# Patient Record
Sex: FEMALE | Race: OTHER | NOT HISPANIC OR LATINO | URBAN - METROPOLITAN AREA
[De-identification: names, ages, dates, MRNs, and addresses within clinical notes are randomized per-mention and may not be internally consistent; named-entity substitution may affect disease eponyms.]

---

## 2020-09-14 ENCOUNTER — EMERGENCY (EMERGENCY)
Facility: HOSPITAL | Age: 45
LOS: 1 days | Discharge: HOME | End: 2020-09-14
Attending: HOSPITALIST | Admitting: HOSPITALIST
Payer: COMMERCIAL

## 2020-09-14 VITALS
RESPIRATION RATE: 16 BRPM | TEMPERATURE: 98 F | SYSTOLIC BLOOD PRESSURE: 140 MMHG | HEART RATE: 83 BPM | DIASTOLIC BLOOD PRESSURE: 85 MMHG | OXYGEN SATURATION: 99 %

## 2020-09-14 VITALS
TEMPERATURE: 99 F | HEART RATE: 86 BPM | RESPIRATION RATE: 16 BRPM | SYSTOLIC BLOOD PRESSURE: 150 MMHG | OXYGEN SATURATION: 99 % | DIASTOLIC BLOOD PRESSURE: 87 MMHG

## 2020-09-14 LAB
APPEARANCE UR: CLEAR — SIGNIFICANT CHANGE UP
BACTERIA # UR AUTO: SIGNIFICANT CHANGE UP
BILIRUB UR-MCNC: NEGATIVE — SIGNIFICANT CHANGE UP
BLOOD UR QL VISUAL: SIGNIFICANT CHANGE UP
COLOR SPEC: YELLOW — SIGNIFICANT CHANGE UP
GLUCOSE UR-MCNC: NEGATIVE — SIGNIFICANT CHANGE UP
HYALINE CASTS # UR AUTO: NEGATIVE — SIGNIFICANT CHANGE UP
KETONES UR-MCNC: NEGATIVE — SIGNIFICANT CHANGE UP
LEUKOCYTE ESTERASE UR-ACNC: NEGATIVE — SIGNIFICANT CHANGE UP
MUCOUS THREADS # UR AUTO: SIGNIFICANT CHANGE UP
NITRITE UR-MCNC: NEGATIVE — SIGNIFICANT CHANGE UP
PH UR: 7.5 — SIGNIFICANT CHANGE UP (ref 5–8)
PROT UR-MCNC: 20 — SIGNIFICANT CHANGE UP
RBC CASTS # UR COMP ASSIST: HIGH (ref 0–?)
SP GR SPEC: 1.03 — SIGNIFICANT CHANGE UP (ref 1–1.04)
SQUAMOUS # UR AUTO: SIGNIFICANT CHANGE UP
UROBILINOGEN FLD QL: NORMAL — SIGNIFICANT CHANGE UP
WBC UR QL: SIGNIFICANT CHANGE UP (ref 0–?)

## 2020-09-14 PROCEDURE — 99284 EMERGENCY DEPT VISIT MOD MDM: CPT

## 2020-09-14 PROCEDURE — 76830 TRANSVAGINAL US NON-OB: CPT | Mod: 26

## 2020-09-14 RX ORDER — IBUPROFEN 200 MG
600 TABLET ORAL ONCE
Refills: 0 | Status: COMPLETED | OUTPATIENT
Start: 2020-09-14 | End: 2020-09-14

## 2020-09-14 RX ADMIN — Medication 600 MILLIGRAM(S): at 21:10

## 2020-09-14 NOTE — ED ADULT TRIAGE NOTE - CHIEF COMPLAINT QUOTE
pt c/o dysuria and lower abdominal pain for approx 4 days. Pt unsure if she has had fevers but feels like she has chills. Denies PMH. Appears comfortable.

## 2020-09-14 NOTE — ED PROVIDER NOTE - NSFOLLOWUPINSTRUCTIONS_ED_ALL_ED_FT
please follow up with a primary care doctor, referral list provided as requested  return for any new or worsening pain  Take tylenol as needed for pain, 650Mg every 6-8 hours.  You can also take ibuprofen as needed for pain, 600mg every 6-8 hours, take with food.

## 2020-09-14 NOTE — ED PROVIDER NOTE - NS_ ATTENDINGSCRIBEDETAILS _ED_A_ED_FT
Ashley Lebron MD: The history, relevant review of systems, past medical and surgical history, medical decision making, and physical examination was documented by the scribe in my presence and I attest to the accuracy of the documentation.

## 2020-09-14 NOTE — ED ADULT NURSE NOTE - OBJECTIVE STATEMENT
patient aaox3. ambulatory w/o assist. came in with lower abdominal pain 8/10 radiating to the back, dysuria, foul smelling urine. denies hematuria, vaginal discharge, bleeding, chance of pregnancy. also reports chills, no fever denies nausea, vomiting. came from [place] a month ago. urine tests sent earlier. Will continue to monitor

## 2020-09-14 NOTE — ED PROVIDER NOTE - PATIENT PORTAL LINK FT
You can access the FollowMyHealth Patient Portal offered by [facility] by registering at the following website: [email or web address]. By joining [employer]’s FollowMyHealth portal, you will also be able to view your health information using other applications (apps) compatible with our system.

## 2020-09-17 RX ORDER — MOXIFLOXACIN HYDROCHLORIDE TABLETS, 400 MG 400 MG/1
1 TABLET, FILM COATED ORAL
Qty: 20 | Refills: 0
Start: 2020-09-17 | End: 2020-09-26

## 2020-09-22 ENCOUNTER — EMERGENCY (EMERGENCY)
Facility: HOSPITAL | Age: 45
LOS: 1 days | Discharge: HOME | End: 2020-09-22
Attending: STUDENT IN AN ORGANIZED HEALTH CARE EDUCATION/TRAINING PROGRAM | Admitting: STUDENT IN AN ORGANIZED HEALTH CARE EDUCATION/TRAINING PROGRAM
Payer: COMMERCIAL

## 2020-09-22 VITALS
SYSTOLIC BLOOD PRESSURE: 120 MMHG | OXYGEN SATURATION: 100 % | TEMPERATURE: 98 F | RESPIRATION RATE: 20 BRPM | DIASTOLIC BLOOD PRESSURE: 48 MMHG | HEART RATE: 76 BPM

## 2020-09-22 VITALS
HEART RATE: 60 BPM | TEMPERATURE: 98 F | RESPIRATION RATE: 19 BRPM | SYSTOLIC BLOOD PRESSURE: 133 MMHG | DIASTOLIC BLOOD PRESSURE: 64 MMHG | OXYGEN SATURATION: 99 %

## 2020-09-22 LAB
ALBUMIN SERPL ELPH-MCNC: 4.5 G/DL — SIGNIFICANT CHANGE UP (ref 3.3–5)
ALP SERPL-CCNC: 80 U/L — SIGNIFICANT CHANGE UP (ref 40–120)
ALT FLD-CCNC: 19 U/L — SIGNIFICANT CHANGE UP (ref 4–33)
ANION GAP SERPL CALC-SCNC: 12 MMO/L — SIGNIFICANT CHANGE UP (ref 7–14)
APPEARANCE UR: CLEAR — SIGNIFICANT CHANGE UP
AST SERPL-CCNC: 31 U/L — SIGNIFICANT CHANGE UP (ref 4–32)
BASE EXCESS BLDV CALC-SCNC: 2.3 MMOL/L — SIGNIFICANT CHANGE UP
BASOPHILS # BLD AUTO: 0.04 K/UL — SIGNIFICANT CHANGE UP (ref 0–0.2)
BASOPHILS NFR BLD AUTO: 0.7 % — SIGNIFICANT CHANGE UP (ref 0–2)
BILIRUB SERPL-MCNC: 0.2 MG/DL — SIGNIFICANT CHANGE UP (ref 0.2–1.2)
BILIRUB UR-MCNC: NEGATIVE — SIGNIFICANT CHANGE UP
BLOOD GAS VENOUS - CREATININE: 0.81 MG/DL — SIGNIFICANT CHANGE UP (ref 0.5–1.3)
BLOOD GAS VENOUS - FIO2: 21 — SIGNIFICANT CHANGE UP
BLOOD UR QL VISUAL: NEGATIVE — SIGNIFICANT CHANGE UP
BUN SERPL-MCNC: 9 MG/DL — SIGNIFICANT CHANGE UP (ref 7–23)
CALCIUM SERPL-MCNC: 10.2 MG/DL — SIGNIFICANT CHANGE UP (ref 8.4–10.5)
CHLORIDE BLDV-SCNC: 109 MMOL/L — HIGH (ref 96–108)
CHLORIDE SERPL-SCNC: 103 MMOL/L — SIGNIFICANT CHANGE UP (ref 98–107)
CO2 SERPL-SCNC: 24 MMOL/L — SIGNIFICANT CHANGE UP (ref 22–31)
COLOR SPEC: SIGNIFICANT CHANGE UP
CREAT SERPL-MCNC: 0.79 MG/DL — SIGNIFICANT CHANGE UP (ref 0.5–1.3)
EOSINOPHIL # BLD AUTO: 0.18 K/UL — SIGNIFICANT CHANGE UP (ref 0–0.5)
EOSINOPHIL NFR BLD AUTO: 3 % — SIGNIFICANT CHANGE UP (ref 0–6)
GAS PNL BLDV: 133 MMOL/L — LOW (ref 136–146)
GLUCOSE BLDV-MCNC: 90 MG/DL — SIGNIFICANT CHANGE UP (ref 70–99)
GLUCOSE SERPL-MCNC: 93 MG/DL — SIGNIFICANT CHANGE UP (ref 70–99)
GLUCOSE UR-MCNC: NEGATIVE — SIGNIFICANT CHANGE UP
HCO3 BLDV-SCNC: 25 MMOL/L — SIGNIFICANT CHANGE UP (ref 20–27)
HCT VFR BLD CALC: 39.1 % — SIGNIFICANT CHANGE UP (ref 34.5–45)
HCT VFR BLDV CALC: 39.7 % — SIGNIFICANT CHANGE UP (ref 34.5–45)
HGB BLD-MCNC: 12.2 G/DL — SIGNIFICANT CHANGE UP (ref 11.5–15.5)
HGB BLDV-MCNC: 12.9 G/DL — SIGNIFICANT CHANGE UP (ref 11.5–15.5)
IMM GRANULOCYTES NFR BLD AUTO: 0.3 % — SIGNIFICANT CHANGE UP (ref 0–1.5)
KETONES UR-MCNC: NEGATIVE — SIGNIFICANT CHANGE UP
LACTATE BLDV-MCNC: 1.4 MMOL/L — SIGNIFICANT CHANGE UP (ref 0.5–2)
LEUKOCYTE ESTERASE UR-ACNC: NEGATIVE — SIGNIFICANT CHANGE UP
LYMPHOCYTES # BLD AUTO: 1.72 K/UL — SIGNIFICANT CHANGE UP (ref 1–3.3)
LYMPHOCYTES # BLD AUTO: 28.5 % — SIGNIFICANT CHANGE UP (ref 13–44)
MCHC RBC-ENTMCNC: 28.9 PG — SIGNIFICANT CHANGE UP (ref 27–34)
MCHC RBC-ENTMCNC: 31.2 % — LOW (ref 32–36)
MCV RBC AUTO: 92.7 FL — SIGNIFICANT CHANGE UP (ref 80–100)
MONOCYTES # BLD AUTO: 0.29 K/UL — SIGNIFICANT CHANGE UP (ref 0–0.9)
MONOCYTES NFR BLD AUTO: 4.8 % — SIGNIFICANT CHANGE UP (ref 2–14)
NEUTROPHILS # BLD AUTO: 3.79 K/UL — SIGNIFICANT CHANGE UP (ref 1.8–7.4)
NEUTROPHILS NFR BLD AUTO: 62.7 % — SIGNIFICANT CHANGE UP (ref 43–77)
NITRITE UR-MCNC: NEGATIVE — SIGNIFICANT CHANGE UP
NRBC # FLD: 0 K/UL — SIGNIFICANT CHANGE UP (ref 0–0)
PCO2 BLDV: 49 MMHG — SIGNIFICANT CHANGE UP (ref 41–51)
PH BLDV: 7.36 PH — SIGNIFICANT CHANGE UP (ref 7.32–7.43)
PH UR: 7.5 — SIGNIFICANT CHANGE UP (ref 5–8)
PLATELET # BLD AUTO: 254 K/UL — SIGNIFICANT CHANGE UP (ref 150–400)
PMV BLD: 11.7 FL — SIGNIFICANT CHANGE UP (ref 7–13)
PO2 BLDV: 32 MMHG — LOW (ref 35–40)
POTASSIUM BLDV-SCNC: 4 MMOL/L — SIGNIFICANT CHANGE UP (ref 3.4–4.5)
POTASSIUM SERPL-MCNC: 4.3 MMOL/L — SIGNIFICANT CHANGE UP (ref 3.5–5.3)
POTASSIUM SERPL-SCNC: 4.3 MMOL/L — SIGNIFICANT CHANGE UP (ref 3.5–5.3)
PROT SERPL-MCNC: 6.8 G/DL — SIGNIFICANT CHANGE UP (ref 6–8.3)
PROT UR-MCNC: 10 — SIGNIFICANT CHANGE UP
RBC # BLD: 4.22 M/UL — SIGNIFICANT CHANGE UP (ref 3.8–5.2)
RBC # FLD: 12.8 % — SIGNIFICANT CHANGE UP (ref 10.3–14.5)
SAO2 % BLDV: 53.7 % — LOW (ref 60–85)
SODIUM SERPL-SCNC: 139 MMOL/L — SIGNIFICANT CHANGE UP (ref 135–145)
SP GR SPEC: 1.01 — SIGNIFICANT CHANGE UP (ref 1–1.04)
UROBILINOGEN FLD QL: NORMAL — SIGNIFICANT CHANGE UP
WBC # BLD: 6.04 K/UL — SIGNIFICANT CHANGE UP (ref 3.8–10.5)
WBC # FLD AUTO: 6.04 K/UL — SIGNIFICANT CHANGE UP (ref 3.8–10.5)

## 2020-09-22 PROCEDURE — 74177 CT ABD & PELVIS W/CONTRAST: CPT | Mod: 26

## 2020-09-22 PROCEDURE — 99284 EMERGENCY DEPT VISIT MOD MDM: CPT

## 2020-09-22 RX ORDER — KETOROLAC TROMETHAMINE 30 MG/ML
15 SYRINGE (ML) INJECTION ONCE
Refills: 0 | Status: DISCONTINUED | OUTPATIENT
Start: 2020-09-22 | End: 2020-09-22

## 2020-09-22 RX ORDER — SODIUM CHLORIDE 9 MG/ML
1000 INJECTION INTRAMUSCULAR; INTRAVENOUS; SUBCUTANEOUS ONCE
Refills: 0 | Status: COMPLETED | OUTPATIENT
Start: 2020-09-22 | End: 2020-09-22

## 2020-09-22 RX ADMIN — Medication 15 MILLIGRAM(S): at 18:29

## 2020-09-22 RX ADMIN — Medication 15 MILLIGRAM(S): at 16:00

## 2020-09-22 RX ADMIN — SODIUM CHLORIDE 1000 MILLILITER(S): 9 INJECTION INTRAMUSCULAR; INTRAVENOUS; SUBCUTANEOUS at 16:00

## 2020-09-22 RX ADMIN — SODIUM CHLORIDE 1000 MILLILITER(S): 9 INJECTION INTRAMUSCULAR; INTRAVENOUS; SUBCUTANEOUS at 17:00

## 2020-09-22 NOTE — ED PROVIDER NOTE - PROGRESS NOTE DETAILS
ITA Aldana: Pt reassessed, states back pain has improved although still feels some discomfort. CT abdomen consistent with mild wall thickening of the sigmoid which may be partially due to underdistention vs colitis. Hx clinically not consistent w/ colitis. Sx likely related to UTI. Discussed labs and imaging with patient, agrees to follow up with her primary care doctor for further management. Pt instructed to abstain from sexual intercourse until completion of her abx.

## 2020-09-22 NOTE — ED PROVIDER NOTE - CARE PLAN
Principal Discharge DX:	Abdominal pain  Secondary Diagnosis:	Back pain  Secondary Diagnosis:	UTI (urinary tract infection)

## 2020-09-22 NOTE — ED PROVIDER NOTE - CLINICAL SUMMARY MEDICAL DECISION MAKING FREE TEXT BOX
46 y/o F no PMHx here c/o lower abdominal and lower back pain, worsening x 1 week. Abdomen tender in RLQ. Sx likely related to UTI but will do ct abdomen to r/o appendicitis.

## 2020-09-22 NOTE — ED PROVIDER NOTE - ATTENDING CONTRIBUTION TO CARE
Alex RYAN: I agree with the above provided history and exam and addend/modify it as follows.    46 y/o F no PMHx here c/o lower abdominal and lower back pain, worsening x 1 week. Was seen in the ED 1 week ago for urinary sx, diagnosed with a UTI and treated with Cipro BID x 10 days. Patient reports diffuse pain w/ chills, and nausea. No fever, vomit, cough, sob, dizziness.     On exam patient diffusely tender over abdomen, particularly bilateral lower abd. Otherwise no CVA TTP, no erythema, rash, fever,     I Hector Johnson MD performed a history and physical exam of the patient and discussed their management with the resident and /or advanced care provider. I reviewed the resident and /or ACP's note and agree with the documented findings and plan of care. My medical decision making and observations are found above. Alex RYAN: I agree with the above provided history and exam and addend/modify it as follows.    46 y/o F no PMHx here c/o lower abdominal and lower back pain, worsening x 1 week. Was seen in the ED 1 week ago for urinary sx, diagnosed with a UTI and treated with Cipro BID x 10 days. Patient reports diffuse pain w/ chills, and nausea. No fever, vomit, cough, sob, dizziness.     On exam patient diffusely tender over abdomen, particularly bilateral lower abd. Otherwise no CVA TTP, no erythema, rash, tachycardia, etc.    Possible sx 2/2 UTI, ?pyelo, but also will check CTAP for appendicitis/diverticulitis, will reassess, consider dispo after    I Hector Johnson MD performed a history and physical exam of the patient and discussed their management with the resident and /or advanced care provider. I reviewed the resident and /or ACP's note and agree with the documented findings and plan of care. My medical decision making and observations are found above.

## 2020-09-22 NOTE — ED ADULT NURSE NOTE - OBJECTIVE STATEMENT
Patient was treated for a UTI, Called back, given medication for a kidney infection. Pt was to take antibiotic for 10 days. Pt is on her third day of medication and is c/o RT flank pain. 20 g line placed in LT AC, labs sent.

## 2020-09-22 NOTE — ED PROVIDER NOTE - OBJECTIVE STATEMENT
44 y/o F no PMHx here c/o lower abdominal and lower back pain, worsening x 1 week. Was seen in the ED 1 week ago for urinary sx, diagnosed with a UTI and treated with Cipro BID x 10 days. States she has been taking the abx for 3 days with worsening back and abdominal pain. States she feels run down. +chills, no fevers. No vaginal discharge. Sexually active w/ 1 partner. Last pelvic exam was 6 months ago, normal STD screen at the time.

## 2020-09-22 NOTE — ED PROVIDER NOTE - PATIENT PORTAL LINK FT
You can access the FollowMyHealth Patient Portal offered by [facility] by registering at the following website: [email or web address]. By joining [employer]’s FollowMyHealth portal, you will also be able to view your health information using other applications (apps) compatible with our system.

## 2020-09-22 NOTE — ED ADULT TRIAGE NOTE - CHIEF COMPLAINT QUOTE
Patient was treated for a UTI, Called back, given medication for a kidney infection. Pt was to take antibiotic for 10 days. Pt is on her third day of medication and is c/o pain still.

## 2020-09-22 NOTE — ED PROVIDER NOTE - NSFOLLOWUPINSTRUCTIONS_ED_ALL_ED_FT
See your primary care doctor within 24-48 hours, bring copies of all reports with you. Finish the full course of Cipro as prescribed to you. Take Motrin 600mg every 8hrs with food for pain. Drink plenty of fluids. Abstain from sexual intercourse until completion of your antibiotic. Return to the ER for worsening symptoms or any other concerns.

## 2020-09-23 LAB
CULTURE RESULTS: NO GROWTH — SIGNIFICANT CHANGE UP
SPECIMEN SOURCE: SIGNIFICANT CHANGE UP

## 2020-11-29 ENCOUNTER — INPATIENT (INPATIENT)
Facility: HOSPITAL | Age: 45
LOS: 3 days | Discharge: LEFT BEFORE DISCHARGE | End: 2020-12-03
Attending: INTERNAL MEDICINE | Admitting: INTERNAL MEDICINE
Payer: COMMERCIAL

## 2020-11-29 VITALS
SYSTOLIC BLOOD PRESSURE: 134 MMHG | TEMPERATURE: 98 F | DIASTOLIC BLOOD PRESSURE: 81 MMHG | RESPIRATION RATE: 20 BRPM | OXYGEN SATURATION: 100 % | HEART RATE: 102 BPM

## 2020-11-29 DIAGNOSIS — Z90.710 ACQUIRED ABSENCE OF BOTH CERVIX AND UTERUS: Chronic | ICD-10-CM

## 2020-11-29 LAB
ALBUMIN SERPL ELPH-MCNC: 4.1 G/DL — SIGNIFICANT CHANGE UP (ref 3.3–5)
ALP SERPL-CCNC: 86 U/L — SIGNIFICANT CHANGE UP (ref 40–120)
ALT FLD-CCNC: 17 U/L — SIGNIFICANT CHANGE UP (ref 4–33)
ANION GAP SERPL CALC-SCNC: 8 MMO/L — SIGNIFICANT CHANGE UP (ref 7–14)
APPEARANCE UR: SIGNIFICANT CHANGE UP
AST SERPL-CCNC: 31 U/L — SIGNIFICANT CHANGE UP (ref 4–32)
BACTERIA # UR AUTO: SIGNIFICANT CHANGE UP
BASOPHILS # BLD AUTO: 0.04 K/UL — SIGNIFICANT CHANGE UP (ref 0–0.2)
BASOPHILS NFR BLD AUTO: 0.6 % — SIGNIFICANT CHANGE UP (ref 0–2)
BILIRUB SERPL-MCNC: 0.3 MG/DL — SIGNIFICANT CHANGE UP (ref 0.2–1.2)
BILIRUB UR-MCNC: NEGATIVE — SIGNIFICANT CHANGE UP
BLOOD UR QL VISUAL: NEGATIVE — SIGNIFICANT CHANGE UP
BUN SERPL-MCNC: 9 MG/DL — SIGNIFICANT CHANGE UP (ref 7–23)
CALCIUM SERPL-MCNC: 9.2 MG/DL — SIGNIFICANT CHANGE UP (ref 8.4–10.5)
CHLORIDE SERPL-SCNC: 106 MMOL/L — SIGNIFICANT CHANGE UP (ref 98–107)
CO2 SERPL-SCNC: 25 MMOL/L — SIGNIFICANT CHANGE UP (ref 22–31)
COLOR SPEC: YELLOW — SIGNIFICANT CHANGE UP
CREAT SERPL-MCNC: 0.71 MG/DL — SIGNIFICANT CHANGE UP (ref 0.5–1.3)
EOSINOPHIL # BLD AUTO: 0.17 K/UL — SIGNIFICANT CHANGE UP (ref 0–0.5)
EOSINOPHIL NFR BLD AUTO: 2.5 % — SIGNIFICANT CHANGE UP (ref 0–6)
GLUCOSE SERPL-MCNC: 77 MG/DL — SIGNIFICANT CHANGE UP (ref 70–99)
GLUCOSE UR-MCNC: NEGATIVE — SIGNIFICANT CHANGE UP
HCT VFR BLD CALC: 35.2 % — SIGNIFICANT CHANGE UP (ref 34.5–45)
HGB BLD-MCNC: 11.4 G/DL — LOW (ref 11.5–15.5)
HYALINE CASTS # UR AUTO: NEGATIVE — SIGNIFICANT CHANGE UP
IMM GRANULOCYTES NFR BLD AUTO: 0.3 % — SIGNIFICANT CHANGE UP (ref 0–1.5)
KETONES UR-MCNC: NEGATIVE — SIGNIFICANT CHANGE UP
LEUKOCYTE ESTERASE UR-ACNC: NEGATIVE — SIGNIFICANT CHANGE UP
LYMPHOCYTES # BLD AUTO: 1.25 K/UL — SIGNIFICANT CHANGE UP (ref 1–3.3)
LYMPHOCYTES # BLD AUTO: 18.6 % — SIGNIFICANT CHANGE UP (ref 13–44)
MCHC RBC-ENTMCNC: 28.9 PG — SIGNIFICANT CHANGE UP (ref 27–34)
MCHC RBC-ENTMCNC: 32.4 % — SIGNIFICANT CHANGE UP (ref 32–36)
MCV RBC AUTO: 89.3 FL — SIGNIFICANT CHANGE UP (ref 80–100)
MONOCYTES # BLD AUTO: 0.54 K/UL — SIGNIFICANT CHANGE UP (ref 0–0.9)
MONOCYTES NFR BLD AUTO: 8 % — SIGNIFICANT CHANGE UP (ref 2–14)
NEUTROPHILS # BLD AUTO: 4.7 K/UL — SIGNIFICANT CHANGE UP (ref 1.8–7.4)
NEUTROPHILS NFR BLD AUTO: 70 % — SIGNIFICANT CHANGE UP (ref 43–77)
NITRITE UR-MCNC: NEGATIVE — SIGNIFICANT CHANGE UP
NRBC # FLD: 0 K/UL — SIGNIFICANT CHANGE UP (ref 0–0)
PH UR: 8.5 — HIGH (ref 5–8)
PLATELET # BLD AUTO: 230 K/UL — SIGNIFICANT CHANGE UP (ref 150–400)
PMV BLD: 10.7 FL — SIGNIFICANT CHANGE UP (ref 7–13)
POTASSIUM SERPL-MCNC: 4 MMOL/L — SIGNIFICANT CHANGE UP (ref 3.5–5.3)
POTASSIUM SERPL-SCNC: 4 MMOL/L — SIGNIFICANT CHANGE UP (ref 3.5–5.3)
PROT SERPL-MCNC: 6.7 G/DL — SIGNIFICANT CHANGE UP (ref 6–8.3)
PROT UR-MCNC: 30 — SIGNIFICANT CHANGE UP
RBC # BLD: 3.94 M/UL — SIGNIFICANT CHANGE UP (ref 3.8–5.2)
RBC # FLD: 12.7 % — SIGNIFICANT CHANGE UP (ref 10.3–14.5)
RBC CASTS # UR COMP ASSIST: >50 — HIGH (ref 0–?)
SODIUM SERPL-SCNC: 139 MMOL/L — SIGNIFICANT CHANGE UP (ref 135–145)
SP GR SPEC: 1.02 — SIGNIFICANT CHANGE UP (ref 1–1.04)
SQUAMOUS # UR AUTO: SIGNIFICANT CHANGE UP
UROBILINOGEN FLD QL: NORMAL — SIGNIFICANT CHANGE UP
WBC # BLD: 6.72 K/UL — SIGNIFICANT CHANGE UP (ref 3.8–10.5)
WBC # FLD AUTO: 6.72 K/UL — SIGNIFICANT CHANGE UP (ref 3.8–10.5)
WBC UR QL: SIGNIFICANT CHANGE UP (ref 0–?)

## 2020-11-29 PROCEDURE — 76830 TRANSVAGINAL US NON-OB: CPT | Mod: 26

## 2020-11-29 PROCEDURE — 74177 CT ABD & PELVIS W/CONTRAST: CPT | Mod: 26

## 2020-11-29 RX ORDER — MORPHINE SULFATE 50 MG/1
4 CAPSULE, EXTENDED RELEASE ORAL ONCE
Refills: 0 | Status: DISCONTINUED | OUTPATIENT
Start: 2020-11-29 | End: 2020-11-29

## 2020-11-29 RX ORDER — KETOROLAC TROMETHAMINE 30 MG/ML
30 SYRINGE (ML) INJECTION EVERY 8 HOURS
Refills: 0 | Status: DISCONTINUED | OUTPATIENT
Start: 2020-11-29 | End: 2020-12-02

## 2020-11-29 RX ORDER — KETOROLAC TROMETHAMINE 30 MG/ML
30 SYRINGE (ML) INJECTION ONCE
Refills: 0 | Status: DISCONTINUED | OUTPATIENT
Start: 2020-11-29 | End: 2020-11-29

## 2020-11-29 RX ORDER — MORPHINE SULFATE 50 MG/1
4 CAPSULE, EXTENDED RELEASE ORAL EVERY 6 HOURS
Refills: 0 | Status: DISCONTINUED | OUTPATIENT
Start: 2020-11-29 | End: 2020-12-03

## 2020-11-29 RX ORDER — SODIUM CHLORIDE 9 MG/ML
1000 INJECTION INTRAMUSCULAR; INTRAVENOUS; SUBCUTANEOUS ONCE
Refills: 0 | Status: COMPLETED | OUTPATIENT
Start: 2020-11-29 | End: 2020-11-29

## 2020-11-29 RX ORDER — SODIUM CHLORIDE 9 MG/ML
1000 INJECTION, SOLUTION INTRAVENOUS
Refills: 0 | Status: DISCONTINUED | OUTPATIENT
Start: 2020-11-29 | End: 2020-12-02

## 2020-11-29 RX ADMIN — Medication 1 TABLET(S): at 22:47

## 2020-11-29 RX ADMIN — SODIUM CHLORIDE 1000 MILLILITER(S): 9 INJECTION INTRAMUSCULAR; INTRAVENOUS; SUBCUTANEOUS at 17:35

## 2020-11-29 RX ADMIN — Medication 30 MILLIGRAM(S): at 21:30

## 2020-11-29 RX ADMIN — Medication 30 MILLIGRAM(S): at 20:57

## 2020-11-29 RX ADMIN — SODIUM CHLORIDE 75 MILLILITER(S): 9 INJECTION, SOLUTION INTRAVENOUS at 22:47

## 2020-11-29 RX ADMIN — MORPHINE SULFATE 4 MILLIGRAM(S): 50 CAPSULE, EXTENDED RELEASE ORAL at 22:47

## 2020-11-29 RX ADMIN — MORPHINE SULFATE 4 MILLIGRAM(S): 50 CAPSULE, EXTENDED RELEASE ORAL at 17:41

## 2020-11-29 NOTE — ED CDU PROVIDER INITIAL DAY NOTE - ATTENDING CONTRIBUTION TO CARE
ED Attending (Dr Bowen): I have personally performed a face to face bedside history and physical examination of this patient.  I have discussed the case with the PA and  I have personally authored the following components: HPI, ROS, Physical Exam and MDM in addition to reviewing and revising the rest of the Provider Note. Pt p/w LLQ pain, found to have acute sigmoid diverticulitis and possible hemorrhagic ovarian cyst. Still with pain and tenderness to LLQ. Labs unremarkable. Pt requiring multiple doses of IV analgesia. Plan to observe overnight in CDU to continue pain control and antibiotics. Reassess in AM.

## 2020-11-29 NOTE — ED CDU PROVIDER INITIAL DAY NOTE - MEDICAL DECISION MAKING DETAILS
acute sigmoid diverticulitis. Labs unremarkable. Pt requiring multiple doses of IV analgesia. Plan to observe overnight in CDU to continue pain control and antibiotics. If pain controlled overnight pt can likely be d/c in morning. Pt p/w LLQ pain, found to have acute sigmoid diverticulitis and possible hemorrhagic ovarian cyst. Still with pain and tenderness to LLQ. Labs unremarkable. Pt requiring multiple doses of IV analgesia. Plan to observe overnight in CDU to continue pain control and antibiotics. Reassess in AM.

## 2020-11-29 NOTE — ED PROVIDER NOTE - CLINICAL SUMMARY MEDICAL DECISION MAKING FREE TEXT BOX
46 y/o h/o endometrial cancer s/p hysterectomy without BSO presents with severe pelvic pain. Mostly tender to left adnexa with vaginal discharge. Concern for GC chlamydia vs PAD. Pain control, transvaginal US, urine culture and UA. R/O pregnancy and reassess. 46 y/o h/o endometrial cancer s/p hysterectomy without BSO presents with severe pelvic pain. Mostly tender to left adnexa with vaginal discharge. Concern for GC chlamydia vs PID. Pain control, transvaginal US, urine culture and UA. R/O pregnancy and reassess.

## 2020-11-29 NOTE — ED ADULT NURSE REASSESSMENT NOTE - COMFORT CARE
plan of care explained
wait time explained/plan of care explained/ambulatory in area, placed on stretcher for comfort

## 2020-11-29 NOTE — ED ADULT NURSE REASSESSMENT NOTE - SYMPTOMS
complains of increasing lower abd pain 9/10 currently,  denies nausea and vomiting
c.o. continued pain in LLQ, unrelieved by tordol
states pain is better after morphine, decreased to 4/10, resting quietly

## 2020-11-29 NOTE — ED PROVIDER NOTE - PHYSICAL EXAMINATION
pt appears uncomfortable and is clutching lower abdomen and using a heating pad   Pelvic exam chaperone RN   tender at left adnexa and right adnexa, external genital normal, TTP left adnexa and right adnexa more left, mild watery discharge, no swabs available in ED for a GC pt appears uncomfortable and is clutching lower abdomen and using a heating pad   Pelvic exam chaperone RN Mara  tender at left adnexa and right adnexa, external genital normal, TTP left adnexa and right adnexa more left, mild watery discharge, no swabs available in ED for a GC

## 2020-11-29 NOTE — ED PROVIDER NOTE - OBJECTIVE STATEMENT
46 y/o F with PMHx of endometrial cancer s/p hysterectomy several years ago presents to the ED with severe lower abdominal pian with watery discharge for the last 3 days. Denies n/v/d, fever, chills, vaginal bleeding. Reports pain is stabbing, constant and severe. Tried Ibuprofen without relief. Pt recently  and sexually active with one male partner. Denies cough, SOB, upper abdominal pain. Pain is much worse with palpitations. No alleviating factors. 44 y/o F with PMHx of endometrial cancer s/p hysterectomy (no BSO) several years ago presents to the ED with severe lower abdominal pian with watery discharge for the last 3 days. Denies n/v/d, fever, chills, vaginal bleeding. Reports pain is stabbing, constant and severe. Tried Ibuprofen without relief. Pt recently  and sexually active with one male partner. Denies cough, SOB, upper abdominal pain. Pain is much worse with palpitations. No alleviating factors.

## 2020-11-29 NOTE — ED ADULT NURSE REASSESSMENT NOTE - ANCILLARY STATUS
radiology results pending
covid swab done/lab results pending/cardiovascular tests pending
lab results pending

## 2020-11-29 NOTE — ED PROVIDER NOTE - PROGRESS NOTE DETAILS
Reviewed imaging c pt incl known ovarian cyst and divertic.  Pt states NSAID not helping, though felt somewhat better c Morphine. Agreeable to CDU. Accepted for abx, pain control, AM reassessment.

## 2020-11-29 NOTE — ED ADULT NURSE REASSESSMENT NOTE - REASSESS COMMUNICATION
medicated as ordered/ED physician notified
moved to intake area for continued evaluation, report to area RNs
ED physician notified/medicated as ordered

## 2020-11-29 NOTE — ED ADULT NURSE NOTE - OBJECTIVE STATEMENT
pt received to intake, a&ox 3, ambulatory, pmh of hysertectomy, ovarian cysts p/w lower abdominal/pelvic pain since friday. Pt breathing even and unlabored on room air. Denies fever, chills, cough, SOB, chest pain, palpitations, dizziness, N/V/D, constipation, numbness, tingling. Right 20g IV placed. Labs collected and sent. pending eval.

## 2020-11-29 NOTE — ED CDU PROVIDER INITIAL DAY NOTE - PROGRESS NOTE DETAILS
Pt signed out to me by ITA [place], 45yF w/pmhx endometrial cancer s/p hysterectomy p/w lower abd pain. On CT pt found to have acute sigmoid diverticulitis, pt sent to CDU for pain control and PO Augmentin. TVUS with left sided ovarian cyst Spoke with surgery, will consult on pt given pt reports continued severe pain requiring multiple doses of pain medication

## 2020-11-29 NOTE — ED CDU PROVIDER INITIAL DAY NOTE - OBJECTIVE STATEMENT
44 y/o F with PMHx of endometrial cancer s/p hysterectomy (no BSO) several years ago presents to the ED with severe LLQ pain x 3 days Denies n/v/d, fever, chills, vaginal bleeding. Reports pain is stabbing, constant and severe. Tried Ibuprofen without relief. Pt recently  and sexually active with one male partner. Denies cough, SOB, upper abdominal pain. Pain is much worse with palpitations. No alleviating factors.  CTAP reveals acute sigmoid diverticulitis. Labs unremarkable. Pt requiring multiple doses of IV analgesia. Plan to observe overnight in CDU to continue pain control and antibiotics. If pain controlled overnight pt can likely be d/c in morning. 46 y/o F with PMHx of endometrial cancer s/p hysterectomy (no BSO) several years ago presents to the ED with severe LLQ pain x 3 days Denies n/v/d, fever, chills, vaginal bleeding. Reports pain is stabbing, constant and severe. Tried Ibuprofen without relief. Pt recently  and sexually active with one male partner. Denies cough, SOB, upper abdominal pain. Pain is much worse with palpitations. No alleviating factors. CTAP reveals acute sigmoid diverticulitis. Labs unremarkable. Pt requiring multiple doses of IV analgesia. Plan to observe overnight in CDU to continue pain control and antibiotics. If pain controlled overnight pt can likely be d/c in morning if improving.

## 2020-11-29 NOTE — ED ADULT TRIAGE NOTE - CHIEF COMPLAINT QUOTE
pt c/o lower abdominal pain since friday. endorses dizziness. no bleeding, n/v, fevers or chills. + discharge. hx cysts

## 2020-11-30 DIAGNOSIS — R10.30 LOWER ABDOMINAL PAIN, UNSPECIFIED: ICD-10-CM

## 2020-11-30 DIAGNOSIS — Z29.9 ENCOUNTER FOR PROPHYLACTIC MEASURES, UNSPECIFIED: ICD-10-CM

## 2020-11-30 DIAGNOSIS — Z02.9 ENCOUNTER FOR ADMINISTRATIVE EXAMINATIONS, UNSPECIFIED: ICD-10-CM

## 2020-11-30 DIAGNOSIS — N83.202 UNSPECIFIED OVARIAN CYST, LEFT SIDE: ICD-10-CM

## 2020-11-30 DIAGNOSIS — R07.9 CHEST PAIN, UNSPECIFIED: ICD-10-CM

## 2020-11-30 DIAGNOSIS — K57.92 DIVERTICULITIS OF INTESTINE, PART UNSPECIFIED, WITHOUT PERFORATION OR ABSCESS WITHOUT BLEEDING: ICD-10-CM

## 2020-11-30 LAB — SARS-COV-2 RNA SPEC QL NAA+PROBE: SIGNIFICANT CHANGE UP

## 2020-11-30 PROCEDURE — 99254 IP/OBS CNSLTJ NEW/EST MOD 60: CPT

## 2020-11-30 PROCEDURE — 99220: CPT

## 2020-11-30 PROCEDURE — 93010 ELECTROCARDIOGRAM REPORT: CPT

## 2020-11-30 RX ORDER — MORPHINE SULFATE 50 MG/1
4 CAPSULE, EXTENDED RELEASE ORAL ONCE
Refills: 0 | Status: DISCONTINUED | OUTPATIENT
Start: 2020-11-30 | End: 2020-11-30

## 2020-11-30 RX ORDER — AMPICILLIN SODIUM AND SULBACTAM SODIUM 250; 125 MG/ML; MG/ML
3 INJECTION, POWDER, FOR SUSPENSION INTRAMUSCULAR; INTRAVENOUS EVERY 6 HOURS
Refills: 0 | Status: DISCONTINUED | OUTPATIENT
Start: 2020-12-01 | End: 2020-12-03

## 2020-11-30 RX ORDER — ENOXAPARIN SODIUM 100 MG/ML
40 INJECTION SUBCUTANEOUS DAILY
Refills: 0 | Status: DISCONTINUED | OUTPATIENT
Start: 2020-11-30 | End: 2020-12-03

## 2020-11-30 RX ORDER — SENNA PLUS 8.6 MG/1
2 TABLET ORAL AT BEDTIME
Refills: 0 | Status: DISCONTINUED | OUTPATIENT
Start: 2020-11-30 | End: 2020-12-01

## 2020-11-30 RX ORDER — AMPICILLIN SODIUM AND SULBACTAM SODIUM 250; 125 MG/ML; MG/ML
3 INJECTION, POWDER, FOR SUSPENSION INTRAMUSCULAR; INTRAVENOUS ONCE
Refills: 0 | Status: COMPLETED | OUTPATIENT
Start: 2020-11-30 | End: 2020-11-30

## 2020-11-30 RX ORDER — AMPICILLIN SODIUM AND SULBACTAM SODIUM 250; 125 MG/ML; MG/ML
INJECTION, POWDER, FOR SUSPENSION INTRAMUSCULAR; INTRAVENOUS
Refills: 0 | Status: DISCONTINUED | OUTPATIENT
Start: 2020-11-30 | End: 2020-12-03

## 2020-11-30 RX ORDER — POLYETHYLENE GLYCOL 3350 17 G/17G
17 POWDER, FOR SOLUTION ORAL DAILY
Refills: 0 | Status: DISCONTINUED | OUTPATIENT
Start: 2020-11-30 | End: 2020-12-01

## 2020-11-30 RX ADMIN — Medication 30 MILLIGRAM(S): at 10:00

## 2020-11-30 RX ADMIN — Medication 30 MILLIGRAM(S): at 19:22

## 2020-11-30 RX ADMIN — ENOXAPARIN SODIUM 40 MILLIGRAM(S): 100 INJECTION SUBCUTANEOUS at 21:45

## 2020-11-30 RX ADMIN — Medication 30 MILLIGRAM(S): at 01:04

## 2020-11-30 RX ADMIN — MORPHINE SULFATE 4 MILLIGRAM(S): 50 CAPSULE, EXTENDED RELEASE ORAL at 07:07

## 2020-11-30 RX ADMIN — MORPHINE SULFATE 4 MILLIGRAM(S): 50 CAPSULE, EXTENDED RELEASE ORAL at 03:07

## 2020-11-30 RX ADMIN — Medication 30 MILLIGRAM(S): at 09:45

## 2020-11-30 RX ADMIN — Medication 1 TABLET(S): at 10:48

## 2020-11-30 RX ADMIN — AMPICILLIN SODIUM AND SULBACTAM SODIUM 200 GRAM(S): 250; 125 INJECTION, POWDER, FOR SUSPENSION INTRAMUSCULAR; INTRAVENOUS at 16:38

## 2020-11-30 RX ADMIN — MORPHINE SULFATE 4 MILLIGRAM(S): 50 CAPSULE, EXTENDED RELEASE ORAL at 02:52

## 2020-11-30 RX ADMIN — Medication 30 MILLIGRAM(S): at 09:00

## 2020-11-30 RX ADMIN — SENNA PLUS 2 TABLET(S): 8.6 TABLET ORAL at 21:44

## 2020-11-30 NOTE — CONSULT NOTE ADULT - SUBJECTIVE AND OBJECTIVE BOX
HPI:  Patricia is a very pleasant 45 year old lady with history of CHELSEA for uterine cancer with adjuvant XRT, ovarian cyst, abdominoplasty and breast reduction, presenting to ED c/o abdominal pain. Pt states she first had severe b/l LQ abdominal pain which woke her from sleep around 2AM on . Pain persisted and over the course of the day became localized to the LLQ. Pt thought the pain would pass and did not come to ED at the time, presented today as pain has not improved and has been constant since onset. She has never had this type of pain before. Pain is also associated with nausea but no vomiting. Pt has also been constipated, last BM Saturday and feels bloated at this time. States she typically is not constipated and has 1-2 BM's per day without straining. Denies any other complaints including recent illness/sick contacts, fevers/chills, HA/lightheadedness, chest pain/shortness of breath, vomiting, diarrhea.     PMHx: Endometrial cancer    No pertinent past medical history      PSHx: H/O: hysterectomy    No significant past surgical history      Medications (inpatient): amoxicillin  875 milliGRAM(s)/clavulanate 1 Tablet(s) Oral two times a day  lactated ringers. 1000 milliLiter(s) IV Continuous <Continuous>    Medications (PRN):ketorolac   Injectable 30 milliGRAM(s) IV Push every 8 hours PRN  morphine  - Injectable 4 milliGRAM(s) IV Push every 6 hours PRN    Allergies: codeine (Short breath)  (Intolerances: )  Social Hx:   Family Hx: No pertinent family history in first degree relatives        T(C): 36.6  HR: 76 (72 - 102)  BP: 115/58 (115/58 - 143/78)  RR: 18 (18 - 20)  SpO2: 100% (100% - 100%)  Tmax: T(C): , Max: 37.4 (11-29-20 @ 17:28)      Physical Exam:  General: Well-developed, in no acute distress   Neurologic: Awake, alert, GCS 15, No focal Deficits   Respiratory: Normal respiratory effort  CVS: RRR, perfusing adequately  Abdomen: Abdomen softly distended, moderate LLQ TTP, no rebound or guarding   Ext: Grossly symmetric, Moving all extremities  Skin: Warm, dry, intact, no erythema     Labs:                        11.4   6.72  )-----------( 230      ( 2020 17:20 )             35.2           139  |  106  |  9   ----------------------------<  77  4.0   |  25  |  0.71    Ca    9.2      2020 17:20    TPro  6.7  /  Alb  4.1  /  TBili  0.3  /  DBili  x   /  AST  31  /  ALT  17  /  AlkPhos  86      Urinalysis Basic - ( 2020 17:24 )    Color: YELLOW / Appearance: Lt TURBID / S.025 / pH: 8.5  Gluc: NEGATIVE / Ketone: NEGATIVE  / Bili: NEGATIVE / Urobili: NORMAL   Blood: NEGATIVE / Protein: 30 / Nitrite: NEGATIVE   Leuk Esterase: NEGATIVE / RBC: >50 / WBC 0-2   Sq Epi: MODERATE / Non Sq Epi: x / Bacteria: FEW            Imaging and other studies:  < from: CT Abdomen and Pelvis w/ IV Cont (20 @ 20:20) >    EXAM:  CT ABDOMEN AND PELVIS IC        PROCEDURE DATE:  2020         INTERPRETATION:  CLINICAL INFORMATION: Left lower quadrant/adnexal pain.    COMPARISON: Pelvic ultrasound from same day. CT abdomen and pelvis 2020.    PROCEDURE:  CT of the Abdomen and Pelvis was performed with intravenous contrast.  Intravenous contrast: 90 ml Omnipaque 350. 10 ml discarded.  Oral contrast: None.  Sagittal and coronal reformats were performed.    FINDINGS:  LOWER CHEST: Within normal limits.    LIVER: Within normal limits.  BILE DUCTS: Normal caliber.  GALLBLADDER: Within normal limits.  SPLEEN: Within normal limits.  PANCREAS: Within normal limits.  ADRENALS: Within normal limits.  KIDNEYS: Symmetrically enhancing without hydronephrosis.    BLADDER: Inadequately distended.  REPRODUCTIVE ORGANS: Hysterectomy. Redemonstrated complex 4.0 x 3.3 cm left adnexal cyst as better delineated on pelvic ultrasound from same day.    BOWEL: Scattered colonic diverticulosis. There is short segmental wall thickening of the sigmoid colonic loops with trace surrounding stranding at the level of the diverticuli, concerning for acute diverticulitis. Consider nonemergent colonoscopy evaluation once inflammation subsides to exclude underlying malignancy.No bowel obstruction. Appendix is normal.  PERITONEUM: No ascites.  VESSELS: Within normal limits.  RETROPERITONEUM/LYMPH NODES: No lymphadenopathy.  ABDOMINAL WALL: Within normal limits.  BONES: Degenerative changes of the spine.    IMPRESSION:    Findings concerning for acute sigmoid diverticulitis. Consider nonemergent colonoscopy evaluation once inflammation subsides to exclude underlying malignancy.No bowel obstruction.    Appendix is normal.    Hysterectomy. Redemonstrated complex 4 cm left adnexal cyst, as better delineated on pelvic ultrasound from same day. Short-term pelvic ultrasound imaging follow-up in 6 weeks is advised to demonstrate resolution.              ELGIN CAGLE M.D., RADIOLOGY RESIDENT  This document has been electronically signed.  AMY VIVAS MD; Attending Radiologist  This document has been electronically signed. 2020 10:22PM    < end of copied text >

## 2020-11-30 NOTE — CONSULT NOTE ADULT - ASSESSMENT
45yF w/pmhx endometrial cancer s/p hysterectomy p/w lower abd pain.    1. Abd pain, Acute diverticulitis   - ct abd with acute sigmoid diverticulitis. + Hysterectomy. Redemonstrated complex 4 cm left adnexal cyst  -on Po abx  -pending surgery eval   -med f/u      45yF w/pmhx endometrial cancer s/p hysterectomy p/w lower abd pain.    1. Abd pain, Acute diverticulitis   - ct abd with acute sigmoid diverticulitis. + Hysterectomy. Redemonstrated complex 4 cm left adnexal cyst  -on Po abx  -pending surgery eval   -med f/u   -gi eval    dvt ppx

## 2020-11-30 NOTE — H&P ADULT - NSHPREVIEWOFSYSTEMS_GEN_ALL_CORE
CONSTITUTIONAL: No fever, no chills  EYES: No eye pain, no visual disturbance  Mouth: no pain in mouth, no cuts  RESPIRATORY: No cough, No sob  CARDIOVASCULAR: No CP, no palpitations  GASTROINTESTINAL: +nausea, +abdominal pain, no v/d  GENITOURINARY: No dysuria, no hematuria  NEUROLOGICAL: No headaches, no weakness  SKIN: No itching, no rashes  MUSCULOSKELETAL: No joint pain, no joint swelling  PSYCHIATRY: no insomnia, no irritability

## 2020-11-30 NOTE — CONSULT NOTE ADULT - ATTENDING COMMENTS
Agree with above NP note.  cv stable  abdominal pain, atypical sx secondary to acute gi process  med/gi/sx f/u  abx   ct abd imaging
Patient seen and examined  Presents with lower abdominal pain  Never had episodes of diverticulitis in the past    Awake, alert and oriented  Breathing comfortably on room air  Moving extremities  Abd is soft, not distended, mild tenderness in the lower abdomen  No rebound, no guarding    Labs and imaging reviewed    Sigmoid diverticulitis  - no emergent surgical intervention required at this time  - continue antibiotics per ID  - continue supportive care per primary  team
Crispin Brooks  Attending Physician   Division of Infectious Disease  Pager #[phone]  After 5pm/weekend or no response, call #[phone]

## 2020-11-30 NOTE — H&P ADULT - PROBLEM SELECTOR PLAN 3
DVT ppx: lovenox subq daily  Diet: regular  Pain control: toradol 30 IVP q8h prn mod pain, morphine 4mg IVPq6h prn severe pain  Constipation: miralax, senna negative...

## 2020-11-30 NOTE — ED CDU PROVIDER SUBSEQUENT DAY NOTE - ATTENDING CONTRIBUTION TO CARE
agree with PA note    "45yF w/pmhx endometrial cancer s/p hysterectomy p/w lower abd pain. On CT pt found to have acute sigmoid diverticulitis, pt sent to CDU for pain control and PO Augmentin. TVUS with left sided ovarian cyst. Pt continues to have 8/10 pain, ordered for Toradol and morphine PRN. Spoke with surgery will consult on pt the CDU."    overnight pt still having pain; states comes and goes; tolerable but still present.    PE: well appearing; VSS: CTAB/L; s1 s2 no m/r/g abd: LLQ tenderness no rebound no guarding ext: no edema    Imp: given continued pain from diverticulitis; will have surgery consult; likely will need admission for pain control

## 2020-11-30 NOTE — H&P ADULT - HISTORY OF PRESENT ILLNESS
45yF w/pmhx endometrial cancer s/p hysterectomy p/w lower abd pain. On CT pt found to have acute sigmoid diverticulitis and is started PO Augmentin. TVUS with left sided ovarian cyst. Pt continues to have lower abd pain on exam. She denies any chest pain, sob, palps, or orthopnea. Denies any hx of CHF. CAD or valvular disease 44 yo female w/pmhx endometrial cancer s/p hysterectomy p/w lower abd pain of 2 days duration. Her lower abdominal pain was bilateral and woke her up from sleep. The pain was continuous throughout the day, localizing to the LLQ and she decided to come to the ED on 11/29. On CT pt found to have acute sigmoid diverticulitis and is started PO Augmentin. TVUS with left sided ovarian cyst. Pt continues to have lower abd pain on exam. Pt had nausea however she denies any vomiting, chest pain, sob, palps, sick contacts, fevers, chills, or orthopnea. Denies any hx of CHF, CAD or valvular disease. 46 yo female w/pmhx endometrial cancer s/p hysterectomy p/w lower abd pain of 2 days duration. Her lower abdominal pain was bilateral and woke her up from sleep around 1am Saturday 11/28 night. The pain was 10/10, sharp and continuous throughout the night and into the day. Pt tried Tylenol, ibuprofen and heating pads without any benefit. Her pain did not subside and she decided to come to the ED on 11/29. On CTAP pt found to have acute sigmoid diverticulitis and she was observed in the CDU and started PO Augmentin. TVUS also noted a left sided ovarian cyst. Pt continued to have lower abd pain on exam while in the CDU and was thus admitted for further management. Of note pt also reports constipation since 3 days ago. She had nausea however denied vomiting, chest pain, sob, palps, sick contacts, fevers, chills, dysuria, diarrhea or orthopnea. Denies any hx of CHF, CAD or valvular disease. Currently her pain is slightly improved, rated 8/10 with pain meds.

## 2020-11-30 NOTE — CONSULT NOTE ADULT - SUBJECTIVE AND OBJECTIVE BOX
CARDIOLOGY CONSULT - Dr. Chavez         HPI:   45yF w/pmhx endometrial cancer s/p hysterectomy p/w lower abd pain. On CT pt found to have acute sigmoid diverticulitis and is started PO Augmentin. TVUS with left sided ovarian cyst. Pt continues to have lower abd pain on exam. She denies any chest pain, sob, palps, or orthopnea. Denies any hx of CHF. CAD or valvular disease  pending surgery consult per CDU noted  ROS otherwise negative       PAST MEDICAL & SURGICAL HISTORY:  Endometrial cancer    H/O: hysterectomy            PREVIOUS DIAGNOSTIC TESTING:    [ ] Echocardiogram:  [ ]  Catheterization:  [ ] Stress Test:  	    MEDICATIONS:  Home Medications:      MEDICATIONS  (STANDING):  amoxicillin  875 milliGRAM(s)/clavulanate 1 Tablet(s) Oral two times a day  lactated ringers. 1000 milliLiter(s) (75 mL/Hr) IV Continuous <Continuous>      FAMILY HISTORY:  No pertinent family history in first degree relatives        SOCIAL HISTORY:    [ x] Non-smoker  [ ] Smoker  [ ] Alcohol    Allergies    codeine (Short breath)    Intolerances    	    REVIEW OF SYSTEMS:  CONSTITUTIONAL: No fever, weight loss, or fatigue  EYES: No eye pain, visual disturbances, or discharge  ENMT:  No difficulty hearing, tinnitus, vertigo; No sinus or throat pain  NECK: No pain or stiffness  RESPIRATORY: No cough, wheezing, chills or hemoptysis; No Shortness of Breath  CARDIOVASCULAR: No chest pain, palpitations, passing out, dizziness, or leg swelling  GASTROINTESTINAL: see hpi   GENITOURINARY: No dysuria, frequency, hematuria, or incontinence  NEUROLOGICAL: No headaches, memory loss, loss of strength, numbness, or tremors  SKIN: No itching, burning, rashes, or lesions   	    [ x] All others negative	  [ ] Unable to obtain    PHYSICAL EXAM:  T(C): 36.6 (11-30-20 @ 06:23), Max: 37.4 (11-29-20 @ 17:28)  HR: 75 (11-30-20 @ 06:23) (72 - 102)  BP: 143/78 (11-30-20 @ 06:23) (127/72 - 143/78)  RR: 18 (11-30-20 @ 06:23) (18 - 20)  SpO2: 100% (11-30-20 @ 06:23) (100% - 100%)  Wt(kg): --  I&O's Summary      Appearance: Normal	  Psychiatry: A & O x 3, Mood & affect appropriate  HEENT:   Normal oral mucosa, PERRL, EOMI	  Lymphatic: No lymphadenopathy  Cardiovascular: Normal S1 S2,RRR, No JVD, No murmurs  Respiratory: Lungs clear to auscultation	  Gastrointestinal:  tender, + BS	  Skin: No rashes, No ecchymoses, No cyanosis	  Neurologic: Non-focal  Extremities: Normal range of motion, No clubbing, cyanosis or edema  Vascular: Peripheral pulses palpable 2+ bilaterally    TELEMETRY: 	    ECG:  	  RADIOLOGY:    < from: US Transvaginal (11.29.20 @ 19:07) >    IMPRESSION:  A 2.9 cm complex left ovarian cyst consistent with hemorrhagic cyst.    There is no evidence of ovarian torsion.        < from: CT Abdomen and Pelvis w/ IV Cont (11.29.20 @ 20:20) >    IMPRESSION:    Findings concerning for acute sigmoid diverticulitis. Consider nonemergent colonoscopy evaluation once inflammation subsides to exclude underlying malignancy.No bowel obstruction.    Appendix is normal.    Hysterectomy. Redemonstrated complex 4 cm left adnexal cyst, as better delineated on pelvic ultrasound from same day. Short-term pelvic ultrasound imaging follow-up in 6 weeks is advised to demonstrate resolution.    < end of copied text >    OTHER: 	  	  LABS:	 	    CARDIAC MARKERS:                                  11.4   6.72  )-----------( 230      ( 29 Nov 2020 17:20 )             35.2     11-29    139  |  106  |  9   ----------------------------<  77  4.0   |  25  |  0.71    Ca    9.2      29 Nov 2020 17:20    TPro  6.7  /  Alb  4.1  /  TBili  0.3  /  DBili  x   /  AST  31  /  ALT  17  /  AlkPhos  86  11-29      proBNP:   Lipid Profile:   HgA1c:   TSH:

## 2020-11-30 NOTE — ED CDU PROVIDER SUBSEQUENT DAY NOTE - HISTORY
Pt signed out to me by ITA [place], 45yF w/pmhx endometrial cancer s/p hysterectomy p/w lower abd pain. On CT pt found to have acute sigmoid diverticulitis, pt sent to CDU for pain control and PO Augmentin. TVUS with left sided ovarian cyst. Pt continues to have 8/10 pain, ordered for Toradol and morphine PRN. Spoke with surgery will consult on pt the CDU

## 2020-11-30 NOTE — ED CDU PROVIDER SUBSEQUENT DAY NOTE - PROGRESS NOTE DETAILS
Pt continues to have pain but is refusing pain medication, reports she feels drowsy. Will admit to medicine, surgery following. Pt was seen by cardiology who consulted GI. Spoke with Dr.Davindar Cade who accepts pt to his service.

## 2020-11-30 NOTE — H&P ADULT - NSHPPHYSICALEXAM_GEN_ALL_CORE
Vital Signs Last 24 Hrs  T(C): 36 (30 Nov 2020 15:05), Max: 37.4 (29 Nov 2020 17:28)  T(F): 96.8 (30 Nov 2020 15:05), Max: 99.3 (29 Nov 2020 17:28)  HR: 77 (30 Nov 2020 15:05) (72 - 77)  BP: 114/75 (30 Nov 2020 15:05) (114/75 - 143/78)  BP(mean): --  RR: 17 (30 Nov 2020 15:05) (17 - 19)  SpO2: 99% (30 Nov 2020 15:05) (99% - 100%) Vital Signs Last 24 Hrs  T(C): 36 (30 Nov 2020 15:05), Max: 37.4 (29 Nov 2020 17:28)  T(F): 96.8 (30 Nov 2020 15:05), Max: 99.3 (29 Nov 2020 17:28)  HR: 77 (30 Nov 2020 15:05) (72 - 77)  BP: 114/75 (30 Nov 2020 15:05) (114/75 - 143/78)  BP(mean): --  RR: 17 (30 Nov 2020 15:05) (17 - 19)  SpO2: 99% (30 Nov 2020 15:05) (99% - 100%)    PHYSICAL EXAM:  GENERAL: NAD, lying in bed comfortably. Birthmark center of forehead.  HEENT:  NCAT, supple neck  CHEST/LUNG: Clear to auscultation bilaterally; No rales, rhonchi, wheezing, or rubs. Unlabored respirations  HEART: Regular rate and rhythm; S1, S2 present. No murmurs, rubs, or gallops  ABDOMEN: Bowel sounds present; Soft, lower abdomen scar from liposuction. +distended. Mild TTP lower abdomen.   EXTREMITIES:  No pedal edema  NERVOUS SYSTEM:  Alert & Oriented X3, speech clear. No deficits   MSK: FROM all 4 extremities, full and equal strength

## 2020-11-30 NOTE — CONSULT NOTE ADULT - SUBJECTIVE AND OBJECTIVE BOX
HITESH DEAN 45y Female  MRN-[id number]    Patient is a 45y old  Female who presents with a chief complaint of abdominal pain (2020 14:45)      HPI:  45yF w/pmhx endometrial cancer s/p hysterectomy p/w lower abd pain. On CT pt found to have acute sigmoid diverticulitis and is started PO Augmentin. TVUS with left sided ovarian cyst. Pt continues to have lower abd pain on exam. She denies any chest pain, sob, palps, or orthopnea. Denies any hx of CHF. CAD or valvular disease (2020 13:45)    No fever. NO abx use prior.    PAST MEDICAL & SURGICAL HISTORY:  Endometrial cancer    H/O: hysterectomy        Allergies    codeine (Short breath)    Intolerances        ANTIMICROBIALS:  ampicillin/sulbactam  IVPB        MEDICATIONS  (STANDING):  ampicillin/sulbactam  IVPB      lactated ringers. 1000 milliLiter(s) (75 mL/Hr) IV Continuous <Continuous>      Social History  Smoking: no  Etoh: no  Drug use: no      FAMILY HISTORY:  No pertinent family history in first degree relatives  No h/o diverticulitis       Vital Signs Last 24 Hrs  T(C): 36 (2020 15:05), Max: 37.4 (2020 17:28)  T(F): 96.8 (2020 15:05), Max: 99.3 (2020 17:28)  HR: 77 (2020 15:05) (72 - 102)  BP: 114/75 (2020 15:05) (114/75 - 143/78)  BP(mean): --  RR: 17 (2020 15:05) (17 - 20)  SpO2: 99% (2020 15:05) (99% - 100%)    CBC Full  -  ( 2020 17:20 )  WBC Count : 6.72 K/uL  RBC Count : 3.94 M/uL  Hemoglobin : 11.4 g/dL  Hematocrit : 35.2 %  Platelet Count - Automated : 230 K/uL  Mean Cell Volume : 89.3 fL  Mean Cell Hemoglobin : 28.9 pg  Mean Cell Hemoglobin Concentration : 32.4 %  Auto Neutrophil # : 4.70 K/uL  Auto Lymphocyte # : 1.25 K/uL  Auto Monocyte # : 0.54 K/uL  Auto Eosinophil # : 0.17 K/uL  Auto Basophil # : 0.04 K/uL  Auto Neutrophil % : 70.0 %  Auto Lymphocyte % : 18.6 %  Auto Monocyte % : 8.0 %  Auto Eosinophil % : 2.5 %  Auto Basophil % : 0.6 %        139  |  106  |  9   ----------------------------<  77  4.0   |  25  |  0.71    Ca    9.2      2020 17:20    TPro  6.7  /  Alb  4.1  /  TBili  0.3  /  DBili  x   /  AST  31  /  ALT  17  /  AlkPhos  86      LIVER FUNCTIONS - ( 2020 17:20 )  Alb: 4.1 g/dL / Pro: 6.7 g/dL / ALK PHOS: 86 u/L / ALT: 17 u/L / AST: 31 u/L / GGT: x           Urinalysis Basic - ( 2020 17:24 )    Color: YELLOW / Appearance: Lt TURBID / S.025 / pH: 8.5  Gluc: NEGATIVE / Ketone: NEGATIVE  / Bili: NEGATIVE / Urobili: NORMAL   Blood: NEGATIVE / Protein: 30 / Nitrite: NEGATIVE   Leuk Esterase: NEGATIVE / RBC: >50 / WBC 0-2   Sq Epi: MODERATE / Non Sq Epi: x / Bacteria: FEW        MICROBIOLOGY:    COVID-19 PCR: NotDetec: Testing is performed using polymerase chain reaction (PCR) or   transcription mediated amplification (TMA). This COVID-19 (SARS-CoV-2)   nucleic acid amplification test was validated by [employer] and is   in use under the FDA Emergency Use Authorization (EUA) for clinical labs   CLIA-certified to perform high complexity testing. Test results should be   correlated with clinical presentation, patient history, and epidemiology.         RADIOLOGY  < from: CT Abdomen and Pelvis w/ IV Cont (20 @ 20:20) >  Findings concerning for acute sigmoid diverticulitis. Consider nonemergent colonoscopy evaluation once inflammation subsides to exclude underlying malignancy.No bowel obstruction.    Appendix is normal.    Hysterectomy. Redemonstrated complex 4 cm left adnexal cyst, as better delineated on pelvic ultrasound from same day. Short-term pelvic ultrasound imaging follow-up in 6 weeks is advised to demonstrate resolution.    < end of copied text >

## 2020-11-30 NOTE — ED CDU PROVIDER SUBSEQUENT DAY NOTE - MEDICAL DECISION MAKING DETAILS
45yF w/pmhx endometrial cancer s/p hysterectomy p/w lower abd pain, found to have acute sigmoid diverticulitis. pt sent to CDU for pain control and PO Augmentin. Given persistent pain following multiple doses of IV analgesia will consult surgery this morning.

## 2020-11-30 NOTE — ED CDU PROVIDER DISPOSITION NOTE - CLINICAL COURSE
45yF w/pmhx endometrial cancer s/p hysterectomy p/w lower abd pain. On CT pt found to have acute sigmoid diverticulitis, pt sent to CDU for pain control and PO Augmentin. TVUS with left sided ovarian cyst. Pt continues to have 8/10 pain, ordered for Toradol and morphine PRN. Spoke with surgery will consult on pt the CDU.  pt to be admitted to medicine for pain control, with GI and surgery following

## 2020-11-30 NOTE — CONSULT NOTE ADULT - ASSESSMENT
ASSESSMENT:  Patricia is a very pleasant 45 year old lady with 1st episode of acute uncomplicated diverticulitis    PLAN:  - No acute surgical intervention at this time  - Would recommend Abx  - Pain control  - Seen and examined by surgery attending    Dev Nash, PGY 3  Surgery [id number]

## 2020-11-30 NOTE — H&P ADULT - PROBLEM SELECTOR PLAN 1
CTAP w/ acute sigmoid diverticulitis   - GI consulted, appreciate recs  - ID consulted, switched from PO Augmentin to Unasyn (11/30-)  - Per surgery, no acute surgical interventions at this time. Continue abx.

## 2020-11-30 NOTE — CONSULT NOTE ADULT - SUBJECTIVE AND OBJECTIVE BOX
Patient is a 45y Female     Patient is a 45y old  Female who presents with a chief complaint of abdominal pain (30 Nov 2020 15:39)      HPI:  44 yo female w/pmhx endometrial cancer s/p hysterectomy p/w lower abd pain of 2 days duration. Her lower abdominal pain was bilateral and woke her up from sleep around 1am Saturday 11/28 night. The pain was 10/10, sharp and continuous throughout the night and into the day. Pt tried Tylenol, ibuprofen and heating pads without any benefit. Her pain did not subside and she decided to come to the ED on 11/29. On CTAP pt found to have acute sigmoid diverticulitis and she was observed in the CDU and started PO Augmentin. TVUS also noted a left sided ovarian cyst. Pt continued to have lower abd pain on exam while in the CDU and was thus admitted for further management. Of note pt also reports constipation since 3 days ago. She had nausea however denied vomiting, chest pain, sob, palps, sick contacts, fevers, chills, dysuria, diarrhea or orthopnea. Denies any hx of CHF, CAD or valvular disease. Currently her pain is slightly improved, rated 8/10 with pain meds.  (30 Nov 2020 13:45)      PAST MEDICAL & SURGICAL HISTORY:  Endometrial cancer    H/O: hysterectomy        MEDICATIONS  (STANDING):  ampicillin/sulbactam  IVPB      lactated ringers. 1000 milliLiter(s) (75 mL/Hr) IV Continuous <Continuous>  senna 2 Tablet(s) Oral at bedtime      Allergies    codeine (Short breath)    Intolerances        SOCIAL HISTORY:  Denies ETOh,Smoking,     FAMILY HISTORY:  No pertinent family history in first degree relatives        REVIEW OF SYSTEMS:    CONSTITUTIONAL: No weakness, fevers or chills  EYES/ENT: No visual changes;  No vertigo or throat pain   NECK: No pain or stiffness  RESPIRATORY: No cough, wheezing, hemoptysis; No shortness of breath  CARDIOVASCULAR: No chest pain or palpitations  GASTROINTESTINAL: No abdominal or epigastric pain. No nausea, vomiting, or hematemesis; No diarrhea or constipation. No melena or hematochezia.  GENITOURINARY: No dysuria, frequency or hematuria  NEUROLOGICAL: No numbness or weakness  SKIN: No itching, burning, rashes, or lesions   All other review of systems is negative unless indicated above.    VITAL:  T(C): , Max: 36.8 (11-29-20 @ 20:51)  T(F): , Max: 98.2 (11-29-20 @ 20:51)  HR: 77 (11-30-20 @ 15:05)  BP: 114/75 (11-30-20 @ 15:05)  BP(mean): --  RR: 17 (11-30-20 @ 15:05)  SpO2: 99% (11-30-20 @ 15:05)  Wt(kg): --    I and O's:    Height (cm): 152.4 (11-30 @ 16:11)  Weight (kg): 68.6 (11-30 @ 16:11)  BMI (kg/m2): 29.5 (11-30 @ 16:11)  BSA (m2): 1.66 (11-30 @ 16:11)    PHYSICAL EXAM:    Constitutional: NAD  HEENT: PERRLA,   Neck: No JVD  Respiratory: CTA B/L  Cardiovascular: S1 and S2  Gastrointestinal: BS+, soft, NT/ND  Extremities: No peripheral edema  Neurological: A/O x 3, no focal deficits  Psychiatric: Normal mood, normal affect  : No Roberts  Skin: No rashes  Access: Not applicable  Back: No CVA tenderness    LABS:                        11.4   6.72  )-----------( 230      ( 29 Nov 2020 17:20 )             35.2     11-29    139  |  106  |  9   ----------------------------<  77  4.0   |  25  |  0.71    Ca    9.2      29 Nov 2020 17:20    TPro  6.7  /  Alb  4.1  /  TBili  0.3  /  DBili  x   /  AST  31  /  ALT  17  /  AlkPhos  86  11-29          RADIOLOGY & ADDITIONAL STUDIES:

## 2020-11-30 NOTE — H&P ADULT - PROBLEM SELECTOR PLAN 2
TVUS consistent with 2.9 cm complex left ovarian cyst consistent with hemorrhagic cyst. No signs of ovarian torsion  - monitor

## 2020-11-30 NOTE — H&P ADULT - NSHPLABSRESULTS_GEN_ALL_CORE
LABS:                        11.4   6.72  )-----------( 230      ( 2020 17:20 )             35.2         139  |  106  |  9   ----------------------------<  77  4.0   |  25  |  0.71    Ca    9.2      2020 17:20    TPro  6.7  /  Alb  4.1  /  TBili  0.3  /  DBili  x   /  AST  31  /  ALT  17  /  AlkPhos  86        Urinalysis Basic - ( 2020 17:24 )    Color: YELLOW / Appearance: Lt TURBID / S.025 / pH: 8.5  Gluc: NEGATIVE / Ketone: NEGATIVE  / Bili: NEGATIVE / Urobili: NORMAL   Blood: NEGATIVE / Protein: 30 / Nitrite: NEGATIVE   Leuk Esterase: NEGATIVE / RBC: >50 / WBC 0-2   Sq Epi: MODERATE / Non Sq Epi: x / Bacteria: FEW      CAPILLARY BLOOD GLUCOSE      RADIOLOGY/ADDITIONAL TESTS:    < from: CT Abdomen and Pelvis w/ IV Cont (20 @ 20:20) >    FINDINGS:  LOWER CHEST: Within normal limits.    LIVER: Within normal limits.  BILE DUCTS: Normal caliber.  GALLBLADDER: Within normal limits.  SPLEEN: Within normal limits.  PANCREAS: Within normal limits.  ADRENALS: Within normal limits.  KIDNEYS: Symmetrically enhancing without hydronephrosis.    BLADDER: Inadequately distended.  REPRODUCTIVE ORGANS: Hysterectomy. Redemonstrated complex 4.0 x 3.3 cm left adnexal cyst as better delineated on pelvic ultrasound from same day.    BOWEL: Scattered colonic diverticulosis. There is short segmental wall thickening of the sigmoid colonic loops with trace surrounding stranding at the level of the diverticuli, concerning for acute diverticulitis. Consider nonemergent colonoscopy evaluation once inflammation subsides to exclude underlying malignancy.No bowel obstruction. Appendix is normal.  PERITONEUM: No ascites.  VESSELS: Within normal limits.  RETROPERITONEUM/LYMPH NODES: No lymphadenopathy.  ABDOMINAL WALL: Within normal limits.  BONES: Degenerative changes of the spine.    IMPRESSION:    Findings concerning for acute sigmoid diverticulitis. Consider nonemergent colonoscopy evaluation once inflammation subsides to exclude underlying malignancy.No bowel obstruction.    Appendix is normal.    Hysterectomy. Redemonstrated complex 4 cm left adnexal cyst, as better delineated on pelvic ultrasound from same day. Short-term pelvic ultrasound imaging follow-up in 6 weeks is advised to demonstrate resolution.      < end of copied text >        EKG: LABS:                        11.4   6.72  )-----------( 230      ( 2020 17:20 )             35.2         139  |  106  |  9   ----------------------------<  77  4.0   |  25  |  0.71    Ca    9.2      2020 17:20    TPro  6.7  /  Alb  4.1  /  TBili  0.3  /  DBili  x   /  AST  31  /  ALT  17  /  AlkPhos  86        Urinalysis Basic - ( 2020 17:24 )    Color: YELLOW / Appearance: Lt TURBID / S.025 / pH: 8.5  Gluc: NEGATIVE / Ketone: NEGATIVE  / Bili: NEGATIVE / Urobili: NORMAL   Blood: NEGATIVE / Protein: 30 / Nitrite: NEGATIVE   Leuk Esterase: NEGATIVE / RBC: >50 / WBC 0-2   Sq Epi: MODERATE / Non Sq Epi: x / Bacteria: FEW      CAPILLARY BLOOD GLUCOSE      RADIOLOGY/ADDITIONAL TESTS:    < from: CT Abdomen and Pelvis w/ IV Cont (20 @ 20:20) >    FINDINGS:  LOWER CHEST: Within normal limits.    LIVER: Within normal limits.  BILE DUCTS: Normal caliber.  GALLBLADDER: Within normal limits.  SPLEEN: Within normal limits.  PANCREAS: Within normal limits.  ADRENALS: Within normal limits.  KIDNEYS: Symmetrically enhancing without hydronephrosis.    BLADDER: Inadequately distended.  REPRODUCTIVE ORGANS: Hysterectomy. Redemonstrated complex 4.0 x 3.3 cm left adnexal cyst as better delineated on pelvic ultrasound from same day.    BOWEL: Scattered colonic diverticulosis. There is short segmental wall thickening of the sigmoid colonic loops with trace surrounding stranding at the level of the diverticuli, concerning for acute diverticulitis. Consider nonemergent colonoscopy evaluation once inflammation subsides to exclude underlying malignancy.No bowel obstruction. Appendix is normal.  PERITONEUM: No ascites.  VESSELS: Within normal limits.  RETROPERITONEUM/LYMPH NODES: No lymphadenopathy.  ABDOMINAL WALL: Within normal limits.  BONES: Degenerative changes of the spine.    IMPRESSION:    Findings concerning for acute sigmoid diverticulitis. Consider nonemergent colonoscopy evaluation once inflammation subsides to exclude underlying malignancy.No bowel obstruction.    Appendix is normal.    Hysterectomy. Redemonstrated complex 4 cm left adnexal cyst, as better delineated on pelvic ultrasound from same day. Short-term pelvic ultrasound imaging follow-up in 6 weeks is advised to demonstrate resolution.      < end of copied text >    < from: US Transvaginal (20 @ 19:07) >      FINDINGS:    Uterus: Status post hysterectomy. Vaginal cuff is unremarkable.    Right ovary: Not visualized  Left ovary: 3.3 x 3.3 x 3.1 cm cm. Left ovarian cyst measuring 2.7 x 1.6x 2.9 cm, contains lacy internal echoes and fluid fluid level. There is no internal vascularity. Findings are consistent with hemorrhagic cyst.. Normal arterial waveforms within the ovary.. Findings are new as compared with the prior studies.    Fluid: Trace simple free fluid.    IMPRESSION:  A 2.9 cm complex left ovarian cyst consistent with hemorrhagic cyst.    There is no evidence of ovarian torsion.      < end of copied text >          EKG:

## 2020-11-30 NOTE — H&P ADULT - NSHPSOCIALHISTORY_GEN_ALL_CORE
No tobacco, ETOH use, drug use. Recently moved from [place] to [place], recently  2 months ago. Lives with . Able to perform all ADLS independently.

## 2020-12-01 ENCOUNTER — TRANSCRIPTION ENCOUNTER (OUTPATIENT)
Age: 45
End: 2020-12-01

## 2020-12-01 LAB
-  AMIKACIN: SIGNIFICANT CHANGE UP
-  AMOXICILLIN/CLAVULANIC ACID: SIGNIFICANT CHANGE UP
-  AMPICILLIN/SULBACTAM: SIGNIFICANT CHANGE UP
-  AMPICILLIN: SIGNIFICANT CHANGE UP
-  AZTREONAM: SIGNIFICANT CHANGE UP
-  CEFAZOLIN: SIGNIFICANT CHANGE UP
-  CEFEPIME: SIGNIFICANT CHANGE UP
-  CEFOXITIN: SIGNIFICANT CHANGE UP
-  CEFTRIAXONE: SIGNIFICANT CHANGE UP
-  CIPROFLOXACIN: SIGNIFICANT CHANGE UP
-  ERTAPENEM: SIGNIFICANT CHANGE UP
-  GENTAMICIN: SIGNIFICANT CHANGE UP
-  IMIPENEM: SIGNIFICANT CHANGE UP
-  LEVOFLOXACIN: SIGNIFICANT CHANGE UP
-  MEROPENEM: SIGNIFICANT CHANGE UP
-  NITROFURANTOIN: SIGNIFICANT CHANGE UP
-  PIPERACILLIN/TAZOBACTAM: SIGNIFICANT CHANGE UP
-  TIGECYCLINE: SIGNIFICANT CHANGE UP
-  TOBRAMYCIN: SIGNIFICANT CHANGE UP
-  TRIMETHOPRIM/SULFAMETHOXAZOLE: SIGNIFICANT CHANGE UP
ALBUMIN SERPL ELPH-MCNC: 3.8 G/DL — SIGNIFICANT CHANGE UP (ref 3.3–5)
ALP SERPL-CCNC: 85 U/L — SIGNIFICANT CHANGE UP (ref 40–120)
ALT FLD-CCNC: 20 U/L — SIGNIFICANT CHANGE UP (ref 4–33)
ANION GAP SERPL CALC-SCNC: 8 MMO/L — SIGNIFICANT CHANGE UP (ref 7–14)
AST SERPL-CCNC: 44 U/L — HIGH (ref 4–32)
BILIRUB SERPL-MCNC: < 0.2 MG/DL — LOW (ref 0.2–1.2)
BUN SERPL-MCNC: 17 MG/DL — SIGNIFICANT CHANGE UP (ref 7–23)
CALCIUM SERPL-MCNC: 9.3 MG/DL — SIGNIFICANT CHANGE UP (ref 8.4–10.5)
CHLORIDE SERPL-SCNC: 104 MMOL/L — SIGNIFICANT CHANGE UP (ref 98–107)
CO2 SERPL-SCNC: 25 MMOL/L — SIGNIFICANT CHANGE UP (ref 22–31)
CREAT SERPL-MCNC: 0.76 MG/DL — SIGNIFICANT CHANGE UP (ref 0.5–1.3)
CULTURE RESULTS: SIGNIFICANT CHANGE UP
GLUCOSE SERPL-MCNC: 116 MG/DL — HIGH (ref 70–99)
HCT VFR BLD CALC: 34 % — LOW (ref 34.5–45)
HGB BLD-MCNC: 10.7 G/DL — LOW (ref 11.5–15.5)
MAGNESIUM SERPL-MCNC: 1.9 MG/DL — SIGNIFICANT CHANGE UP (ref 1.6–2.6)
MCHC RBC-ENTMCNC: 28.4 PG — SIGNIFICANT CHANGE UP (ref 27–34)
MCHC RBC-ENTMCNC: 31.5 % — LOW (ref 32–36)
MCV RBC AUTO: 90.2 FL — SIGNIFICANT CHANGE UP (ref 80–100)
METHOD TYPE: SIGNIFICANT CHANGE UP
NRBC # FLD: 0 K/UL — SIGNIFICANT CHANGE UP (ref 0–0)
ORGANISM # SPEC MICROSCOPIC CNT: SIGNIFICANT CHANGE UP
ORGANISM # SPEC MICROSCOPIC CNT: SIGNIFICANT CHANGE UP
PHOSPHATE SERPL-MCNC: 3.5 MG/DL — SIGNIFICANT CHANGE UP (ref 2.5–4.5)
PLATELET # BLD AUTO: 214 K/UL — SIGNIFICANT CHANGE UP (ref 150–400)
PMV BLD: 11.2 FL — SIGNIFICANT CHANGE UP (ref 7–13)
POTASSIUM SERPL-MCNC: 3.9 MMOL/L — SIGNIFICANT CHANGE UP (ref 3.5–5.3)
POTASSIUM SERPL-SCNC: 3.9 MMOL/L — SIGNIFICANT CHANGE UP (ref 3.5–5.3)
PROT SERPL-MCNC: 6.4 G/DL — SIGNIFICANT CHANGE UP (ref 6–8.3)
RBC # BLD: 3.77 M/UL — LOW (ref 3.8–5.2)
RBC # FLD: 12.8 % — SIGNIFICANT CHANGE UP (ref 10.3–14.5)
SODIUM SERPL-SCNC: 137 MMOL/L — SIGNIFICANT CHANGE UP (ref 135–145)
SPECIMEN SOURCE: SIGNIFICANT CHANGE UP
WBC # BLD: 5.37 K/UL — SIGNIFICANT CHANGE UP (ref 3.8–10.5)
WBC # FLD AUTO: 5.37 K/UL — SIGNIFICANT CHANGE UP (ref 3.8–10.5)

## 2020-12-01 PROCEDURE — 99232 SBSQ HOSP IP/OBS MODERATE 35: CPT

## 2020-12-01 PROCEDURE — 99217: CPT

## 2020-12-01 RX ORDER — MORPHINE SULFATE 50 MG/1
4 CAPSULE, EXTENDED RELEASE ORAL ONCE
Refills: 0 | Status: DISCONTINUED | OUTPATIENT
Start: 2020-12-01 | End: 2020-12-01

## 2020-12-01 RX ADMIN — MORPHINE SULFATE 4 MILLIGRAM(S): 50 CAPSULE, EXTENDED RELEASE ORAL at 12:47

## 2020-12-01 RX ADMIN — ENOXAPARIN SODIUM 40 MILLIGRAM(S): 100 INJECTION SUBCUTANEOUS at 12:07

## 2020-12-01 RX ADMIN — Medication 20 MILLIGRAM(S): at 15:51

## 2020-12-01 RX ADMIN — AMPICILLIN SODIUM AND SULBACTAM SODIUM 200 GRAM(S): 250; 125 INJECTION, POWDER, FOR SUSPENSION INTRAMUSCULAR; INTRAVENOUS at 00:19

## 2020-12-01 RX ADMIN — AMPICILLIN SODIUM AND SULBACTAM SODIUM 200 GRAM(S): 250; 125 INJECTION, POWDER, FOR SUSPENSION INTRAMUSCULAR; INTRAVENOUS at 23:44

## 2020-12-01 RX ADMIN — MORPHINE SULFATE 4 MILLIGRAM(S): 50 CAPSULE, EXTENDED RELEASE ORAL at 15:50

## 2020-12-01 RX ADMIN — AMPICILLIN SODIUM AND SULBACTAM SODIUM 200 GRAM(S): 250; 125 INJECTION, POWDER, FOR SUSPENSION INTRAMUSCULAR; INTRAVENOUS at 05:59

## 2020-12-01 RX ADMIN — MORPHINE SULFATE 4 MILLIGRAM(S): 50 CAPSULE, EXTENDED RELEASE ORAL at 00:26

## 2020-12-01 RX ADMIN — Medication 30 MILLIGRAM(S): at 20:50

## 2020-12-01 RX ADMIN — AMPICILLIN SODIUM AND SULBACTAM SODIUM 200 GRAM(S): 250; 125 INJECTION, POWDER, FOR SUSPENSION INTRAMUSCULAR; INTRAVENOUS at 17:21

## 2020-12-01 RX ADMIN — MORPHINE SULFATE 4 MILLIGRAM(S): 50 CAPSULE, EXTENDED RELEASE ORAL at 00:49

## 2020-12-01 RX ADMIN — MORPHINE SULFATE 4 MILLIGRAM(S): 50 CAPSULE, EXTENDED RELEASE ORAL at 23:55

## 2020-12-01 RX ADMIN — MORPHINE SULFATE 4 MILLIGRAM(S): 50 CAPSULE, EXTENDED RELEASE ORAL at 12:05

## 2020-12-01 RX ADMIN — Medication 30 MILLIGRAM(S): at 20:32

## 2020-12-01 RX ADMIN — AMPICILLIN SODIUM AND SULBACTAM SODIUM 200 GRAM(S): 250; 125 INJECTION, POWDER, FOR SUSPENSION INTRAMUSCULAR; INTRAVENOUS at 12:07

## 2020-12-01 NOTE — PROGRESS NOTE ADULT - SUBJECTIVE AND OBJECTIVE BOX
PROGRESS NOTE:   Authored by Dr. Zamzam Matthews MD, Pager [phone] [facility], [id number] [facility]     Patient is a 45y old  Female who presents with a chief complaint of abdominal pain (01 Dec 2020 06:07)      SUBJECTIVE / OVERNIGHT EVENTS: No events overnight.    ADDITIONAL REVIEW OF SYSTEMS: Denies fevers, chills, n/v.    MEDICATIONS  (STANDING):  ampicillin/sulbactam  IVPB      ampicillin/sulbactam  IVPB 3 Gram(s) IV Intermittent every 6 hours  enoxaparin Injectable 40 milliGRAM(s) SubCutaneous daily  lactated ringers. 1000 milliLiter(s) (75 mL/Hr) IV Continuous <Continuous>  senna 2 Tablet(s) Oral at bedtime    MEDICATIONS  (PRN):  ketorolac   Injectable 30 milliGRAM(s) IV Push every 8 hours PRN Moderate Pain (4 - 6)  morphine  - Injectable 4 milliGRAM(s) IV Push every 6 hours PRN Severe Pain (7 - 10)  polyethylene glycol 3350 17 Gram(s) Oral daily PRN Constipation      CAPILLARY BLOOD GLUCOSE        I&O's Summary      PHYSICAL EXAM:  Vital Signs Last 24 Hrs  T(C): 36.4 (01 Dec 2020 06:19), Max: 37.2 (01 Dec 2020 00:25)  T(F): 97.6 (01 Dec 2020 06:19), Max: 98.9 (01 Dec 2020 00:25)  HR: 75 (01 Dec 2020 06:19) (74 - 84)  BP: 127/77 (01 Dec 2020 06:19) (114/75 - 129/87)  BP(mean): --  RR: 18 (01 Dec 2020 06:19) (17 - 18)  SpO2: 98% (01 Dec 2020 06:19) (98% - 100%)    CONSTITUTIONAL: NAD, lying in bed comfortably  RESPIRATORY: Normal respiratory effort; CTABL  CARDIOVASCULAR: Regular rate and rhythm, normal S1 and S2, no murmur/rub/gallop  ABDOMEN: Soft, nondistended, nontender to palpation, normoactive bowel sounds, no rebound/guarding  MUSCLOSKELETAL: no joint swelling or tenderness to palpation, FROM all extremities  NEURO: AAOx3 to person, place, and time, full and equal strength all extremities   EXTREMITIES: no pedal edema    LABS:                        11.4   6.72  )-----------( 230      ( 2020 17:20 )             35.2     11    139  |  106  |  9   ----------------------------<  77  4.0   |  25  |  0.71    Ca    9.2      2020 17:20    TPro  6.7  /  Alb  4.1  /  TBili  0.3  /  DBili  x   /  AST  31  /  ALT  17  /  AlkPhos  86            Urinalysis Basic - ( 2020 17:24 )    Color: YELLOW / Appearance: Lt TURBID / S.025 / pH: 8.5  Gluc: NEGATIVE / Ketone: NEGATIVE  / Bili: NEGATIVE / Urobili: NORMAL   Blood: NEGATIVE / Protein: 30 / Nitrite: NEGATIVE   Leuk Esterase: NEGATIVE / RBC: >50 / WBC 0-2   Sq Epi: MODERATE / Non Sq Epi: x / Bacteria: FEW        Culture - Urine (collected 2020 22:54)  Source: .Urine Clean Catch (Midstream)  Preliminary Report (2020 18:15):    >100,000 CFU/ml Escherichia coli        RADIOLOGY & ADDITIONAL TESTS:     PROGRESS NOTE:   Authored by Dr. Zamzam Matthews MD, Pager [phone] [facility], [id number] [facility]     Patient is a 45y old  Female who presents with a chief complaint of abdominal pain (01 Dec 2020 06:07)      SUBJECTIVE / OVERNIGHT EVENTS: No events overnight. Pt seen this AM, still with abdominal pain, only improved with pain meds.     ADDITIONAL REVIEW OF SYSTEMS: Denies fevers, chills, n/v.    MEDICATIONS  (STANDING):  ampicillin/sulbactam  IVPB      ampicillin/sulbactam  IVPB 3 Gram(s) IV Intermittent every 6 hours  enoxaparin Injectable 40 milliGRAM(s) SubCutaneous daily  lactated ringers. 1000 milliLiter(s) (75 mL/Hr) IV Continuous <Continuous>  senna 2 Tablet(s) Oral at bedtime    MEDICATIONS  (PRN):  ketorolac   Injectable 30 milliGRAM(s) IV Push every 8 hours PRN Moderate Pain (4 - 6)  morphine  - Injectable 4 milliGRAM(s) IV Push every 6 hours PRN Severe Pain (7 - 10)  polyethylene glycol 3350 17 Gram(s) Oral daily PRN Constipation      CAPILLARY BLOOD GLUCOSE        I&O's Summary      PHYSICAL EXAM:  Vital Signs Last 24 Hrs  T(C): 36.4 (01 Dec 2020 06:19), Max: 37.2 (01 Dec 2020 00:25)  T(F): 97.6 (01 Dec 2020 06:19), Max: 98.9 (01 Dec 2020 00:25)  HR: 75 (01 Dec 2020 06:19) (74 - 84)  BP: 127/77 (01 Dec 2020 06:19) (114/75 - 129/87)  BP(mean): --  RR: 18 (01 Dec 2020 06:19) (17 - 18)  SpO2: 98% (01 Dec 2020 06:19) (98% - 100%)    CONSTITUTIONAL: NAD, lying in bed comfortably  RESPIRATORY: Normal respiratory effort; CTABL  CARDIOVASCULAR: Regular rate and rhythm, normal S1 and S2, no murmur/rub/gallop  ABDOMEN: Soft, nondistended, TTP lower and mid abdomen , normoactive bowel sounds, no rebound/guarding  MUSCLOSKELETAL: no joint swelling or tenderness to palpation, FROM all extremities  NEURO: AAOx3 to person, place, and time, full and equal strength all extremities   EXTREMITIES: no pedal edema    LABS:                        11.4   6.72  )-----------( 230      ( 2020 17:20 )             35.2         139  |  106  |  9   ----------------------------<  77  4.0   |  25  |  0.71    Ca    9.2      2020 17:20    TPro  6.7  /  Alb  4.1  /  TBili  0.3  /  DBili  x   /  AST  31  /  ALT  17  /  AlkPhos  86            Urinalysis Basic - ( 2020 17:24 )    Color: YELLOW / Appearance: Lt TURBID / S.025 / pH: 8.5  Gluc: NEGATIVE / Ketone: NEGATIVE  / Bili: NEGATIVE / Urobili: NORMAL   Blood: NEGATIVE / Protein: 30 / Nitrite: NEGATIVE   Leuk Esterase: NEGATIVE / RBC: >50 / WBC 0-2   Sq Epi: MODERATE / Non Sq Epi: x / Bacteria: FEW        Culture - Urine (collected 2020 22:54)  Source: .Urine Clean Catch (Midstream)  Preliminary Report (2020 18:15):    >100,000 CFU/ml Escherichia coli        RADIOLOGY & ADDITIONAL TESTS:

## 2020-12-01 NOTE — PROGRESS NOTE ADULT - SUBJECTIVE AND OBJECTIVE BOX
Patient is a 45y Female     Patient is a 45y old  Female who presents with a chief complaint of abdominal pain (01 Dec 2020 07:19)      HPI:  46 yo female w/pmhx endometrial cancer s/p hysterectomy p/w lower abd pain of 2 days duration. Her lower abdominal pain was bilateral and woke her up from sleep around 1am Saturday 11/28 night. The pain was 10/10, sharp and continuous throughout the night and into the day. Pt tried Tylenol, ibuprofen and heating pads without any benefit. Her pain did not subside and she decided to come to the ED on 11/29. On CTAP pt found to have acute sigmoid diverticulitis and she was observed in the CDU and started PO Augmentin. TVUS also noted a left sided ovarian cyst. Pt continued to have lower abd pain on exam while in the CDU and was thus admitted for further management. Of note pt also reports constipation since 3 days ago. She had nausea however denied vomiting, chest pain, sob, palps, sick contacts, fevers, chills, dysuria, diarrhea or orthopnea. Denies any hx of CHF, CAD or valvular disease. Currently her pain is slightly improved, rated 8/10 with pain meds.  (30 Nov 2020 13:45)      PAST MEDICAL & SURGICAL HISTORY:  Endometrial cancer    H/O: hysterectomy        MEDICATIONS  (STANDING):  ampicillin/sulbactam  IVPB      ampicillin/sulbactam  IVPB 3 Gram(s) IV Intermittent every 6 hours  dicyclomine 20 milliGRAM(s) Oral two times a day before meals  enoxaparin Injectable 40 milliGRAM(s) SubCutaneous daily  lactated ringers. 1000 milliLiter(s) (75 mL/Hr) IV Continuous <Continuous>  senna 2 Tablet(s) Oral at bedtime      Allergies    codeine (Short breath)    Intolerances        SOCIAL HISTORY:  Denies ETOh,Smoking,     FAMILY HISTORY:  No pertinent family history in first degree relatives        REVIEW OF SYSTEMS:    CONSTITUTIONAL: No weakness, fevers or chills  EYES/ENT: No visual changes;  No vertigo or throat pain   NECK: No pain or stiffness  RESPIRATORY: No cough, wheezing, hemoptysis; No shortness of breath  CARDIOVASCULAR: No chest pain or palpitations  GASTROINTESTINAL: No abdominal or epigastric pain. No nausea, vomiting, or hematemesis; No diarrhea or constipation. No melena or hematochezia.  GENITOURINARY: No dysuria, frequency or hematuria  NEUROLOGICAL: No numbness or weakness  SKIN: No itching, burning, rashes, or lesions   All other review of systems is negative unless indicated above.    VITAL:  T(C): , Max: 37.2 (12-01-20 @ 00:25)  T(F): , Max: 98.9 (12-01-20 @ 00:25)  HR: 75 (12-01-20 @ 06:19)  BP: 127/77 (12-01-20 @ 06:19)  BP(mean): --  RR: 18 (12-01-20 @ 06:19)  SpO2: 98% (12-01-20 @ 06:19)  Wt(kg): --    I and O's:    Height (cm): 152.4 (11-30 @ 16:11)  Weight (kg): 68.6 (11-30 @ 16:11)  BMI (kg/m2): 29.5 (11-30 @ 16:11)  BSA (m2): 1.66 (11-30 @ 16:11)    PHYSICAL EXAM:    Constitutional: NAD  HEENT: PERRLA,   Neck: No JVD  Respiratory: CTA B/L  Cardiovascular: S1 and S2  Gastrointestinal: BS+, soft, NT/ND  Extremities: No peripheral edema  Neurological: A/O x 3, no focal deficits  Psychiatric: Normal mood, normal affect  : No Roberts  Skin: No rashes  Access: Not applicable  Back: No CVA tenderness    LABS:                        11.4   6.72  )-----------( 230      ( 29 Nov 2020 17:20 )             35.2     11-29    139  |  106  |  9   ----------------------------<  77  4.0   |  25  |  0.71    Ca    9.2      29 Nov 2020 17:20    TPro  6.7  /  Alb  4.1  /  TBili  0.3  /  DBili  x   /  AST  31  /  ALT  17  /  AlkPhos  86  11-29          RADIOLOGY & ADDITIONAL STUDIES:

## 2020-12-01 NOTE — PROGRESS NOTE ADULT - SUBJECTIVE AND OBJECTIVE BOX
CARDIOLOGY FOLLOW UP - Dr. Chavez    CC still with abd pain   no cp or sob        PHYSICAL EXAM:  T(C): 36.4 (12-01-20 @ 06:19), Max: 37.2 (12-01-20 @ 00:25)  HR: 75 (12-01-20 @ 06:19) (74 - 84)  BP: 127/77 (12-01-20 @ 06:19) (114/75 - 129/87)  RR: 18 (12-01-20 @ 06:19) (17 - 18)  SpO2: 98% (12-01-20 @ 06:19) (98% - 100%)  Wt(kg): --  I&O's Summary      Appearance: Normal	  Cardiovascular: Normal S1 S2,RRR, No JVD, No murmurs  Respiratory: Lungs clear to auscultation	  Gastrointestinal:  Soft, Non-tender, + BS	  Extremities: Normal range of motion, No clubbing, cyanosis or edema      Home Medications:      MEDICATIONS  (STANDING):  ampicillin/sulbactam  IVPB      ampicillin/sulbactam  IVPB 3 Gram(s) IV Intermittent every 6 hours  dicyclomine 20 milliGRAM(s) Oral two times a day before meals  enoxaparin Injectable 40 milliGRAM(s) SubCutaneous daily  lactated ringers. 1000 milliLiter(s) (75 mL/Hr) IV Continuous <Continuous>  senna 2 Tablet(s) Oral at bedtime      TELEMETRY: 	    ECG:  NSR 	  RADIOLOGY:   DIAGNOSTIC TESTING:  [ ] Echocardiogram:  [ ]  Catheterization:  [ ] Stress Test:    OTHER: 	    LABS:	 	                            10.7   5.37  )-----------( 214      ( 01 Dec 2020 06:05 )             34.0     12-01    137  |  104  |  17  ----------------------------<  116<H>  3.9   |  25  |  0.76    Ca    9.3      01 Dec 2020 06:05  Phos  3.5     12-01  Mg     1.9     12-01    TPro  6.4  /  Alb  3.8  /  TBili  < 0.2<L>  /  DBili  x   /  AST  44<H>  /  ALT  20  /  AlkPhos  85  12-01

## 2020-12-01 NOTE — DISCHARGE NOTE PROVIDER - NSDCCPCAREPLAN_GEN_ALL_CORE_FT
PRINCIPAL DISCHARGE DIAGNOSIS  Diagnosis: Diverticulitis  Assessment and Plan of Treatment: You came to the hospital because of abdominal pain. A CT scan of your abdomen showed diverticulitis, which is inflammation of outpouchings in your colon. You were given intravenous antibiotics. Please follow-up with your primary care physician and gastroenterologist (stomach and colon doctor) as you will need a colonoscopy in 6-8 weeks time to further evaluate your colon.      SECONDARY DISCHARGE DIAGNOSES  Diagnosis: Ovarian cyst  Assessment and Plan of Treatment: An ultrasound was performed which showed a 2.9cm left sided ovarian cyst. Please follow-up with your primary care physician and get another transvaginal ultrasound in 6 weeks time to ensure that the cyst is resolving.     PRINCIPAL DISCHARGE DIAGNOSIS  Diagnosis: Diverticulitis  Assessment and Plan of Treatment: You came to the hospital because of abdominal pain. A CT scan of your abdomen showed diverticulitis, which is inflammation of outpouchings in your colon. You were given intravenous antibiotics. You were placed on bowel rest (which consists of not eating) for your pain. You decided to leave against medical advice. Please continue to the prescribed antibiotics upon leaving. Please follow-up with your primary care physician and gastroenterologist (stomach and colon doctor) as you will need a colonoscopy in 6-8 weeks time to further evaluate your colon. Return to the ED if your symptoms worsen.      SECONDARY DISCHARGE DIAGNOSES  Diagnosis: Ovarian cyst  Assessment and Plan of Treatment: An ultrasound was performed which showed a 2.9cm left sided ovarian cyst. Please follow-up with your primary care physician and get another transvaginal ultrasound in 6 weeks time to ensure that the cyst is resolving.

## 2020-12-01 NOTE — PROGRESS NOTE ADULT - PROBLEM SELECTOR PLAN 2
TVUS consistent with 2.9 cm complex left ovarian cyst consistent with hemorrhagic cyst. No signs of ovarian torsion  - monitor TVUS consistent with 2.9 cm complex left ovarian cyst consistent with hemorrhagic cyst. No signs of ovarian torsion  - monitor  - consider TVUS follow-up in 6 weeks to demonstrate resolution

## 2020-12-01 NOTE — PROGRESS NOTE ADULT - ASSESSMENT
ASSESSMENT:  Patricia is a very pleasant 45 year old lady with 1st episode of acute uncomplicated diverticulitis    complaining of dysuria that started 11/30  UCx 11/29 + e coli  on unasyn    Recommendations:  - No acute surgical intervention at this time  can follow up with Dr. Steven Naylor as needed as outpatient  - appreciate GI follow up  - continue with abx  - Pain control    Please call back A TEAM SURGERY with any additional questions or concerns    A TEAM SURGERY  [id number] ASSESSMENT:  Patricia is a very pleasant 45 year old lady with 1st episode of acute uncomplicated diverticulitis    complaining of dysuria that started 11/30  UCx 11/29 + e coli  on unasyn    Recommendations:  - No acute surgical intervention at this time  can follow up with Dr. Steven Naylor as needed as outpatient  - appreciate GI follow up  - continue with abx  - Pain control    A TEAM SURGERY  [id number]

## 2020-12-01 NOTE — DISCHARGE NOTE PROVIDER - NSDCMRMEDTOKEN_GEN_ALL_CORE_FT
Cipro 500 mg oral tablet: 1 tab(s) orally every 12 hours    Cipro 500 mg oral tablet: 1 tab(s) orally every 12 hours   Flagyl 500 mg oral tablet: 1 tab(s) orally 3 times a day

## 2020-12-01 NOTE — PROGRESS NOTE ADULT - SUBJECTIVE AND OBJECTIVE BOX
GENERAL SURGERY PROGRESS NOTE    SUBJECTIVE  hx of hysterectomy for "cancer" 8 years ago  Patient seen and examined. No acute events overnight.   tolerating diet, denies ab pain or n/v when eating  notes lower abdominal/suprapubic pain unchanged this admission  no flatus or BM  notes pain with urination that started  PM, UCx from  positive for > 100k e coli      OBJECTIVE    PHYSICAL EXAM  General: Appears well, NAD  CHEST: breathing comfortably  CV: appears well perfused  Abdomen: soft, TTP suprapubic and low LLQ, nondistended, no rebound or guarding  Extremities: Grossly symmetric    T(C): 37.2 (20 @ 00:25), Max: 37.2 (20 @ 00:25)  HR: 84 (20 @ 00:25) (74 - 84)  BP: 129/87 (20 @ 00:25) (114/75 - 143/78)  RR: 18 (20 @ 00:25) (17 - 18)  SpO2: 98% (20 @ 00:25) (98% - 100%)      MEDICATIONS  ampicillin/sulbactam  IVPB      ampicillin/sulbactam  IVPB 3 Gram(s) IV Intermittent every 6 hours  enoxaparin Injectable 40 milliGRAM(s) SubCutaneous daily  ketorolac   Injectable 30 milliGRAM(s) IV Push every 8 hours PRN  lactated ringers. 1000 milliLiter(s) IV Continuous <Continuous>  morphine  - Injectable 4 milliGRAM(s) IV Push every 6 hours PRN  polyethylene glycol 3350 17 Gram(s) Oral daily PRN  senna 2 Tablet(s) Oral at bedtime      LABS                        11.4   6.72  )-----------( 230      ( 2020 17:20 )             35.2         139  |  106  |  9   ----------------------------<  77  4.0   |  25  |  0.71    Ca    9.2      2020 17:20    TPro  6.7  /  Alb  4.1  /  TBili  0.3  /  DBili  x   /  AST  31  /  ALT  17  /  AlkPhos  86        Urinalysis Basic - ( 2020 17:24 )    Color: YELLOW / Appearance: Lt TURBID / S.025 / pH: 8.5  Gluc: NEGATIVE / Ketone: NEGATIVE  / Bili: NEGATIVE / Urobili: NORMAL   Blood: NEGATIVE / Protein: 30 / Nitrite: NEGATIVE   Leuk Esterase: NEGATIVE / RBC: >50 / WBC 0-2   Sq Epi: MODERATE / Non Sq Epi: x / Bacteria: FEW        RADIOLOGY & ADDITIONAL STUDIES GENERAL SURGERY PROGRESS NOTE    SUBJECTIVE  hx of hysterectomy for "cancer" 8 years ago  Patient seen and examined. No acute events overnight.   tolerating diet, denies ab pain or n/v when eating  notes lower abdominal/suprapubic pain unchanged this admission  no flatus or BM  notes pain with urination that started  PM, UCx from  positive for > 100k e coli  also notes difficulty sleeping    OBJECTIVE    PHYSICAL EXAM  General: Appears well, NAD  CHEST: breathing comfortably  CV: appears well perfused  Abdomen: soft, TTP suprapubic and low LLQ, nondistended, no rebound or guarding  Extremities: Grossly symmetric    T(C): 37.2 (20 @ 00:25), Max: 37.2 (20 @ 00:25)  HR: 84 (20 @ 00:25) (74 - 84)  BP: 129/87 (20 @ 00:25) (114/75 - 143/78)  RR: 18 (20 @ 00:25) (17 - 18)  SpO2: 98% (20 @ 00:25) (98% - 100%)      MEDICATIONS  ampicillin/sulbactam  IVPB      ampicillin/sulbactam  IVPB 3 Gram(s) IV Intermittent every 6 hours  enoxaparin Injectable 40 milliGRAM(s) SubCutaneous daily  ketorolac   Injectable 30 milliGRAM(s) IV Push every 8 hours PRN  lactated ringers. 1000 milliLiter(s) IV Continuous <Continuous>  morphine  - Injectable 4 milliGRAM(s) IV Push every 6 hours PRN  polyethylene glycol 3350 17 Gram(s) Oral daily PRN  senna 2 Tablet(s) Oral at bedtime      LABS                        11.4   6.72  )-----------( 230      ( 2020 17:20 )             35.2         139  |  106  |  9   ----------------------------<  77  4.0   |  25  |  0.71    Ca    9.2      2020 17:20    TPro  6.7  /  Alb  4.1  /  TBili  0.3  /  DBili  x   /  AST  31  /  ALT  17  /  AlkPhos  86        Urinalysis Basic - ( 2020 17:24 )    Color: YELLOW / Appearance: Lt TURBID / S.025 / pH: 8.5  Gluc: NEGATIVE / Ketone: NEGATIVE  / Bili: NEGATIVE / Urobili: NORMAL   Blood: NEGATIVE / Protein: 30 / Nitrite: NEGATIVE   Leuk Esterase: NEGATIVE / RBC: >50 / WBC 0-2   Sq Epi: MODERATE / Non Sq Epi: x / Bacteria: FEW        RADIOLOGY & ADDITIONAL STUDIES

## 2020-12-01 NOTE — PROGRESS NOTE ADULT - EYES
In [address]  [place] [address], [address] 
[phone] fax Plan of Care/ Statement of Necessity for Physical Therapy Services Patient name: Katharine Earl Start of Care: 2019 Referral source: Bernardo Rodrigez MD : 1966 Medical Diagnosis: Low back pain [M54.5] Cervicalgia [M54.2] Payor: [facility] [employer] / Plan: [address] [place] [address] / Product Type: Medicare /  Onset Date: 8 + years Treatment Diagnosis: mechanical neck and back pain Prior Hospitalization: see medical history Provider#: [id number] Medications: Verified on Patient summary List  
 Comorbidities: left hand trigger finger, DM, chronic neck and back pain Prior Level of Function: back pain and neck pain many years, some limited activity tolerance 2/2 pain The Plan of Care and following information is based on the information from the initial evaluation. Assessment/ key information: Pt is a 46year old male who reports 10 years of chronic back and neck pain void of acute precipitating incident. He reports pain is constant, aching, and tends to worsen with prolonged sitting or standing. He denies bowel and bladder symptoms, numbness, tingling, weakness. He reports he has had some injection in his neck and back with limited relief. He presents with signs and symptoms consistent with chronic mechanical neck and back pain with associated impairments consisting of limited thoracic and lumbar mobility, (+) ZAHRA bilat, (+) teodora test bilat, poor HS flexibility bilat, diffuse TTP B cervical paraspinals, mild TTP lower lumbar spine with mild hypertonic lumbar paraspinal musculature, and mild hyperlordotic and forward shoulder posture. Pt will benefit from skilled PT management to address their impairments and improve their level of function.  
 
Evaluation Complexity History MEDIUM  Complexity : 1-2 comorbidities / personal factors will impact the outcome/ POC ; Examination LOW Complexity : 1-2 Standardized tests and measures addressing body structure, function, activity limitation and / or participation in recreation  ;Presentation LOW Complexity : Stable, uncomplicated  ;Clinical Decision Making MEDIUM Complexity : FOTO score of 26-74 Overall Complexity Rating: LOW Problem List: pain affecting function, decrease ROM, decrease strength, decrease ADL/ functional abilitiies, decrease activity tolerance and decrease flexibility/ joint mobility Treatment Plan may include any combination of the following: Therapeutic exercise, Therapeutic activities, Neuromuscular re-education, Physical agent/modality, Manual therapy, Patient education and Self Care training Patient / Family readiness to learn indicated by: asking questions, trying to perform skills and interest 
Persons(s) to be included in education: patient (P) Barriers to Learning/Limitations: None Patient Goal (s): Not reported. Patient Self Reported Health Status: fair Rehabilitation Potential: fair Short Term Goals: To be accomplished in 2 weeks: 1. Patient will report performance of HEP at least 2 times per day to facilitate improved outcomes and improved self management. Long Term Goals: To be accomplished in 4 weeks: 1. Patient will report FOTO score of 59 or better to indicate significant improvement in functional status. 2. Pt will demonstrate (-) teodora test bilat to improve lumbopelvic mechanics and reduce pain. 3. Pt will demonstrate ZAHRA of 15 degrees from parallel to improve lumbopelvic mechanics and reduce pain. 4. Pt will demonstrate B HS 90/90 flexibility of -35 or better to reduce stress on L/S and improve ease of daily activity. 5. Pt will demonstrate full cervical AROM void of pain to improve ease of daily activity. Frequency / Duration: Patient to be seen 2 times per week for 4 weeks. Patient/ Caregiver education and instruction: Diagnosis, prognosis, self care, activity modification and exercises 
 [x]  Plan of care has been reviewed with LUISA Mason PT, DPT, ATC 4/4/2019 4:59 PM 
 
________________________________________________________________________ I certify that the above Therapy Services are being furnished while the patient is under my care. I agree with the treatment plan and certify that this therapy is necessary. [de-identified] Signature:____________Date:_________TIME:________ 
 
[employer], Date and Time must be completed for valid certification ** Please sign and return to [address] [facility] [address] [place] [address], [facility] [address] 
[phone] fax detailed exam EOMI/conjunctiva clear

## 2020-12-01 NOTE — PROGRESS NOTE ADULT - ASSESSMENT
46 yo female w/pmhx endometrial cancer s/p hysterectomy p/w lower abd pain of 2 days duration. CTAP w/ acute sigmoid diverticulitis, on Unasyn. TVUS also with L ovarian cyst c/w hemorrhagic cyst.

## 2020-12-01 NOTE — DISCHARGE NOTE PROVIDER - HOSPITAL COURSE
46 yo female w/pmhx endometrial cancer s/p hysterectomy p/w lower abd pain of 2 days duration. On CTAP pt found to have acute sigmoid diverticulitis and she was observed in the CDU and started PO Augmentin. TVUS also noted a left sided ovarian cyst. Pt continued to have lower abd pain on exam while in the CDU and was thus admitted for further management. She was switched to Unasyn. Bentyl was added for abdominal pain. _______ 44 yo female w/pmhx endometrial cancer s/p hysterectomy p/w lower abd pain of 2 days duration. On CTAP pt found to have acute sigmoid diverticulitis and she was observed in the CDU and started PO Augmentin. TVUS also noted a left sided ovarian cyst. Pt continued to have lower abd pain on exam while in the CDU and was thus admitted for further management. She was switched to Unasyn. Bentyl was added for abdominal pain. Pt continued to have abdominal pain throughout hospital stay and she was made NPO. Pt became upset that she was not getting solid food and was upset that her symptoms were not improving, and signed out AMA. Agreed to take Cipro/Flagyl for 7 day course for diverticulitis upon leaving.

## 2020-12-01 NOTE — PROGRESS NOTE ADULT - ASSESSMENT
45yF w/pmhx endometrial cancer s/p hysterectomy p/w lower abd pain. On CT pt found to have acute sigmoid diverticulitis

## 2020-12-01 NOTE — PROGRESS NOTE ADULT - ASSESSMENT
45yF w/pmhx endometrial cancer s/p hysterectomy p/w lower abd pain.    1. Abd pain, Acute diverticulitis   - ct abd with acute sigmoid diverticulitis. + Hysterectomy. Redemonstrated complex 4 cm left adnexal cyst  -on abx  -surgery eval noted : No acute surgical intervention at this time  -med f/u   mangement per gi   - ecg with no acs, cv stable   dvt ppx

## 2020-12-01 NOTE — PROGRESS NOTE ADULT - SUBJECTIVE AND OBJECTIVE BOX
HITESH DEAN 45y [id number]    Patient is a 45y old  Female who presents with a chief complaint of abdominal pain (01 Dec 2020 12:16)      Follow Up/CC:  ID following for diverticulitis    Interval History/ROS: no fever, some abd pain     Allergies    codeine (Short breath)    Intolerances        ANTIMICROBIALS:  ampicillin/sulbactam  IVPB    ampicillin/sulbactam  IVPB 3 every 6 hours      MEDICATIONS  (STANDING):  ampicillin/sulbactam  IVPB      ampicillin/sulbactam  IVPB 3 Gram(s) IV Intermittent every 6 hours  dicyclomine 20 milliGRAM(s) Oral two times a day before meals  enoxaparin Injectable 40 milliGRAM(s) SubCutaneous daily  lactated ringers. 1000 milliLiter(s) (75 mL/Hr) IV Continuous <Continuous>  senna 2 Tablet(s) Oral at bedtime    MEDICATIONS  (PRN):  ketorolac   Injectable 30 milliGRAM(s) IV Push every 8 hours PRN Moderate Pain (4 - 6)  morphine  - Injectable 4 milliGRAM(s) IV Push every 6 hours PRN Severe Pain (7 - 10)  polyethylene glycol 3350 17 Gram(s) Oral daily PRN Constipation        Vital Signs Last 24 Hrs  T(C): 36.6 (01 Dec 2020 13:03), Max: 37.2 (01 Dec 2020 00:25)  T(F): 97.8 (01 Dec 2020 13:03), Max: 98.9 (01 Dec 2020 00:25)  HR: 75 (01 Dec 2020 13:03) (74 - 84)  BP: 124/77 (01 Dec 2020 13:03) (114/75 - 129/87)  BP(mean): --  RR: 18 (01 Dec 2020 13:03) (17 - 18)  SpO2: 100% (01 Dec 2020 13:03) (98% - 100%)    CBC Full  -  ( 01 Dec 2020 06:05 )  WBC Count : 5.37 K/uL  RBC Count : 3.77 M/uL  Hemoglobin : 10.7 g/dL  Hematocrit : 34.0 %  Platelet Count - Automated : 214 K/uL  Mean Cell Volume : 90.2 fL  Mean Cell Hemoglobin : 28.4 pg  Mean Cell Hemoglobin Concentration : 31.5 %  Auto Neutrophil # : x  Auto Lymphocyte # : x  Auto Monocyte # : x  Auto Eosinophil # : x  Auto Basophil # : x  Auto Neutrophil % : x  Auto Lymphocyte % : x  Auto Monocyte % : x  Auto Eosinophil % : x  Auto Basophil % : x        137  |  104  |  17  ----------------------------<  116<H>  3.9   |  25  |  0.76    Ca    9.3      01 Dec 2020 06:05  Phos  3.5       Mg     1.9         TPro  6.4  /  Alb  3.8  /  TBili  < 0.2<L>  /  DBili  x   /  AST  44<H>  /  ALT  20  /  AlkPhos  85      LIVER FUNCTIONS - ( 01 Dec 2020 06:05 )  Alb: 3.8 g/dL / Pro: 6.4 g/dL / ALK PHOS: 85 u/L / ALT: 20 u/L / AST: 44 u/L / GGT: x           Urinalysis Basic - ( 2020 17:24 )    Color: YELLOW / Appearance: Lt TURBID / S.025 / pH: 8.5  Gluc: NEGATIVE / Ketone: NEGATIVE  / Bili: NEGATIVE / Urobili: NORMAL   Blood: NEGATIVE / Protein: 30 / Nitrite: NEGATIVE   Leuk Esterase: NEGATIVE / RBC: >50 / WBC 0-2   Sq Epi: MODERATE / Non Sq Epi: x / Bacteria: FEW        MICROBIOLOGY:  .Urine Clean Catch (Midstream)  20   >100,000 CFU/ml Escherichia coli  --  --      COVID-19 PCR: NotDetec: Testing is performed using polymerase chain reaction (PCR) or   transcription mediated amplification (TMA). This COVID-19 (SARS-CoV-2)   nucleic acid amplification test was validated by [employer] and is   in use under the FDA Emergency Use Authorization (EUA) for clinical labs   CLIA-certified to perform high complexity testing. Test results should be   correlated with clinical presentation, patient history, and epidemiology.     RADIOLOGY    < from: CT Abdomen and Pelvis w/ IV Cont (20 @ 20:20) >  Findings concerning for acute sigmoid diverticulitis. Consider nonemergent colonoscopy evaluation once inflammation subsides to exclude underlying malignancy.No bowel obstruction.    Appendix is normal.    Hysterectomy. Redemonstrated complex 4 cm left adnexal cyst, as better delineated on pelvic ultrasound from same day. Short-term pelvic ultrasound imaging follow-up in 6 weeks is advised to demonstrate resolution.    < end of copied text >

## 2020-12-02 LAB
ALBUMIN SERPL ELPH-MCNC: 4.3 G/DL — SIGNIFICANT CHANGE UP (ref 3.3–5)
ALP SERPL-CCNC: 94 U/L — SIGNIFICANT CHANGE UP (ref 40–120)
ALT FLD-CCNC: 19 U/L — SIGNIFICANT CHANGE UP (ref 4–33)
ANION GAP SERPL CALC-SCNC: 11 MMO/L — SIGNIFICANT CHANGE UP (ref 7–14)
AST SERPL-CCNC: 36 U/L — HIGH (ref 4–32)
BILIRUB SERPL-MCNC: 0.3 MG/DL — SIGNIFICANT CHANGE UP (ref 0.2–1.2)
BUN SERPL-MCNC: 11 MG/DL — SIGNIFICANT CHANGE UP (ref 7–23)
CALCIUM SERPL-MCNC: 9.7 MG/DL — SIGNIFICANT CHANGE UP (ref 8.4–10.5)
CHLORIDE SERPL-SCNC: 102 MMOL/L — SIGNIFICANT CHANGE UP (ref 98–107)
CO2 SERPL-SCNC: 23 MMOL/L — SIGNIFICANT CHANGE UP (ref 22–31)
CREAT SERPL-MCNC: 0.73 MG/DL — SIGNIFICANT CHANGE UP (ref 0.5–1.3)
GLUCOSE SERPL-MCNC: 108 MG/DL — HIGH (ref 70–99)
HCT VFR BLD CALC: 38.2 % — SIGNIFICANT CHANGE UP (ref 34.5–45)
HGB BLD-MCNC: 12.1 G/DL — SIGNIFICANT CHANGE UP (ref 11.5–15.5)
MAGNESIUM SERPL-MCNC: 2 MG/DL — SIGNIFICANT CHANGE UP (ref 1.6–2.6)
MCHC RBC-ENTMCNC: 28.5 PG — SIGNIFICANT CHANGE UP (ref 27–34)
MCHC RBC-ENTMCNC: 31.7 % — LOW (ref 32–36)
MCV RBC AUTO: 89.9 FL — SIGNIFICANT CHANGE UP (ref 80–100)
NRBC # FLD: 0 K/UL — SIGNIFICANT CHANGE UP (ref 0–0)
PHOSPHATE SERPL-MCNC: 3.1 MG/DL — SIGNIFICANT CHANGE UP (ref 2.5–4.5)
PLATELET # BLD AUTO: 286 K/UL — SIGNIFICANT CHANGE UP (ref 150–400)
PMV BLD: 11.2 FL — SIGNIFICANT CHANGE UP (ref 7–13)
POTASSIUM SERPL-MCNC: 3.9 MMOL/L — SIGNIFICANT CHANGE UP (ref 3.5–5.3)
POTASSIUM SERPL-SCNC: 3.9 MMOL/L — SIGNIFICANT CHANGE UP (ref 3.5–5.3)
PROT SERPL-MCNC: 7.5 G/DL — SIGNIFICANT CHANGE UP (ref 6–8.3)
RBC # BLD: 4.25 M/UL — SIGNIFICANT CHANGE UP (ref 3.8–5.2)
RBC # FLD: 12.9 % — SIGNIFICANT CHANGE UP (ref 10.3–14.5)
SODIUM SERPL-SCNC: 136 MMOL/L — SIGNIFICANT CHANGE UP (ref 135–145)
WBC # BLD: 7.42 K/UL — SIGNIFICANT CHANGE UP (ref 3.8–10.5)
WBC # FLD AUTO: 7.42 K/UL — SIGNIFICANT CHANGE UP (ref 3.8–10.5)

## 2020-12-02 PROCEDURE — 99232 SBSQ HOSP IP/OBS MODERATE 35: CPT

## 2020-12-02 RX ORDER — POLYETHYLENE GLYCOL 3350 17 G/17G
17 POWDER, FOR SOLUTION ORAL DAILY
Refills: 0 | Status: DISCONTINUED | OUTPATIENT
Start: 2020-12-02 | End: 2020-12-03

## 2020-12-02 RX ORDER — MORPHINE SULFATE 50 MG/1
2 CAPSULE, EXTENDED RELEASE ORAL EVERY 6 HOURS
Refills: 0 | Status: DISCONTINUED | OUTPATIENT
Start: 2020-12-02 | End: 2020-12-03

## 2020-12-02 RX ORDER — LANOLIN ALCOHOL/MO/W.PET/CERES
5 CREAM (GRAM) TOPICAL AT BEDTIME
Refills: 0 | Status: DISCONTINUED | OUTPATIENT
Start: 2020-12-02 | End: 2020-12-02

## 2020-12-02 RX ORDER — LANOLIN ALCOHOL/MO/W.PET/CERES
3 CREAM (GRAM) TOPICAL AT BEDTIME
Refills: 0 | Status: DISCONTINUED | OUTPATIENT
Start: 2020-12-02 | End: 2020-12-03

## 2020-12-02 RX ORDER — SODIUM CHLORIDE 9 MG/ML
1000 INJECTION, SOLUTION INTRAVENOUS
Refills: 0 | Status: DISCONTINUED | OUTPATIENT
Start: 2020-12-02 | End: 2020-12-03

## 2020-12-02 RX ORDER — ONDANSETRON 8 MG/1
4 TABLET, FILM COATED ORAL EVERY 6 HOURS
Refills: 0 | Status: DISCONTINUED | OUTPATIENT
Start: 2020-12-02 | End: 2020-12-03

## 2020-12-02 RX ORDER — SENNA PLUS 8.6 MG/1
2 TABLET ORAL AT BEDTIME
Refills: 0 | Status: DISCONTINUED | OUTPATIENT
Start: 2020-12-02 | End: 2020-12-03

## 2020-12-02 RX ADMIN — AMPICILLIN SODIUM AND SULBACTAM SODIUM 200 GRAM(S): 250; 125 INJECTION, POWDER, FOR SUSPENSION INTRAMUSCULAR; INTRAVENOUS at 11:30

## 2020-12-02 RX ADMIN — POLYETHYLENE GLYCOL 3350 17 GRAM(S): 17 POWDER, FOR SOLUTION ORAL at 10:47

## 2020-12-02 RX ADMIN — MORPHINE SULFATE 4 MILLIGRAM(S): 50 CAPSULE, EXTENDED RELEASE ORAL at 11:15

## 2020-12-02 RX ADMIN — SODIUM CHLORIDE 75 MILLILITER(S): 9 INJECTION, SOLUTION INTRAVENOUS at 17:43

## 2020-12-02 RX ADMIN — MORPHINE SULFATE 4 MILLIGRAM(S): 50 CAPSULE, EXTENDED RELEASE ORAL at 00:10

## 2020-12-02 RX ADMIN — AMPICILLIN SODIUM AND SULBACTAM SODIUM 200 GRAM(S): 250; 125 INJECTION, POWDER, FOR SUSPENSION INTRAMUSCULAR; INTRAVENOUS at 06:14

## 2020-12-02 RX ADMIN — AMPICILLIN SODIUM AND SULBACTAM SODIUM 200 GRAM(S): 250; 125 INJECTION, POWDER, FOR SUSPENSION INTRAMUSCULAR; INTRAVENOUS at 17:43

## 2020-12-02 RX ADMIN — MORPHINE SULFATE 4 MILLIGRAM(S): 50 CAPSULE, EXTENDED RELEASE ORAL at 10:45

## 2020-12-02 RX ADMIN — Medication 10 MILLIGRAM(S): at 10:47

## 2020-12-02 RX ADMIN — Medication 20 MILLIGRAM(S): at 06:14

## 2020-12-02 NOTE — PROGRESS NOTE ADULT - ASSESSMENT
ASSESSMENT:  45F hx of hysterectomy with 1st episode of acute uncomplicated diverticulitis    unable to sleep  inadequate pain control    Recommendations:  - No acute surgical intervention at this time  can follow up with Dr. Steven Naylor as needed as outpatient  - appreciate GI follow up  - continue with abx  - reassess multimodal pain regimen as patient has inadequate pain control  - CLD and then slowly advance diet as tolerated (patient did not tolerate regular diet last night)    A TEAM SURGERY  [id number] ASSESSMENT:  45F hx of hysterectomy with 1st episode of acute uncomplicated diverticulitis    unable to sleep  inadequate pain control    Recommendations:  - No acute surgical intervention at this time  can follow up with Dr. Steven Naylor as needed as outpatient  - appreciate GI follow up  - continue with abx  - reassess multimodal pain regimen as patient has inadequate pain control  - NPO until pain improves, IVF while NPO    Discussed with Dr. Dayday [facility] TEAM SURGERY  [id number]

## 2020-12-02 NOTE — PROGRESS NOTE ADULT - SUBJECTIVE AND OBJECTIVE BOX
PROGRESS NOTE:   Authored by Dr. Zamzam Matthews MD, Pager [phone] [facility], [id number] [address]     Patient is a 45y old  Female who presents with a chief complaint of abdominal pain (02 Dec 2020 07:24)      SUBJECTIVE / OVERNIGHT EVENTS: No events overnight.    ADDITIONAL REVIEW OF SYSTEMS: Denies fevers, chills, n/v.    MEDICATIONS  (STANDING):  ampicillin/sulbactam  IVPB      ampicillin/sulbactam  IVPB 3 Gram(s) IV Intermittent every 6 hours  dicyclomine 20 milliGRAM(s) Oral two times a day before meals  enoxaparin Injectable 40 milliGRAM(s) SubCutaneous daily  lactated ringers. 1000 milliLiter(s) (75 mL/Hr) IV Continuous <Continuous>  senna 2 Tablet(s) Oral at bedtime    MEDICATIONS  (PRN):  ketorolac   Injectable 30 milliGRAM(s) IV Push every 8 hours PRN Moderate Pain (4 - 6)  morphine  - Injectable 4 milliGRAM(s) IV Push every 6 hours PRN Severe Pain (7 - 10)  polyethylene glycol 3350 17 Gram(s) Oral daily PRN Constipation      CAPILLARY BLOOD GLUCOSE        I&O's Summary      PHYSICAL EXAM:  Vital Signs Last 24 Hrs  T(C): 37.1 (02 Dec 2020 06:10), Max: 37.1 (02 Dec 2020 06:10)  T(F): 98.7 (02 Dec 2020 06:10), Max: 98.7 (02 Dec 2020 06:10)  HR: 60 (02 Dec 2020 06:10) (60 - 81)  BP: 137/74 (02 Dec 2020 06:10) (122/70 - 142/73)  BP(mean): --  RR: 17 (02 Dec 2020 06:10) (17 - 18)  SpO2: 100% (02 Dec 2020 06:10) (99% - 100%)    CONSTITUTIONAL: NAD, lying in bed comfortably  RESPIRATORY: Normal respiratory effort; CTABL  CARDIOVASCULAR: Regular rate and rhythm, normal S1 and S2, no murmur/rub/gallop  ABDOMEN: Soft, nondistended, nontender to palpation, normoactive bowel sounds, no rebound/guarding  MUSCLOSKELETAL: no joint swelling or tenderness to palpation, FROM all extremities  NEURO: AAOx3 to person, place, and time, full and equal strength all extremities   EXTREMITIES: no pedal edema    LABS:                        10.7   5.37  )-----------( 214      ( 01 Dec 2020 06:05 )             34.0     12-01    137  |  104  |  17  ----------------------------<  116<H>  3.9   |  25  |  0.76    Ca    9.3      01 Dec 2020 06:05  Phos  3.5     12-01  Mg     1.9     12-01    TPro  6.4  /  Alb  3.8  /  TBili  < 0.2<L>  /  DBili  x   /  AST  44<H>  /  ALT  20  /  AlkPhos  85  12-01              Culture - Urine (collected 29 Nov 2020 17:25)  Source: .Urine Clean Catch (Midstream)  Final Report (01 Dec 2020 16:02):    >100,000 CFU/ml Escherichia coli  Organism: Escherichia coli (01 Dec 2020 16:02)  Organism: Escherichia coli (01 Dec 2020 16:02)        RADIOLOGY & ADDITIONAL TESTS:     PROGRESS NOTE:   Authored by Dr. Zamzam Matthews MD, Pager [phone] [facility], [id number] [address]     Patient is a 45y old  Female who presents with a chief complaint of abdominal pain (02 Dec 2020 07:24)      SUBJECTIVE / OVERNIGHT EVENTS: No events overnight. Pt w/ small BM yesterday, still feels constipated this AM. Still with significant abdominal pain.    ADDITIONAL REVIEW OF SYSTEMS: Denies fevers, chills, n/v.    MEDICATIONS  (STANDING):  ampicillin/sulbactam  IVPB      ampicillin/sulbactam  IVPB 3 Gram(s) IV Intermittent every 6 hours  dicyclomine 20 milliGRAM(s) Oral two times a day before meals  enoxaparin Injectable 40 milliGRAM(s) SubCutaneous daily  lactated ringers. 1000 milliLiter(s) (75 mL/Hr) IV Continuous <Continuous>  senna 2 Tablet(s) Oral at bedtime    MEDICATIONS  (PRN):  ketorolac   Injectable 30 milliGRAM(s) IV Push every 8 hours PRN Moderate Pain (4 - 6)  morphine  - Injectable 4 milliGRAM(s) IV Push every 6 hours PRN Severe Pain (7 - 10)  polyethylene glycol 3350 17 Gram(s) Oral daily PRN Constipation      CAPILLARY BLOOD GLUCOSE        I&O's Summary      PHYSICAL EXAM:  Vital Signs Last 24 Hrs  T(C): 37.1 (02 Dec 2020 06:10), Max: 37.1 (02 Dec 2020 06:10)  T(F): 98.7 (02 Dec 2020 06:10), Max: 98.7 (02 Dec 2020 06:10)  HR: 60 (02 Dec 2020 06:10) (60 - 81)  BP: 137/74 (02 Dec 2020 06:10) (122/70 - 142/73)  BP(mean): --  RR: 17 (02 Dec 2020 06:10) (17 - 18)  SpO2: 100% (02 Dec 2020 06:10) (99% - 100%)    CONSTITUTIONAL: NAD, lying in bed comfortably  RESPIRATORY: Normal respiratory effort; CTABL  CARDIOVASCULAR: Regular rate and rhythm, normal S1 and S2, no murmur/rub/gallop  ABDOMEN: Soft, distended, TTP lower and mid abdomen , normoactive bowel sounds, no rebound/guarding  MUSCLOSKELETAL: no joint swelling or tenderness to palpation, FROM all extremities  NEURO: AAOx3 to person, place, and time, full and equal strength all extremities   EXTREMITIES: no pedal edema      LABS:                        10.7   5.37  )-----------( 214      ( 01 Dec 2020 06:05 )             34.0     12-01    137  |  104  |  17  ----------------------------<  116<H>  3.9   |  25  |  0.76    Ca    9.3      01 Dec 2020 06:05  Phos  3.5     12-01  Mg     1.9     12-01    TPro  6.4  /  Alb  3.8  /  TBili  < 0.2<L>  /  DBili  x   /  AST  44<H>  /  ALT  20  /  AlkPhos  85  12-01              Culture - Urine (collected 29 Nov 2020 17:25)  Source: .Urine Clean Catch (Midstream)  Final Report (01 Dec 2020 16:02):    >100,000 CFU/ml Escherichia coli  Organism: Escherichia coli (01 Dec 2020 16:02)  Organism: Escherichia coli (01 Dec 2020 16:02)        RADIOLOGY & ADDITIONAL TESTS:

## 2020-12-02 NOTE — PROGRESS NOTE ADULT - SUBJECTIVE AND OBJECTIVE BOX
HITESH DEAN 45y [id number]    Patient is a 45y old  Female who presents with a chief complaint of abdominal pain (02 Dec 2020 11:33)      Follow Up/CC:  ID following for diverticulitis    Interval History/ROS: no fever, trying po, still with pain     Allergies    codeine (Short breath)    Intolerances        ANTIMICROBIALS:  ampicillin/sulbactam  IVPB    ampicillin/sulbactam  IVPB 3 every 6 hours      MEDICATIONS  (STANDING):  ampicillin/sulbactam  IVPB      ampicillin/sulbactam  IVPB 3 Gram(s) IV Intermittent every 6 hours  dicyclomine 20 milliGRAM(s) Oral two times a day before meals  enoxaparin Injectable 40 milliGRAM(s) SubCutaneous daily  lactated ringers. 1000 milliLiter(s) (75 mL/Hr) IV Continuous <Continuous>  senna 2 Tablet(s) Oral at bedtime    MEDICATIONS  (PRN):  melatonin 5 milliGRAM(s) Oral at bedtime PRN Insomnia  morphine  - Injectable 4 milliGRAM(s) IV Push every 6 hours PRN Severe Pain (7 - 10)  morphine  - Injectable 2 milliGRAM(s) IV Push every 6 hours PRN Moderate Pain (4 - 6)  ondansetron Injectable 4 milliGRAM(s) IV Push every 6 hours PRN Nausea and/or Vomiting  polyethylene glycol 3350 17 Gram(s) Oral daily PRN Constipation        Vital Signs Last 24 Hrs  T(C): 37.1 (02 Dec 2020 06:10), Max: 37.1 (02 Dec 2020 06:10)  T(F): 98.7 (02 Dec 2020 06:10), Max: 98.7 (02 Dec 2020 06:10)  HR: 60 (02 Dec 2020 06:10) (60 - 81)  BP: 137/74 (02 Dec 2020 06:10) (122/70 - 142/73)  BP(mean): --  RR: 17 (02 Dec 2020 06:10) (17 - 18)  SpO2: 100% (02 Dec 2020 06:10) (99% - 100%)    CBC Full  -  ( 02 Dec 2020 07:42 )  WBC Count : 7.42 K/uL  RBC Count : 4.25 M/uL  Hemoglobin : 12.1 g/dL  Hematocrit : 38.2 %  Platelet Count - Automated : 286 K/uL  Mean Cell Volume : 89.9 fL  Mean Cell Hemoglobin : 28.5 pg  Mean Cell Hemoglobin Concentration : 31.7 %  Auto Neutrophil # : x  Auto Lymphocyte # : x  Auto Monocyte # : x  Auto Eosinophil # : x  Auto Basophil # : x  Auto Neutrophil % : x  Auto Lymphocyte % : x  Auto Monocyte % : x  Auto Eosinophil % : x  Auto Basophil % : x    12-02    136  |  102  |  11  ----------------------------<  108<H>  3.9   |  23  |  0.73    Ca    9.7      02 Dec 2020 07:42  Phos  3.1     12-02  Mg     2.0     12-02    TPro  7.5  /  Alb  4.3  /  TBili  0.3  /  DBili  x   /  AST  36<H>  /  ALT  19  /  AlkPhos  94  12-02    LIVER FUNCTIONS - ( 02 Dec 2020 07:42 )  Alb: 4.3 g/dL / Pro: 7.5 g/dL / ALK PHOS: 94 u/L / ALT: 19 u/L / AST: 36 u/L / GGT: x               MICROBIOLOGY:  .Urine Clean Catch (Midstream)  11-29-20   >100,000 CFU/ml Escherichia coli  --  Escherichia coli        RADIOLOGY    < from: CT Abdomen and Pelvis w/ IV Cont (11.29.20 @ 20:20) >  Findings concerning for acute sigmoid diverticulitis. Consider nonemergent colonoscopy evaluation once inflammation subsides to exclude underlying malignancy.No bowel obstruction.    Appendix is normal.    Hysterectomy. Redemonstrated complex 4 cm left adnexal cyst, as better delineated on pelvic ultrasound from same day. Short-term pelvic ultrasound imaging follow-up in 6 weeks is advised to demonstrate resolution.    < end of copied text >

## 2020-12-02 NOTE — PROGRESS NOTE ADULT - SUBJECTIVE AND OBJECTIVE BOX
CARDIOLOGY FOLLOW UP - Dr. Chavez    CC no cp or sob        PHYSICAL EXAM:  T(C): 37.1 (12-02-20 @ 06:10), Max: 37.1 (12-02-20 @ 06:10)  HR: 60 (12-02-20 @ 06:10) (60 - 81)  BP: 137/74 (12-02-20 @ 06:10) (122/70 - 142/73)  RR: 17 (12-02-20 @ 06:10) (17 - 18)  SpO2: 100% (12-02-20 @ 06:10) (99% - 100%)  Wt(kg): --  I&O's Summary      Appearance: Normal	  Cardiovascular: Normal S1 S2,RRR, No JVD, No murmurs  Respiratory: Lungs clear to auscultation	  Gastrointestinal:  Soft, Non-tender, + BS	  Extremities: Normal range of motion, No clubbing, cyanosis or edema      Home Medications:      MEDICATIONS  (STANDING):  ampicillin/sulbactam  IVPB      ampicillin/sulbactam  IVPB 3 Gram(s) IV Intermittent every 6 hours  dicyclomine 20 milliGRAM(s) Oral two times a day before meals  enoxaparin Injectable 40 milliGRAM(s) SubCutaneous daily  lactated ringers. 1000 milliLiter(s) (75 mL/Hr) IV Continuous <Continuous>  senna 2 Tablet(s) Oral at bedtime      TELEMETRY: 	    ECG:  	  RADIOLOGY:   DIAGNOSTIC TESTING:  [ ] Echocardiogram:  [ ]  Catheterization:  [ ] Stress Test:    OTHER: 	    LABS:	 	                            12.1   7.42  )-----------( 286      ( 02 Dec 2020 07:42 )             38.2     12-02    136  |  102  |  11  ----------------------------<  108<H>  3.9   |  23  |  0.73    Ca    9.7      02 Dec 2020 07:42  Phos  3.1     12-02  Mg     2.0     12-02    TPro  7.5  /  Alb  4.3  /  TBili  0.3  /  DBili  x   /  AST  36<H>  /  ALT  19  /  AlkPhos  94  12-02

## 2020-12-02 NOTE — PROGRESS NOTE ADULT - SUBJECTIVE AND OBJECTIVE BOX
Patient is a 45y Female     Patient is a 45y old  Female who presents with a chief complaint of abdominal pain (01 Dec 2020 14:46)      HPI:  44 yo female w/pmhx endometrial cancer s/p hysterectomy p/w lower abd pain of 2 days duration. Her lower abdominal pain was bilateral and woke her up from sleep around 1am Saturday 11/28 night. The pain was 10/10, sharp and continuous throughout the night and into the day. Pt tried Tylenol, ibuprofen and heating pads without any benefit. Her pain did not subside and she decided to come to the ED on 11/29. On CTAP pt found to have acute sigmoid diverticulitis and she was observed in the CDU and started PO Augmentin. TVUS also noted a left sided ovarian cyst. Pt continued to have lower abd pain on exam while in the CDU and was thus admitted for further management. Of note pt also reports constipation since 3 days ago. She had nausea however denied vomiting, chest pain, sob, palps, sick contacts, fevers, chills, dysuria, diarrhea or orthopnea. Denies any hx of CHF, CAD or valvular disease. Currently her pain is slightly improved, rated 8/10 with pain meds.  (30 Nov 2020 13:45)      PAST MEDICAL & SURGICAL HISTORY:  Endometrial cancer    H/O: hysterectomy        MEDICATIONS  (STANDING):  ampicillin/sulbactam  IVPB      ampicillin/sulbactam  IVPB 3 Gram(s) IV Intermittent every 6 hours  dicyclomine 20 milliGRAM(s) Oral two times a day before meals  enoxaparin Injectable 40 milliGRAM(s) SubCutaneous daily  lactated ringers. 1000 milliLiter(s) (75 mL/Hr) IV Continuous <Continuous>      Allergies    codeine (Short breath)    Intolerances        SOCIAL HISTORY:  Denies ETOh,Smoking,     FAMILY HISTORY:  No pertinent family history in first degree relatives        REVIEW OF SYSTEMS:    CONSTITUTIONAL: No weakness, fevers or chills  EYES/ENT: No visual changes;  No vertigo or throat pain   NECK: No pain or stiffness  RESPIRATORY: No cough, wheezing, hemoptysis; No shortness of breath  CARDIOVASCULAR: No chest pain or palpitations  GASTROINTESTINAL: No abdominal or epigastric pain. No nausea, vomiting, or hematemesis; No diarrhea or constipation. No melena or hematochezia.  GENITOURINARY: No dysuria, frequency or hematuria  NEUROLOGICAL: No numbness or weakness  SKIN: No itching, burning, rashes, or lesions   All other review of systems is negative unless indicated above.    VITAL:  T(C): , Max: 37.1 (12-02-20 @ 06:10)  T(F): , Max: 98.7 (12-02-20 @ 06:10)  HR: 60 (12-02-20 @ 06:10)  BP: 137/74 (12-02-20 @ 06:10)  BP(mean): --  RR: 17 (12-02-20 @ 06:10)  SpO2: 100% (12-02-20 @ 06:10)  Wt(kg): --    I and O's:      Weight (kg): 68.6 (12-02 @ 06:10)    PHYSICAL EXAM:    Constitutional: NAD  HEENT: PERRLA,   Neck: No JVD  Respiratory: CTA B/L  Cardiovascular: S1 and S2  Gastrointestinal: BS+, soft, NT/ND  Extremities: No peripheral edema  Neurological: A/O x 3, no focal deficits  Psychiatric: Normal mood, normal affect  : No Roberts  Skin: No rashes  Access: Not applicable  Back: No CVA tenderness    LABS:                        10.7   5.37  )-----------( 214      ( 01 Dec 2020 06:05 )             34.0     12-01    137  |  104  |  17  ----------------------------<  116<H>  3.9   |  25  |  0.76    Ca    9.3      01 Dec 2020 06:05  Phos  3.5     12-01  Mg     1.9     12-01    TPro  6.4  /  Alb  3.8  /  TBili  < 0.2<L>  /  DBili  x   /  AST  44<H>  /  ALT  20  /  AlkPhos  85  12-01          RADIOLOGY & ADDITIONAL STUDIES:

## 2020-12-02 NOTE — PROGRESS NOTE ADULT - NEGATIVE ALLERGIC REACTIONS
EMERGENCY DEPARTMENT HISTORY AND PHYSICAL EXAM      Date: 11/26/2017  Patient Name: Syed Prado    History of Presenting Illness     Chief Complaint   Patient presents with    Generalized Body Aches     x1 month states, \"they say I have fibromyallergy and arthritis. \"       History Provided By: Patient    HPI: Syed Prado, 80 y.o. female with PMHx significant for HLD, HTN, fibromyalgia, and asthma, presents ambulatory with daughter to the [address] [facility] ED with cc of generalized body aches x 4 months (since August). Pt reports she feels pain \"all over\" starting from her shoulders all the way down her BL lower extremities. She states it is painful to walk and to turn over in bed. She denies injury or fall. She states she has seen her PCP and a rheumatologist without a definitive diagnosis; initially, she was told she had fibromyalgia, then arthritis, but most recently she was told she did not have arthritis. The rheumatologist gave her a shot and a rx for gabapentin, but she has had no relief and the pain persists. She denies any known tick bites. She specifically denies any fevers, chills, nausea, vomiting, shortness of breath, headache, rash, diarrhea, sweating or weight loss. Social hx: - Tobacco, - EtOH, - Illicit drugs    PCP: Maximilian Wells MD    There are no other complaints, changes, or physical findings at this time. Current Outpatient Prescriptions   Medication Sig Dispense Refill    oxyCODONE-acetaminophen (PERCOCET) 5-325 mg per tablet Take 1 Tab by mouth every six (6) hours as needed for Pain. Max Daily Amount: 4 Tabs. 12 Tab 0    ondansetron hcl (ZOFRAN, AS HYDROCHLORIDE,) 4 mg tablet Take 1 Tab by mouth every eight (8) hours as needed for Nausea. 20 Tab 0    gabapentin (NEURONTIN) 100 mg capsule Take 100 mg by mouth three (3) times daily.  diclofenac (VOLTAREN) 0.1 % ophthalmic solution Administer 1 Drop to right eye four (4) times daily.       prednisoLONE acetate (PRED FORTE) 1 % ophthalmic suspension Administer 1 Drop to right eye four (4) times daily.  moxifloxacin (VIGAMOX) 0.5 % ophthalmic solution Administer 1 Drop to left eye four (4) times daily.  moxifloxacin (VIGAMOX) 0.5 % ophthalmic solution Administer 1 Drop to right eye four (4) times daily.  bimatoprost (LUMIGAN) 0.01 % ophthalmic drops Administer 1 Drop to both eyes every evening.  multivitamin (ONE A DAY) tablet Take 1 Tab by mouth daily.  B.infantis-B.ani-B.long-B.bifi (PROBIOTIC 4X) 10-15 mg TbEC Take  by mouth daily.  pantoprazole (PROTONIX) 40 mg tablet Take 40 mg by mouth nightly.  celecoxib (CELEBREX) 200 mg capsule Take  by mouth every morning.  aspirin delayed-release 81 mg tablet Take  by mouth daily.  albuterol (PROVENTIL HFA, VENTOLIN HFA, PROAIR HFA) 90 mcg/actuation inhaler Take  by inhalation as needed for Wheezing.  meclizine (ANTIVERT) 25 mg tablet Take 1 Tab by mouth three (3) times daily as needed for Dizziness. 20 Tab 0    indapamide (LOZOL) 1.25 mg tablet Take 1.25 mg by mouth daily.  lorazepam (ATIVAN) 0.5 mg tablet Take 1 mg by mouth nightly.  co-enzyme Q-10 (CO Q-10) 100 mg capsule Take 100 mg by mouth daily.  mometasone-formoterol (DULERA) 100-5 mcg/Actuation HFAA HFA inhaler Take 2 Puffs by inhalation two (2) times a day.          Past History     Past Medical History:  Past Medical History:   Diagnosis Date    Adverse effect of anesthesia     combative waking up    Anxiety     Arthritis     Asthma     as of 12/30/16 pt states under control    Diverticulitis Dx 11/2016    Fibromyalgia     GERD (gastroesophageal reflux disease)     Glaucoma     Hiatal hernia     Hypercholesteremia     Hypertension     Ill-defined condition     neurapthy in ble    Nausea & vomiting        Past Surgical History:  Past Surgical History:   Procedure Laterality Date    HX CATARACT REMOVAL Right 01/04/2017    HX HYSTERECTOMY      HX ROTATOR CUFF REPAIR Left 2008    HX TUBAL LIGATION      CT COLONOSCOPY FLX DX W/COLLJ SPEC WHEN PFRMD  11/9/2011         CT EGD TRANSORAL BIOPSY SINGLE/MULTIPLE  11/9/2011            Family History:  No family history on file. Social History:  Social History   Substance Use Topics    Smoking status: Never Smoker    Smokeless tobacco: Never Used    Alcohol use No       Allergies: Allergies   Allergen Reactions    Cymbalta [Duloxetine] Nausea Only and Other (comments)     \"within the hour I had a severe headache, and vomiting\"      Demerol [Meperidine] Nausea and Vomiting    Morphine Nausea and Vomiting    Penicillins Other (comments)     rash    Percocet [Oxycodone-Acetaminophen] Nausea and Vomiting         Review of Systems   Review of Systems   Constitutional: Negative. Negative for activity change, appetite change, chills, fatigue, fever and unexpected weight change. HENT: Negative. Negative for congestion, hearing loss, rhinorrhea, sneezing and voice change. Eyes: Negative. Negative for pain and visual disturbance. Respiratory: Negative. Negative for apnea, cough, choking, chest tightness and shortness of breath. Cardiovascular: Negative. Negative for chest pain and palpitations. Gastrointestinal: Negative. Negative for abdominal distention, abdominal pain, blood in stool, diarrhea, nausea and vomiting. Genitourinary: Negative. Negative for difficulty urinating, flank pain, frequency and urgency. Musculoskeletal: Positive for myalgias (generalized body aches). Negative for arthralgias, back pain and neck stiffness. Skin: Negative. Negative for color change and rash. Neurological: Negative. Negative for dizziness, seizures, syncope, speech difficulty, weakness, numbness and headaches. Hematological: Negative for adenopathy. Psychiatric/Behavioral: Negative. Negative for agitation, behavioral problems, dysphoric mood and suicidal ideas. The patient is not nervous/anxious. Physical Exam   Physical Exam   Constitutional: She is oriented to person, place, and time. She appears well-developed and well-nourished. No distress. HENT:   Head: Normocephalic and atraumatic. Right Ear: External ear normal.   Left Ear: External ear normal.   Nose: Nose normal.   Mouth/Throat: Oropharynx is clear and moist. No oropharyngeal exudate. Eyes: Conjunctivae and EOM are normal. Pupils are equal, round, and reactive to light. Right eye exhibits no discharge. Left eye exhibits no discharge. No scleral icterus. Neck: Normal range of motion. Neck supple. No JVD present. No tracheal deviation present. Cardiovascular: Normal rate, regular rhythm, normal heart sounds and intact distal pulses. Exam reveals no gallop and no friction rub. No murmur heard. Pulmonary/Chest: Effort normal and breath sounds normal. No respiratory distress. She has no wheezes. She has no rales. She exhibits no tenderness. Abdominal: Soft. Bowel sounds are normal. She exhibits no distension and no mass. There is no tenderness. There is no rebound and no guarding. Musculoskeletal: Normal range of motion. She exhibits no edema or tenderness. Good A/P of all extremities, no gross deformities, no significant swelling. Lymphadenopathy:     She has no cervical adenopathy. Neurological: She is alert and oriented to person, place, and time. She has normal reflexes. No cranial nerve deficit. She exhibits normal muscle tone. Coordination normal.   Skin: Skin is warm and dry. No rash noted. She is not diaphoretic. Psychiatric: She has a normal mood and affect. Her behavior is normal. Judgment and thought content normal.   Nursing note and vitals reviewed.         Diagnostic Study Results     Labs -     Recent Results (from the past 12 hour(s))   URINALYSIS W/ REFLEX CULTURE    Collection Time: 11/26/17  2:45 PM   Result Value Ref Range    Color YELLOW/STRAW      Appearance CLEAR CLEAR      Specific gravity 1.019 1.003 - 1.030      pH (UA) 6.0 5.0 - 8.0      Protein NEGATIVE  NEG mg/dL    Glucose NEGATIVE  NEG mg/dL    Ketone NEGATIVE  NEG mg/dL    Bilirubin NEGATIVE  NEG      Blood NEGATIVE  NEG      Urobilinogen 0.2 0.2 - 1.0 EU/dL    Nitrites NEGATIVE  NEG      Leukocyte Esterase SMALL (A) NEG      WBC 5-10 0 - 4 /hpf    RBC 0-5 0 - 5 /hpf    Epithelial cells FEW FEW /lpf    Bacteria NEGATIVE  NEG /hpf    UA:UC IF INDICATED URINE CULTURE ORDERED (A) CNI      Hyaline cast 2-5 0 - 5 /lpf   CBC WITH AUTOMATED DIFF    Collection Time: 11/26/17  2:47 PM   Result Value Ref Range    WBC 8.2 3.6 - 11.0 K/uL    RBC 4.48 3.80 - 5.20 M/uL    HGB 12.7 11.5 - 16.0 g/dL    HCT 39.3 35.0 - 47.0 %    MCV 87.7 80.0 - 99.0 FL    MCH 28.3 26.0 - 34.0 PG    MCHC 32.3 30.0 - 36.5 g/dL    RDW 13.4 11.5 - 14.5 %    PLATELET 986 551 - 341 K/uL    NEUTROPHILS 53 32 - 75 %    LYMPHOCYTES 30 12 - 49 %    MONOCYTES 12 5 - 13 %    EOSINOPHILS 4 0 - 7 %    BASOPHILS 1 0 - 1 %    ABS. NEUTROPHILS 4.4 1.8 - 8.0 K/UL    ABS. LYMPHOCYTES 2.4 0.8 - 3.5 K/UL    ABS. MONOCYTES 1.0 0.0 - 1.0 K/UL    ABS. EOSINOPHILS 0.3 0.0 - 0.4 K/UL    ABS. BASOPHILS 0.1 0.0 - 0.1 K/UL   METABOLIC PANEL, COMPREHENSIVE    Collection Time: 11/26/17  2:47 PM   Result Value Ref Range    Sodium 139 136 - 145 mmol/L    Potassium 4.1 3.5 - 5.1 mmol/L    Chloride 103 97 - 108 mmol/L    CO2 30 21 - 32 mmol/L    Anion gap 6 5 - 15 mmol/L    Glucose 82 65 - 100 mg/dL    BUN 14 6 - 20 MG/DL    Creatinine 0.96 0.55 - 1.02 MG/DL    BUN/Creatinine ratio 15 12 - 20      GFR est AA >60 >60 ml/min/1.73m2    GFR est non-AA 56 (L) >60 ml/min/1.73m2    Calcium 9.2 8.5 - 10.1 MG/DL    Bilirubin, total 0.3 0.2 - 1.0 MG/DL    ALT (SGPT) 25 12 - 78 U/L    AST (SGOT) 23 15 - 37 U/L    Alk.  phosphatase 92 45 - 117 U/L    Protein, total 7.6 6.4 - 8.2 g/dL    Albumin 3.8 3.5 - 5.0 g/dL    Globulin 3.8 2.0 - 4.0 g/dL    A-G Ratio 1.0 (L) 1.1 - 2.2     SED RATE (ESR)    Collection Time: 11/26/17  2:47 PM Result Value Ref Range    Sed rate, automated 20 0 - 30 mm/hr       Medical Decision Making   I am the first provider for this patient. I reviewed the vital signs, available nursing notes, past medical history, past surgical history, family history and social history. Vital Signs-Reviewed the patient's vital signs. Patient Vitals for the past 12 hrs:   Temp Pulse Resp BP SpO2   11/26/17 1321 98 °F (36.7 °C) 74 16 172/77 99 %       Records Reviewed: Old Medical Records    Provider Notes (Medical Decision Making):   DDx: DJD, connective tissue disorder, RA, lyme disease, Z[place]ib Mountain spotted fever. ED Course:   Initial assessment performed. The patients presenting problems have been discussed, and they are in agreement with the care plan formulated and outlined with them. I have encouraged them to ask questions as they arise throughout their visit. Disposition:  DISCHARGE NOTE  3:59 PM  The patient has been re-evaluated and is ready for discharge. Reviewed available results with patient. Counseled patient on diagnosis and care plan. Patient has expressed understanding, and all questions have been answered. Patient agrees with plan and agrees to follow up as recommended, or return to the ED if their symptoms worsen. Discharge instructions have been provided and explained to the patient, along with reasons to return to the ED. PLAN:  1. Current Discharge Medication List      START taking these medications    Details   oxyCODONE-acetaminophen (PERCOCET) 5-325 mg per tablet Take 1 Tab by mouth every six (6) hours as needed for Pain. Max Daily Amount: 4 Tabs. Qty: 12 Tab, Refills: 0      ondansetron hcl (ZOFRAN, AS HYDROCHLORIDE,) 4 mg tablet Take 1 Tab by mouth every eight (8) hours as needed for Nausea. Qty: 20 Tab, Refills: 0           2.    Follow-up Information     Follow up With Details Comments Contact Info    Lia Monsivais MD In 3 days As needed [address] [address]  [phone]          Return to ED if worse     Diagnosis     Clinical Impression:   1. Acute cystitis without hematuria    2. Body aches        Attestations: This note is prepared by Rocio Moscoso, acting as Scribe for [employer]. The scribe's documentation has been prepared under my direction and personally reviewed by me in its entirety. I confirm that the note above accurately reflects all work, treatment, procedures, and medical decision making performed by me.   [employer] [employer] no respiratory distress

## 2020-12-02 NOTE — PROGRESS NOTE ADULT - PROBLEM SELECTOR PLAN 4
Dispo:   1.  Name of PCP:   2.  PCP Contacted on Admission: [ ] Y    [ ] N    3.  PCP contacted at Discharge: [ ] Y    [ ] N    [ ] N/A  4.  Post-Discharge Appointment Date and Location:  5.  Summary of Handoff given to PCP: Dispo:   1.  Name of PCP: N/A recently moved from [place]  2.  PCP Contacted on Admission: [ ] Y    [ x] N    3.  PCP contacted at Discharge: [ ] Y    [ ] N    [ ] N/A  4.  Post-Discharge Appointment Date and Location:  5.  Summary of Handoff given to PCP:

## 2020-12-02 NOTE — PROGRESS NOTE ADULT - PROBLEM SELECTOR PLAN 2
TVUS consistent with 2.9 cm complex left ovarian cyst consistent with hemorrhagic cyst. No signs of ovarian torsion  - monitor  - consider TVUS follow-up in 6 weeks to demonstrate resolution

## 2020-12-02 NOTE — PROGRESS NOTE ADULT - PROBLEM SELECTOR PLAN 3
DVT ppx: lovenox subq daily  Diet: regular  Pain control: toradol 30 IVP q8h prn mod pain, morphine 4mg IVPq6h prn severe pain  Constipation: miralax, senna. Will consider enema if pt does not have bowel movement DVT ppx: lovenox subq daily  Diet: regular  Pain control: morphine 2mg IVP q6h prn mod pain, morphine 4mg IVPq6h prn severe pain  Constipation: miralax, senna. s/p tap water enema. Will try suppository DVT ppx: lovenox subq daily  Diet: NPO given significant abd pain, will advance as tolerated. Gentle IVF while NPO.  Pain control: morphine 2mg IVP q6h prn mod pain, morphine 4mg IVPq6h prn severe pain  Constipation: miralax, senna. s/p tap water enema. Will try suppository

## 2020-12-02 NOTE — PROGRESS NOTE ADULT - SUBJECTIVE AND OBJECTIVE BOX
GENERAL SURGERY PROGRESS NOTE    SUBJECTIVE  Patient seen and examined. No acute events overnight.  patient unable to sleep  did not tolerate food for dinner, had nausea  feels nauseous this AM, no flatus or BM in days  feels bloated  says she was denied pain medication overnight because the medication was not due yet  dysuria resolved  +amb oob    OBJECTIVE    PHYSICAL EXAM  General: Appears well, NAD, tired  CHEST: breathing comfortably  CV: appears well perfused  Abdomen: soft, mildly TTP low abdomen, nondistended, no rebound or guarding  Extremities: Grossly symmetric    T(C): 37.1 (12-02-20 @ 06:10), Max: 37.1 (12-02-20 @ 06:10)  HR: 60 (12-02-20 @ 06:10) (60 - 81)  BP: 137/74 (12-02-20 @ 06:10) (122/70 - 142/73)  RR: 17 (12-02-20 @ 06:10) (17 - 18)  SpO2: 100% (12-02-20 @ 06:10) (99% - 100%)      MEDICATIONS  ampicillin/sulbactam  IVPB      ampicillin/sulbactam  IVPB 3 Gram(s) IV Intermittent every 6 hours  dicyclomine 20 milliGRAM(s) Oral two times a day before meals  enoxaparin Injectable 40 milliGRAM(s) SubCutaneous daily  ketorolac   Injectable 30 milliGRAM(s) IV Push every 8 hours PRN  lactated ringers. 1000 milliLiter(s) IV Continuous <Continuous>  morphine  - Injectable 4 milliGRAM(s) IV Push every 6 hours PRN      LABS                        10.7   5.37  )-----------( 214      ( 01 Dec 2020 06:05 )             34.0     12-01    137  |  104  |  17  ----------------------------<  116<H>  3.9   |  25  |  0.76    Ca    9.3      01 Dec 2020 06:05  Phos  3.5     12-01  Mg     1.9     12-01    TPro  6.4  /  Alb  3.8  /  TBili  < 0.2<L>  /  DBili  x   /  AST  44<H>  /  ALT  20  /  AlkPhos  85  12-01          RADIOLOGY & ADDITIONAL STUDIES

## 2020-12-03 ENCOUNTER — TRANSCRIPTION ENCOUNTER (OUTPATIENT)
Age: 45
End: 2020-12-03

## 2020-12-03 VITALS
OXYGEN SATURATION: 100 % | RESPIRATION RATE: 16 BRPM | SYSTOLIC BLOOD PRESSURE: 108 MMHG | TEMPERATURE: 98 F | DIASTOLIC BLOOD PRESSURE: 60 MMHG | HEART RATE: 74 BPM

## 2020-12-03 PROCEDURE — 99232 SBSQ HOSP IP/OBS MODERATE 35: CPT

## 2020-12-03 RX ORDER — SODIUM CHLORIDE 9 MG/ML
1000 INJECTION, SOLUTION INTRAVENOUS
Refills: 0 | Status: DISCONTINUED | OUTPATIENT
Start: 2020-12-03 | End: 2020-12-03

## 2020-12-03 RX ORDER — MOXIFLOXACIN HYDROCHLORIDE TABLETS, 400 MG 400 MG/1
1 TABLET, FILM COATED ORAL
Qty: 14 | Refills: 0
Start: 2020-12-03 | End: 2020-12-09

## 2020-12-03 RX ORDER — METRONIDAZOLE 500 MG
1 TABLET ORAL
Qty: 21 | Refills: 0
Start: 2020-12-03 | End: 2020-12-09

## 2020-12-03 RX ADMIN — AMPICILLIN SODIUM AND SULBACTAM SODIUM 200 GRAM(S): 250; 125 INJECTION, POWDER, FOR SUSPENSION INTRAMUSCULAR; INTRAVENOUS at 00:08

## 2020-12-03 RX ADMIN — Medication 10 MILLIGRAM(S): at 11:18

## 2020-12-03 RX ADMIN — ENOXAPARIN SODIUM 40 MILLIGRAM(S): 100 INJECTION SUBCUTANEOUS at 11:18

## 2020-12-03 RX ADMIN — AMPICILLIN SODIUM AND SULBACTAM SODIUM 200 GRAM(S): 250; 125 INJECTION, POWDER, FOR SUSPENSION INTRAMUSCULAR; INTRAVENOUS at 11:23

## 2020-12-03 RX ADMIN — SENNA PLUS 2 TABLET(S): 8.6 TABLET ORAL at 00:08

## 2020-12-03 RX ADMIN — AMPICILLIN SODIUM AND SULBACTAM SODIUM 200 GRAM(S): 250; 125 INJECTION, POWDER, FOR SUSPENSION INTRAMUSCULAR; INTRAVENOUS at 06:24

## 2020-12-03 RX ADMIN — Medication 3 MILLIGRAM(S): at 00:10

## 2020-12-03 NOTE — CHART NOTE - NSCHARTNOTEFT_GEN_A_CORE
Called by RN, as patient wishes to leave AMA. Patient seen at bedside to further discuss plan of care. Patient upset that she is not getting solid food and she is not improving since arrival to the hospital. Provider expressed the need for her to be on clear liquids until her pain improves and until she has a bowel movement. Patient still wishes to go home where she will receive a bowel regimen from her mother to help her have a bowel movement. Discussed risks of leaving against medical advice, which includes worsening of current medical condition, up to and including death. Patient understands risks and still wishes to leave. She will finish a dose of Unasyn before leaving and continue Cipro/Flagyl PO for diverticulitis for another 7 days. Patient expressed understanding that she will continue to take PO abx upon leaving the hospital. Leaving was strictly discouraged however patient still wished to leave AMA. AMA paperwork signed and placed in chart. Dr. Cade made aware.       Zamzam Matthews, PGY-1  [phone]

## 2020-12-03 NOTE — PROGRESS NOTE ADULT - SUBJECTIVE AND OBJECTIVE BOX
Patient seen and examined earlier this morning  She was comfortable in bed  States she still has the lower abdominal pain, but it is improved  No nausea or vomiting today    ICU Vital Signs Last 24 Hrs  T(C): 36.4 (03 Dec 2020 06:22), Max: 36.7 (02 Dec 2020 13:30)  T(F): 97.5 (03 Dec 2020 06:22), Max: 98.1 (02 Dec 2020 13:30)  HR: 74 (03 Dec 2020 06:22) (71 - 84)  BP: 108/60 (03 Dec 2020 06:22) (108/60 - 136/82)  BP(mean): --  ABP: --  ABP(mean): --  RR: 16 (03 Dec 2020 06:22) (16 - 17)  SpO2: 100% (03 Dec 2020 06:22) (100% - 100%)      On exam: she is awake, alert and oriented  Breathing comfortably on room air  Moving all extremities  Abd is soft, moderate distension, slightly less tender in the lower abdomen  No rebound, no guarding                            12.1   7.42  )-----------( 286      ( 02 Dec 2020 07:42 )             38.2     12-02    136  |  102  |  11  ----------------------------<  108<H>  3.9   |  23  |  0.73    Ca    9.7      02 Dec 2020 07:42  Phos  3.1     12-02  Mg     2.0     12-02    TPro  7.5  /  Alb  4.3  /  TBili  0.3  /  DBili  x   /  AST  36<H>  /  ALT  19  /  AlkPhos  94  12-02

## 2020-12-03 NOTE — PROGRESS NOTE ADULT - SUBJECTIVE AND OBJECTIVE BOX
HITESH DIANNE:[id number],   45yFemale followed for:  codeine (Short breath)    PAST MEDICAL & SURGICAL HISTORY:  Endometrial cancer    H/O: hysterectomy      FAMILY HISTORY:  No pertinent family history in first degree relatives      MEDICATIONS  (STANDING):  ampicillin/sulbactam  IVPB      ampicillin/sulbactam  IVPB 3 Gram(s) IV Intermittent every 6 hours  bisacodyl Suppository 10 milliGRAM(s) Rectal once  dicyclomine 20 milliGRAM(s) Oral two times a day before meals  enoxaparin Injectable 40 milliGRAM(s) SubCutaneous daily  lactated ringers. 1000 milliLiter(s) (75 mL/Hr) IV Continuous <Continuous>  senna 2 Tablet(s) Oral at bedtime    MEDICATIONS  (PRN):  melatonin 3 milliGRAM(s) Oral at bedtime PRN Insomnia  morphine  - Injectable 2 milliGRAM(s) IV Push every 6 hours PRN Moderate Pain (4 - 6)  morphine  - Injectable 4 milliGRAM(s) IV Push every 6 hours PRN Severe Pain (7 - 10)  ondansetron Injectable 4 milliGRAM(s) IV Push every 6 hours PRN Nausea and/or Vomiting  polyethylene glycol 3350 17 Gram(s) Oral daily PRN Constipation      Vital Signs Last 24 Hrs  T(C): 36.4 (03 Dec 2020 06:22), Max: 36.7 (02 Dec 2020 13:30)  T(F): 97.5 (03 Dec 2020 06:22), Max: 98.1 (02 Dec 2020 13:30)  HR: 74 (03 Dec 2020 06:22) (71 - 84)  BP: 108/60 (03 Dec 2020 06:22) (108/60 - 136/82)  BP(mean): --  RR: 16 (03 Dec 2020 06:22) (16 - 17)  SpO2: 100% (03 Dec 2020 06:22) (100% - 100%)  nc/at  s1s2  cta  soft, nt, nd no guarding or rebound  no c/c/e    CBC Full  -  ( 02 Dec 2020 07:42 )  WBC Count : 7.42 K/uL  RBC Count : 4.25 M/uL  Hemoglobin : 12.1 g/dL  Hematocrit : 38.2 %  Platelet Count - Automated : 286 K/uL  Mean Cell Volume : 89.9 fL  Mean Cell Hemoglobin : 28.5 pg  Mean Cell Hemoglobin Concentration : 31.7 %  Auto Neutrophil # : x  Auto Lymphocyte # : x  Auto Monocyte # : x  Auto Eosinophil # : x  Auto Basophil # : x  Auto Neutrophil % : x  Auto Lymphocyte % : x  Auto Monocyte % : x  Auto Eosinophil % : x  Auto Basophil % : x    12-02    136  |  102  |  11  ----------------------------<  108<H>  3.9   |  23  |  0.73    Ca    9.7      02 Dec 2020 07:42  Phos  3.1     12-02  Mg     2.0     12-02    TPro  7.5  /  Alb  4.3  /  TBili  0.3  /  DBili  x   /  AST  36<H>  /  ALT  19  /  AlkPhos  94  12-02

## 2020-12-03 NOTE — DISCHARGE NOTE NURSING/CASE MANAGEMENT/SOCIAL WORK - PATIENT PORTAL LINK FT
You can access the FollowMyHealth Patient Portal offered by [facility] by registering at the following website: [email or web address]. By joining [email or web address]’s FollowMyHealth portal, you will also be able to view your health information using other applications (apps) compatible with our system.

## 2020-12-03 NOTE — PROGRESS NOTE ADULT - NEUROLOGICAL DETAILS
alert and oriented x 3/responds to verbal commands
normal strength/alert and oriented x 3/responds to verbal commands
alert and oriented x 3/normal strength/responds to verbal commands

## 2020-12-03 NOTE — PROGRESS NOTE ADULT - PROBLEM SELECTOR PROBLEM 2
Ovarian cyst, left
Lower abdominal pain

## 2020-12-03 NOTE — PROGRESS NOTE ADULT - SUBJECTIVE AND OBJECTIVE BOX
PROGRESS NOTE:   Authored by Dr. Zamzam Matthews MD, Pager [phone] [facility], [address] [facility]     Patient is a 45y old  Female who presents with a chief complaint of abdominal pain (02 Dec 2020 11:33)      SUBJECTIVE / OVERNIGHT EVENTS: No events overnight.    ADDITIONAL REVIEW OF SYSTEMS: Denies fevers, chills, n/v.    MEDICATIONS  (STANDING):  ampicillin/sulbactam  IVPB      ampicillin/sulbactam  IVPB 3 Gram(s) IV Intermittent every 6 hours  dicyclomine 20 milliGRAM(s) Oral two times a day before meals  enoxaparin Injectable 40 milliGRAM(s) SubCutaneous daily  lactated ringers. 1000 milliLiter(s) (75 mL/Hr) IV Continuous <Continuous>  senna 2 Tablet(s) Oral at bedtime    MEDICATIONS  (PRN):  melatonin 3 milliGRAM(s) Oral at bedtime PRN Insomnia  morphine  - Injectable 2 milliGRAM(s) IV Push every 6 hours PRN Moderate Pain (4 - 6)  morphine  - Injectable 4 milliGRAM(s) IV Push every 6 hours PRN Severe Pain (7 - 10)  ondansetron Injectable 4 milliGRAM(s) IV Push every 6 hours PRN Nausea and/or Vomiting  polyethylene glycol 3350 17 Gram(s) Oral daily PRN Constipation      CAPILLARY BLOOD GLUCOSE        I&O's Summary      PHYSICAL EXAM:  Vital Signs Last 24 Hrs  T(C): 36.4 (03 Dec 2020 06:22), Max: 36.7 (02 Dec 2020 13:30)  T(F): 97.5 (03 Dec 2020 06:22), Max: 98.1 (02 Dec 2020 13:30)  HR: 74 (03 Dec 2020 06:22) (71 - 84)  BP: 108/60 (03 Dec 2020 06:22) (108/60 - 136/82)  BP(mean): --  RR: 16 (03 Dec 2020 06:22) (16 - 17)  SpO2: 100% (03 Dec 2020 06:22) (100% - 100%)    CONSTITUTIONAL: NAD, lying in bed comfortably  RESPIRATORY: Normal respiratory effort; CTABL  CARDIOVASCULAR: Regular rate and rhythm, normal S1 and S2, no murmur/rub/gallop  ABDOMEN: Soft, distended, TTP lower and mid abdomen , normoactive bowel sounds, no rebound/guarding  MUSCLOSKELETAL: no joint swelling or tenderness to palpation, FROM all extremities  NEURO: AAOx3 to person, place, and time, full and equal strength all extremities   EXTREMITIES: no pedal edema    LABS:                        12.1   7.42  )-----------( 286      ( 02 Dec 2020 07:42 )             38.2     12-02    136  |  102  |  11  ----------------------------<  108<H>  3.9   |  23  |  0.73    Ca    9.7      02 Dec 2020 07:42  Phos  3.1     12-02  Mg     2.0     12-02    TPro  7.5  /  Alb  4.3  /  TBili  0.3  /  DBili  x   /  AST  36<H>  /  ALT  19  /  AlkPhos  94  12-02                RADIOLOGY & ADDITIONAL TESTS:     PROGRESS NOTE:   Authored by Dr. Zamzam Matthews MD, Pager [phone] [facility], [address] [facility]     Patient is a 45y old  Female who presents with a chief complaint of abdominal pain (02 Dec 2020 11:33)      SUBJECTIVE / OVERNIGHT EVENTS: No events overnight. Pt this AM upset she cannot have solid food. Notes no improvement in pain.     ADDITIONAL REVIEW OF SYSTEMS: Denies fevers, chills, n/v.    MEDICATIONS  (STANDING):  ampicillin/sulbactam  IVPB      ampicillin/sulbactam  IVPB 3 Gram(s) IV Intermittent every 6 hours  dicyclomine 20 milliGRAM(s) Oral two times a day before meals  enoxaparin Injectable 40 milliGRAM(s) SubCutaneous daily  lactated ringers. 1000 milliLiter(s) (75 mL/Hr) IV Continuous <Continuous>  senna 2 Tablet(s) Oral at bedtime    MEDICATIONS  (PRN):  melatonin 3 milliGRAM(s) Oral at bedtime PRN Insomnia  morphine  - Injectable 2 milliGRAM(s) IV Push every 6 hours PRN Moderate Pain (4 - 6)  morphine  - Injectable 4 milliGRAM(s) IV Push every 6 hours PRN Severe Pain (7 - 10)  ondansetron Injectable 4 milliGRAM(s) IV Push every 6 hours PRN Nausea and/or Vomiting  polyethylene glycol 3350 17 Gram(s) Oral daily PRN Constipation      CAPILLARY BLOOD GLUCOSE        I&O's Summary      PHYSICAL EXAM:  Vital Signs Last 24 Hrs  T(C): 36.4 (03 Dec 2020 06:22), Max: 36.7 (02 Dec 2020 13:30)  T(F): 97.5 (03 Dec 2020 06:22), Max: 98.1 (02 Dec 2020 13:30)  HR: 74 (03 Dec 2020 06:22) (71 - 84)  BP: 108/60 (03 Dec 2020 06:22) (108/60 - 136/82)  BP(mean): --  RR: 16 (03 Dec 2020 06:22) (16 - 17)  SpO2: 100% (03 Dec 2020 06:22) (100% - 100%)    CONSTITUTIONAL: NAD, lying in bed comfortably  RESPIRATORY: Normal respiratory effort; CTABL  CARDIOVASCULAR: Regular rate and rhythm, normal S1 and S2, no murmur/rub/gallop  ABDOMEN: Soft, distended, TTP lower and mid abdomen , normoactive bowel sounds, no rebound/guarding  MUSCLOSKELETAL: no joint swelling or tenderness to palpation, FROM all extremities  NEURO: AAOx3 to person, place, and time, full and equal strength all extremities   EXTREMITIES: no pedal edema    LABS:                        12.1   7.42  )-----------( 286      ( 02 Dec 2020 07:42 )             38.2     12-02    136  |  102  |  11  ----------------------------<  108<H>  3.9   |  23  |  0.73    Ca    9.7      02 Dec 2020 07:42  Phos  3.1     12-02  Mg     2.0     12-02    TPro  7.5  /  Alb  4.3  /  TBili  0.3  /  DBili  x   /  AST  36<H>  /  ALT  19  /  AlkPhos  94  12-02                RADIOLOGY & ADDITIONAL TESTS:

## 2020-12-03 NOTE — PROGRESS NOTE ADULT - PROBLEM SELECTOR PLAN 4
Dispo:   1.  Name of PCP: N/A recently moved from [place]  2.  PCP Contacted on Admission: [ ] Y    [ x] N    3.  PCP contacted at Discharge: [ ] Y    [ ] N    [ ] N/A  4.  Post-Discharge Appointment Date and Location:  5.  Summary of Handoff given to PCP:

## 2020-12-03 NOTE — PROGRESS NOTE ADULT - SUBJECTIVE AND OBJECTIVE BOX
HITESH DEAN 45y [id number]    Patient is a 45y old  Female who presents with a chief complaint of abdominal pain (03 Dec 2020 13:22)      Follow Up/CC:  ID following for diverticulitis     Interval History/ROS: no fever, going home, tolerating po, still some pain     Allergies    codeine (Short breath)    Intolerances        ANTIMICROBIALS:  ampicillin/sulbactam  IVPB    ampicillin/sulbactam  IVPB 3 every 6 hours      MEDICATIONS  (STANDING):  ampicillin/sulbactam  IVPB      ampicillin/sulbactam  IVPB 3 Gram(s) IV Intermittent every 6 hours  dicyclomine 20 milliGRAM(s) Oral two times a day before meals  enoxaparin Injectable 40 milliGRAM(s) SubCutaneous daily  lactated ringers. 1000 milliLiter(s) (75 mL/Hr) IV Continuous <Continuous>  senna 2 Tablet(s) Oral at bedtime    MEDICATIONS  (PRN):  melatonin 3 milliGRAM(s) Oral at bedtime PRN Insomnia  morphine  - Injectable 2 milliGRAM(s) IV Push every 6 hours PRN Moderate Pain (4 - 6)  morphine  - Injectable 4 milliGRAM(s) IV Push every 6 hours PRN Severe Pain (7 - 10)  ondansetron Injectable 4 milliGRAM(s) IV Push every 6 hours PRN Nausea and/or Vomiting  polyethylene glycol 3350 17 Gram(s) Oral daily PRN Constipation        Vital Signs Last 24 Hrs  T(C): 36.4 (03 Dec 2020 06:22), Max: 36.4 (03 Dec 2020 06:22)  T(F): 97.5 (03 Dec 2020 06:22), Max: 97.5 (03 Dec 2020 06:22)  HR: 74 (03 Dec 2020 06:22) (74 - 84)  BP: 108/60 (03 Dec 2020 06:22) (108/60 - 136/82)  BP(mean): --  RR: 16 (03 Dec 2020 06:22) (16 - 17)  SpO2: 100% (03 Dec 2020 06:22) (100% - 100%)    CBC Full  -  ( 02 Dec 2020 07:42 )  WBC Count : 7.42 K/uL  RBC Count : 4.25 M/uL  Hemoglobin : 12.1 g/dL  Hematocrit : 38.2 %  Platelet Count - Automated : 286 K/uL  Mean Cell Volume : 89.9 fL  Mean Cell Hemoglobin : 28.5 pg  Mean Cell Hemoglobin Concentration : 31.7 %  Auto Neutrophil # : x  Auto Lymphocyte # : x  Auto Monocyte # : x  Auto Eosinophil # : x  Auto Basophil # : x  Auto Neutrophil % : x  Auto Lymphocyte % : x  Auto Monocyte % : x  Auto Eosinophil % : x  Auto Basophil % : x    12-02    136  |  102  |  11  ----------------------------<  108<H>  3.9   |  23  |  0.73    Ca    9.7      02 Dec 2020 07:42  Phos  3.1     12-02  Mg     2.0     12-02    TPro  7.5  /  Alb  4.3  /  TBili  0.3  /  DBili  x   /  AST  36<H>  /  ALT  19  /  AlkPhos  94  12-02    LIVER FUNCTIONS - ( 02 Dec 2020 07:42 )  Alb: 4.3 g/dL / Pro: 7.5 g/dL / ALK PHOS: 94 u/L / ALT: 19 u/L / AST: 36 u/L / GGT: x               MICROBIOLOGY:  .Urine Clean Catch (Midstream)  11-29-20   >100,000 CFU/ml Escherichia coli  --  Escherichia coli        RADIOLOGY    < from: CT Abdomen and Pelvis w/ IV Cont (11.29.20 @ 20:20) >  Findings concerning for acute sigmoid diverticulitis. Consider nonemergent colonoscopy evaluation once inflammation subsides to exclude underlying malignancy.No bowel obstruction.    Appendix is normal.    Hysterectomy. Redemonstrated complex 4 cm left adnexal cyst, as better delineated on pelvic ultrasound from same day. Short-term pelvic ultrasound imaging follow-up in 6 weeks is advised to demonstrate resolution.      < end of copied text >

## 2020-12-03 NOTE — PROGRESS NOTE ADULT - REASON FOR ADMISSION
diverticulitis
abdominal pain

## 2020-12-03 NOTE — PROGRESS NOTE ADULT - ATTENDING COMMENTS
Agree with above NP note.  cv stable  med/gi/sx f/u  abx   ct abd imaging e evidence of diverticulitis
Agree with above NP note.  cv stable  med/gi/sx f/u  abx   ct abd imaging w evidence of diverticulitis
Patient seen and examined  Complains of nausea and abdominal pain  No flatus    On exam: awake, alert  Breathing comfortably  Abd is soft, more distended, tender in the lower quadrants  No rebound, no guarding    - no emergent surgical intervention required at this time  - continue antibiotics per ID  - NPO, bowel rest, as patient is nauseated, with persistent pain
Patient seen and examined  No nausea or vomiting  Continues to complain of lower abdominal pain    On exam: awake, alert  Breathing comfortably  Abd is soft, not distended, tender in the lower abdomen, no rebound, no guarding                          10.7   5.37  )-----------( 214      ( 01 Dec 2020 06:05 )             34.0       45 year old woman with diverticulitis  - no emergent surgical intervention required at this time  - continue antibiotics per ID  - will eventually need colonoscopy in the future when inflammation resolves  - continue supportive care per primary team
Crispin Brooks  Attending Physician   Division of Infectious Disease  Pager #[phone]  After 5pm/weekend or no response, call #[phone]
Crispin Brooks  Attending Physician   Division of Infectious Disease  Pager #[phone]  After 5pm/weekend or no response, call #[phone]
Crispin Brooks  Attending Physician   Division of Infectious Disease  Pager #[phone]  After 5pm/weekend or no response, call #[phone]

## 2020-12-03 NOTE — PROGRESS NOTE ADULT - ASSESSMENT
45 year old woman with diverticulitis  - no emergent surgical intervention required at this time  - clear liquids  - continue antibiotics per ID  - continue supportive team per primary team

## 2020-12-03 NOTE — PROGRESS NOTE ADULT - PROBLEM SELECTOR PLAN 1
CTAP w/ acute sigmoid diverticulitis   - GI consulted, appreciate recs  - ID consulted, switched from PO Augmentin to Unasyn (11/30-)  - Per surgery, no acute surgical interventions at this time. Continue abx.
-cont Unasyn  -appreciate GI and surgery input
-cont Unasyn  -appreciate GI and surgery input
-cont Unasyn  -appreciate GI and surgery input  -wants to go home - Augmentin 875 mg po bid x 7 days  -outpt GI f/u   -potential side effects of abx explained including GI, Cdiff, allergy issues, development of resistance, etc.  -advised to come back to ER if worse or fevers

## 2020-12-03 NOTE — PROGRESS NOTE ADULT - PROBLEM SELECTOR PLAN 3
DVT ppx: lovenox subq daily  Diet: NPO given significant abd pain, will advance as tolerated. Gentle IVF while NPO.  Pain control: morphine 2mg IVP q6h prn mod pain, morphine 4mg IVPq6h prn severe pain  Constipation: miralax, senna. s/p tap water enema. Will try suppository

## 2020-12-08 ENCOUNTER — INPATIENT (INPATIENT)
Facility: HOSPITAL | Age: 45
LOS: 9 days | Discharge: HOME | End: 2020-12-18
Attending: INTERNAL MEDICINE | Admitting: INTERNAL MEDICINE
Payer: COMMERCIAL

## 2020-12-08 VITALS
HEART RATE: 81 BPM | HEIGHT: 60 IN | DIASTOLIC BLOOD PRESSURE: 86 MMHG | RESPIRATION RATE: 18 BRPM | SYSTOLIC BLOOD PRESSURE: 143 MMHG | TEMPERATURE: 99 F | OXYGEN SATURATION: 100 %

## 2020-12-08 DIAGNOSIS — R10.9 UNSPECIFIED ABDOMINAL PAIN: ICD-10-CM

## 2020-12-08 DIAGNOSIS — K57.92 DIVERTICULITIS OF INTESTINE, PART UNSPECIFIED, WITHOUT PERFORATION OR ABSCESS WITHOUT BLEEDING: ICD-10-CM

## 2020-12-08 DIAGNOSIS — Z90.710 ACQUIRED ABSENCE OF BOTH CERVIX AND UTERUS: Chronic | ICD-10-CM

## 2020-12-08 DIAGNOSIS — K57.90 DIVERTICULOSIS OF INTESTINE, PART UNSPECIFIED, WITHOUT PERFORATION OR ABSCESS WITHOUT BLEEDING: ICD-10-CM

## 2020-12-08 PROBLEM — C54.1 MALIGNANT NEOPLASM OF ENDOMETRIUM: Chronic | Status: ACTIVE | Noted: 2020-11-29

## 2020-12-08 LAB
ALBUMIN SERPL ELPH-MCNC: 4.3 G/DL — SIGNIFICANT CHANGE UP (ref 3.3–5)
ALP SERPL-CCNC: 96 U/L — SIGNIFICANT CHANGE UP (ref 40–120)
ALT FLD-CCNC: 34 U/L — HIGH (ref 4–33)
ANION GAP SERPL CALC-SCNC: 9 MMOL/L — SIGNIFICANT CHANGE UP (ref 7–14)
AST SERPL-CCNC: 45 U/L — HIGH (ref 4–32)
BASOPHILS # BLD AUTO: 0.05 K/UL — SIGNIFICANT CHANGE UP (ref 0–0.2)
BASOPHILS NFR BLD AUTO: 0.5 % — SIGNIFICANT CHANGE UP (ref 0–2)
BILIRUB SERPL-MCNC: <0.2 MG/DL — SIGNIFICANT CHANGE UP (ref 0.2–1.2)
BLOOD GAS VENOUS COMPREHENSIVE RESULT: SIGNIFICANT CHANGE UP
BUN SERPL-MCNC: 14 MG/DL — SIGNIFICANT CHANGE UP (ref 7–23)
CALCIUM SERPL-MCNC: 10.3 MG/DL — SIGNIFICANT CHANGE UP (ref 8.4–10.5)
CHLORIDE SERPL-SCNC: 103 MMOL/L — SIGNIFICANT CHANGE UP (ref 98–107)
CO2 SERPL-SCNC: 25 MMOL/L — SIGNIFICANT CHANGE UP (ref 22–31)
CREAT SERPL-MCNC: 0.72 MG/DL — SIGNIFICANT CHANGE UP (ref 0.5–1.3)
EOSINOPHIL # BLD AUTO: 0.16 K/UL — SIGNIFICANT CHANGE UP (ref 0–0.5)
EOSINOPHIL NFR BLD AUTO: 1.7 % — SIGNIFICANT CHANGE UP (ref 0–6)
GLUCOSE SERPL-MCNC: 141 MG/DL — HIGH (ref 70–99)
HCT VFR BLD CALC: 39.8 % — SIGNIFICANT CHANGE UP (ref 34.5–45)
HGB BLD-MCNC: 12.3 G/DL — SIGNIFICANT CHANGE UP (ref 11.5–15.5)
IANC: 7.67 K/UL — SIGNIFICANT CHANGE UP (ref 1.5–8.5)
IMM GRANULOCYTES NFR BLD AUTO: 0.5 % — SIGNIFICANT CHANGE UP (ref 0–1.5)
LYMPHOCYTES # BLD AUTO: 1.46 K/UL — SIGNIFICANT CHANGE UP (ref 1–3.3)
LYMPHOCYTES # BLD AUTO: 15.2 % — SIGNIFICANT CHANGE UP (ref 13–44)
MCHC RBC-ENTMCNC: 28.6 PG — SIGNIFICANT CHANGE UP (ref 27–34)
MCHC RBC-ENTMCNC: 30.9 GM/DL — LOW (ref 32–36)
MCV RBC AUTO: 92.6 FL — SIGNIFICANT CHANGE UP (ref 80–100)
MONOCYTES # BLD AUTO: 0.2 K/UL — SIGNIFICANT CHANGE UP (ref 0–0.9)
MONOCYTES NFR BLD AUTO: 2.1 % — SIGNIFICANT CHANGE UP (ref 2–14)
NEUTROPHILS # BLD AUTO: 7.67 K/UL — HIGH (ref 1.8–7.4)
NEUTROPHILS NFR BLD AUTO: 80 % — HIGH (ref 43–77)
NRBC # BLD: 0 /100 WBCS — SIGNIFICANT CHANGE UP
NRBC # FLD: 0 K/UL — SIGNIFICANT CHANGE UP
PLATELET # BLD AUTO: 285 K/UL — SIGNIFICANT CHANGE UP (ref 150–400)
POTASSIUM SERPL-MCNC: 4.4 MMOL/L — SIGNIFICANT CHANGE UP (ref 3.5–5.3)
POTASSIUM SERPL-SCNC: 4.4 MMOL/L — SIGNIFICANT CHANGE UP (ref 3.5–5.3)
PROT SERPL-MCNC: 7 G/DL — SIGNIFICANT CHANGE UP (ref 6–8.3)
RBC # BLD: 4.3 M/UL — SIGNIFICANT CHANGE UP (ref 3.8–5.2)
RBC # FLD: 12.9 % — SIGNIFICANT CHANGE UP (ref 10.3–14.5)
SARS-COV-2 RNA SPEC QL NAA+PROBE: SIGNIFICANT CHANGE UP
SODIUM SERPL-SCNC: 137 MMOL/L — SIGNIFICANT CHANGE UP (ref 135–145)
WBC # BLD: 9.59 K/UL — SIGNIFICANT CHANGE UP (ref 3.8–10.5)
WBC # FLD AUTO: 9.59 K/UL — SIGNIFICANT CHANGE UP (ref 3.8–10.5)

## 2020-12-08 PROCEDURE — 74177 CT ABD & PELVIS W/CONTRAST: CPT | Mod: 26

## 2020-12-08 PROCEDURE — 99253 IP/OBS CNSLTJ NEW/EST LOW 45: CPT

## 2020-12-08 PROCEDURE — 99285 EMERGENCY DEPT VISIT HI MDM: CPT

## 2020-12-08 RX ORDER — OXYCODONE AND ACETAMINOPHEN 5; 325 MG/1; MG/1
1 TABLET ORAL EVERY 6 HOURS
Refills: 0 | Status: DISCONTINUED | OUTPATIENT
Start: 2020-12-08 | End: 2020-12-08

## 2020-12-08 RX ORDER — PIPERACILLIN AND TAZOBACTAM 4; .5 G/20ML; G/20ML
3.38 INJECTION, POWDER, LYOPHILIZED, FOR SOLUTION INTRAVENOUS ONCE
Refills: 0 | Status: COMPLETED | OUTPATIENT
Start: 2020-12-08 | End: 2020-12-08

## 2020-12-08 RX ORDER — PIPERACILLIN AND TAZOBACTAM 4; .5 G/20ML; G/20ML
3.38 INJECTION, POWDER, LYOPHILIZED, FOR SOLUTION INTRAVENOUS EVERY 8 HOURS
Refills: 0 | Status: DISCONTINUED | OUTPATIENT
Start: 2020-12-08 | End: 2020-12-08

## 2020-12-08 RX ORDER — KETOROLAC TROMETHAMINE 30 MG/ML
15 SYRINGE (ML) INJECTION ONCE
Refills: 0 | Status: DISCONTINUED | OUTPATIENT
Start: 2020-12-08 | End: 2020-12-08

## 2020-12-08 RX ORDER — MORPHINE SULFATE 50 MG/1
2 CAPSULE, EXTENDED RELEASE ORAL ONCE
Refills: 0 | Status: DISCONTINUED | OUTPATIENT
Start: 2020-12-08 | End: 2020-12-08

## 2020-12-08 RX ORDER — MORPHINE SULFATE 50 MG/1
2 CAPSULE, EXTENDED RELEASE ORAL EVERY 4 HOURS
Refills: 0 | Status: DISCONTINUED | OUTPATIENT
Start: 2020-12-08 | End: 2020-12-12

## 2020-12-08 RX ORDER — SODIUM CHLORIDE 9 MG/ML
1000 INJECTION INTRAMUSCULAR; INTRAVENOUS; SUBCUTANEOUS
Refills: 0 | Status: DISCONTINUED | OUTPATIENT
Start: 2020-12-08 | End: 2020-12-11

## 2020-12-08 RX ORDER — PIPERACILLIN AND TAZOBACTAM 4; .5 G/20ML; G/20ML
3.38 INJECTION, POWDER, LYOPHILIZED, FOR SOLUTION INTRAVENOUS EVERY 8 HOURS
Refills: 0 | Status: DISCONTINUED | OUTPATIENT
Start: 2020-12-08 | End: 2020-12-11

## 2020-12-08 RX ADMIN — SODIUM CHLORIDE 100 MILLILITER(S): 9 INJECTION INTRAMUSCULAR; INTRAVENOUS; SUBCUTANEOUS at 19:54

## 2020-12-08 RX ADMIN — Medication 15 MILLIGRAM(S): at 02:56

## 2020-12-08 RX ADMIN — Medication 15 MILLIGRAM(S): at 05:20

## 2020-12-08 RX ADMIN — MORPHINE SULFATE 2 MILLIGRAM(S): 50 CAPSULE, EXTENDED RELEASE ORAL at 09:47

## 2020-12-08 RX ADMIN — MORPHINE SULFATE 2 MILLIGRAM(S): 50 CAPSULE, EXTENDED RELEASE ORAL at 05:19

## 2020-12-08 RX ADMIN — MORPHINE SULFATE 2 MILLIGRAM(S): 50 CAPSULE, EXTENDED RELEASE ORAL at 02:56

## 2020-12-08 RX ADMIN — MORPHINE SULFATE 2 MILLIGRAM(S): 50 CAPSULE, EXTENDED RELEASE ORAL at 19:55

## 2020-12-08 RX ADMIN — SODIUM CHLORIDE 100 MILLILITER(S): 9 INJECTION INTRAMUSCULAR; INTRAVENOUS; SUBCUTANEOUS at 11:50

## 2020-12-08 RX ADMIN — PIPERACILLIN AND TAZOBACTAM 25 GRAM(S): 4; .5 INJECTION, POWDER, LYOPHILIZED, FOR SOLUTION INTRAVENOUS at 23:55

## 2020-12-08 RX ADMIN — MORPHINE SULFATE 2 MILLIGRAM(S): 50 CAPSULE, EXTENDED RELEASE ORAL at 06:00

## 2020-12-08 RX ADMIN — PIPERACILLIN AND TAZOBACTAM 200 GRAM(S): 4; .5 INJECTION, POWDER, LYOPHILIZED, FOR SOLUTION INTRAVENOUS at 13:30

## 2020-12-08 RX ADMIN — MORPHINE SULFATE 2 MILLIGRAM(S): 50 CAPSULE, EXTENDED RELEASE ORAL at 04:00

## 2020-12-08 RX ADMIN — MORPHINE SULFATE 2 MILLIGRAM(S): 50 CAPSULE, EXTENDED RELEASE ORAL at 14:39

## 2020-12-08 RX ADMIN — Medication 15 MILLIGRAM(S): at 04:00

## 2020-12-08 RX ADMIN — MORPHINE SULFATE 2 MILLIGRAM(S): 50 CAPSULE, EXTENDED RELEASE ORAL at 23:55

## 2020-12-08 RX ADMIN — Medication 15 MILLIGRAM(S): at 06:00

## 2020-12-08 RX ADMIN — MORPHINE SULFATE 2 MILLIGRAM(S): 50 CAPSULE, EXTENDED RELEASE ORAL at 14:40

## 2020-12-08 NOTE — CONSULT NOTE ADULT - PROBLEM SELECTOR RECOMMENDATION 9
Unlcear etiology. She is very tender on exam although no inflammtion seen on imaging. I would avoid colonoscopy until her pain is improved. Agree with antibiotics for now. Repeat abdominal exams. Can advance to liquid diet. We will follow closely and decide on next course of action based on her clinical progression
-no inflammation on CT  -still with abd pain  -cont zosyn 3.375 gm iv q8 for now  -if no fevers, will dc abx in next 24-48 hours

## 2020-12-08 NOTE — H&P ADULT - PROBLEM SELECTOR PLAN 1
Likely sec to diverticulitis  pt left ama last admission  iv zosyn  pain control  npo  gi fu  check CT abdomen

## 2020-12-08 NOTE — ED PROVIDER NOTE - OBJECTIVE STATEMENT
46 y/o F with PMHx of endometrial cancer s/p hysterectomy (no BSO) several years ago presents to the ED with severe lower abdominal pain.  I saw her for same last week, she was found to have diverticulitis, placed in CDU, then admitted for non resolving pain.  States she left hospital a few days ago because she was uncomfortable with dietary regimen and has been taking abx as prescribed . Pain has become intolerable again, though she says she was feeling much better at the time she left hospital.  Also endorses chills associated with severe pain though she doesn't feel as if she has a fever.  Some nausea as well. No diarrhea. No known sick contacts.     Denies n/v/d, fever, chills, vaginal bleeding. Reports pain is stabbing, constant and severe. Tried Ibuprofen without relief. Pt recently  and sexually active with one male partner. Denies cough, SOB, upper abdominal pain. Pain is much worse with palpitations. No alleviating factors.

## 2020-12-08 NOTE — ED ADULT NURSE NOTE - OBJECTIVE STATEMENT
break coverage RN - pt received in spot 29 A&OX3 c/o abdominal pain. states she was recently here for diverticulitis however AMA'd because "they were starving me, but I made a mistake". c/o generalized abd pain and nausea. no vomiting, no diarrhea, no fevers. abd soft non distended. resp even and unlabored. 20g Iv placed to L AC. labs drawn and sent. will continue to monitor.

## 2020-12-08 NOTE — H&P ADULT - ASSESSMENT
46 y/o F with PMHx of endometrial cancer s/p hysterectomy (no BSO) several years ago presents to the ED with severe lower abdominal pain.  I saw her for same last week, she was found to have diverticulitis, placed in CDU, then admitted for non resolving pain.  States she left hospital a few days ago because she was uncomfortable with dietary regimen and has been taking abx as prescribed . Pain has become intolerable again, though she says she was feeling much better at the time she left hospital.  Also endorses chills associated with severe pain though she doesn't feel as if she has a fever.  Some nausea as well. No diarrhea. No known sick contacts.

## 2020-12-08 NOTE — CONSULT NOTE ADULT - ATTENDING COMMENTS
Agree with above NP note.  cv stable  recurrent abdominal pain due to diverticulosis   med/gi/sx f/u  abx
Crispin Brooks  Attending Physician   Division of Infectious Disease  Pager #[phone]  After 5pm/weekend or no response, call #[phone]

## 2020-12-08 NOTE — ED PROVIDER NOTE - CLINICAL SUMMARY MEDICAL DECISION MAKING FREE TEXT BOX
Pt c h/o endometrial CA s/o hysterectomy p/w recurrent severe abd pain and recent hospital admission for diverticulitis.  Has lower abd tenderness, no guarding.  Presumably partially treated divertic is worsening again.  Will provide Sx Tx and reassess.  Likely readmit.  At present would defer imaging as she has not had a change in her sx otherwise.

## 2020-12-08 NOTE — H&P ADULT - NSHPLABSRESULTS_GEN_ALL_CORE
Lab Results:  CBC  CBC Full  -  ( 08 Dec 2020 04:31 )  WBC Count : 9.59 K/uL  RBC Count : 4.30 M/uL  Hemoglobin : 12.3 g/dL  Hematocrit : 39.8 %  Platelet Count - Automated : 285 K/uL  Mean Cell Volume : 92.6 fL  Mean Cell Hemoglobin : 28.6 pg  Mean Cell Hemoglobin Concentration : 30.9 gm/dL  Auto Neutrophil # : 7.67 K/uL  Auto Lymphocyte # : 1.46 K/uL  Auto Monocyte # : 0.20 K/uL  Auto Eosinophil # : 0.16 K/uL  Auto Basophil # : 0.05 K/uL  Auto Neutrophil % : 80.0 %  Auto Lymphocyte % : 15.2 %  Auto Monocyte % : 2.1 %  Auto Eosinophil % : 1.7 %  Auto Basophil % : 0.5 %    .		Differential:	[] Automated		[] Manual  Chemistry                        12.3   9.59  )-----------( 285      ( 08 Dec 2020 04:31 )             39.8     12-08    137  |  103  |  14  ----------------------------<  141<H>  4.4   |  25  |  0.72    Ca    10.3      08 Dec 2020 03:37    TPro  7.0  /  Alb  4.3  /  TBili  <0.2  /  DBili  x   /  AST  45<H>  /  ALT  34<H>  /  AlkPhos  96  12-08    LIVER FUNCTIONS - ( 08 Dec 2020 03:37 )  Alb: 4.3 g/dL / Pro: 7.0 g/dL / ALK PHOS: 96 U/L / ALT: 34 U/L / AST: 45 U/L / GGT: x                     MICROBIOLOGY/CULTURES:      RADIOLOGY RESULTS: reviewed

## 2020-12-08 NOTE — ED ADULT TRIAGE NOTE - CHIEF COMPLAINT QUOTE
pt c/o abdominal pain few hrs. Pt was seen in ED on 11/30 for same complaint was disgnosed with diverticulits but signed out AMA because staff would not give her food. Pt also c/o SOB, weakness, n/v. Denies chest pain.

## 2020-12-08 NOTE — CONSULT NOTE ADULT - ASSESSMENT
45 year old female with acute abdominal pain. Seen in ED
45yF w/pmhx endometrial cancer s/p hysterectomy p/w lower abd pain.    1. Abd pain, Acute diverticulitis   - ct abd with Colonic diverticulosis. + Hysterectomy  -on IV abx  -med f/u   -gi eval    dvt ppx   
45yF w/pmhx endometrial cancer s/p hysterectomy p/w lower abd pain. On CT pt found to have acute sigmoid diverticulitis

## 2020-12-08 NOTE — CONSULT NOTE ADULT - SUBJECTIVE AND OBJECTIVE BOX
Patient is a 45y old  Female who presents with a chief complaint of lower abdominal pain (08 Dec 2020 13:56)     .     HPI:  HPI:   44 y/o F with PMHx of endometrial cancer s/p hysterectomy (no BSO) several years ago presents to the ED with severe lower abdominal pain.  I saw her for same last week, she was found to have diverticulitis, placed in CDU, then admitted for non resolving pain.  States she left hospital a few days ago because she was uncomfortable with dietary regimen and has been taking abx as prescribed . Pain has become intolerable again, though she says she was feeling much better at the time she left hospital.  Also endorses chills associated with severe pain though she doesn't feel as if she has a fever.  Some nausea as well. No diarrhea. No known sick contacts.  (08 Dec 2020 10:24)            REVIEW OF SYSTEMS  Constitutional:   No fever, no fatigue, no pallor, no night sweats, no weight loss.  HEENT:   No eye pain, no vision changes, no icterus, no mouth ulcers.  Respiratory:   No shortness of breath, no cough, no respiratory distress.   Cardiovascular:   No chest pain, no palpitations.   Gastrointestinal: + abdominal pain, + nausea, + vomiting , no diahrrea, no constipation, no hematochezia,no melena.  Skin:   No rashes, no jaundice, no eczema.   Musculoskeletal:   No joint pain, no swelling, no myalgia.   Neurologic:   No headache, no seizure, no weakness.   Genitourinary:   No dysuria, no decreased urine output.  Psychiatric:  No depression, no anxiety,   Endocrine:   No thyroid disease, no diabetes.  Heme/Lymphatic:   No anemia, no blood transfusions, no lymph node enlargement, no bleeding, no bruising.  ___________________________________________________________________________________________  Allergies    codeine (Short breath)    Intolerances      MEDICATIONS  (STANDING):  piperacillin/tazobactam IVPB.. 3.375 Gram(s) IV Intermittent every 8 hours  sodium chloride 0.9%. 1000 milliLiter(s) (100 mL/Hr) IV Continuous <Continuous>    MEDICATIONS  (PRN):  morphine  - Injectable 2 milliGRAM(s) IV Push every 4 hours PRN Severe Pain (7 - 10)      PAST MEDICAL & SURGICAL HISTORY:  Ovarian cyst    Diverticulitis    Endometrial cancer    H/O: hysterectomy      FAMILY HISTORY:  No pertinent family history in first degree relatives      Social History: No hsitory of : Tobacco use, IVDA, EToH  ______________________________________________________________________________________    PHYSICAL EXAM    Daily Height in cm: 152.4 (08 Dec 2020 01:53)    Daily   BMI: 29.5 (12-08 @ 01:53)  Change in Weight:  Vital Signs Last 24 Hrs  T(C): 36.4 (08 Dec 2020 14:52), Max: 37.1 (08 Dec 2020 01:53)  T(F): 97.5 (08 Dec 2020 14:52), Max: 98.8 (08 Dec 2020 01:53)  HR: 83 (08 Dec 2020 14:52) (68 - 83)  BP: 115/72 (08 Dec 2020 14:52) (115/72 - 155/56)  BP(mean): --  RR: 16 (08 Dec 2020 14:52) (15 - 18)  SpO2: 99% (08 Dec 2020 14:52) (98% - 100%)    General:  Well developed, well nourished, alert and active, no pallor, NAD.  HEENT:    Normal appearance of conjunctiva, ears, nose, lips, oropharynx, and oral mucosa, anicteric.  Neck:  No masses, no asymmetry.  Lymph Nodes:  No lymphadenopathy.   Cardiovascular:  RRR normal S1/S2, no murmur.  Respiratory:  CTA B/L, normal respiratory effort.   Abdominal:   soft, no masses. +  tenderness, normoactive BS, NT/ND, no HSM.  Extremities:   No clubbing or cyanosis, normal capillary refill, no edema.   Skin:   No rash, jaundice, lesions, eczema.   Musculoskeletal:  No joint swelling, erythema or tenderness.   Neuro: No focal deficits.   Other:   _______________________________________________________________________________________________  Lab Results:                          12.3   9.59  )-----------( 285      ( 08 Dec 2020 04:31 )             39.8     12-08    137  |  103  |  14  ----------------------------<  141<H>  4.4   |  25  |  0.72    Ca    10.3      08 Dec 2020 03:37    TPro  7.0  /  Alb  4.3  /  TBili  <0.2  /  DBili  x   /  AST  45<H>  /  ALT  34<H>  /  AlkPhos  96  12-08    LIVER FUNCTIONS - ( 08 Dec 2020 03:37 )  Alb: 4.3 g/dL / Pro: 7.0 g/dL / ALK PHOS: 96 U/L / ALT: 34 U/L / AST: 45 U/L / GGT: x                   Stool Results:          RADIOLOGY RESULTS:    < from: CT Abdomen and Pelvis w/ IV Cont (12.08.20 @ 12:40) >    EXAM:  CT ABDOMEN AND PELVIS IC        PROCEDURE DATE:  Dec  8 2020         INTERPRETATION:  CLINICAL INFORMATION: Diverticulitis.    COMPARISON: November 29, 2020.    PROCEDURE:  CT of the Abdomen and Pelvis was performed with intravenous contrast.  Intravenous contrast: 90 ml Omnipaque 350. 10 ml discarded.  Oral contrast: None.  Sagittal and coronal reformats were performed.    FINDINGS:  LOWER CHEST: Within normal limits.    LIVER: Within normal limits.  BILE DUCTS: Normal caliber.  GALLBLADDER: Within normal limits.  SPLEEN: Within normal limits.  PANCREAS: Within normal limits.  ADRENALS: Within normal limits.  KIDNEYS/URETERS: Within normal limits.    BLADDER: Within normal limits.  REPRODUCTIVE ORGANS: Hysterectomy.    BOWEL: No bowel obstruction. Appendix is normal.  PERITONEUM: No ascites.  VESSELS: Within normal limits.  RETROPERITONEUM/LYMPH NODES: No lymphadenopathy.  ABDOMINAL WALL: Soft tissue infiltration of the ventral lower abdominal wall.  BONES: Lumbarization of the L1 vertebral body.    IMPRESSION:  Colonic diverticulosis, without CT evidence of diverticulitis.              BERNARD DELACRUZ MD; Attending Radiologist  This document has been electronically signed. Dec  8 2020  1:17PM    < end of copied text >  SURGICAL PATHOLOGY:

## 2020-12-08 NOTE — H&P ADULT - NSHPREVIEWOFSYSTEMS_GEN_ALL_CORE
REVIEW OF SYSTEMS:    CONSTITUTIONAL: No weakness, fevers or chills  EYES/ENT: No visual changes;  No vertigo or throat pain   NECK: No pain or stiffness  RESPIRATORY: No cough, wheezing, hemoptysis; No shortness of breath  CARDIOVASCULAR: No chest pain or palpitations  GASTROINTESTINAL: abdominal pain  GENITOURINARY: No dysuria, frequency or hematuria  NEUROLOGICAL: No numbness or weakness  SKIN: No itching, burning, rashes, or lesions   All other review of systems is negative unless indicated above.

## 2020-12-08 NOTE — CONSULT NOTE ADULT - SUBJECTIVE AND OBJECTIVE BOX
HITESH DEAN 45y Female  MRN-[id number]    Patient is a 45y old  Female who presents with a chief complaint of lower abdominal pain (08 Dec 2020 13:56)      HPI:   46 y/o F with PMHx of endometrial cancer s/p hysterectomy (no BSO) several years ago presents to the ED with severe lower abdominal pain.  I saw her for same last week, she was found to have diverticulitis, placed in CDU, then admitted for non resolving pain.  States she left hospital a few days ago because she was uncomfortable with dietary regimen and has been taking abx as prescribed . Pain has become intolerable again, though she says she was feeling much better at the time she left hospital.  Also endorses chills associated with severe pain though she doesn't feel as if she has a fever.  Some nausea as well. No diarrhea. No known sick contacts.  (08 Dec 2020 10:24)      PAST MEDICAL & SURGICAL HISTORY:  Ovarian cyst    Diverticulitis    Endometrial cancer    H/O: hysterectomy        Allergies    codeine (Short breath)    Intolerances        ANTIMICROBIALS:  piperacillin/tazobactam IVPB. 3.375 once  piperacillin/tazobactam IVPB.. 3.375 every 8 hours      MEDICATIONS  (STANDING):  piperacillin/tazobactam IVPB. 3.375 Gram(s) IV Intermittent once  piperacillin/tazobactam IVPB.. 3.375 Gram(s) IV Intermittent every 8 hours  sodium chloride 0.9%. 1000 milliLiter(s) (100 mL/Hr) IV Continuous <Continuous>      Social History  Smoking: no  Etoh: no  Drug use: no      FAMILY HISTORY:  No pertinent family history in first degree relatives  No h/o diverticulitis       Vital Signs Last 24 Hrs  T(C): 36.4 (08 Dec 2020 14:52), Max: 37.1 (08 Dec 2020 01:53)  T(F): 97.5 (08 Dec 2020 14:52), Max: 98.8 (08 Dec 2020 01:53)  HR: 83 (08 Dec 2020 14:52) (68 - 83)  BP: 115/72 (08 Dec 2020 14:52) (115/72 - 155/56)  BP(mean): --  RR: 16 (08 Dec 2020 14:52) (15 - 18)  SpO2: 99% (08 Dec 2020 14:52) (98% - 100%)    CBC Full  -  ( 08 Dec 2020 04:31 )  WBC Count : 9.59 K/uL  RBC Count : 4.30 M/uL  Hemoglobin : 12.3 g/dL  Hematocrit : 39.8 %  Platelet Count - Automated : 285 K/uL  Mean Cell Volume : 92.6 fL  Mean Cell Hemoglobin : 28.6 pg  Mean Cell Hemoglobin Concentration : 30.9 gm/dL  Auto Neutrophil # : 7.67 K/uL  Auto Lymphocyte # : 1.46 K/uL  Auto Monocyte # : 0.20 K/uL  Auto Eosinophil # : 0.16 K/uL  Auto Basophil # : 0.05 K/uL  Auto Neutrophil % : 80.0 %  Auto Lymphocyte % : 15.2 %  Auto Monocyte % : 2.1 %  Auto Eosinophil % : 1.7 %  Auto Basophil % : 0.5 %    12-08    137  |  103  |  14  ----------------------------<  141<H>  4.4   |  25  |  0.72    Ca    10.3      08 Dec 2020 03:37    TPro  7.0  /  Alb  4.3  /  TBili  <0.2  /  DBili  x   /  AST  45<H>  /  ALT  34<H>  /  AlkPhos  96  12-08    LIVER FUNCTIONS - ( 08 Dec 2020 03:37 )  Alb: 4.3 g/dL / Pro: 7.0 g/dL / ALK PHOS: 96 U/L / ALT: 34 U/L / AST: 45 U/L / GGT: x               MICROBIOLOGY:  .Urine Clean Catch (Midstream)  11-29-20   >100,000 CFU/ml Escherichia coli  --  Escherichia coli        COVID-19 PCR: NotDetec: Testing is performed using polymerase chain reaction (PCR) or   transcription mediated amplification (TMA). This COVID-19 (SARS-CoV-2)   nucleic acid amplification test was validated by [employer] and is   in use under the FDA Emergency Use Authorization (EUA) for clinical labs   CLIA-certified to perform high complexity testing. Test results should be     RADIOLOGY  < from: CT Abdomen and Pelvis w/ IV Cont (12.08.20 @ 12:40) >  Colonic diverticulosis, without CT evidence of diverticulitis.    < end of copied text >

## 2020-12-08 NOTE — ED PROVIDER NOTE - COVID-19 ORDERING FACILITY
Pt comes in complaining of a laceration to the left side of his face. Pt was working at [employer] when a cylinder hit his head. Denies LOC.   
[facility]

## 2020-12-08 NOTE — CONSULT NOTE ADULT - SUBJECTIVE AND OBJECTIVE BOX
CARDIOLOGY CONSULT - Dr. Chavez         HPI:   46 y/o F with PMHx of endometrial cancer s/p hysterectomy (no BSO) several years ago presents to the ED with severe lower abdominal pain. Patient was here last week, she was found to have diverticulitis, placed in CDU, then admitted for non resolving pain.  States she left hospital a few days ago because she was uncomfortable with dietary regimen and has been taking abx as prescribed. Reports pain has become intolerable again, though she says she was feeling much better at the time she left hospital.  Also endorses chills associated with severe pain though she doesn't feel as if she has a fever.  Some nausea as well. No diarrhea. No known sick contacts.  Patient denies any chest pain, dyspnea, palpitations, cough, syncope, edema, exertional symptoms, abdominal pain, fever, chills, or rash.  Denies any history of CAD, MI, valve disease, cardiomyopathy, or congenital heart disease.       PAST MEDICAL & SURGICAL HISTORY:  Ovarian cyst    Diverticulitis    Endometrial cancer    H/O: hysterectomy        PREVIOUS DIAGNOSTIC TESTING:    [ ] Echocardiogram:  [ ]  Catheterization:  [ ] Stress Test:  	    MEDICATIONS:  Home Medications:      MEDICATIONS  (STANDING):  piperacillin/tazobactam IVPB. 3.375 Gram(s) IV Intermittent once  piperacillin/tazobactam IVPB.. 3.375 Gram(s) IV Intermittent every 8 hours  sodium chloride 0.9%. 1000 milliLiter(s) (100 mL/Hr) IV Continuous <Continuous>      FAMILY HISTORY:  No pertinent family history in first degree relatives        SOCIAL HISTORY:    [ x] Non-smoker  [ ] Smoker  [ ] Alcohol    Allergies    codeine (Short breath)    Intolerances    	    REVIEW OF SYSTEMS:  CONSTITUTIONAL: No fever, weight loss, or fatigue  EYES: No eye pain, visual disturbances, or discharge  ENMT:  No difficulty hearing, tinnitus, vertigo; No sinus or throat pain  NECK: No pain or stiffness  RESPIRATORY: No cough, wheezing, chills or hemoptysis; No Shortness of Breath  CARDIOVASCULAR: No chest pain, palpitations, passing out, dizziness, or leg swelling  GASTROINTESTINAL: + abdominal and nausea, No vomiting, or hematemesis; No diarrhea or constipation. No melena or hematochezia.  GENITOURINARY: No dysuria, frequency, hematuria, or incontinence  NEUROLOGICAL: No headaches, memory loss, loss of strength, numbness, or tremors  SKIN: No itching, burning, rashes, or lesions   	    [x ] All others negative	see hpi   [ ] Unable to obtain    PHYSICAL EXAM:  T(C): 36.9 (12-08-20 @ 05:30), Max: 37.1 (12-08-20 @ 01:53)  HR: 80 (12-08-20 @ 11:49) (68 - 83)  BP: 119/71 (12-08-20 @ 11:49) (117/77 - 155/56)  RR: 16 (12-08-20 @ 11:49) (15 - 18)  SpO2: 100% (12-08-20 @ 11:49) (98% - 100%)  Wt(kg): --  I&O's Summary      Appearance: Normal	  Psychiatry: A & O x 3, Mood & affect appropriate  HEENT:   Normal oral mucosa, PERRL, EOMI	  Lymphatic: No lymphadenopathy  Cardiovascular: Normal S1 S2,RRR, No JVD, No murmurs  Respiratory: Lungs clear to auscultation	  Gastrointestinal:  Soft, Non-tender, + BS	  Skin: No rashes, No ecchymoses, No cyanosis	  Neurologic: Non-focal  Extremities: Normal range of motion, No clubbing, cyanosis or edema  Vascular: Peripheral pulses palpable 2+ bilaterally    TELEMETRY: 	    ECG:  NSR 71- no ischemic changes 	  RADIOLOGY:  CT Abdomen and Pelvis w/ IV Cont (12.08.20 @ 12:40)  FINDINGS:  LOWER CHEST: Within normal limits.    LIVER: Within normal limits.  BILE DUCTS: Normal caliber.  GALLBLADDER: Within normal limits.  SPLEEN: Within normal limits.  PANCREAS: Within normal limits.  ADRENALS: Within normal limits.  KIDNEYS/URETERS: Within normal limits.    BLADDER: Within normal limits.  REPRODUCTIVE ORGANS: Hysterectomy.    BOWEL: No bowel obstruction. Appendix is normal.  PERITONEUM: No ascites.  VESSELS: Within normal limits.  RETROPERITONEUM/LYMPH NODES: No lymphadenopathy.  ABDOMINAL WALL: Soft tissue infiltration of the ventral lower abdominal wall.  BONES: Lumbarization of the L1 vertebral body.    IMPRESSION:  Colonic diverticulosis, without CT evidence of diverticulitis.        OTHER: 	  	  LABS:	 	    CARDIAC MARKERS:                                  12.3   9.59  )-----------( 285      ( 08 Dec 2020 04:31 )             39.8     12-08    137  |  103  |  14  ----------------------------<  141<H>  4.4   |  25  |  0.72    Ca    10.3      08 Dec 2020 03:37    TPro  7.0  /  Alb  4.3  /  TBili  <0.2  /  DBili  x   /  AST  45<H>  /  ALT  34<H>  /  AlkPhos  96  12-08      proBNP:   Lipid Profile:   HgA1c:   TSH:

## 2020-12-08 NOTE — H&P ADULT - NSHPPHYSICALEXAM_GEN_ALL_CORE
General: WN/WD NAD  PERRLA  Neurology: A&Ox3, nonfocal, PARRA x 4  Respiratory: CTA B/L  CV: RRR, S1S2, no murmurs, rubs or gallops  Abdominal: Soft, NT, ND +BS, Last BM  Extremities: No edema, + peripheral pulses  Skin Normal

## 2020-12-09 DIAGNOSIS — Z71.89 OTHER SPECIFIED COUNSELING: ICD-10-CM

## 2020-12-09 LAB
ALBUMIN SERPL ELPH-MCNC: 3.6 G/DL — SIGNIFICANT CHANGE UP (ref 3.3–5)
ALP SERPL-CCNC: 76 U/L — SIGNIFICANT CHANGE UP (ref 40–120)
ALT FLD-CCNC: 20 U/L — SIGNIFICANT CHANGE UP (ref 4–33)
ANION GAP SERPL CALC-SCNC: 10 MMOL/L — SIGNIFICANT CHANGE UP (ref 7–14)
AST SERPL-CCNC: 19 U/L — SIGNIFICANT CHANGE UP (ref 4–32)
BILIRUB SERPL-MCNC: 0.3 MG/DL — SIGNIFICANT CHANGE UP (ref 0.2–1.2)
BUN SERPL-MCNC: 11 MG/DL — SIGNIFICANT CHANGE UP (ref 7–23)
CALCIUM SERPL-MCNC: 8.3 MG/DL — LOW (ref 8.4–10.5)
CHLORIDE SERPL-SCNC: 107 MMOL/L — SIGNIFICANT CHANGE UP (ref 98–107)
CO2 SERPL-SCNC: 22 MMOL/L — SIGNIFICANT CHANGE UP (ref 22–31)
CREAT SERPL-MCNC: 0.67 MG/DL — SIGNIFICANT CHANGE UP (ref 0.5–1.3)
GLUCOSE SERPL-MCNC: 110 MG/DL — HIGH (ref 70–99)
HCT VFR BLD CALC: 36.5 % — SIGNIFICANT CHANGE UP (ref 34.5–45)
HGB BLD-MCNC: 11.1 G/DL — LOW (ref 11.5–15.5)
MCHC RBC-ENTMCNC: 28.3 PG — SIGNIFICANT CHANGE UP (ref 27–34)
MCHC RBC-ENTMCNC: 30.4 GM/DL — LOW (ref 32–36)
MCV RBC AUTO: 93.1 FL — SIGNIFICANT CHANGE UP (ref 80–100)
NRBC # BLD: 0 /100 WBCS — SIGNIFICANT CHANGE UP
NRBC # FLD: 0 K/UL — SIGNIFICANT CHANGE UP
PLATELET # BLD AUTO: 256 K/UL — SIGNIFICANT CHANGE UP (ref 150–400)
POTASSIUM SERPL-MCNC: 4.1 MMOL/L — SIGNIFICANT CHANGE UP (ref 3.5–5.3)
POTASSIUM SERPL-SCNC: 4.1 MMOL/L — SIGNIFICANT CHANGE UP (ref 3.5–5.3)
PROT SERPL-MCNC: 6.2 G/DL — SIGNIFICANT CHANGE UP (ref 6–8.3)
RBC # BLD: 3.92 M/UL — SIGNIFICANT CHANGE UP (ref 3.8–5.2)
RBC # FLD: 13 % — SIGNIFICANT CHANGE UP (ref 10.3–14.5)
SODIUM SERPL-SCNC: 139 MMOL/L — SIGNIFICANT CHANGE UP (ref 135–145)
WBC # BLD: 9.09 K/UL — SIGNIFICANT CHANGE UP (ref 3.8–10.5)
WBC # FLD AUTO: 9.09 K/UL — SIGNIFICANT CHANGE UP (ref 3.8–10.5)

## 2020-12-09 PROCEDURE — 99232 SBSQ HOSP IP/OBS MODERATE 35: CPT

## 2020-12-09 RX ORDER — INFLUENZA VIRUS VACCINE 15; 15; 15; 15 UG/.5ML; UG/.5ML; UG/.5ML; UG/.5ML
0.5 SUSPENSION INTRAMUSCULAR ONCE
Refills: 0 | Status: COMPLETED | OUTPATIENT
Start: 2020-12-09 | End: 2020-12-18

## 2020-12-09 RX ORDER — ACETAMINOPHEN 500 MG
1000 TABLET ORAL ONCE
Refills: 0 | Status: COMPLETED | OUTPATIENT
Start: 2020-12-09 | End: 2020-12-09

## 2020-12-09 RX ORDER — SIMETHICONE 80 MG/1
80 TABLET, CHEWABLE ORAL EVERY 6 HOURS
Refills: 0 | Status: DISCONTINUED | OUTPATIENT
Start: 2020-12-09 | End: 2020-12-18

## 2020-12-09 RX ADMIN — SODIUM CHLORIDE 100 MILLILITER(S): 9 INJECTION INTRAMUSCULAR; INTRAVENOUS; SUBCUTANEOUS at 08:56

## 2020-12-09 RX ADMIN — PIPERACILLIN AND TAZOBACTAM 25 GRAM(S): 4; .5 INJECTION, POWDER, LYOPHILIZED, FOR SOLUTION INTRAVENOUS at 18:02

## 2020-12-09 RX ADMIN — MORPHINE SULFATE 2 MILLIGRAM(S): 50 CAPSULE, EXTENDED RELEASE ORAL at 13:00

## 2020-12-09 RX ADMIN — MORPHINE SULFATE 2 MILLIGRAM(S): 50 CAPSULE, EXTENDED RELEASE ORAL at 09:25

## 2020-12-09 RX ADMIN — MORPHINE SULFATE 2 MILLIGRAM(S): 50 CAPSULE, EXTENDED RELEASE ORAL at 08:55

## 2020-12-09 RX ADMIN — SIMETHICONE 80 MILLIGRAM(S): 80 TABLET, CHEWABLE ORAL at 23:09

## 2020-12-09 RX ADMIN — MORPHINE SULFATE 2 MILLIGRAM(S): 50 CAPSULE, EXTENDED RELEASE ORAL at 18:01

## 2020-12-09 RX ADMIN — MORPHINE SULFATE 2 MILLIGRAM(S): 50 CAPSULE, EXTENDED RELEASE ORAL at 00:55

## 2020-12-09 RX ADMIN — MORPHINE SULFATE 2 MILLIGRAM(S): 50 CAPSULE, EXTENDED RELEASE ORAL at 04:55

## 2020-12-09 RX ADMIN — MORPHINE SULFATE 2 MILLIGRAM(S): 50 CAPSULE, EXTENDED RELEASE ORAL at 13:30

## 2020-12-09 RX ADMIN — MORPHINE SULFATE 2 MILLIGRAM(S): 50 CAPSULE, EXTENDED RELEASE ORAL at 18:27

## 2020-12-09 RX ADMIN — PIPERACILLIN AND TAZOBACTAM 25 GRAM(S): 4; .5 INJECTION, POWDER, LYOPHILIZED, FOR SOLUTION INTRAVENOUS at 08:55

## 2020-12-09 RX ADMIN — MORPHINE SULFATE 2 MILLIGRAM(S): 50 CAPSULE, EXTENDED RELEASE ORAL at 03:55

## 2020-12-09 NOTE — PROGRESS NOTE ADULT - PROBLEM SELECTOR PLAN 1
-cont abx  -possible smoldering diverticulitis - appreciate GI input  -probably will benefit with iv abx on dc  -14 days iv abx reasonable given recent use of Augmentin and pt returned to ER

## 2020-12-09 NOTE — PROGRESS NOTE ADULT - SUBJECTIVE AND OBJECTIVE BOX
Patient is a 45y old  Female who presents with a chief complaint of lower abdominal pain (09 Dec 2020 13:06)      INTERVAL HPI/OVERNIGHT EVENTS:  T(C): 36.7 (12-09-20 @ 08:53), Max: 36.8 (12-09-20 @ 03:45)  HR: 81 (12-09-20 @ 08:53) (81 - 91)  BP: 124/75 (12-09-20 @ 08:53) (106/67 - 128/80)  RR: 17 (12-09-20 @ 08:53) (14 - 18)  SpO2: 100% (12-09-20 @ 08:53) (99% - 100%)  Wt(kg): --  I&O's Summary      LABS:                        11.1   9.09  )-----------( 256      ( 09 Dec 2020 06:52 )             36.5     12-09    139  |  107  |  11  ----------------------------<  110<H>  4.1   |  22  |  0.67    Ca    8.3<L>      09 Dec 2020 06:52    TPro  6.2  /  Alb  3.6  /  TBili  0.3  /  DBili  x   /  AST  19  /  ALT  20  /  AlkPhos  76  12-09        CAPILLARY BLOOD GLUCOSE                MEDICATIONS  (STANDING):  influenza   Vaccine 0.5 milliLiter(s) IntraMuscular once  piperacillin/tazobactam IVPB.. 3.375 Gram(s) IV Intermittent every 8 hours  sodium chloride 0.9%. 1000 milliLiter(s) (100 mL/Hr) IV Continuous <Continuous>    MEDICATIONS  (PRN):  morphine  - Injectable 2 milliGRAM(s) IV Push every 4 hours PRN Severe Pain (7 - 10)          PHYSICAL EXAM:  GENERAL: NAD, well-groomed, well-developed  HEAD:  Atraumatic, Normocephalic  CHEST/LUNG: Clear to percussion bilaterally; No rales, rhonchi, wheezing, or rubs  HEART: Regular rate and rhythm; No murmurs, rubs, or gallops  ABDOMEN: Soft, Nontender, Nondistended; Bowel sounds present  EXTREMITIES:  2+ Peripheral Pulses, No clubbing, cyanosis, or edema  LYMPH: No lymphadenopathy noted  SKIN: No rashes or lesions    Care Discussed with Consultants/Other Providers [ ] YES  [ ] NO

## 2020-12-09 NOTE — PROGRESS NOTE ADULT - SUBJECTIVE AND OBJECTIVE BOX
CARDIOLOGY FOLLOW UP - Dr. Chavez    CC: madalyn cp, sob. reports abd pain       PHYSICAL EXAM:  T(C): 36.7 (12-09-20 @ 08:53), Max: 36.8 (12-09-20 @ 03:45)  HR: 81 (12-09-20 @ 08:53) (80 - 91)  BP: 124/75 (12-09-20 @ 08:53) (106/67 - 128/80)  RR: 17 (12-09-20 @ 08:53) (14 - 18)  SpO2: 100% (12-09-20 @ 08:53) (99% - 100%)  Wt(kg): --  I&O's Summary      Appearance: Normal	  Cardiovascular: Normal S1 S2,RRR, No JVD, No murmurs  Respiratory: Lungs clear to auscultation	  Gastrointestinal:  Soft, Non-tender, + BS	  Extremities: Normal range of motion, No clubbing, cyanosis or edema      Home Medications:      MEDICATIONS  (STANDING):  influenza   Vaccine 0.5 milliLiter(s) IntraMuscular once  piperacillin/tazobactam IVPB.. 3.375 Gram(s) IV Intermittent every 8 hours  sodium chloride 0.9%. 1000 milliLiter(s) (100 mL/Hr) IV Continuous <Continuous>      TELEMETRY: 	    ECG:  	  RADIOLOGY:   DIAGNOSTIC TESTING:  [ ] Echocardiogram:  [ ]  Catheterization:  [ ] Stress Test:    OTHER: 	    LABS:	 	                            11.1   9.09  )-----------( 256      ( 09 Dec 2020 06:52 )             36.5     12-09    139  |  107  |  11  ----------------------------<  110<H>  4.1   |  22  |  0.67    Ca    8.3<L>      09 Dec 2020 06:52    TPro  6.2  /  Alb  3.6  /  TBili  0.3  /  DBili  x   /  AST  19  /  ALT  20  /  AlkPhos  76  12-09

## 2020-12-09 NOTE — PROGRESS NOTE ADULT - SUBJECTIVE AND OBJECTIVE BOX
HITESH DEAN 45y [id number]    Patient is a 45y old  Female who presents with a chief complaint of lower abdominal pain (09 Dec 2020 13:18)      Follow Up/CC:  ID following for diveticulitis    Interval History/ROS: no fever, abd pain+    Allergies    codeine (Short breath)    Intolerances        ANTIMICROBIALS:  piperacillin/tazobactam IVPB.. 3.375 every 8 hours      MEDICATIONS  (STANDING):  influenza   Vaccine 0.5 milliLiter(s) IntraMuscular once  piperacillin/tazobactam IVPB.. 3.375 Gram(s) IV Intermittent every 8 hours  sodium chloride 0.9%. 1000 milliLiter(s) (100 mL/Hr) IV Continuous <Continuous>    MEDICATIONS  (PRN):  morphine  - Injectable 2 milliGRAM(s) IV Push every 4 hours PRN Severe Pain (7 - 10)        Vital Signs Last 24 Hrs  T(C): 36.7 (09 Dec 2020 08:53), Max: 36.8 (09 Dec 2020 03:45)  T(F): 98 (09 Dec 2020 08:53), Max: 98.3 (09 Dec 2020 03:45)  HR: 81 (09 Dec 2020 08:53) (81 - 91)  BP: 124/75 (09 Dec 2020 08:53) (106/67 - 128/80)  BP(mean): --  RR: 17 (09 Dec 2020 08:53) (14 - 18)  SpO2: 100% (09 Dec 2020 08:53) (100% - 100%)    CBC Full  -  ( 09 Dec 2020 06:52 )  WBC Count : 9.09 K/uL  RBC Count : 3.92 M/uL  Hemoglobin : 11.1 g/dL  Hematocrit : 36.5 %  Platelet Count - Automated : 256 K/uL  Mean Cell Volume : 93.1 fL  Mean Cell Hemoglobin : 28.3 pg  Mean Cell Hemoglobin Concentration : 30.4 gm/dL  Auto Neutrophil # : x  Auto Lymphocyte # : x  Auto Monocyte # : x  Auto Eosinophil # : x  Auto Basophil # : x  Auto Neutrophil % : x  Auto Lymphocyte % : x  Auto Monocyte % : x  Auto Eosinophil % : x  Auto Basophil % : x    12-09    139  |  107  |  11  ----------------------------<  110<H>  4.1   |  22  |  0.67    Ca    8.3<L>      09 Dec 2020 06:52    TPro  6.2  /  Alb  3.6  /  TBili  0.3  /  DBili  x   /  AST  19  /  ALT  20  /  AlkPhos  76  12-09    LIVER FUNCTIONS - ( 09 Dec 2020 06:52 )  Alb: 3.6 g/dL / Pro: 6.2 g/dL / ALK PHOS: 76 U/L / ALT: 20 U/L / AST: 19 U/L / GGT: x               MICROBIOLOGY:  .Urine Clean Catch (Midstream)  11-29-20   >100,000 CFU/ml Escherichia coli  --  Escherichia coli    RADIOLOGY    < from: CT Abdomen and Pelvis w/ IV Cont (12.08.20 @ 12:40) >  Colonic diverticulosis, without CT evidence of diverticulitis.    < end of copied text >

## 2020-12-09 NOTE — PROGRESS NOTE ADULT - PROBLEM SELECTOR PLAN 1
-daily labs  -CT 11/29 with acute sigmoid diverticulitis w/repeat CT 12/8 w/o diverticulitis  -favor Smoldering Diverticulitis  -cont IV Abx during admission; rec completing a 14 days total course of abx   -pain control prn  -simethicone q6h prn   -clear diet

## 2020-12-09 NOTE — PROGRESS NOTE ADULT - SUBJECTIVE AND OBJECTIVE BOX
INTERVAL HPI/OVERNIGHT EVENTS:    c/o abd pain/bloating    MEDICATIONS  (STANDING):  influenza   Vaccine 0.5 milliLiter(s) IntraMuscular once  piperacillin/tazobactam IVPB.. 3.375 Gram(s) IV Intermittent every 8 hours  sodium chloride 0.9%. 1000 milliLiter(s) (100 mL/Hr) IV Continuous <Continuous>    MEDICATIONS  (PRN):  morphine  - Injectable 2 milliGRAM(s) IV Push every 4 hours PRN Severe Pain (7 - 10)      Allergies    codeine (Short breath)    Intolerances        Review of Systems:    General:  No wt loss, fevers, chills, night sweats, fatigue   Eyes:  Good vision, no reported pain  ENT:  No sore throat, pain, runny nose, dysphagia  CV:  No pain, palpitations, hypo/hypertension  Resp:  No dyspnea, cough, tachypnea, wheezing  GI:  +pain, No nausea, No vomiting, No diarrhea, No constipation, No weight loss, No fever, No pruritis, No rectal bleeding, No melena, No dysphagia  :  No pain, bleeding, incontinence, nocturia  Muscle:  No pain, weakness  Neuro:  No weakness, tingling, memory problems  Psych:  No fatigue, insomnia, mood problems, depression  Endocrine:  No polyuria, polydypsia, cold/heat intolerance  Heme:  No petechiae, ecchymosis, easy bruisability  Skin:  No rash, tattoos, scars, edema      Vital Signs Last 24 Hrs  T(C): 36.7 (09 Dec 2020 08:53), Max: 36.8 (09 Dec 2020 03:45)  T(F): 98 (09 Dec 2020 08:53), Max: 98.3 (09 Dec 2020 03:45)  HR: 81 (09 Dec 2020 08:53) (81 - 91)  BP: 124/75 (09 Dec 2020 08:53) (106/67 - 128/80)  BP(mean): --  RR: 17 (09 Dec 2020 08:53) (14 - 18)  SpO2: 100% (09 Dec 2020 08:53) (99% - 100%)    PHYSICAL EXAM:    Constitutional: NAD  HEENT: EOMI, throat clear  Neck: No LAD, supple  Respiratory: CTA and P  Cardiovascular: S1 and S2, RRR, no M  Gastrointestinal: BS+, soft, +ttp lower abd, neg HSM,  Extremities: No peripheral edema, neg clubbing, cyanosis  Vascular: 2+ peripheral pulses  Neurological: A/O x 3, no focal deficits  Psychiatric: Normal mood, normal affect  Skin: No rashes      LABS:                        11.1   9.09  )-----------( 256      ( 09 Dec 2020 06:52 )             36.5     12-09    139  |  107  |  11  ----------------------------<  110<H>  4.1   |  22  |  0.67    Ca    8.3<L>      09 Dec 2020 06:52    TPro  6.2  /  Alb  3.6  /  TBili  0.3  /  DBili  x   /  AST  19  /  ALT  20  /  AlkPhos  76  12-09          RADIOLOGY & ADDITIONAL TESTS:

## 2020-12-10 LAB
ANION GAP SERPL CALC-SCNC: 8 MMOL/L — SIGNIFICANT CHANGE UP (ref 7–14)
BUN SERPL-MCNC: 4 MG/DL — LOW (ref 7–23)
CALCIUM SERPL-MCNC: 8.6 MG/DL — SIGNIFICANT CHANGE UP (ref 8.4–10.5)
CHLORIDE SERPL-SCNC: 108 MMOL/L — HIGH (ref 98–107)
CO2 SERPL-SCNC: 20 MMOL/L — LOW (ref 22–31)
CREAT SERPL-MCNC: 0.64 MG/DL — SIGNIFICANT CHANGE UP (ref 0.5–1.3)
GLUCOSE SERPL-MCNC: 106 MG/DL — HIGH (ref 70–99)
HCT VFR BLD CALC: 31.6 % — LOW (ref 34.5–45)
HGB BLD-MCNC: 9.9 G/DL — LOW (ref 11.5–15.5)
MAGNESIUM SERPL-MCNC: 2 MG/DL — SIGNIFICANT CHANGE UP (ref 1.6–2.6)
MCHC RBC-ENTMCNC: 28.4 PG — SIGNIFICANT CHANGE UP (ref 27–34)
MCHC RBC-ENTMCNC: 31.3 GM/DL — LOW (ref 32–36)
MCV RBC AUTO: 90.8 FL — SIGNIFICANT CHANGE UP (ref 80–100)
NRBC # BLD: 0 /100 WBCS — SIGNIFICANT CHANGE UP
NRBC # FLD: 0 K/UL — SIGNIFICANT CHANGE UP
PLATELET # BLD AUTO: 224 K/UL — SIGNIFICANT CHANGE UP (ref 150–400)
POTASSIUM SERPL-MCNC: 4.1 MMOL/L — SIGNIFICANT CHANGE UP (ref 3.5–5.3)
POTASSIUM SERPL-SCNC: 4.1 MMOL/L — SIGNIFICANT CHANGE UP (ref 3.5–5.3)
RBC # BLD: 3.48 M/UL — LOW (ref 3.8–5.2)
RBC # FLD: 13 % — SIGNIFICANT CHANGE UP (ref 10.3–14.5)
SODIUM SERPL-SCNC: 136 MMOL/L — SIGNIFICANT CHANGE UP (ref 135–145)
WBC # BLD: 7.35 K/UL — SIGNIFICANT CHANGE UP (ref 3.8–10.5)
WBC # FLD AUTO: 7.35 K/UL — SIGNIFICANT CHANGE UP (ref 3.8–10.5)

## 2020-12-10 RX ORDER — ACETAMINOPHEN 500 MG
1000 TABLET ORAL ONCE
Refills: 0 | Status: COMPLETED | OUTPATIENT
Start: 2020-12-10 | End: 2020-12-10

## 2020-12-10 RX ADMIN — Medication 1000 MILLIGRAM(S): at 01:55

## 2020-12-10 RX ADMIN — MORPHINE SULFATE 2 MILLIGRAM(S): 50 CAPSULE, EXTENDED RELEASE ORAL at 00:52

## 2020-12-10 RX ADMIN — MORPHINE SULFATE 2 MILLIGRAM(S): 50 CAPSULE, EXTENDED RELEASE ORAL at 15:30

## 2020-12-10 RX ADMIN — Medication 1000 MILLIGRAM(S): at 02:50

## 2020-12-10 RX ADMIN — MORPHINE SULFATE 2 MILLIGRAM(S): 50 CAPSULE, EXTENDED RELEASE ORAL at 11:03

## 2020-12-10 RX ADMIN — MORPHINE SULFATE 2 MILLIGRAM(S): 50 CAPSULE, EXTENDED RELEASE ORAL at 01:43

## 2020-12-10 RX ADMIN — MORPHINE SULFATE 2 MILLIGRAM(S): 50 CAPSULE, EXTENDED RELEASE ORAL at 11:20

## 2020-12-10 RX ADMIN — MORPHINE SULFATE 2 MILLIGRAM(S): 50 CAPSULE, EXTENDED RELEASE ORAL at 21:15

## 2020-12-10 RX ADMIN — PIPERACILLIN AND TAZOBACTAM 25 GRAM(S): 4; .5 INJECTION, POWDER, LYOPHILIZED, FOR SOLUTION INTRAVENOUS at 01:00

## 2020-12-10 RX ADMIN — PIPERACILLIN AND TAZOBACTAM 25 GRAM(S): 4; .5 INJECTION, POWDER, LYOPHILIZED, FOR SOLUTION INTRAVENOUS at 09:02

## 2020-12-10 RX ADMIN — SODIUM CHLORIDE 100 MILLILITER(S): 9 INJECTION INTRAMUSCULAR; INTRAVENOUS; SUBCUTANEOUS at 15:15

## 2020-12-10 RX ADMIN — PIPERACILLIN AND TAZOBACTAM 25 GRAM(S): 4; .5 INJECTION, POWDER, LYOPHILIZED, FOR SOLUTION INTRAVENOUS at 17:52

## 2020-12-10 RX ADMIN — MORPHINE SULFATE 2 MILLIGRAM(S): 50 CAPSULE, EXTENDED RELEASE ORAL at 15:13

## 2020-12-10 RX ADMIN — MORPHINE SULFATE 2 MILLIGRAM(S): 50 CAPSULE, EXTENDED RELEASE ORAL at 20:55

## 2020-12-10 NOTE — PROGRESS NOTE ADULT - SUBJECTIVE AND OBJECTIVE BOX
Patient is a 45y old  Female who presents with a chief complaint of lower abdominal pain (10 Dec 2020 10:48)      INTERVAL HPI/OVERNIGHT EVENTS:  T(C): 36.8 (12-10-20 @ 15:12), Max: 36.8 (12-10-20 @ 15:12)  HR: 69 (12-10-20 @ 15:12) (66 - 84)  BP: 157/91 (12-10-20 @ 15:12) (112/62 - 157/91)  RR: 16 (12-10-20 @ 15:12) (16 - 18)  SpO2: 100% (12-10-20 @ 15:12) (100% - 100%)  Wt(kg): --  I&O's Summary      LABS:                        9.9    7.35  )-----------( 224      ( 10 Dec 2020 06:37 )             31.6     12-10    136  |  108<H>  |  4<L>  ----------------------------<  106<H>  4.1   |  20<L>  |  0.64    Ca    8.6      10 Dec 2020 06:37  Mg     2.0     12-10    TPro  6.2  /  Alb  3.6  /  TBili  0.3  /  DBili  x   /  AST  19  /  ALT  20  /  AlkPhos  76  12-09        CAPILLARY BLOOD GLUCOSE                MEDICATIONS  (STANDING):  influenza   Vaccine 0.5 milliLiter(s) IntraMuscular once  piperacillin/tazobactam IVPB.. 3.375 Gram(s) IV Intermittent every 8 hours  sodium chloride 0.9%. 1000 milliLiter(s) (100 mL/Hr) IV Continuous <Continuous>    MEDICATIONS  (PRN):  morphine  - Injectable 2 milliGRAM(s) IV Push every 4 hours PRN Severe Pain (7 - 10)  simethicone 80 milliGRAM(s) Chew every 6 hours PRN Gas          PHYSICAL EXAM:  GENERAL: NAD, well-groomed, well-developed  HEAD:  Atraumatic, Normocephalic  CHEST/LUNG: Clear to percussion bilaterally; No rales, rhonchi, wheezing, or rubs  HEART: Regular rate and rhythm; No murmurs, rubs, or gallops  ABDOMEN: Soft, Nontender, Nondistended; Bowel sounds present  EXTREMITIES:  2+ Peripheral Pulses, No clubbing, cyanosis, or edema  LYMPH: No lymphadenopathy noted  SKIN: No rashes or lesions    Care Discussed with Consultants/Other Providers [ ] YES  [ ] NO

## 2020-12-10 NOTE — PROGRESS NOTE ADULT - SUBJECTIVE AND OBJECTIVE BOX
CARDIOLOGY FOLLOW UP - Dr. Chavez    CC: denies cp, sob, and palpitations       PHYSICAL EXAM:  T(C): 36.4 (12-10-20 @ 06:58), Max: 36.7 (12-09-20 @ 21:47)  HR: 66 (12-10-20 @ 06:58) (66 - 84)  BP: 120/72 (12-10-20 @ 06:58) (112/62 - 120/78)  RR: 18 (12-10-20 @ 06:58) (17 - 18)  SpO2: 100% (12-10-20 @ 06:58) (99% - 100%)  Wt(kg): --  I&O's Summary      Appearance: Normal	  Cardiovascular: Normal S1 S2,RRR, No JVD, No murmurs  Respiratory: Lungs clear to auscultation	  Gastrointestinal:  Soft, Non-tender, + BS	  Extremities: Normal range of motion, No clubbing, cyanosis or edema      Home Medications:      MEDICATIONS  (STANDING):  influenza   Vaccine 0.5 milliLiter(s) IntraMuscular once  piperacillin/tazobactam IVPB.. 3.375 Gram(s) IV Intermittent every 8 hours  sodium chloride 0.9%. 1000 milliLiter(s) (100 mL/Hr) IV Continuous <Continuous>      TELEMETRY: 	    ECG:  	  RADIOLOGY:   DIAGNOSTIC TESTING:  [ ] Echocardiogram:  [ ]  Catheterization:  [ ] Stress Test:    OTHER: 	    LABS:	 	                            9.9    7.35  )-----------( 224      ( 10 Dec 2020 06:37 )             31.6     12-10    136  |  108<H>  |  4<L>  ----------------------------<  106<H>  4.1   |  20<L>  |  0.64    Ca    8.6      10 Dec 2020 06:37  Mg     2.0     12-10    TPro  6.2  /  Alb  3.6  /  TBili  0.3  /  DBili  x   /  AST  19  /  ALT  20  /  AlkPhos  76  12-09

## 2020-12-10 NOTE — PROGRESS NOTE ADULT - PROBLEM SELECTOR PLAN 1
-daily labs  -CT 11/29 with acute sigmoid diverticulitis w/repeat CT 12/8 w/o diverticulitis  -favor Smoldering Diverticulitis  -cont IV Abx during admission; rec completing a 14 days total course of abx  -no gi objection d/c w/PO abx unless ID feels should complete via PICC; input appr  -pain control prn  -simethicone q6h prn   -advance to low fiber diet as tolerated   -outpt follow up for colonoscopy in 4-6wks as d/w patient -daily labs  -CT 11/29 with acute sigmoid diverticulitis w/repeat CT 12/8 w/o diverticulitis  -favor Smoldering Diverticulitis 2/2 non-compliance with outpt PO medications  -cont IV Abx; rec completing a 14 days total course of abx; fu ID recs on abx coverage for outpatient   -pain control prn  -simethicone q6h prn   -advance to low fiber diet as tolerated   -outpt follow up for colonoscopy in 4-6wks as d/w patient -daily labs  -CT 11/29 with acute sigmoid diverticulitis w/repeat CT 12/8 w/o diverticulitis  -favor Smoldering Diverticulitis 2/2 non-compliance with outpt PO medications  -cont IV Abx while inpatient; rec completing a 14 days total course of abx  -fu ID recs on abx coverage for outpatient; appreciate input   -pain control prn  -simethicone q6h prn   -advance to low fiber diet as tolerated   -outpt follow up for colonoscopy in 4-6wks as d/w patient

## 2020-12-10 NOTE — PROGRESS NOTE ADULT - SUBJECTIVE AND OBJECTIVE BOX
INTERVAL HPI/OVERNIGHT EVENTS:    still with abd pain but improving   had small bm yesterday and passed flatus   tolerated clears well; wants to try some oatmeal    MEDICATIONS  (STANDING):  influenza   Vaccine 0.5 milliLiter(s) IntraMuscular once  piperacillin/tazobactam IVPB.. 3.375 Gram(s) IV Intermittent every 8 hours  sodium chloride 0.9%. 1000 milliLiter(s) (100 mL/Hr) IV Continuous <Continuous>    MEDICATIONS  (PRN):  morphine  - Injectable 2 milliGRAM(s) IV Push every 4 hours PRN Severe Pain (7 - 10)  simethicone 80 milliGRAM(s) Chew every 6 hours PRN Gas      Allergies    codeine (Short breath)    Intolerances        Review of Systems:    General:  No wt loss, fevers, chills, night sweats, fatigue   Eyes:  Good vision, no reported pain  ENT:  No sore throat, pain, runny nose, dysphagia  CV:  No pain, palpitations, hypo/hypertension  Resp:  No dyspnea, cough, tachypnea, wheezing  GI:  +pain, No nausea, No vomiting, No diarrhea, No constipation, No weight loss, No fever, No pruritis, No rectal bleeding, No melena, No dysphagia  :  No pain, bleeding, incontinence, nocturia  Muscle:  No pain, weakness  Neuro:  No weakness, tingling, memory problems  Psych:  No fatigue, insomnia, mood problems, depression  Endocrine:  No polyuria, polydypsia, cold/heat intolerance  Heme:  No petechiae, ecchymosis, easy bruisability  Skin:  No rash, tattoos, scars, edema      Vital Signs Last 24 Hrs  T(C): 36.4 (10 Dec 2020 06:58), Max: 36.7 (09 Dec 2020 21:47)  T(F): 97.6 (10 Dec 2020 06:58), Max: 98.1 (09 Dec 2020 21:47)  HR: 66 (10 Dec 2020 06:58) (66 - 84)  BP: 120/72 (10 Dec 2020 06:58) (112/62 - 120/78)  BP(mean): --  RR: 18 (10 Dec 2020 06:58) (17 - 18)  SpO2: 100% (10 Dec 2020 06:58) (99% - 100%)    PHYSICAL EXAM:    Constitutional: NAD  HEENT: EOMI, throat clear  Neck: No LAD, supple  Respiratory: CTA and P  Cardiovascular: S1 and S2, RRR, no M  Gastrointestinal: BS+, soft, +ttp, neg HSM,  Extremities: No peripheral edema, neg clubbing, cyanosis  Vascular: 2+ peripheral pulses  Neurological: A/O x 3, no focal deficits  Psychiatric: Normal mood, normal affect  Skin: No rashes      LABS:                        9.9    7.35  )-----------( 224      ( 10 Dec 2020 06:37 )             31.6     12-10    136  |  108<H>  |  4<L>  ----------------------------<  106<H>  4.1   |  20<L>  |  0.64    Ca    8.6      10 Dec 2020 06:37  Mg     2.0     12-10    TPro  6.2  /  Alb  3.6  /  TBili  0.3  /  DBili  x   /  AST  19  /  ALT  20  /  AlkPhos  76  12-09          RADIOLOGY & ADDITIONAL TESTS:   INTERVAL HPI/OVERNIGHT EVENTS:    reports 'had a rough night'  still c/o lower abd pain  passing flatus; small bm   tolerating diet        MEDICATIONS  (STANDING):  influenza   Vaccine 0.5 milliLiter(s) IntraMuscular once  piperacillin/tazobactam IVPB.. 3.375 Gram(s) IV Intermittent every 8 hours  sodium chloride 0.9%. 1000 milliLiter(s) (100 mL/Hr) IV Continuous <Continuous>    MEDICATIONS  (PRN):  morphine  - Injectable 2 milliGRAM(s) IV Push every 4 hours PRN Severe Pain (7 - 10)  simethicone 80 milliGRAM(s) Chew every 6 hours PRN Gas      Allergies    codeine (Short breath)    Intolerances        Review of Systems:    General:  No wt loss, fevers, chills, night sweats, fatigue   Eyes:  Good vision, no reported pain  ENT:  No sore throat, pain, runny nose, dysphagia  CV:  No pain, palpitations, hypo/hypertension  Resp:  No dyspnea, cough, tachypnea, wheezing  GI:  +pain, No nausea, No vomiting, No diarrhea, No constipation, No weight loss, No fever, No pruritis, No rectal bleeding, No melena, No dysphagia  :  No pain, bleeding, incontinence, nocturia  Muscle:  No pain, weakness  Neuro:  No weakness, tingling, memory problems  Psych:  No fatigue, insomnia, mood problems, depression  Endocrine:  No polyuria, polydypsia, cold/heat intolerance  Heme:  No petechiae, ecchymosis, easy bruisability  Skin:  No rash, tattoos, scars, edema      Vital Signs Last 24 Hrs  T(C): 36.4 (10 Dec 2020 06:58), Max: 36.7 (09 Dec 2020 21:47)  T(F): 97.6 (10 Dec 2020 06:58), Max: 98.1 (09 Dec 2020 21:47)  HR: 66 (10 Dec 2020 06:58) (66 - 84)  BP: 120/72 (10 Dec 2020 06:58) (112/62 - 120/78)  BP(mean): --  RR: 18 (10 Dec 2020 06:58) (17 - 18)  SpO2: 100% (10 Dec 2020 06:58) (99% - 100%)    PHYSICAL EXAM:    Constitutional: NAD  HEENT: EOMI, throat clear  Neck: No LAD, supple  Respiratory: CTA and P  Cardiovascular: S1 and S2, RRR, no M  Gastrointestinal: BS+, soft, +ttp, neg HSM,  Extremities: No peripheral edema, neg clubbing, cyanosis  Vascular: 2+ peripheral pulses  Neurological: A/O x 3, no focal deficits  Psychiatric: Normal mood, normal affect  Skin: No rashes      LABS:                        9.9    7.35  )-----------( 224      ( 10 Dec 2020 06:37 )             31.6     12-10    136  |  108<H>  |  4<L>  ----------------------------<  106<H>  4.1   |  20<L>  |  0.64    Ca    8.6      10 Dec 2020 06:37  Mg     2.0     12-10    TPro  6.2  /  Alb  3.6  /  TBili  0.3  /  DBili  x   /  AST  19  /  ALT  20  /  AlkPhos  76  12-09          RADIOLOGY & ADDITIONAL TESTS:   INTERVAL HPI/OVERNIGHT EVENTS:    reports 'had a rough night'; still c/o lower abd pain  passing flatus; small bm   tolerating current diet  endorsed she was not compliant with her PO abx as outpt      MEDICATIONS  (STANDING):  influenza   Vaccine 0.5 milliLiter(s) IntraMuscular once  piperacillin/tazobactam IVPB.. 3.375 Gram(s) IV Intermittent every 8 hours  sodium chloride 0.9%. 1000 milliLiter(s) (100 mL/Hr) IV Continuous <Continuous>    MEDICATIONS  (PRN):  morphine  - Injectable 2 milliGRAM(s) IV Push every 4 hours PRN Severe Pain (7 - 10)  simethicone 80 milliGRAM(s) Chew every 6 hours PRN Gas      Allergies    codeine (Short breath)    Intolerances        Review of Systems:    General:  No wt loss, fevers, chills, night sweats, fatigue   Eyes:  Good vision, no reported pain  ENT:  No sore throat, pain, runny nose, dysphagia  CV:  No pain, palpitations, hypo/hypertension  Resp:  No dyspnea, cough, tachypnea, wheezing  GI:  +pain, No nausea, No vomiting, No diarrhea, No constipation, No weight loss, No fever, No pruritis, No rectal bleeding, No melena, No dysphagia  :  No pain, bleeding, incontinence, nocturia  Muscle:  No pain, weakness  Neuro:  No weakness, tingling, memory problems  Psych:  No fatigue, insomnia, mood problems, depression  Endocrine:  No polyuria, polydypsia, cold/heat intolerance  Heme:  No petechiae, ecchymosis, easy bruisability  Skin:  No rash, tattoos, scars, edema      Vital Signs Last 24 Hrs  T(C): 36.4 (10 Dec 2020 06:58), Max: 36.7 (09 Dec 2020 21:47)  T(F): 97.6 (10 Dec 2020 06:58), Max: 98.1 (09 Dec 2020 21:47)  HR: 66 (10 Dec 2020 06:58) (66 - 84)  BP: 120/72 (10 Dec 2020 06:58) (112/62 - 120/78)  BP(mean): --  RR: 18 (10 Dec 2020 06:58) (17 - 18)  SpO2: 100% (10 Dec 2020 06:58) (99% - 100%)    PHYSICAL EXAM:    Constitutional: NAD  HEENT: EOMI, throat clear  Neck: No LAD, supple  Respiratory: CTA and P  Cardiovascular: S1 and S2, RRR, no M  Gastrointestinal: BS+, soft, +ttp, neg HSM,  Extremities: No peripheral edema, neg clubbing, cyanosis  Vascular: 2+ peripheral pulses  Neurological: A/O x 3, no focal deficits  Psychiatric: Normal mood, normal affect  Skin: No rashes      LABS:                        9.9    7.35  )-----------( 224      ( 10 Dec 2020 06:37 )             31.6     12-10    136  |  108<H>  |  4<L>  ----------------------------<  106<H>  4.1   |  20<L>  |  0.64    Ca    8.6      10 Dec 2020 06:37  Mg     2.0     12-10    TPro  6.2  /  Alb  3.6  /  TBili  0.3  /  DBili  x   /  AST  19  /  ALT  20  /  AlkPhos  76  12-09          RADIOLOGY & ADDITIONAL TESTS:

## 2020-12-11 ENCOUNTER — TRANSCRIPTION ENCOUNTER (OUTPATIENT)
Age: 45
End: 2020-12-11

## 2020-12-11 LAB
ANION GAP SERPL CALC-SCNC: 9 MMOL/L — SIGNIFICANT CHANGE UP (ref 7–14)
BUN SERPL-MCNC: 7 MG/DL — SIGNIFICANT CHANGE UP (ref 7–23)
CALCIUM SERPL-MCNC: 9.2 MG/DL — SIGNIFICANT CHANGE UP (ref 8.4–10.5)
CHLORIDE SERPL-SCNC: 107 MMOL/L — SIGNIFICANT CHANGE UP (ref 98–107)
CO2 SERPL-SCNC: 21 MMOL/L — LOW (ref 22–31)
CREAT SERPL-MCNC: 0.71 MG/DL — SIGNIFICANT CHANGE UP (ref 0.5–1.3)
GLUCOSE SERPL-MCNC: 88 MG/DL — SIGNIFICANT CHANGE UP (ref 70–99)
HCT VFR BLD CALC: 33.9 % — LOW (ref 34.5–45)
HGB BLD-MCNC: 10.9 G/DL — LOW (ref 11.5–15.5)
MAGNESIUM SERPL-MCNC: 2 MG/DL — SIGNIFICANT CHANGE UP (ref 1.6–2.6)
MCHC RBC-ENTMCNC: 28.7 PG — SIGNIFICANT CHANGE UP (ref 27–34)
MCHC RBC-ENTMCNC: 32.2 GM/DL — SIGNIFICANT CHANGE UP (ref 32–36)
MCV RBC AUTO: 89.2 FL — SIGNIFICANT CHANGE UP (ref 80–100)
NRBC # BLD: 0 /100 WBCS — SIGNIFICANT CHANGE UP
NRBC # FLD: 0 K/UL — SIGNIFICANT CHANGE UP
PLATELET # BLD AUTO: 199 K/UL — SIGNIFICANT CHANGE UP (ref 150–400)
POTASSIUM SERPL-MCNC: 4.5 MMOL/L — SIGNIFICANT CHANGE UP (ref 3.5–5.3)
POTASSIUM SERPL-SCNC: 4.5 MMOL/L — SIGNIFICANT CHANGE UP (ref 3.5–5.3)
RBC # BLD: 3.8 M/UL — SIGNIFICANT CHANGE UP (ref 3.8–5.2)
RBC # FLD: 12.8 % — SIGNIFICANT CHANGE UP (ref 10.3–14.5)
SODIUM SERPL-SCNC: 137 MMOL/L — SIGNIFICANT CHANGE UP (ref 135–145)
WBC # BLD: 6.36 K/UL — SIGNIFICANT CHANGE UP (ref 3.8–10.5)
WBC # FLD AUTO: 6.36 K/UL — SIGNIFICANT CHANGE UP (ref 3.8–10.5)

## 2020-12-11 PROCEDURE — 99232 SBSQ HOSP IP/OBS MODERATE 35: CPT

## 2020-12-11 RX ORDER — PIPERACILLIN AND TAZOBACTAM 4; .5 G/20ML; G/20ML
3.38 INJECTION, POWDER, LYOPHILIZED, FOR SOLUTION INTRAVENOUS
Qty: 70.88 | Refills: 0
Start: 2020-12-11 | End: 2020-12-17

## 2020-12-11 RX ORDER — PIPERACILLIN AND TAZOBACTAM 4; .5 G/20ML; G/20ML
3.38 INJECTION, POWDER, LYOPHILIZED, FOR SOLUTION INTRAVENOUS EVERY 8 HOURS
Refills: 0 | Status: COMPLETED | OUTPATIENT
Start: 2020-12-11 | End: 2020-12-18

## 2020-12-11 RX ADMIN — MORPHINE SULFATE 2 MILLIGRAM(S): 50 CAPSULE, EXTENDED RELEASE ORAL at 05:56

## 2020-12-11 RX ADMIN — PIPERACILLIN AND TAZOBACTAM 25 GRAM(S): 4; .5 INJECTION, POWDER, LYOPHILIZED, FOR SOLUTION INTRAVENOUS at 14:45

## 2020-12-11 RX ADMIN — PIPERACILLIN AND TAZOBACTAM 25 GRAM(S): 4; .5 INJECTION, POWDER, LYOPHILIZED, FOR SOLUTION INTRAVENOUS at 23:17

## 2020-12-11 RX ADMIN — SODIUM CHLORIDE 100 MILLILITER(S): 9 INJECTION INTRAMUSCULAR; INTRAVENOUS; SUBCUTANEOUS at 05:57

## 2020-12-11 RX ADMIN — MORPHINE SULFATE 2 MILLIGRAM(S): 50 CAPSULE, EXTENDED RELEASE ORAL at 14:45

## 2020-12-11 RX ADMIN — MORPHINE SULFATE 2 MILLIGRAM(S): 50 CAPSULE, EXTENDED RELEASE ORAL at 15:15

## 2020-12-11 RX ADMIN — MORPHINE SULFATE 2 MILLIGRAM(S): 50 CAPSULE, EXTENDED RELEASE ORAL at 01:38

## 2020-12-11 RX ADMIN — MORPHINE SULFATE 2 MILLIGRAM(S): 50 CAPSULE, EXTENDED RELEASE ORAL at 18:49

## 2020-12-11 RX ADMIN — MORPHINE SULFATE 2 MILLIGRAM(S): 50 CAPSULE, EXTENDED RELEASE ORAL at 02:00

## 2020-12-11 RX ADMIN — MORPHINE SULFATE 2 MILLIGRAM(S): 50 CAPSULE, EXTENDED RELEASE ORAL at 19:45

## 2020-12-11 RX ADMIN — MORPHINE SULFATE 2 MILLIGRAM(S): 50 CAPSULE, EXTENDED RELEASE ORAL at 06:15

## 2020-12-11 NOTE — PROGRESS NOTE ADULT - PROBLEM SELECTOR PLAN 1
-daily labs  -CT 11/29 with acute sigmoid diverticulitis w/repeat CT 12/8 w/o diverticulitis  -favor Smoldering Diverticulitis 2/2 non-compliance with outpt PO medications  -cont IV Abx while inpatient; rec completing a 14 days total course of abx  -fu ID recs on abx coverage for outpatient; appreciate input   -pain control prn  -simethicone q6h prn   -low fiber diet  -outpt follow up for colonoscopy in 4-6wks as d/w patient

## 2020-12-11 NOTE — PROGRESS NOTE ADULT - PROBLEM SELECTOR PLAN 1
-cont abx  -possible smoldering diverticulitis - appreciate GI input  -probably will benefit with iv abx on dc  -14 days iv abx reasonable given recent use of Augmentin and pt returned to ER  -midline - zosyn or invanz depending on cost - another 10 days   -dc planning  -weekly cbc, bun/creatinine - fax [phone]

## 2020-12-11 NOTE — PROGRESS NOTE ADULT - SUBJECTIVE AND OBJECTIVE BOX
INTERVAL HPI/OVERNIGHT EVENTS:    c/o lower abd pain but endorses improvement    MEDICATIONS  (STANDING):  influenza   Vaccine 0.5 milliLiter(s) IntraMuscular once  piperacillin/tazobactam IVPB.. 3.375 Gram(s) IV Intermittent every 8 hours  sodium chloride 0.9%. 1000 milliLiter(s) (100 mL/Hr) IV Continuous <Continuous>    MEDICATIONS  (PRN):  morphine  - Injectable 2 milliGRAM(s) IV Push every 4 hours PRN Severe Pain (7 - 10)  simethicone 80 milliGRAM(s) Chew every 6 hours PRN Gas      Allergies    codeine (Short breath)    Intolerances        Review of Systems:    General:  No wt loss, fevers, chills, night sweats, fatigue   Eyes:  Good vision, no reported pain  ENT:  No sore throat, pain, runny nose, dysphagia  CV:  No pain, palpitations, hypo/hypertension  Resp:  No dyspnea, cough, tachypnea, wheezing  GI:  +pain, No nausea, No vomiting, No diarrhea, No constipation, No weight loss, No fever, No pruritis, No rectal bleeding, No melena, No dysphagia  :  No pain, bleeding, incontinence, nocturia  Muscle:  No pain, weakness  Neuro:  No weakness, tingling, memory problems  Psych:  No fatigue, insomnia, mood problems, depression  Endocrine:  No polyuria, polydypsia, cold/heat intolerance  Heme:  No petechiae, ecchymosis, easy bruisability  Skin:  No rash, tattoos, scars, edema      Vital Signs Last 24 Hrs  T(C): 36.7 (11 Dec 2020 06:06), Max: 36.8 (10 Dec 2020 15:12)  T(F): 98 (11 Dec 2020 06:06), Max: 98.2 (10 Dec 2020 15:12)  HR: 86 (11 Dec 2020 06:06) (69 - 86)  BP: 107/68 (11 Dec 2020 06:06) (107/68 - 157/91)  BP(mean): --  RR: 18 (11 Dec 2020 06:06) (16 - 18)  SpO2: 100% (11 Dec 2020 06:06) (100% - 100%)    PHYSICAL EXAM:    Constitutional: NAD  HEENT: EOMI, throat clear  Neck: No LAD, supple  Respiratory: CTA and P  Cardiovascular: S1 and S2, RRR, no M  Gastrointestinal: BS+, soft, NT/ND, neg HSM,  Extremities: No peripheral edema, neg clubbing, cyanosis  Vascular: 2+ peripheral pulses  Neurological: A/O x 3, no focal deficits  Psychiatric: Normal mood, normal affect  Skin: No rashes      LABS:                        10.9   6.36  )-----------( 199      ( 11 Dec 2020 08:15 )             33.9     12-11    137  |  107  |  7   ----------------------------<  88  4.5   |  21<L>  |  0.71    Ca    9.2      11 Dec 2020 08:15  Mg     2.0     12-11            RADIOLOGY & ADDITIONAL TESTS:

## 2020-12-11 NOTE — PROGRESS NOTE ADULT - SUBJECTIVE AND OBJECTIVE BOX
HITESH DEAN 45y [id number]    Patient is a 45y old  Female who presents with a chief complaint of lower abdominal pain (11 Dec 2020 16:06)      Follow Up/CC:  ID following for diverticultiis    Interval History/ROS: no fever, pain better     Allergies    codeine (Short breath)    Intolerances        ANTIMICROBIALS:  piperacillin/tazobactam IVPB.. 3.375 every 8 hours      MEDICATIONS  (STANDING):  influenza   Vaccine 0.5 milliLiter(s) IntraMuscular once  piperacillin/tazobactam IVPB.. 3.375 Gram(s) IV Intermittent every 8 hours    MEDICATIONS  (PRN):  morphine  - Injectable 2 milliGRAM(s) IV Push every 4 hours PRN Severe Pain (7 - 10)  simethicone 80 milliGRAM(s) Chew every 6 hours PRN Gas        Vital Signs Last 24 Hrs  T(C): 36.7 (11 Dec 2020 06:06), Max: 36.7 (10 Dec 2020 20:55)  T(F): 98 (11 Dec 2020 06:06), Max: 98.1 (10 Dec 2020 20:55)  HR: 86 (11 Dec 2020 06:06) (71 - 86)  BP: 107/68 (11 Dec 2020 06:06) (107/68 - 145/70)  BP(mean): --  RR: 18 (11 Dec 2020 06:06) (16 - 18)  SpO2: 100% (11 Dec 2020 06:06) (100% - 100%)    CBC Full  -  ( 11 Dec 2020 08:15 )  WBC Count : 6.36 K/uL  RBC Count : 3.80 M/uL  Hemoglobin : 10.9 g/dL  Hematocrit : 33.9 %  Platelet Count - Automated : 199 K/uL  Mean Cell Volume : 89.2 fL  Mean Cell Hemoglobin : 28.7 pg  Mean Cell Hemoglobin Concentration : 32.2 gm/dL  Auto Neutrophil # : x  Auto Lymphocyte # : x  Auto Monocyte # : x  Auto Eosinophil # : x  Auto Basophil # : x  Auto Neutrophil % : x  Auto Lymphocyte % : x  Auto Monocyte % : x  Auto Eosinophil % : x  Auto Basophil % : x    12-11    137  |  107  |  7   ----------------------------<  88  4.5   |  21<L>  |  0.71    Ca    9.2      11 Dec 2020 08:15  Mg     2.0     12-11            MICROBIOLOGY:  .Urine Clean Catch (Midstream)  11-29-20   >100,000 CFU/ml Escherichia coli  --  Escherichia coli      RADIOLOGY    < from: CT Abdomen and Pelvis w/ IV Cont (12.08.20 @ 12:40) >  Colonic diverticulosis, without CT evidence of diverticulitis.    < end of copied text >

## 2020-12-11 NOTE — DISCHARGE NOTE PROVIDER - PROVIDER TOKENS
PROVIDER:[TOKEN:[[id number]]] PROVIDER:[TOKEN:[[id number]]],PROVIDER:[TOKEN:[[id number]]]

## 2020-12-11 NOTE — DISCHARGE NOTE PROVIDER - NSDCCPCAREPLAN_GEN_ALL_CORE_FT
PRINCIPAL DISCHARGE DIAGNOSIS  Diagnosis: Diverticulitis  Assessment and Plan of Treatment: Your CT scan in November showed diverticulitis (an infection of the pouches in your colon). You came in with abdominal pain. Your CT scan here showed diveritculosis (pouches in the colon) but no evidence of infection in those pouches. Infectious disease and GI evaluated you- it is believed you have smoldering diverticulitis for which you need to take antibiotic Zosyn for a total of 14 days. A midline was placed for you to complete these antibiotics at home. Follow up with GI Dr. Gunter 4-6 weeks following discharge for colonoscopy.       PRINCIPAL DISCHARGE DIAGNOSIS  Diagnosis: Diverticulitis  Assessment and Plan of Treatment: Your CT scan in November showed diverticulitis (an infection of the pouches in your colon). You came in with abdominal pain. Your CT scan here showed diveritculosis (pouches in the colon) but no evidence of infection in those pouches. Infectious disease and GI evaluated you- it is believed you have smoldering diverticulitis for which you need to take antibiotic Zosyn for a total of 14 days. A midline was placed for you to complete these antibiotics at home. Colonoscopy was completed 12/17 and showed multiple diverticula in colon, but no evidence of bleeding. Rest of exam was unremarkable. As per GI, symptoms most likely related to irritable bowel sydrome. Please follow up with your primary care physician and gastroenterologist after discharge for continued monitoring and management.       PRINCIPAL DISCHARGE DIAGNOSIS  Diagnosis: Diverticulitis  Assessment and Plan of Treatment: Your CT scan in November showed diverticulitis (an infection of the pouches in your colon). You came in with abdominal pain. Your CT scan here showed diveritculosis (pouches in the colon) but no evidence of infection in those pouches. Infectious disease and GI evaluated you and believed you had smoldering diverticulitis for which you needed to take antibiotic Zosyn. Colonoscopy was completed 12/17 and showed multiple diverticula in colon, but no evidence of bleeding. As per ID and GI, no further need for antibiotics as no diverticulitis was noted on colonoscopy. You will be sent home on miralax, colace, and bentyl.  Rest of exam was unremarkable. As per GI, symptoms most likely related to irritable bowel sydrome. Please follow up with your primary care physician and gastroenterologist after discharge for continued monitoring and management.

## 2020-12-11 NOTE — DISCHARGE NOTE PROVIDER - NSDCMRMEDTOKEN_GEN_ALL_CORE_FT
Cipro 500 mg oral tablet: 1 tab(s) orally every 12 hours   Flagyl 500 mg oral tablet: 1 tab(s) orally 3 times a day   Midline supplies and Flushes : 1 application buccal once a day   piperacillin-tazobactam: 3.375 gram(s) intravenously every 8 hours    Cipro 500 mg oral tablet: 1 tab(s) orally every 12 hours   Flagyl 500 mg oral tablet: 1 tab(s) orally 3 times a day   Midline supplies and Flushes : 1 application buccal once a day   piperacillin-tazobactam: 3.375 gram(s) intravenous every 8 hours    Cipro 500 mg oral tablet: 1 tab(s) orally every 12 hours   Flagyl 500 mg oral tablet: 1 tab(s) orally 3 times a day   Midline supplies and Flushes : 1 application buccal once a day    Cipro 500 mg oral tablet: 1 tab(s) orally every 12 hours   Flagyl 500 mg oral tablet: 1 tab(s) orally 3 times a day   Midline supplies and Flushes : 1 application buccal once a day   piperacillin-tazobactam 3 g-0.375 g intravenous injection: 3.375 gram(s) intravenously every 8 hours    Colace 100 mg oral capsule: 1 cap(s) orally 3 times a day   dicyclomine 20 mg oral tablet: 1 tab(s) orally every 6 hours, As Needed for pain    melatonin 3 mg oral tablet: 1 tab(s) orally once a day (at bedtime)   polyethylene glycol 3350 oral powder for reconstitution: 17 gram(s) orally 2 times a day

## 2020-12-11 NOTE — PROGRESS NOTE ADULT - SUBJECTIVE AND OBJECTIVE BOX
Patient is a 45y old  Female who presents with a chief complaint of lower abdominal pain (11 Dec 2020 12:42)      INTERVAL HPI/OVERNIGHT EVENTS:  T(C): 36.7 (12-11-20 @ 06:06), Max: 36.7 (12-10-20 @ 20:55)  HR: 86 (12-11-20 @ 06:06) (71 - 86)  BP: 107/68 (12-11-20 @ 06:06) (107/68 - 145/70)  RR: 18 (12-11-20 @ 06:06) (16 - 18)  SpO2: 100% (12-11-20 @ 06:06) (100% - 100%)  Wt(kg): --  I&O's Summary      LABS:                        10.9   6.36  )-----------( 199      ( 11 Dec 2020 08:15 )             33.9     12-11    137  |  107  |  7   ----------------------------<  88  4.5   |  21<L>  |  0.71    Ca    9.2      11 Dec 2020 08:15  Mg     2.0     12-11          CAPILLARY BLOOD GLUCOSE                MEDICATIONS  (STANDING):  influenza   Vaccine 0.5 milliLiter(s) IntraMuscular once  piperacillin/tazobactam IVPB.. 3.375 Gram(s) IV Intermittent every 8 hours    MEDICATIONS  (PRN):  morphine  - Injectable 2 milliGRAM(s) IV Push every 4 hours PRN Severe Pain (7 - 10)  simethicone 80 milliGRAM(s) Chew every 6 hours PRN Gas          PHYSICAL EXAM:  GENERAL: NAD, well-groomed, well-developed  HEAD:  Atraumatic, Normocephalic  CHEST/LUNG: Clear to percussion bilaterally; No rales, rhonchi, wheezing, or rubs  HEART: Regular rate and rhythm; No murmurs, rubs, or gallops  ABDOMEN: Soft, Nontender, Nondistended; Bowel sounds present  EXTREMITIES:  2+ Peripheral Pulses, No clubbing, cyanosis, or edema  LYMPH: No lymphadenopathy noted  SKIN: No rashes or lesions    Care Discussed with Consultants/Other Providers [ ] YES  [ ] NO

## 2020-12-11 NOTE — DISCHARGE NOTE PROVIDER - HOSPITAL COURSE
46 y/o F with PMHx of endometrial cancer s/p hysterectomy (no BSO) several years ago presents to the ED with severe lower abdominal pain.  I saw her for same last week, she was found to have diverticulitis, placed in CDU, then admitted for non resolving pain.  States she left hospital a few days ago because she was uncomfortable with dietary regimen and has been taking abx as prescribed . Pain has become intolerable again, though she says she was feeling much better at the time she left hospital.  Also endorses chills associated with severe pain though she doesn't feel as if she has a fever.  Some nausea as well. No diarrhea. No known sick contacts.      Patient was admitted for abdominal pain, likely due to diverticulitis. Patient was placed on IV zosyn and GI was consulted. CT abdomen showed no diverticulitis. Abdominal pain most likely smoldering diverticulitis secondary to non-compliance with outpatient PO medications. ID was consulted. ID recommended finishing 14 day course of antibiotics. Midline was placed _____. Patient will complete antibiotics _____.     On ___ this case was reviewed with  ____, the patient is medically stable and optimized for discharge. All medications were reviewed and prescriptions were sent to mutually agreed upon pharmacy.         46 y/o F with PMHx of endometrial cancer s/p hysterectomy (no BSO) several years ago presents to the ED with severe lower abdominal pain.  CTAP 11/29/2020 with acute sigmoid diverticulitis. Repeat CTAP 12/8/2020 with diverticulosis and no diverticulitis. Started on IV Zosyn to complete 14 day course per ID for suspected smoldering diverticulitis. Midline placed and home care set up for home IV ABX administration. Will need 4-6 weeks follow up with GI for colonoscopy.     Patient seen and evaluated. Reviewed discharge medications with patient and attending. All new medications requiring new prescriptions were sent to the pharmacy of patient's choice. Reviewed need for prescription for previous home medications and new prescriptions sent if requested. Medically cleared/stable for discharge as per   __________ with appropriate follow up. Patient understands and agrees with plan of care.            44 y/o F with PMHx of endometrial cancer s/p hysterectomy (no BSO) several years ago presents to the ED with severe lower abdominal pain.  CTAP 11/29/2020 with acute sigmoid diverticulitis. Repeat CTAP 12/8/2020 with diverticulosis and no diverticulitis. Started on IV Zosyn to complete 14 day course per ID for suspected smoldering diverticulitis. Midline placed and home care set up for home IV ABX administration. Colonoscopy completed 12/17 and showed multiple diverticula in colon, but no evidence of bleeding. Rest of exam was unremarkable. As per GI, symptoms most likely related to IBS.     On ___ this case was reviewed with  ____, the patient is medically stable and optimized for discharge. All medications were reviewed and prescriptions were sent to mutually agreed upon pharmacy.            44 y/o F with PMHx of endometrial cancer s/p hysterectomy (no BSO) several years ago presents to the ED with severe lower abdominal pain.  CTAP 11/29/2020 with acute sigmoid diverticulitis. Repeat CTAP 12/8/2020 with diverticulosis and no diverticulitis. Started on IV Zosyn to complete 14 day course per ID for suspected smoldering diverticulitis. Colonoscopy completed 12/17 and showed multiple diverticula in colon, but no evidence of bleeding. Rest of exam was unremarkable. As per GI, symptoms most likely related to IBS. As per ID and GI, no further need for antibiotics as no diverticulitis was noted on colonoscopy. Patient will be sent home on miralax, colace, and bentyl.    On 12/18/2020 this case was reviewed with Dr. Cade, the patient is medically stable and optimized for discharge. All medications were reviewed and prescriptions were sent to mutually agreed upon pharmacy.

## 2020-12-11 NOTE — PROGRESS NOTE ADULT - SUBJECTIVE AND OBJECTIVE BOX
CARDIOLOGY FOLLOW UP - Dr. Chavez    CC: denies cp, sob, and palpitations       PHYSICAL EXAM:  T(C): 36.7 (12-11-20 @ 06:06), Max: 36.8 (12-10-20 @ 15:12)  HR: 86 (12-11-20 @ 06:06) (69 - 86)  BP: 107/68 (12-11-20 @ 06:06) (107/68 - 157/91)  RR: 18 (12-11-20 @ 06:06) (16 - 18)  SpO2: 100% (12-11-20 @ 06:06) (100% - 100%)  Wt(kg): --  I&O's Summary      Appearance: Normal	  Cardiovascular: Normal S1 S2,RRR, No JVD, No murmurs  Respiratory: Lungs clear to auscultation	  Gastrointestinal:  Soft, Non-tender, + BS	  Extremities: Normal range of motion, No clubbing, cyanosis or edema      Home Medications:      MEDICATIONS  (STANDING):  influenza   Vaccine 0.5 milliLiter(s) IntraMuscular once  piperacillin/tazobactam IVPB.. 3.375 Gram(s) IV Intermittent every 8 hours  sodium chloride 0.9%. 1000 milliLiter(s) (100 mL/Hr) IV Continuous <Continuous>      TELEMETRY: 	    ECG:  	  RADIOLOGY:   DIAGNOSTIC TESTING:  [ ] Echocardiogram:  [ ]  Catheterization:  [ ] Stress Test:    OTHER: 	    LABS:	 	                            10.9   6.36  )-----------( 199      ( 11 Dec 2020 08:15 )             33.9     12-11    137  |  107  |  7   ----------------------------<  88  4.5   |  21<L>  |  0.71    Ca    9.2      11 Dec 2020 08:15  Mg     2.0     12-11

## 2020-12-11 NOTE — DISCHARGE NOTE PROVIDER - CARE PROVIDER_API CALL
Rashad Lakhani)  Gastroenterology; Internal Medicine  [address] [place] [id number]  Phone: [phone]  Fax: [phone]  Follow Up Time:    Rashad Lakhani)  Gastroenterology; Internal Medicine  [address] [place] [address]  Phone: [phone]  Fax: [phone]  Follow Up Time:     Justus Chavez  CARDIOVASCULAR DISEASE  [address] [facility], [address]  [place] [address]  Phone: [phone]  Fax: [phone]  Follow Up Time:

## 2020-12-11 NOTE — DISCHARGE NOTE PROVIDER - CARE PROVIDERS DIRECT ADDRESSES
,[email or web address],DirectAddress_Unknown

## 2020-12-12 LAB
ANION GAP SERPL CALC-SCNC: 11 MMOL/L — SIGNIFICANT CHANGE UP (ref 7–14)
BUN SERPL-MCNC: 10 MG/DL — SIGNIFICANT CHANGE UP (ref 7–23)
CALCIUM SERPL-MCNC: 9.9 MG/DL — SIGNIFICANT CHANGE UP (ref 8.4–10.5)
CHLORIDE SERPL-SCNC: 104 MMOL/L — SIGNIFICANT CHANGE UP (ref 98–107)
CO2 SERPL-SCNC: 23 MMOL/L — SIGNIFICANT CHANGE UP (ref 22–31)
CREAT SERPL-MCNC: 0.78 MG/DL — SIGNIFICANT CHANGE UP (ref 0.5–1.3)
GLUCOSE SERPL-MCNC: 97 MG/DL — SIGNIFICANT CHANGE UP (ref 70–99)
HCT VFR BLD CALC: 38.2 % — SIGNIFICANT CHANGE UP (ref 34.5–45)
HGB BLD-MCNC: 11.8 G/DL — SIGNIFICANT CHANGE UP (ref 11.5–15.5)
MAGNESIUM SERPL-MCNC: 1.9 MG/DL — SIGNIFICANT CHANGE UP (ref 1.6–2.6)
MCHC RBC-ENTMCNC: 28.1 PG — SIGNIFICANT CHANGE UP (ref 27–34)
MCHC RBC-ENTMCNC: 30.9 GM/DL — LOW (ref 32–36)
MCV RBC AUTO: 91 FL — SIGNIFICANT CHANGE UP (ref 80–100)
NRBC # BLD: 0 /100 WBCS — SIGNIFICANT CHANGE UP
NRBC # FLD: 0 K/UL — SIGNIFICANT CHANGE UP
PLATELET # BLD AUTO: 279 K/UL — SIGNIFICANT CHANGE UP (ref 150–400)
POTASSIUM SERPL-MCNC: 4.4 MMOL/L — SIGNIFICANT CHANGE UP (ref 3.5–5.3)
POTASSIUM SERPL-SCNC: 4.4 MMOL/L — SIGNIFICANT CHANGE UP (ref 3.5–5.3)
RBC # BLD: 4.2 M/UL — SIGNIFICANT CHANGE UP (ref 3.8–5.2)
RBC # FLD: 12.8 % — SIGNIFICANT CHANGE UP (ref 10.3–14.5)
SODIUM SERPL-SCNC: 138 MMOL/L — SIGNIFICANT CHANGE UP (ref 135–145)
WBC # BLD: 7.73 K/UL — SIGNIFICANT CHANGE UP (ref 3.8–10.5)
WBC # FLD AUTO: 7.73 K/UL — SIGNIFICANT CHANGE UP (ref 3.8–10.5)

## 2020-12-12 PROCEDURE — 74018 RADEX ABDOMEN 1 VIEW: CPT | Mod: 26

## 2020-12-12 RX ORDER — ACETAMINOPHEN 500 MG
1000 TABLET ORAL ONCE
Refills: 0 | Status: COMPLETED | OUTPATIENT
Start: 2020-12-12 | End: 2020-12-12

## 2020-12-12 RX ORDER — MORPHINE SULFATE 50 MG/1
2 CAPSULE, EXTENDED RELEASE ORAL EVERY 4 HOURS
Refills: 0 | Status: DISCONTINUED | OUTPATIENT
Start: 2020-12-12 | End: 2020-12-17

## 2020-12-12 RX ADMIN — MORPHINE SULFATE 2 MILLIGRAM(S): 50 CAPSULE, EXTENDED RELEASE ORAL at 22:22

## 2020-12-12 RX ADMIN — MORPHINE SULFATE 2 MILLIGRAM(S): 50 CAPSULE, EXTENDED RELEASE ORAL at 05:39

## 2020-12-12 RX ADMIN — PIPERACILLIN AND TAZOBACTAM 25 GRAM(S): 4; .5 INJECTION, POWDER, LYOPHILIZED, FOR SOLUTION INTRAVENOUS at 23:03

## 2020-12-12 RX ADMIN — MORPHINE SULFATE 2 MILLIGRAM(S): 50 CAPSULE, EXTENDED RELEASE ORAL at 22:07

## 2020-12-12 RX ADMIN — MORPHINE SULFATE 2 MILLIGRAM(S): 50 CAPSULE, EXTENDED RELEASE ORAL at 18:30

## 2020-12-12 RX ADMIN — PIPERACILLIN AND TAZOBACTAM 25 GRAM(S): 4; .5 INJECTION, POWDER, LYOPHILIZED, FOR SOLUTION INTRAVENOUS at 06:22

## 2020-12-12 RX ADMIN — PIPERACILLIN AND TAZOBACTAM 25 GRAM(S): 4; .5 INJECTION, POWDER, LYOPHILIZED, FOR SOLUTION INTRAVENOUS at 14:26

## 2020-12-12 RX ADMIN — MORPHINE SULFATE 2 MILLIGRAM(S): 50 CAPSULE, EXTENDED RELEASE ORAL at 19:30

## 2020-12-12 RX ADMIN — MORPHINE SULFATE 2 MILLIGRAM(S): 50 CAPSULE, EXTENDED RELEASE ORAL at 05:55

## 2020-12-12 RX ADMIN — Medication 1000 MILLIGRAM(S): at 23:45

## 2020-12-12 NOTE — CHART NOTE - NSCHARTNOTEFT_GEN_A_CORE
Alerted by RN that Pt w/ new severe Abd pain. Pt assessed at bedside. She c/o sudden onset 9/10 sharp b/l lower quadrant abdominal pain and endorses it is equal severity and the same type of pain that brought her to the hospital. Denies F/C, N/V, diarrhea/constipation, melena, BRBPR, HA.     ICU Vital Signs Last 24 Hrs  T(C): 36.7 (12 Dec 2020 22:00), Max: 36.8 (12 Dec 2020 12:00)  T(F): 98 (12 Dec 2020 22:00), Max: 98.3 (12 Dec 2020 12:00)  HR: 84 (12 Dec 2020 22:00) (70 - 84)  BP: 140/80 (12 Dec 2020 22:00) (136/70 - 140/80)  RR: 18 (12 Dec 2020 22:00) (16 - 18)  SpO2: 100% (12 Dec 2020 22:00) (100% - 100%)    GENERAL: appears in moderate pain, A&Ox3, WN/WD  ABDOMEN: Mildly distended, mild tenderness to palpation in RLQ/LLQ, BSx4; Soft, no guarding or rebound tenderness    Plan:   AXR verbal prelim, wnl  morphine prn pain  acetaminophen  hot packs    Plan d/w Pt, RN, in agreement with assessment and plan.     will continue to monitor

## 2020-12-12 NOTE — PROGRESS NOTE ADULT - PROBLEM SELECTOR PLAN 1
-daily labs  -CT 11/29 with acute sigmoid diverticulitis w/repeat CT 12/8 w/o diverticulitis  -favor "Smoldering Diverticulitis" 2/2 non-compliance with outpt PO medications  -cont IV Abx while inpatient; rec completing a 14 days total course of abx  -fu ID recs on abx coverage for outpatient; appreciate input   -pain control prn  -simethicone q6h prn   -low fiber diet  -outpt follow up for colonoscopy in 4-6wks as d/w patient

## 2020-12-12 NOTE — PROGRESS NOTE ADULT - SUBJECTIVE AND OBJECTIVE BOX
CARDIOLOGY FOLLOW UP - Dr. Chavez    CC no cp or sob        PHYSICAL EXAM:  T(C): 37 (12-11-20 @ 21:00), Max: 37 (12-11-20 @ 21:00)  HR: 72 (12-11-20 @ 21:00) (72 - 72)  BP: 147/72 (12-11-20 @ 21:00) (147/72 - 147/72)  RR: 16 (12-11-20 @ 21:00) (16 - 16)  SpO2: 100% (12-11-20 @ 21:00) (100% - 100%)  Wt(kg): --  I&O's Summary      Appearance: Normal	  Cardiovascular: Normal S1 S2,RRR, No JVD, No murmurs  Respiratory: Lungs clear to auscultation	  Gastrointestinal:  Soft, Non-tender, + BS	  Extremities: Normal range of motion, No clubbing, cyanosis or edema      Home Medications:      MEDICATIONS  (STANDING):  influenza   Vaccine 0.5 milliLiter(s) IntraMuscular once  piperacillin/tazobactam IVPB.. 3.375 Gram(s) IV Intermittent every 8 hours      TELEMETRY: off tele      ECG:  	  RADIOLOGY:   DIAGNOSTIC TESTING:  [ ] Echocardiogram:  [ ]  Catheterization:  [ ] Stress Test:    OTHER: 	    LABS:	 	                            10.9   6.36  )-----------( 199      ( 11 Dec 2020 08:15 )             33.9     12-11    137  |  107  |  7   ----------------------------<  88  4.5   |  21<L>  |  0.71    Ca    9.2      11 Dec 2020 08:15  Mg     2.0     12-11

## 2020-12-12 NOTE — PROGRESS NOTE ADULT - SUBJECTIVE AND OBJECTIVE BOX
Patient is a 45y old  Female who presents with a chief complaint of lower abdominal pain (12 Dec 2020 10:14)      INTERVAL HPI/OVERNIGHT EVENTS:  T(C): 37 (12-11-20 @ 21:00), Max: 37 (12-11-20 @ 21:00)  HR: 72 (12-11-20 @ 21:00) (72 - 72)  BP: 147/72 (12-11-20 @ 21:00) (147/72 - 147/72)  RR: 16 (12-11-20 @ 21:00) (16 - 16)  SpO2: 100% (12-11-20 @ 21:00) (100% - 100%)  Wt(kg): --  I&O's Summary      LABS:                        11.8   7.73  )-----------( 279      ( 12 Dec 2020 12:37 )             38.2     12-12    138  |  104  |  10  ----------------------------<  97  4.4   |  23  |  0.78    Ca    9.9      12 Dec 2020 12:37  Mg     1.9     12-12          CAPILLARY BLOOD GLUCOSE                MEDICATIONS  (STANDING):  influenza   Vaccine 0.5 milliLiter(s) IntraMuscular once  piperacillin/tazobactam IVPB.. 3.375 Gram(s) IV Intermittent every 8 hours    MEDICATIONS  (PRN):  morphine  - Injectable 2 milliGRAM(s) IV Push every 4 hours PRN Severe Pain (7 - 10)  simethicone 80 milliGRAM(s) Chew every 6 hours PRN Gas          PHYSICAL EXAM:  GENERAL: NAD, well-groomed, well-developed  HEAD:  Atraumatic, Normocephalic  CHEST/LUNG: Clear to percussion bilaterally; No rales, rhonchi, wheezing, or rubs  HEART: Regular rate and rhythm; No murmurs, rubs, or gallops  ABDOMEN: Soft, Nontender, Nondistended; Bowel sounds present  EXTREMITIES:  2+ Peripheral Pulses, No clubbing, cyanosis, or edema  LYMPH: No lymphadenopathy noted  SKIN: No rashes or lesions    Care Discussed with Consultants/Other Providers [ ] YES  [ ] NO

## 2020-12-13 LAB
ANION GAP SERPL CALC-SCNC: 10 MMOL/L — SIGNIFICANT CHANGE UP (ref 7–14)
BUN SERPL-MCNC: 12 MG/DL — SIGNIFICANT CHANGE UP (ref 7–23)
CALCIUM SERPL-MCNC: 9.6 MG/DL — SIGNIFICANT CHANGE UP (ref 8.4–10.5)
CHLORIDE SERPL-SCNC: 102 MMOL/L — SIGNIFICANT CHANGE UP (ref 98–107)
CO2 SERPL-SCNC: 22 MMOL/L — SIGNIFICANT CHANGE UP (ref 22–31)
CREAT SERPL-MCNC: 0.75 MG/DL — SIGNIFICANT CHANGE UP (ref 0.5–1.3)
GLUCOSE SERPL-MCNC: 99 MG/DL — SIGNIFICANT CHANGE UP (ref 70–99)
MAGNESIUM SERPL-MCNC: 1.9 MG/DL — SIGNIFICANT CHANGE UP (ref 1.6–2.6)
POTASSIUM SERPL-MCNC: 4.2 MMOL/L — SIGNIFICANT CHANGE UP (ref 3.5–5.3)
POTASSIUM SERPL-SCNC: 4.2 MMOL/L — SIGNIFICANT CHANGE UP (ref 3.5–5.3)
SODIUM SERPL-SCNC: 134 MMOL/L — LOW (ref 135–145)

## 2020-12-13 RX ORDER — OXYCODONE AND ACETAMINOPHEN 5; 325 MG/1; MG/1
2 TABLET ORAL EVERY 4 HOURS
Refills: 0 | Status: DISCONTINUED | OUTPATIENT
Start: 2020-12-13 | End: 2020-12-17

## 2020-12-13 RX ORDER — KETOROLAC TROMETHAMINE 30 MG/ML
15 SYRINGE (ML) INJECTION ONCE
Refills: 0 | Status: DISCONTINUED | OUTPATIENT
Start: 2020-12-13 | End: 2020-12-13

## 2020-12-13 RX ORDER — ACETAMINOPHEN 500 MG
650 TABLET ORAL EVERY 6 HOURS
Refills: 0 | Status: DISCONTINUED | OUTPATIENT
Start: 2020-12-13 | End: 2020-12-13

## 2020-12-13 RX ADMIN — MORPHINE SULFATE 2 MILLIGRAM(S): 50 CAPSULE, EXTENDED RELEASE ORAL at 10:37

## 2020-12-13 RX ADMIN — OXYCODONE AND ACETAMINOPHEN 2 TABLET(S): 5; 325 TABLET ORAL at 18:00

## 2020-12-13 RX ADMIN — MORPHINE SULFATE 2 MILLIGRAM(S): 50 CAPSULE, EXTENDED RELEASE ORAL at 02:32

## 2020-12-13 RX ADMIN — Medication 15 MILLIGRAM(S): at 11:10

## 2020-12-13 RX ADMIN — MORPHINE SULFATE 2 MILLIGRAM(S): 50 CAPSULE, EXTENDED RELEASE ORAL at 02:17

## 2020-12-13 RX ADMIN — Medication 1000 MILLIGRAM(S): at 00:30

## 2020-12-13 RX ADMIN — OXYCODONE AND ACETAMINOPHEN 2 TABLET(S): 5; 325 TABLET ORAL at 21:12

## 2020-12-13 RX ADMIN — OXYCODONE AND ACETAMINOPHEN 2 TABLET(S): 5; 325 TABLET ORAL at 22:12

## 2020-12-13 RX ADMIN — OXYCODONE AND ACETAMINOPHEN 2 TABLET(S): 5; 325 TABLET ORAL at 16:56

## 2020-12-13 RX ADMIN — PIPERACILLIN AND TAZOBACTAM 25 GRAM(S): 4; .5 INJECTION, POWDER, LYOPHILIZED, FOR SOLUTION INTRAVENOUS at 15:09

## 2020-12-13 RX ADMIN — OXYCODONE AND ACETAMINOPHEN 2 TABLET(S): 5; 325 TABLET ORAL at 12:28

## 2020-12-13 RX ADMIN — PIPERACILLIN AND TAZOBACTAM 25 GRAM(S): 4; .5 INJECTION, POWDER, LYOPHILIZED, FOR SOLUTION INTRAVENOUS at 06:22

## 2020-12-13 RX ADMIN — MORPHINE SULFATE 2 MILLIGRAM(S): 50 CAPSULE, EXTENDED RELEASE ORAL at 15:27

## 2020-12-13 RX ADMIN — MORPHINE SULFATE 2 MILLIGRAM(S): 50 CAPSULE, EXTENDED RELEASE ORAL at 07:22

## 2020-12-13 RX ADMIN — MORPHINE SULFATE 2 MILLIGRAM(S): 50 CAPSULE, EXTENDED RELEASE ORAL at 06:22

## 2020-12-13 RX ADMIN — Medication 15 MILLIGRAM(S): at 10:55

## 2020-12-13 RX ADMIN — SIMETHICONE 80 MILLIGRAM(S): 80 TABLET, CHEWABLE ORAL at 22:13

## 2020-12-13 RX ADMIN — MORPHINE SULFATE 2 MILLIGRAM(S): 50 CAPSULE, EXTENDED RELEASE ORAL at 11:10

## 2020-12-13 RX ADMIN — MORPHINE SULFATE 2 MILLIGRAM(S): 50 CAPSULE, EXTENDED RELEASE ORAL at 19:10

## 2020-12-13 RX ADMIN — PIPERACILLIN AND TAZOBACTAM 25 GRAM(S): 4; .5 INJECTION, POWDER, LYOPHILIZED, FOR SOLUTION INTRAVENOUS at 21:12

## 2020-12-13 RX ADMIN — MORPHINE SULFATE 2 MILLIGRAM(S): 50 CAPSULE, EXTENDED RELEASE ORAL at 15:09

## 2020-12-13 RX ADMIN — Medication 10 MILLIGRAM(S): at 17:57

## 2020-12-13 RX ADMIN — MORPHINE SULFATE 2 MILLIGRAM(S): 50 CAPSULE, EXTENDED RELEASE ORAL at 19:25

## 2020-12-13 RX ADMIN — OXYCODONE AND ACETAMINOPHEN 2 TABLET(S): 5; 325 TABLET ORAL at 13:03

## 2020-12-13 NOTE — PROGRESS NOTE ADULT - SUBJECTIVE AND OBJECTIVE BOX
Patient is a 45y old  Female who presents with a chief complaint of lower abdominal pain (13 Dec 2020 10:06)      INTERVAL HPI/OVERNIGHT EVENTS:  T(C): 36.6 (12-13-20 @ 15:00), Max: 36.7 (12-12-20 @ 22:00)  HR: 72 (12-13-20 @ 15:00) (72 - 84)  BP: 113/62 (12-13-20 @ 15:00) (91/61 - 140/80)  RR: 16 (12-13-20 @ 15:00) (16 - 18)  SpO2: 98% (12-13-20 @ 15:00) (98% - 100%)  Wt(kg): --  I&O's Summary      LABS:                        11.8   7.73  )-----------( 279      ( 12 Dec 2020 12:37 )             38.2     12-13    134<L>  |  102  |  12  ----------------------------<  99  4.2   |  22  |  0.75    Ca    9.6      13 Dec 2020 08:11  Mg     1.9     12-13          CAPILLARY BLOOD GLUCOSE                MEDICATIONS  (STANDING):  dicyclomine 10 milliGRAM(s) Oral two times a day before meals  influenza   Vaccine 0.5 milliLiter(s) IntraMuscular once  piperacillin/tazobactam IVPB.. 3.375 Gram(s) IV Intermittent every 8 hours    MEDICATIONS  (PRN):  morphine  - Injectable 2 milliGRAM(s) IV Push every 4 hours PRN Severe Pain (7 - 10)  oxycodone    5 mG/acetaminophen 325 mG 2 Tablet(s) Oral every 4 hours PRN Moderate Pain (4 - 6)  simethicone 80 milliGRAM(s) Chew every 6 hours PRN Gas          PHYSICAL EXAM:  GENERAL: NAD, well-groomed, well-developed  HEAD:  Atraumatic, Normocephalic  CHEST/LUNG: Clear to percussion bilaterally; No rales, rhonchi, wheezing, or rubs  HEART: Regular rate and rhythm; No murmurs, rubs, or gallops  ABDOMEN: Soft, Nontender, Nondistended; Bowel sounds present  EXTREMITIES:  2+ Peripheral Pulses, No clubbing, cyanosis, or edema  LYMPH: No lymphadenopathy noted  SKIN: No rashes or lesions    Care Discussed with Consultants/Other Providers [ ] YES  [ ] NO

## 2020-12-13 NOTE — PROGRESS NOTE ADULT - SUBJECTIVE AND OBJECTIVE BOX
INTERVAL HPI/OVERNIGHT EVENTS:    still with pain     MEDICATIONS  (STANDING):  influenza   Vaccine 0.5 milliLiter(s) IntraMuscular once  piperacillin/tazobactam IVPB.. 3.375 Gram(s) IV Intermittent every 8 hours  sodium chloride 0.9%. 1000 milliLiter(s) (100 mL/Hr) IV Continuous <Continuous>    MEDICATIONS  (PRN):  morphine  - Injectable 2 milliGRAM(s) IV Push every 4 hours PRN Severe Pain (7 - 10)  simethicone 80 milliGRAM(s) Chew every 6 hours PRN Gas      Allergies    codeine (Short breath)    Intolerances        Review of Systems:    General:  No wt loss, fevers, chills, night sweats, fatigue   Eyes:  Good vision, no reported pain  ENT:  No sore throat, pain, runny nose, dysphagia  CV:  No pain, palpitations, hypo/hypertension  Resp:  No dyspnea, cough, tachypnea, wheezing  GI:  +pain, No nausea, No vomiting, No diarrhea, No constipation, No weight loss, No fever, No pruritis, No rectal bleeding, No melena, No dysphagia  :  No pain, bleeding, incontinence, nocturia  Muscle:  No pain, weakness  Neuro:  No weakness, tingling, memory problems  Psych:  No fatigue, insomnia, mood problems, depression  Endocrine:  No polyuria, polydypsia, cold/heat intolerance  Heme:  No petechiae, ecchymosis, easy bruisability  Skin:  No rash, tattoos, scars, edema      Vital Signs Last 24 Hrs  T(C): 36.7 (11 Dec 2020 06:06), Max: 36.8 (10 Dec 2020 15:12)  T(F): 98 (11 Dec 2020 06:06), Max: 98.2 (10 Dec 2020 15:12)  HR: 86 (11 Dec 2020 06:06) (69 - 86)  BP: 107/68 (11 Dec 2020 06:06) (107/68 - 157/91)  BP(mean): --  RR: 18 (11 Dec 2020 06:06) (16 - 18)  SpO2: 100% (11 Dec 2020 06:06) (100% - 100%)    PHYSICAL EXAM:    Constitutional: NAD  HEENT: EOMI, throat clear  Neck: No LAD, supple  Respiratory: CTA and P  Cardiovascular: S1 and S2, RRR, no M  Gastrointestinal: BS+, soft, NT/ND, neg HSM,  Extremities: No peripheral edema, neg clubbing, cyanosis  Vascular: 2+ peripheral pulses  Neurological: A/O x 3, no focal deficits  Psychiatric: Normal mood, normal affect  Skin: No rashes      LABS:                        10.9   6.36  )-----------( 199      ( 11 Dec 2020 08:15 )             33.9     12-11    137  |  107  |  7   ----------------------------<  88  4.5   |  21<L>  |  0.71    Ca    9.2      11 Dec 2020 08:15  Mg     2.0     12-11            RADIOLOGY & ADDITIONAL TESTS:

## 2020-12-13 NOTE — CHART NOTE - NSCHARTNOTEFT_GEN_A_CORE
patient complaining of increased lower abdominal pain this morning, offered tylenol in addition to morphine that is already ordered and patient still having increased pain.  Will try one dose of toradol and reassess. Will continue to monitor.

## 2020-12-13 NOTE — PROGRESS NOTE ADULT - SUBJECTIVE AND OBJECTIVE BOX
CARDIOLOGY FOLLOW UP - Dr. Chavez    CC no cp or sob   events noted : severe Abd pain. last night : abd xray unremarkable       PHYSICAL EXAM:  T(C): 36.6 (12-13-20 @ 06:20), Max: 36.8 (12-12-20 @ 12:00)  HR: 75 (12-13-20 @ 06:20) (70 - 84)  BP: 91/61 (12-13-20 @ 06:20) (91/61 - 140/80)  RR: 17 (12-13-20 @ 06:20) (16 - 18)  SpO2: 100% (12-13-20 @ 06:20) (100% - 100%)  Wt(kg): --  I&O's Summary      Appearance: Normal	  Cardiovascular: Normal S1 S2,RRR, No JVD, No murmurs  Respiratory: Lungs clear to auscultation	  Gastrointestinal:  Soft, Non-tender, + BS	  Extremities: Normal range of motion, No clubbing, cyanosis or edema      Home Medications:      MEDICATIONS  (STANDING):  dicyclomine 10 milliGRAM(s) Oral two times a day before meals  influenza   Vaccine 0.5 milliLiter(s) IntraMuscular once  piperacillin/tazobactam IVPB.. 3.375 Gram(s) IV Intermittent every 8 hours      TELEMETRY: off tele 	    ECG:  	  RADIOLOGY:   DIAGNOSTIC TESTING:  [ ] Echocardiogram:  [ ]  Catheterization:  [ ] Stress Test:    OTHER: 	    LABS:	 	                            11.8   7.73  )-----------( 279      ( 12 Dec 2020 12:37 )             38.2     12-13    134<L>  |  102  |  12  ----------------------------<  99  4.2   |  22  |  0.75    Ca    9.6      13 Dec 2020 08:11  Mg     1.9     12-13

## 2020-12-14 PROCEDURE — 99232 SBSQ HOSP IP/OBS MODERATE 35: CPT

## 2020-12-14 RX ORDER — SOD SULF/SODIUM/NAHCO3/KCL/PEG
1000 SOLUTION, RECONSTITUTED, ORAL ORAL EVERY 4 HOURS
Refills: 0 | Status: COMPLETED | OUTPATIENT
Start: 2020-12-14 | End: 2020-12-14

## 2020-12-14 RX ADMIN — Medication 1000 MILLILITER(S): at 17:23

## 2020-12-14 RX ADMIN — MORPHINE SULFATE 2 MILLIGRAM(S): 50 CAPSULE, EXTENDED RELEASE ORAL at 17:22

## 2020-12-14 RX ADMIN — Medication 10 MILLIGRAM(S): at 17:22

## 2020-12-14 RX ADMIN — PIPERACILLIN AND TAZOBACTAM 25 GRAM(S): 4; .5 INJECTION, POWDER, LYOPHILIZED, FOR SOLUTION INTRAVENOUS at 05:54

## 2020-12-14 RX ADMIN — OXYCODONE AND ACETAMINOPHEN 2 TABLET(S): 5; 325 TABLET ORAL at 15:55

## 2020-12-14 RX ADMIN — MORPHINE SULFATE 2 MILLIGRAM(S): 50 CAPSULE, EXTENDED RELEASE ORAL at 05:53

## 2020-12-14 RX ADMIN — OXYCODONE AND ACETAMINOPHEN 2 TABLET(S): 5; 325 TABLET ORAL at 02:42

## 2020-12-14 RX ADMIN — OXYCODONE AND ACETAMINOPHEN 2 TABLET(S): 5; 325 TABLET ORAL at 20:19

## 2020-12-14 RX ADMIN — Medication 1000 MILLILITER(S): at 14:56

## 2020-12-14 RX ADMIN — PIPERACILLIN AND TAZOBACTAM 25 GRAM(S): 4; .5 INJECTION, POWDER, LYOPHILIZED, FOR SOLUTION INTRAVENOUS at 22:21

## 2020-12-14 RX ADMIN — OXYCODONE AND ACETAMINOPHEN 2 TABLET(S): 5; 325 TABLET ORAL at 06:53

## 2020-12-14 RX ADMIN — PIPERACILLIN AND TAZOBACTAM 25 GRAM(S): 4; .5 INJECTION, POWDER, LYOPHILIZED, FOR SOLUTION INTRAVENOUS at 14:56

## 2020-12-14 RX ADMIN — MORPHINE SULFATE 2 MILLIGRAM(S): 50 CAPSULE, EXTENDED RELEASE ORAL at 22:21

## 2020-12-14 RX ADMIN — MORPHINE SULFATE 2 MILLIGRAM(S): 50 CAPSULE, EXTENDED RELEASE ORAL at 00:38

## 2020-12-14 RX ADMIN — MORPHINE SULFATE 2 MILLIGRAM(S): 50 CAPSULE, EXTENDED RELEASE ORAL at 23:06

## 2020-12-14 RX ADMIN — MORPHINE SULFATE 2 MILLIGRAM(S): 50 CAPSULE, EXTENDED RELEASE ORAL at 00:53

## 2020-12-14 RX ADMIN — OXYCODONE AND ACETAMINOPHEN 2 TABLET(S): 5; 325 TABLET ORAL at 21:10

## 2020-12-14 RX ADMIN — OXYCODONE AND ACETAMINOPHEN 2 TABLET(S): 5; 325 TABLET ORAL at 05:53

## 2020-12-14 RX ADMIN — MORPHINE SULFATE 2 MILLIGRAM(S): 50 CAPSULE, EXTENDED RELEASE ORAL at 06:08

## 2020-12-14 RX ADMIN — MORPHINE SULFATE 2 MILLIGRAM(S): 50 CAPSULE, EXTENDED RELEASE ORAL at 13:44

## 2020-12-14 RX ADMIN — MORPHINE SULFATE 2 MILLIGRAM(S): 50 CAPSULE, EXTENDED RELEASE ORAL at 12:44

## 2020-12-14 RX ADMIN — OXYCODONE AND ACETAMINOPHEN 2 TABLET(S): 5; 325 TABLET ORAL at 14:55

## 2020-12-14 RX ADMIN — Medication 10 MILLIGRAM(S): at 07:39

## 2020-12-14 RX ADMIN — OXYCODONE AND ACETAMINOPHEN 2 TABLET(S): 5; 325 TABLET ORAL at 01:42

## 2020-12-14 NOTE — PROGRESS NOTE ADULT - SUBJECTIVE AND OBJECTIVE BOX
CARDIOLOGY FOLLOW UP - Dr. Chavez    CC no cp or sob       PHYSICAL EXAM:  T(C): 36.7 (12-14-20 @ 05:45), Max: 36.7 (12-13-20 @ 20:00)  HR: 81 (12-14-20 @ 05:45) (71 - 81)  BP: 101/69 (12-14-20 @ 05:45) (101/69 - 123/61)  RR: 17 (12-14-20 @ 05:45) (16 - 17)  SpO2: 99% (12-14-20 @ 05:45) (98% - 100%)  Wt(kg): --  I&O's Summary      Appearance: Normal	  Cardiovascular: Normal S1 S2,RRR, No JVD, No murmurs  Respiratory: Lungs clear to auscultation	  Gastrointestinal:  Soft, Non-tender, + BS	  Extremities: Normal range of motion, No clubbing, cyanosis or edema      Home Medications:      MEDICATIONS  (STANDING):  dicyclomine 10 milliGRAM(s) Oral two times a day before meals  influenza   Vaccine 0.5 milliLiter(s) IntraMuscular once  piperacillin/tazobactam IVPB.. 3.375 Gram(s) IV Intermittent every 8 hours  polyethylene glycol/electrolyte Solution 1000 milliLiter(s) Oral every 4 hours      TELEMETRY: 	    ECG:  	  RADIOLOGY:   DIAGNOSTIC TESTING:  [ ] Echocardiogram:  [ ]  Catheterization:  [ ] Stress Test:    OTHER: 	    LABS:	 	                            11.8   7.73  )-----------( 279      ( 12 Dec 2020 12:37 )             38.2     12-13    134<L>  |  102  |  12  ----------------------------<  99  4.2   |  22  |  0.75    Ca    9.6      13 Dec 2020 08:11  Mg     1.9     12-13

## 2020-12-14 NOTE — PROGRESS NOTE ADULT - PROBLEM SELECTOR PLAN 1
Unclear why her abdominal pain is not improving.   With the low level of  inflammation seen on her last CT scan her pain should be resolving.   We will plan for a colonoscopy tomorrow   Clear liquids today   Movi prep   NPO post MN

## 2020-12-14 NOTE — PROGRESS NOTE ADULT - SUBJECTIVE AND OBJECTIVE BOX
Summary:   45y  Female      Subjective:   Still with increased amount of pain     Objective:    MEDICATIONS  (STANDING):  dicyclomine 10 milliGRAM(s) Oral two times a day before meals  influenza   Vaccine 0.5 milliLiter(s) IntraMuscular once  piperacillin/tazobactam IVPB.. 3.375 Gram(s) IV Intermittent every 8 hours    MEDICATIONS  (PRN):  morphine  - Injectable 2 milliGRAM(s) IV Push every 4 hours PRN Severe Pain (7 - 10)  oxycodone    5 mG/acetaminophen 325 mG 2 Tablet(s) Oral every 4 hours PRN Moderate Pain (4 - 6)  simethicone 80 milliGRAM(s) Chew every 6 hours PRN Gas              Vital Signs Last 24 Hrs  T(C): 36.7 (14 Dec 2020 05:45), Max: 36.7 (13 Dec 2020 10:00)  T(F): 98 (14 Dec 2020 05:45), Max: 98 (13 Dec 2020 10:00)  HR: 81 (14 Dec 2020 05:45) (71 - 81)  BP: 101/69 (14 Dec 2020 05:45) (101/69 - 123/61)  BP(mean): 79 (14 Dec 2020 05:45) (73 - 83)  RR: 17 (14 Dec 2020 05:45) (16 - 17)  SpO2: 99% (14 Dec 2020 05:45) (98% - 100%)      General:  Well developed, well nourished, alert and active, no pallor, NAD.  HEENT:    Normal appearance of conjunctiva, ears, nose, lips, oropharynx, and oral mucosa, anicteric.  Neck:  No masses, no asymmetry.  Lymph Nodes:  No lymphadenopathy.   Cardiovascular:  RRR normal S1/S2, no murmur.  Respiratory:  CTA B/L, normal respiratory effort.   Abdominal:   soft, no masses normoactive BS, NT/ND, no HSM. mild tenderness Right and left lower quadrants   Extremities:   No clubbing or cyanosis, normal capillary refill, no edema.   Skin:   No rash, jaundice, lesions, eczema.   Musculoskeletal:  No joint swelling, erythema or tenderness.   Neuro: No focal deficits.   Other:       LABS:                        11.8   7.73  )-----------( 279      ( 12 Dec 2020 12:37 )             38.2     12-13    134<L>  |  102  |  12  ----------------------------<  99  4.2   |  22  |  0.75    Ca    9.6      13 Dec 2020 08:11  Mg     1.9     12-13            RADIOLOGY & ADDITIONAL TESTS:  < from: CT Abdomen and Pelvis w/ IV Cont (12.08.20 @ 12:40) >    EXAM:  CT ABDOMEN AND PELVIS IC        PROCEDURE DATE:  Dec  8 2020         INTERPRETATION:  CLINICAL INFORMATION: Diverticulitis.    COMPARISON: November 29, 2020.    PROCEDURE:  CT of the Abdomen and Pelvis was performed with intravenous contrast.  Intravenous contrast: 90 ml Omnipaque 350. 10 ml discarded.  Oral contrast: None.  Sagittal and coronal reformats were performed.    FINDINGS:  LOWER CHEST: Within normal limits.    LIVER: Within normal limits.  BILE DUCTS: Normal caliber.  GALLBLADDER: Within normal limits.  SPLEEN: Within normal limits.  PANCREAS: Within normal limits.  ADRENALS: Within normal limits.  KIDNEYS/URETERS: Within normal limits.    BLADDER: Within normal limits.  REPRODUCTIVE ORGANS: Hysterectomy.    BOWEL: No bowel obstruction. Appendix is normal.  PERITONEUM: No ascites.  VESSELS: Within normal limits.  RETROPERITONEUM/LYMPH NODES: No lymphadenopathy.  ABDOMINAL WALL: Soft tissue infiltration of the ventral lower abdominal wall.  BONES: Lumbarization of the L1 vertebral body.    IMPRESSION:  Colonic diverticulosis, without CT evidence of diverticulitis.              BERNARD DELACRUZ MD; Attending Radiologist  This document has been electronically signed. Dec  8 2020  1:17PM    < end of copied text >

## 2020-12-14 NOTE — PROGRESS NOTE ADULT - SUBJECTIVE AND OBJECTIVE BOX
HITESH DEAN 45y [id number]    Patient is a 45y old  Female who presents with a chief complaint of lower abdominal pain (14 Dec 2020 17:24)      Follow Up/CC:  ID following for abd pain    Interval History/ROS: for colonoscopy in AM, no fever, still with pain, Dysuria+    Allergies    codeine (Short breath)    Intolerances        ANTIMICROBIALS:  piperacillin/tazobactam IVPB.. 3.375 every 8 hours      MEDICATIONS  (STANDING):  dicyclomine 10 milliGRAM(s) Oral two times a day before meals  influenza   Vaccine 0.5 milliLiter(s) IntraMuscular once  piperacillin/tazobactam IVPB.. 3.375 Gram(s) IV Intermittent every 8 hours    MEDICATIONS  (PRN):  morphine  - Injectable 2 milliGRAM(s) IV Push every 4 hours PRN Severe Pain (7 - 10)  oxycodone    5 mG/acetaminophen 325 mG 2 Tablet(s) Oral every 4 hours PRN Moderate Pain (4 - 6)  simethicone 80 milliGRAM(s) Chew every 6 hours PRN Gas        Vital Signs Last 24 Hrs  T(C): 36.7 (14 Dec 2020 13:20), Max: 36.7 (13 Dec 2020 20:00)  T(F): 98 (14 Dec 2020 13:20), Max: 98 (13 Dec 2020 20:00)  HR: 79 (14 Dec 2020 13:20) (71 - 81)  BP: 134/79 (14 Dec 2020 13:20) (101/69 - 134/79)  BP(mean): 79 (14 Dec 2020 05:45) (73 - 83)  RR: 18 (14 Dec 2020 13:20) (17 - 18)  SpO2: 100% (14 Dec 2020 13:20) (98% - 100%)      12-13    134<L>  |  102  |  12  ----------------------------<  99  4.2   |  22  |  0.75    Ca    9.6      13 Dec 2020 08:11  Mg     1.9     12-13            MICROBIOLOGY:  .Urine Clean Catch (Midstream)  11-29-20   >100,000 CFU/ml Escherichia coli  --  Escherichia coli      RADIOLOGY    < from: Xray Abdomen 1 View PORTABLE -Urgent (Xray Abdomen 1 View PORTABLE -Urgent .) (12.12.20 @ 22:40) >  IMPRESSION:  Normal abdomen.    < end of copied text >

## 2020-12-14 NOTE — PROGRESS NOTE ADULT - PROBLEM SELECTOR PLAN 1
-cont abx  -possible smoldering diverticulitis - appreciate GI input  -cont zosyn  -14 days iv abx reasonable given recent use of Augmentin and pt returned to ER

## 2020-12-15 LAB
ANION GAP SERPL CALC-SCNC: 11 MMOL/L — SIGNIFICANT CHANGE UP (ref 7–14)
APPEARANCE UR: CLEAR — SIGNIFICANT CHANGE UP
BILIRUB UR-MCNC: NEGATIVE — SIGNIFICANT CHANGE UP
BUN SERPL-MCNC: 13 MG/DL — SIGNIFICANT CHANGE UP (ref 7–23)
CALCIUM SERPL-MCNC: 9.3 MG/DL — SIGNIFICANT CHANGE UP (ref 8.4–10.5)
CHLORIDE SERPL-SCNC: 104 MMOL/L — SIGNIFICANT CHANGE UP (ref 98–107)
CO2 SERPL-SCNC: 22 MMOL/L — SIGNIFICANT CHANGE UP (ref 22–31)
COLOR SPEC: SIGNIFICANT CHANGE UP
CREAT SERPL-MCNC: 0.76 MG/DL — SIGNIFICANT CHANGE UP (ref 0.5–1.3)
DIFF PNL FLD: NEGATIVE — SIGNIFICANT CHANGE UP
GLUCOSE SERPL-MCNC: 104 MG/DL — HIGH (ref 70–99)
GLUCOSE UR QL: NEGATIVE — SIGNIFICANT CHANGE UP
HCT VFR BLD CALC: 35.1 % — SIGNIFICANT CHANGE UP (ref 34.5–45)
HGB BLD-MCNC: 11 G/DL — LOW (ref 11.5–15.5)
KETONES UR-MCNC: NEGATIVE — SIGNIFICANT CHANGE UP
LEUKOCYTE ESTERASE UR-ACNC: NEGATIVE — SIGNIFICANT CHANGE UP
MCHC RBC-ENTMCNC: 28.9 PG — SIGNIFICANT CHANGE UP (ref 27–34)
MCHC RBC-ENTMCNC: 31.3 GM/DL — LOW (ref 32–36)
MCV RBC AUTO: 92.1 FL — SIGNIFICANT CHANGE UP (ref 80–100)
NITRITE UR-MCNC: NEGATIVE — SIGNIFICANT CHANGE UP
NRBC # BLD: 0 /100 WBCS — SIGNIFICANT CHANGE UP
NRBC # FLD: 0 K/UL — SIGNIFICANT CHANGE UP
PH UR: 6.5 — SIGNIFICANT CHANGE UP (ref 5–8)
PLATELET # BLD AUTO: 224 K/UL — SIGNIFICANT CHANGE UP (ref 150–400)
POTASSIUM SERPL-MCNC: 4.3 MMOL/L — SIGNIFICANT CHANGE UP (ref 3.5–5.3)
POTASSIUM SERPL-SCNC: 4.3 MMOL/L — SIGNIFICANT CHANGE UP (ref 3.5–5.3)
PROT UR-MCNC: NEGATIVE — SIGNIFICANT CHANGE UP
RBC # BLD: 3.81 M/UL — SIGNIFICANT CHANGE UP (ref 3.8–5.2)
RBC # FLD: 12.9 % — SIGNIFICANT CHANGE UP (ref 10.3–14.5)
SODIUM SERPL-SCNC: 137 MMOL/L — SIGNIFICANT CHANGE UP (ref 135–145)
SP GR SPEC: 1.01 — SIGNIFICANT CHANGE UP (ref 1.01–1.02)
UROBILINOGEN FLD QL: SIGNIFICANT CHANGE UP
WBC # BLD: 6.9 K/UL — SIGNIFICANT CHANGE UP (ref 3.8–10.5)
WBC # FLD AUTO: 6.9 K/UL — SIGNIFICANT CHANGE UP (ref 3.8–10.5)

## 2020-12-15 PROCEDURE — 74018 RADEX ABDOMEN 1 VIEW: CPT | Mod: 26

## 2020-12-15 PROCEDURE — 99232 SBSQ HOSP IP/OBS MODERATE 35: CPT

## 2020-12-15 RX ORDER — POLYETHYLENE GLYCOL 3350 17 G/17G
17 POWDER, FOR SOLUTION ORAL DAILY
Refills: 0 | Status: DISCONTINUED | OUTPATIENT
Start: 2020-12-15 | End: 2020-12-18

## 2020-12-15 RX ORDER — SENNA PLUS 8.6 MG/1
1 TABLET ORAL DAILY
Refills: 0 | Status: DISCONTINUED | OUTPATIENT
Start: 2020-12-15 | End: 2020-12-18

## 2020-12-15 RX ORDER — MULTIVIT WITH MIN/MFOLATE/K2 340-15/3 G
1 POWDER (GRAM) ORAL ONCE
Refills: 0 | Status: COMPLETED | OUTPATIENT
Start: 2020-12-15 | End: 2020-12-15

## 2020-12-15 RX ADMIN — MORPHINE SULFATE 2 MILLIGRAM(S): 50 CAPSULE, EXTENDED RELEASE ORAL at 03:49

## 2020-12-15 RX ADMIN — Medication 1 ENEMA: at 12:00

## 2020-12-15 RX ADMIN — SENNA PLUS 1 TABLET(S): 8.6 TABLET ORAL at 12:01

## 2020-12-15 RX ADMIN — Medication 1 BOTTLE: at 15:13

## 2020-12-15 RX ADMIN — PIPERACILLIN AND TAZOBACTAM 25 GRAM(S): 4; .5 INJECTION, POWDER, LYOPHILIZED, FOR SOLUTION INTRAVENOUS at 23:06

## 2020-12-15 RX ADMIN — MORPHINE SULFATE 2 MILLIGRAM(S): 50 CAPSULE, EXTENDED RELEASE ORAL at 08:27

## 2020-12-15 RX ADMIN — MORPHINE SULFATE 2 MILLIGRAM(S): 50 CAPSULE, EXTENDED RELEASE ORAL at 20:32

## 2020-12-15 RX ADMIN — MORPHINE SULFATE 2 MILLIGRAM(S): 50 CAPSULE, EXTENDED RELEASE ORAL at 15:28

## 2020-12-15 RX ADMIN — MORPHINE SULFATE 2 MILLIGRAM(S): 50 CAPSULE, EXTENDED RELEASE ORAL at 15:13

## 2020-12-15 RX ADMIN — Medication 10 MILLIGRAM(S): at 18:16

## 2020-12-15 RX ADMIN — PIPERACILLIN AND TAZOBACTAM 25 GRAM(S): 4; .5 INJECTION, POWDER, LYOPHILIZED, FOR SOLUTION INTRAVENOUS at 06:42

## 2020-12-15 RX ADMIN — OXYCODONE AND ACETAMINOPHEN 2 TABLET(S): 5; 325 TABLET ORAL at 02:04

## 2020-12-15 RX ADMIN — MORPHINE SULFATE 2 MILLIGRAM(S): 50 CAPSULE, EXTENDED RELEASE ORAL at 21:16

## 2020-12-15 RX ADMIN — PIPERACILLIN AND TAZOBACTAM 25 GRAM(S): 4; .5 INJECTION, POWDER, LYOPHILIZED, FOR SOLUTION INTRAVENOUS at 15:12

## 2020-12-15 RX ADMIN — POLYETHYLENE GLYCOL 3350 17 GRAM(S): 17 POWDER, FOR SOLUTION ORAL at 12:01

## 2020-12-15 RX ADMIN — MORPHINE SULFATE 2 MILLIGRAM(S): 50 CAPSULE, EXTENDED RELEASE ORAL at 02:58

## 2020-12-15 RX ADMIN — MORPHINE SULFATE 2 MILLIGRAM(S): 50 CAPSULE, EXTENDED RELEASE ORAL at 08:12

## 2020-12-15 RX ADMIN — OXYCODONE AND ACETAMINOPHEN 2 TABLET(S): 5; 325 TABLET ORAL at 02:49

## 2020-12-15 NOTE — PROVIDER CONTACT NOTE (OTHER) - BACKGROUND
45 year old female admitted for abdominal pain. PMHx ovarian cyst, diverticulitis, endometrial cancer.

## 2020-12-15 NOTE — PROVIDER CONTACT NOTE (OTHER) - SITUATION
Pt admitted for abdominal pain and is currently NPO for planned colonoscopy. On assessment abdomen appears more distended that when my shift began. Pain medications given per orders.

## 2020-12-15 NOTE — PROVIDER CONTACT NOTE (OTHER) - ASSESSMENT
Pt complaining of abdominal pain. Abdomen appears more distended than previous assessment. Pt urinating without difficulty and passing gas. Abdomen firm on palpation.

## 2020-12-15 NOTE — PROGRESS NOTE ADULT - SUBJECTIVE AND OBJECTIVE BOX
CARDIOLOGY FOLLOW UP - Dr. Chavez    CC no cp or sob   pt with sev abd pain over night pending repeat abd xray results       PHYSICAL EXAM:  T(C): 37.1 (12-15-20 @ 08:10), Max: 37.1 (12-15-20 @ 08:10)  HR: 66 (12-15-20 @ 08:10) (66 - 79)  BP: 148/55 (12-15-20 @ 08:10) (111/65 - 148/55)  RR: 18 (12-15-20 @ 08:10) (17 - 18)  SpO2: 100% (12-15-20 @ 08:10) (99% - 100%)  Wt(kg): --  I&O's Summary      Appearance: Normal	  Cardiovascular: Normal S1 S2,RRR, No JVD, No murmurs  Respiratory: Lungs clear to auscultation	  Gastrointestinal:  Soft, Non-tender, + BS	  Extremities: Normal range of motion, No clubbing, cyanosis or edema      Home Medications:      MEDICATIONS  (STANDING):  dicyclomine 10 milliGRAM(s) Oral two times a day before meals  influenza   Vaccine 0.5 milliLiter(s) IntraMuscular once  piperacillin/tazobactam IVPB.. 3.375 Gram(s) IV Intermittent every 8 hours  polyethylene glycol 3350 17 Gram(s) Oral daily  senna 1 Tablet(s) Oral daily      TELEMETRY: 	off tele     ECG:  	  RADIOLOGY:   DIAGNOSTIC TESTING:  [ ] Echocardiogram:  [ ]  Catheterization:  [ ] Stress Test:    OTHER: 	    LABS:	 	                            11.0   6.90  )-----------( 224      ( 15 Dec 2020 07:12 )             35.1     12-15    137  |  104  |  13  ----------------------------<  104<H>  4.3   |  22  |  0.76    Ca    9.3      15 Dec 2020 07:12

## 2020-12-15 NOTE — PROGRESS NOTE ADULT - PROBLEM SELECTOR PLAN 1
No with fecal impaction   One fleet enema today   Magnesium citrate 300 ml today   and then reattempt prep for rescheduled colonoscopy Thursday

## 2020-12-15 NOTE — CHART NOTE - NSCHARTNOTEFT_GEN_A_CORE
[facility] MEDICINE NIGHT COVERAGE - Medicine Subsequent Hospital Care Note    CC: Abdominal Pain   HPI/Subjective: Pt seen and assessed at bedside. Pt states 10/10 lower abd sharp pain that is non-radiating. States its the same pain that brought her into the hospital. However, states new abd distention that started earlier in the night. States last BM 2 days ago which was small, brown and formed. Endorses passing gas and nausea. Denies CP, SOB, vomiting.       T(C): 36.7 (14 Dec 2020 20:18), Max: 36.7 (14 Dec 2020 05:45)  T(F): 98 (14 Dec 2020 20:18), Max: 98 (14 Dec 2020 05:45)  HR: 76 (15 Dec 2020 02:56) (71 - 81)  BP: 125/90 (15 Dec 2020 02:56) (101/69 - 138/80)  BP(mean): 79 (14 Dec 2020 05:45) (79 - 79)  RR: 18 (15 Dec 2020 02:56) (17 - 18)  SpO2: 99% (15 Dec 2020 02:56) (99% - 100%)    PHYSICAL EXAM:  Constitutional: NAD, well-developed, well-nourished  Respiratory: Clear to auscultation bilaterally. No wheezes, rales or rhonchi. Normal respiratory effort  Cardiovascular: regular rate and rhythm, S1 and S2, no murmurs, rubs or gallops,  Gastrointestinal: distended, +bowel sounds, no hernia, ttp in lower abd      ASSESSMENT/PLAN:  46 y/o F with PMHx of endometrial cancer s/p hysterectomy (no BSO) several years ago presents to the ED with severe lower abdominal pain. Now with worsening abdominal pain and distention. Pt with known Smoldering diverticulitis on IV Zosyn. Colonoscopy is planned for today 12/15. Of note pt has not been on a bowel regimen although receiving narcotics.     Plan:  -Abd X-ray   -Percocet and Morphine already ordered for pain  -Bowel regimen   -Will continue to monitor.

## 2020-12-15 NOTE — PROGRESS NOTE ADULT - SUBJECTIVE AND OBJECTIVE BOX
HITESH DEAN 45y [id number]    Patient is a 45y old  Female who presents with a chief complaint of lower abdominal pain (15 Dec 2020 12:55)      Follow Up/CC:  ID following for abd pain    Interval History/ROS: no fever, colonoscopy moved to Thursday    Allergies    codeine (Short breath)    Intolerances        ANTIMICROBIALS:  piperacillin/tazobactam IVPB.. 3.375 every 8 hours      MEDICATIONS  (STANDING):  dicyclomine 10 milliGRAM(s) Oral two times a day before meals  influenza   Vaccine 0.5 milliLiter(s) IntraMuscular once  piperacillin/tazobactam IVPB.. 3.375 Gram(s) IV Intermittent every 8 hours  polyethylene glycol 3350 17 Gram(s) Oral daily  senna 1 Tablet(s) Oral daily    MEDICATIONS  (PRN):  morphine  - Injectable 2 milliGRAM(s) IV Push every 4 hours PRN Severe Pain (7 - 10)  oxycodone    5 mG/acetaminophen 325 mG 2 Tablet(s) Oral every 4 hours PRN Moderate Pain (4 - 6)  simethicone 80 milliGRAM(s) Chew every 6 hours PRN Gas        Vital Signs Last 24 Hrs  T(C): 37.1 (15 Dec 2020 08:10), Max: 37.1 (15 Dec 2020 08:10)  T(F): 98.8 (15 Dec 2020 08:10), Max: 98.8 (15 Dec 2020 08:10)  HR: 81 (15 Dec 2020 15:11) (66 - 81)  BP: 142/85 (15 Dec 2020 15:11) (111/65 - 148/55)  BP(mean): --  RR: 17 (15 Dec 2020 15:11) (17 - 18)  SpO2: 100% (15 Dec 2020 15:11) (99% - 100%)    CBC Full  -  ( 15 Dec 2020 07:12 )  WBC Count : 6.90 K/uL  RBC Count : 3.81 M/uL  Hemoglobin : 11.0 g/dL  Hematocrit : 35.1 %  Platelet Count - Automated : 224 K/uL  Mean Cell Volume : 92.1 fL  Mean Cell Hemoglobin : 28.9 pg  Mean Cell Hemoglobin Concentration : 31.3 gm/dL  Auto Neutrophil # : x  Auto Lymphocyte # : x  Auto Monocyte # : x  Auto Eosinophil # : x  Auto Basophil # : x  Auto Neutrophil % : x  Auto Lymphocyte % : x  Auto Monocyte % : x  Auto Eosinophil % : x  Auto Basophil % : x    12-15    137  |  104  |  13  ----------------------------<  104<H>  4.3   |  22  |  0.76    Ca    9.3      15 Dec 2020 07:12            MICROBIOLOGY:  .Urine Clean Catch (Midstream)  11-29-20   >100,000 CFU/ml Escherichia coli  --  Escherichia coli      RADIOLOGY    < from: Xray Abdomen 1 View PORTABLE -Urgent (Xray Abdomen 1 View PORTABLE -Urgent .) (12.15.20 @ 04:22) >  No acute intra-abdominal findings.    < end of copied text >

## 2020-12-15 NOTE — PROGRESS NOTE ADULT - SUBJECTIVE AND OBJECTIVE BOX
Patient is a 45y old  Female who presents with a chief complaint of lower abdominal pain (15 Dec 2020 15:38)      INTERVAL HPI/OVERNIGHT EVENTS:  T(C): 37.1 (12-15-20 @ 08:10), Max: 37.1 (12-15-20 @ 08:10)  HR: 81 (12-15-20 @ 15:11) (66 - 81)  BP: 142/85 (12-15-20 @ 15:11) (111/65 - 148/55)  RR: 17 (12-15-20 @ 15:11) (17 - 18)  SpO2: 100% (12-15-20 @ 15:11) (99% - 100%)  Wt(kg): --  I&O's Summary      LABS:                        11.0   6.90  )-----------( 224      ( 15 Dec 2020 07:12 )             35.1     12-15    137  |  104  |  13  ----------------------------<  104<H>  4.3   |  22  |  0.76    Ca    9.3      15 Dec 2020 07:12          CAPILLARY BLOOD GLUCOSE                MEDICATIONS  (STANDING):  dicyclomine 10 milliGRAM(s) Oral two times a day before meals  influenza   Vaccine 0.5 milliLiter(s) IntraMuscular once  piperacillin/tazobactam IVPB.. 3.375 Gram(s) IV Intermittent every 8 hours  polyethylene glycol 3350 17 Gram(s) Oral daily  senna 1 Tablet(s) Oral daily    MEDICATIONS  (PRN):  morphine  - Injectable 2 milliGRAM(s) IV Push every 4 hours PRN Severe Pain (7 - 10)  oxycodone    5 mG/acetaminophen 325 mG 2 Tablet(s) Oral every 4 hours PRN Moderate Pain (4 - 6)  simethicone 80 milliGRAM(s) Chew every 6 hours PRN Gas          PHYSICAL EXAM:  GENERAL: NAD, well-groomed, well-developed  HEAD:  Atraumatic, Normocephalic  CHEST/LUNG: Clear to percussion bilaterally; No rales, rhonchi, wheezing, or rubs  HEART: Regular rate and rhythm; No murmurs, rubs, or gallops  ABDOMEN: Soft, Nontender, Nondistended; Bowel sounds present  EXTREMITIES:  2+ Peripheral Pulses, No clubbing, cyanosis, or edema  LYMPH: No lymphadenopathy noted  SKIN: No rashes or lesions    Care Discussed with Consultants/Other Providers [ ] YES  [ ] NO

## 2020-12-15 NOTE — PROGRESS NOTE ADULT - SUBJECTIVE AND OBJECTIVE BOX
Summary:   45y  Female      Subjective:   Distended.     Objective:    MEDICATIONS  (STANDING):  dicyclomine 10 milliGRAM(s) Oral two times a day before meals  influenza   Vaccine 0.5 milliLiter(s) IntraMuscular once  piperacillin/tazobactam IVPB.. 3.375 Gram(s) IV Intermittent every 8 hours    MEDICATIONS  (PRN):  morphine  - Injectable 2 milliGRAM(s) IV Push every 4 hours PRN Severe Pain (7 - 10)  oxycodone    5 mG/acetaminophen 325 mG 2 Tablet(s) Oral every 4 hours PRN Moderate Pain (4 - 6)  simethicone 80 milliGRAM(s) Chew every 6 hours PRN Gas              Vital Signs Last 24 Hrs  T(C): 36.7 (14 Dec 2020 05:45), Max: 36.7 (13 Dec 2020 10:00)  T(F): 98 (14 Dec 2020 05:45), Max: 98 (13 Dec 2020 10:00)  HR: 81 (14 Dec 2020 05:45) (71 - 81)  BP: 101/69 (14 Dec 2020 05:45) (101/69 - 123/61)  BP(mean): 79 (14 Dec 2020 05:45) (73 - 83)  RR: 17 (14 Dec 2020 05:45) (16 - 17)  SpO2: 99% (14 Dec 2020 05:45) (98% - 100%)      General:  Well developed, well nourished, alert and active, no pallor, NAD.  HEENT:    Normal appearance of conjunctiva, ears, nose, lips, oropharynx, and oral mucosa, anicteric.  Neck:  No masses, no asymmetry.  Lymph Nodes:  No lymphadenopathy.   Cardiovascular:  RRR normal S1/S2, no murmur.  Respiratory:  CTA B/L, normal respiratory effort.   Abdominal:   soft, no masses normoactive BS, NT/ND, no HSM. mild tenderness Right and left lower quadrants   Extremities:   No clubbing or cyanosis, normal capillary refill, no edema.   Skin:   No rash, jaundice, lesions, eczema.   Musculoskeletal:  No joint swelling, erythema or tenderness.   Neuro: No focal deficits.   Other:       LABS:                        11.8   7.73  )-----------( 279      ( 12 Dec 2020 12:37 )             38.2     12-13    134<L>  |  102  |  12  ----------------------------<  99  4.2   |  22  |  0.75    Ca    9.6      13 Dec 2020 08:11  Mg     1.9     12-13            RADIOLOGY & ADDITIONAL TESTS:  < from: CT Abdomen and Pelvis w/ IV Cont (12.08.20 @ 12:40) >    EXAM:  CT ABDOMEN AND PELVIS IC        PROCEDURE DATE:  Dec  8 2020         INTERPRETATION:  CLINICAL INFORMATION: Diverticulitis.    COMPARISON: November 29, 2020.    PROCEDURE:  CT of the Abdomen and Pelvis was performed with intravenous contrast.  Intravenous contrast: 90 ml Omnipaque 350. 10 ml discarded.  Oral contrast: None.  Sagittal and coronal reformats were performed.    FINDINGS:  LOWER CHEST: Within normal limits.    LIVER: Within normal limits.  BILE DUCTS: Normal caliber.  GALLBLADDER: Within normal limits.  SPLEEN: Within normal limits.  PANCREAS: Within normal limits.  ADRENALS: Within normal limits.  KIDNEYS/URETERS: Within normal limits.    BLADDER: Within normal limits.  REPRODUCTIVE ORGANS: Hysterectomy.    BOWEL: No bowel obstruction. Appendix is normal.  PERITONEUM: No ascites.  VESSELS: Within normal limits.  RETROPERITONEUM/LYMPH NODES: No lymphadenopathy.  ABDOMINAL WALL: Soft tissue infiltration of the ventral lower abdominal wall.  BONES: Lumbarization of the L1 vertebral body.    IMPRESSION:  Colonic diverticulosis, without CT evidence of diverticulitis.              BERNARD DELACRUZ MD; Attending Radiologist  This document has been electronically signed. Dec  8 2020  1:17PM    < end of copied text >

## 2020-12-16 LAB
ANION GAP SERPL CALC-SCNC: 10 MMOL/L — SIGNIFICANT CHANGE UP (ref 7–14)
BUN SERPL-MCNC: 12 MG/DL — SIGNIFICANT CHANGE UP (ref 7–23)
CALCIUM SERPL-MCNC: 9.2 MG/DL — SIGNIFICANT CHANGE UP (ref 8.4–10.5)
CHLORIDE SERPL-SCNC: 104 MMOL/L — SIGNIFICANT CHANGE UP (ref 98–107)
CO2 SERPL-SCNC: 21 MMOL/L — LOW (ref 22–31)
CREAT SERPL-MCNC: 0.76 MG/DL — SIGNIFICANT CHANGE UP (ref 0.5–1.3)
GLUCOSE SERPL-MCNC: 143 MG/DL — HIGH (ref 70–99)
HCT VFR BLD CALC: 35.9 % — SIGNIFICANT CHANGE UP (ref 34.5–45)
HGB BLD-MCNC: 11.3 G/DL — LOW (ref 11.5–15.5)
MCHC RBC-ENTMCNC: 28.3 PG — SIGNIFICANT CHANGE UP (ref 27–34)
MCHC RBC-ENTMCNC: 31.5 GM/DL — LOW (ref 32–36)
MCV RBC AUTO: 90 FL — SIGNIFICANT CHANGE UP (ref 80–100)
NRBC # BLD: 0 /100 WBCS — SIGNIFICANT CHANGE UP
NRBC # FLD: 0 K/UL — SIGNIFICANT CHANGE UP
PLATELET # BLD AUTO: 253 K/UL — SIGNIFICANT CHANGE UP (ref 150–400)
POTASSIUM SERPL-MCNC: 4 MMOL/L — SIGNIFICANT CHANGE UP (ref 3.5–5.3)
POTASSIUM SERPL-SCNC: 4 MMOL/L — SIGNIFICANT CHANGE UP (ref 3.5–5.3)
RBC # BLD: 3.99 M/UL — SIGNIFICANT CHANGE UP (ref 3.8–5.2)
RBC # FLD: 12.8 % — SIGNIFICANT CHANGE UP (ref 10.3–14.5)
SODIUM SERPL-SCNC: 135 MMOL/L — SIGNIFICANT CHANGE UP (ref 135–145)
WBC # BLD: 6.49 K/UL — SIGNIFICANT CHANGE UP (ref 3.8–10.5)
WBC # FLD AUTO: 6.49 K/UL — SIGNIFICANT CHANGE UP (ref 3.8–10.5)

## 2020-12-16 RX ORDER — SOD SULF/SODIUM/NAHCO3/KCL/PEG
1000 SOLUTION, RECONSTITUTED, ORAL ORAL EVERY 4 HOURS
Refills: 0 | Status: COMPLETED | OUTPATIENT
Start: 2020-12-16 | End: 2020-12-16

## 2020-12-16 RX ORDER — MULTIVIT WITH MIN/MFOLATE/K2 340-15/3 G
1 POWDER (GRAM) ORAL ONCE
Refills: 0 | Status: COMPLETED | OUTPATIENT
Start: 2020-12-16 | End: 2020-12-16

## 2020-12-16 RX ADMIN — MORPHINE SULFATE 2 MILLIGRAM(S): 50 CAPSULE, EXTENDED RELEASE ORAL at 23:59

## 2020-12-16 RX ADMIN — MORPHINE SULFATE 2 MILLIGRAM(S): 50 CAPSULE, EXTENDED RELEASE ORAL at 10:46

## 2020-12-16 RX ADMIN — Medication 10 MILLIGRAM(S): at 06:15

## 2020-12-16 RX ADMIN — OXYCODONE AND ACETAMINOPHEN 2 TABLET(S): 5; 325 TABLET ORAL at 21:58

## 2020-12-16 RX ADMIN — PIPERACILLIN AND TAZOBACTAM 25 GRAM(S): 4; .5 INJECTION, POWDER, LYOPHILIZED, FOR SOLUTION INTRAVENOUS at 20:57

## 2020-12-16 RX ADMIN — Medication 1 BOTTLE: at 13:09

## 2020-12-16 RX ADMIN — MORPHINE SULFATE 2 MILLIGRAM(S): 50 CAPSULE, EXTENDED RELEASE ORAL at 01:15

## 2020-12-16 RX ADMIN — OXYCODONE AND ACETAMINOPHEN 2 TABLET(S): 5; 325 TABLET ORAL at 20:58

## 2020-12-16 RX ADMIN — MORPHINE SULFATE 2 MILLIGRAM(S): 50 CAPSULE, EXTENDED RELEASE ORAL at 18:13

## 2020-12-16 RX ADMIN — Medication 1000 MILLILITER(S): at 17:35

## 2020-12-16 RX ADMIN — Medication 1000 MILLILITER(S): at 21:00

## 2020-12-16 RX ADMIN — SENNA PLUS 1 TABLET(S): 8.6 TABLET ORAL at 10:46

## 2020-12-16 RX ADMIN — PIPERACILLIN AND TAZOBACTAM 25 GRAM(S): 4; .5 INJECTION, POWDER, LYOPHILIZED, FOR SOLUTION INTRAVENOUS at 06:15

## 2020-12-16 RX ADMIN — Medication 10 MILLIGRAM(S): at 17:36

## 2020-12-16 RX ADMIN — POLYETHYLENE GLYCOL 3350 17 GRAM(S): 17 POWDER, FOR SOLUTION ORAL at 10:46

## 2020-12-16 RX ADMIN — MORPHINE SULFATE 2 MILLIGRAM(S): 50 CAPSULE, EXTENDED RELEASE ORAL at 00:41

## 2020-12-16 RX ADMIN — MORPHINE SULFATE 2 MILLIGRAM(S): 50 CAPSULE, EXTENDED RELEASE ORAL at 07:06

## 2020-12-16 RX ADMIN — PIPERACILLIN AND TAZOBACTAM 25 GRAM(S): 4; .5 INJECTION, POWDER, LYOPHILIZED, FOR SOLUTION INTRAVENOUS at 13:09

## 2020-12-16 RX ADMIN — MORPHINE SULFATE 2 MILLIGRAM(S): 50 CAPSULE, EXTENDED RELEASE ORAL at 17:43

## 2020-12-16 RX ADMIN — MORPHINE SULFATE 2 MILLIGRAM(S): 50 CAPSULE, EXTENDED RELEASE ORAL at 06:15

## 2020-12-16 RX ADMIN — MORPHINE SULFATE 2 MILLIGRAM(S): 50 CAPSULE, EXTENDED RELEASE ORAL at 11:16

## 2020-12-16 NOTE — PROGRESS NOTE ADULT - PROBLEM SELECTOR PLAN 1
Now with fecal impaction   One fleet enema today   Magnesium citrate 300 ml today   Movi prep in the evening   NPO post MN   Colonoscopy tomorrow at 1 pm

## 2020-12-16 NOTE — PROGRESS NOTE ADULT - SUBJECTIVE AND OBJECTIVE BOX
Patient is a 45y old  Female who presents with a chief complaint of lower abdominal pain (16 Dec 2020 12:26)      INTERVAL HPI/OVERNIGHT EVENTS:  T(C): 36.3 (20 @ 10:44), Max: 36.9 (20 @ 06:13)  HR: 68 (20 @ 10:44) (68 - 83)  BP: 115/87 (20 @ 10:44) (110/67 - 121/63)  RR: 18 (20 @ 10:44) (17 - 18)  SpO2: 100% (20 @ 10:44) (100% - 100%)  Wt(kg): --  I&O's Summary      LABS:                        11.3   6.49  )-----------( 253      ( 16 Dec 2020 06:58 )             35.9         135  |  104  |  12  ----------------------------<  143<H>  4.0   |  21<L>  |  0.76    Ca    9.2      16 Dec 2020 06:58        Urinalysis Basic - ( 15 Dec 2020 18:24 )    Color: Light Yellow / Appearance: Clear / S.013 / pH: x  Gluc: x / Ketone: Negative  / Bili: Negative / Urobili: <2 mg/dL   Blood: x / Protein: Negative / Nitrite: Negative   Leuk Esterase: Negative / RBC: x / WBC x   Sq Epi: x / Non Sq Epi: x / Bacteria: x      CAPILLARY BLOOD GLUCOSE            Urinalysis Basic - ( 15 Dec 2020 18:24 )    Color: Light Yellow / Appearance: Clear / S.013 / pH: x  Gluc: x / Ketone: Negative  / Bili: Negative / Urobili: <2 mg/dL   Blood: x / Protein: Negative / Nitrite: Negative   Leuk Esterase: Negative / RBC: x / WBC x   Sq Epi: x / Non Sq Epi: x / Bacteria: x        MEDICATIONS  (STANDING):  dicyclomine 10 milliGRAM(s) Oral two times a day before meals  influenza   Vaccine 0.5 milliLiter(s) IntraMuscular once  piperacillin/tazobactam IVPB.. 3.375 Gram(s) IV Intermittent every 8 hours  polyethylene glycol 3350 17 Gram(s) Oral daily  polyethylene glycol/electrolyte Solution 1000 milliLiter(s) Oral every 4 hours  senna 1 Tablet(s) Oral daily    MEDICATIONS  (PRN):  bisacodyl 10 milliGRAM(s) Oral once PRN Constipation  morphine  - Injectable 2 milliGRAM(s) IV Push every 4 hours PRN Severe Pain (7 - 10)  oxycodone    5 mG/acetaminophen 325 mG 2 Tablet(s) Oral every 4 hours PRN Moderate Pain (4 - 6)  simethicone 80 milliGRAM(s) Chew every 6 hours PRN Gas          PHYSICAL EXAM:  GENERAL: NAD, well-groomed, well-developed  HEAD:  Atraumatic, Normocephalic  CHEST/LUNG: Clear to percussion bilaterally; No rales, rhonchi, wheezing, or rubs  HEART: Regular rate and rhythm; No murmurs, rubs, or gallops  ABDOMEN: Soft, Nontender, Nondistended; Bowel sounds present  EXTREMITIES:  2+ Peripheral Pulses, No clubbing, cyanosis, or edema  LYMPH: No lymphadenopathy noted  SKIN: No rashes or lesions    Care Discussed with Consultants/Other Providers [ ] YES  [ ] NO

## 2020-12-16 NOTE — PROGRESS NOTE ADULT - SUBJECTIVE AND OBJECTIVE BOX
Summary:   45y  Female      Subjective:   Doing better. Less abdominal distention     Objective:    MEDICATIONS  (STANDING):  dicyclomine 10 milliGRAM(s) Oral two times a day before meals  influenza   Vaccine 0.5 milliLiter(s) IntraMuscular once  magnesium citrate Oral Solution 1 Bottle Oral once  piperacillin/tazobactam IVPB.. 3.375 Gram(s) IV Intermittent every 8 hours  polyethylene glycol 3350 17 Gram(s) Oral daily  senna 1 Tablet(s) Oral daily    MEDICATIONS  (PRN):  morphine  - Injectable 2 milliGRAM(s) IV Push every 4 hours PRN Severe Pain (7 - 10)  oxycodone    5 mG/acetaminophen 325 mG 2 Tablet(s) Oral every 4 hours PRN Moderate Pain (4 - 6)  simethicone 80 milliGRAM(s) Chew every 6 hours PRN Gas              Vital Signs Last 24 Hrs  T(C): 36.3 (16 Dec 2020 10:44), Max: 36.9 (16 Dec 2020 06:13)  T(F): 97.4 (16 Dec 2020 10:44), Max: 98.4 (16 Dec 2020 06:13)  HR: 68 (16 Dec 2020 10:44) (68 - 83)  BP: 115/87 (16 Dec 2020 10:44) (110/67 - 142/85)  BP(mean): --  RR: 18 (16 Dec 2020 10:44) (17 - 18)  SpO2: 100% (16 Dec 2020 10:44) (100% - 100%)      General:  Well developed, well nourished, alert and active, no pallor, NAD.  HEENT:    Normal appearance of conjunctiva, ears, nose, lips, oropharynx, and oral mucosa, anicteric.  Neck:  No masses, no asymmetry.  Lymph Nodes:  No lymphadenopathy.   Cardiovascular:  RRR normal S1/S2, no murmur.  Respiratory:  CTA B/L, normal respiratory effort.   Abdominal:   soft, no masses or tenderness, normoactive BS, NT/ND, no HSM.  Extremities:   No clubbing or cyanosis, normal capillary refill, no edema.   Skin:   No rash, jaundice, lesions, eczema.   Musculoskeletal:  No joint swelling, erythema or tenderness.   Neuro: No focal deficits.   Other:       LABS:                        11.3   6.49  )-----------( 253      ( 16 Dec 2020 06:58 )             35.9     12-    135  |  104  |  12  ----------------------------<  143<H>  4.0   |  21<L>  |  0.76    Ca    9.2      16 Dec 2020 06:58        Urinalysis Basic - ( 15 Dec 2020 18:24 )    Color: Light Yellow / Appearance: Clear / S.013 / pH: x  Gluc: x / Ketone: Negative  / Bili: Negative / Urobili: <2 mg/dL   Blood: x / Protein: Negative / Nitrite: Negative   Leuk Esterase: Negative / RBC: x / WBC x   Sq Epi: x / Non Sq Epi: x / Bacteria: x        RADIOLOGY & ADDITIONAL TESTS:

## 2020-12-17 DIAGNOSIS — N39.0 URINARY TRACT INFECTION, SITE NOT SPECIFIED: ICD-10-CM

## 2020-12-17 LAB
ANION GAP SERPL CALC-SCNC: 13 MMOL/L — SIGNIFICANT CHANGE UP (ref 7–14)
BUN SERPL-MCNC: 9 MG/DL — SIGNIFICANT CHANGE UP (ref 7–23)
CALCIUM SERPL-MCNC: 9.1 MG/DL — SIGNIFICANT CHANGE UP (ref 8.4–10.5)
CHLORIDE SERPL-SCNC: 104 MMOL/L — SIGNIFICANT CHANGE UP (ref 98–107)
CO2 SERPL-SCNC: 18 MMOL/L — LOW (ref 22–31)
CREAT SERPL-MCNC: 0.61 MG/DL — SIGNIFICANT CHANGE UP (ref 0.5–1.3)
GLUCOSE SERPL-MCNC: 119 MG/DL — HIGH (ref 70–99)
MAGNESIUM SERPL-MCNC: 2.1 MG/DL — SIGNIFICANT CHANGE UP (ref 1.6–2.6)
PHOSPHATE SERPL-MCNC: 2 MG/DL — LOW (ref 2.5–4.5)
POTASSIUM SERPL-MCNC: 4.5 MMOL/L — SIGNIFICANT CHANGE UP (ref 3.5–5.3)
POTASSIUM SERPL-SCNC: 4.5 MMOL/L — SIGNIFICANT CHANGE UP (ref 3.5–5.3)
SODIUM SERPL-SCNC: 135 MMOL/L — SIGNIFICANT CHANGE UP (ref 135–145)

## 2020-12-17 PROCEDURE — 99232 SBSQ HOSP IP/OBS MODERATE 35: CPT

## 2020-12-17 RX ORDER — OXYCODONE AND ACETAMINOPHEN 5; 325 MG/1; MG/1
2 TABLET ORAL EVERY 4 HOURS
Refills: 0 | Status: DISCONTINUED | OUTPATIENT
Start: 2020-12-17 | End: 2020-12-18

## 2020-12-17 RX ORDER — MORPHINE SULFATE 50 MG/1
2 CAPSULE, EXTENDED RELEASE ORAL EVERY 4 HOURS
Refills: 0 | Status: DISCONTINUED | OUTPATIENT
Start: 2020-12-17 | End: 2020-12-18

## 2020-12-17 RX ADMIN — MORPHINE SULFATE 2 MILLIGRAM(S): 50 CAPSULE, EXTENDED RELEASE ORAL at 22:02

## 2020-12-17 RX ADMIN — MORPHINE SULFATE 2 MILLIGRAM(S): 50 CAPSULE, EXTENDED RELEASE ORAL at 05:21

## 2020-12-17 RX ADMIN — PIPERACILLIN AND TAZOBACTAM 25 GRAM(S): 4; .5 INJECTION, POWDER, LYOPHILIZED, FOR SOLUTION INTRAVENOUS at 22:02

## 2020-12-17 RX ADMIN — OXYCODONE AND ACETAMINOPHEN 2 TABLET(S): 5; 325 TABLET ORAL at 06:49

## 2020-12-17 RX ADMIN — MORPHINE SULFATE 2 MILLIGRAM(S): 50 CAPSULE, EXTENDED RELEASE ORAL at 00:59

## 2020-12-17 RX ADMIN — Medication 1 ENEMA: at 08:55

## 2020-12-17 RX ADMIN — MORPHINE SULFATE 2 MILLIGRAM(S): 50 CAPSULE, EXTENDED RELEASE ORAL at 22:15

## 2020-12-17 RX ADMIN — MORPHINE SULFATE 2 MILLIGRAM(S): 50 CAPSULE, EXTENDED RELEASE ORAL at 11:00

## 2020-12-17 RX ADMIN — MORPHINE SULFATE 2 MILLIGRAM(S): 50 CAPSULE, EXTENDED RELEASE ORAL at 10:20

## 2020-12-17 RX ADMIN — PIPERACILLIN AND TAZOBACTAM 25 GRAM(S): 4; .5 INJECTION, POWDER, LYOPHILIZED, FOR SOLUTION INTRAVENOUS at 05:00

## 2020-12-17 RX ADMIN — MORPHINE SULFATE 2 MILLIGRAM(S): 50 CAPSULE, EXTENDED RELEASE ORAL at 04:21

## 2020-12-17 RX ADMIN — OXYCODONE AND ACETAMINOPHEN 2 TABLET(S): 5; 325 TABLET ORAL at 05:49

## 2020-12-17 NOTE — PROGRESS NOTE ADULT - SUBJECTIVE AND OBJECTIVE BOX
HITESH DEAN 45y [id number]    Patient is a 45y old  Female who presents with a chief complaint of lower abdominal pain (17 Dec 2020 11:09)      Follow Up/CC:  ID following for abd pain    Interval History/ROS: no fever, for colonoscopy     Allergies    codeine (Short breath)    Intolerances        ANTIMICROBIALS:  piperacillin/tazobactam IVPB.. 3.375 every 8 hours      MEDICATIONS  (STANDING):  dicyclomine 10 milliGRAM(s) Oral two times a day before meals  influenza   Vaccine 0.5 milliLiter(s) IntraMuscular once  piperacillin/tazobactam IVPB.. 3.375 Gram(s) IV Intermittent every 8 hours  polyethylene glycol 3350 17 Gram(s) Oral daily  senna 1 Tablet(s) Oral daily    MEDICATIONS  (PRN):  bisacodyl 10 milliGRAM(s) Oral once PRN Constipation  morphine  - Injectable 2 milliGRAM(s) IV Push every 4 hours PRN Severe Pain (7 - 10)  oxycodone    5 mG/acetaminophen 325 mG 2 Tablet(s) Oral every 4 hours PRN Moderate Pain (4 - 6)  simethicone 80 milliGRAM(s) Chew every 6 hours PRN Gas        Vital Signs Last 24 Hrs  T(C): 36.7 (17 Dec 2020 13:46), Max: 36.8 (17 Dec 2020 04:16)  T(F): 98.1 (17 Dec 2020 13:46), Max: 98.2 (17 Dec 2020 04:16)  HR: 76 (17 Dec 2020 13:46) (75 - 96)  BP: 100/60 (17 Dec 2020 13:46) (100/60 - 157/76)  BP(mean): --  RR: 15 (17 Dec 2020 13:46) (15 - 18)  SpO2: 100% (17 Dec 2020 13:46) (100% - 100%)    CBC Full  -  ( 16 Dec 2020 06:58 )  WBC Count : 6.49 K/uL  RBC Count : 3.99 M/uL  Hemoglobin : 11.3 g/dL  Hematocrit : 35.9 %  Platelet Count - Automated : 253 K/uL  Mean Cell Volume : 90.0 fL  Mean Cell Hemoglobin : 28.3 pg  Mean Cell Hemoglobin Concentration : 31.5 gm/dL  Auto Neutrophil # : x  Auto Lymphocyte # : x  Auto Monocyte # : x  Auto Eosinophil # : x  Auto Basophil # : x  Auto Neutrophil % : x  Auto Lymphocyte % : x  Auto Monocyte % : x  Auto Eosinophil % : x  Auto Basophil % : x        135  |  104  |  9   ----------------------------<  119<H>  4.5   |  18<L>  |  0.61    Ca    9.1      17 Dec 2020 06:23  Phos  2.0       Mg     2.1             Urinalysis Basic - ( 15 Dec 2020 18:24 )    Color: Light Yellow / Appearance: Clear / S.013 / pH: x  Gluc: x / Ketone: Negative  / Bili: Negative / Urobili: <2 mg/dL   Blood: x / Protein: Negative / Nitrite: Negative   Leuk Esterase: Negative / RBC: x / WBC x   Sq Epi: x / Non Sq Epi: x / Bacteria: x        MICROBIOLOGY:  .Urine Clean Catch (Midstream)  20   >100,000 CFU/ml Escherichia coli  --  Escherichia coli        RADIOLOGY    < from: Xray Abdomen 1 View PORTABLE -Urgent (Xray Abdomen 1 View PORTABLE -Urgent .) (12.15.20 @ 04:22) >  No acute intra-abdominal findings.    < end of copied text >

## 2020-12-17 NOTE — PROGRESS NOTE ADULT - SUBJECTIVE AND OBJECTIVE BOX
Patient is a 45y old  Female who presents with a chief complaint of lower abdominal pain (17 Dec 2020 11:09)      INTERVAL HPI/OVERNIGHT EVENTS:  T(C): 36.3 (20 @ 15:55), Max: 36.8 (20 @ 04:16)  HR: 63 (20 @ 16:10) (63 - 96)  BP: 125/66 (20 @ 16:05) (100/60 - 157/76)  RR: 20 (20 @ 16:10) (15 - 20)  SpO2: 100% (20 @ 16:10) (100% - 100%)  Wt(kg): --  I&O's Summary    16 Dec 2020 07:01  -  17 Dec 2020 07:00  --------------------------------------------------------  IN: 354 mL / OUT: 0 mL / NET: 354 mL        LABS:                        11.3   6.49  )-----------( 253      ( 16 Dec 2020 06:58 )             35.9     12    135  |  104  |  9   ----------------------------<  119<H>  4.5   |  18<L>  |  0.61    Ca    9.1      17 Dec 2020 06:23  Phos  2.0       Mg     2.1             Urinalysis Basic - ( 15 Dec 2020 18:24 )    Color: Light Yellow / Appearance: Clear / S.013 / pH: x  Gluc: x / Ketone: Negative  / Bili: Negative / Urobili: <2 mg/dL   Blood: x / Protein: Negative / Nitrite: Negative   Leuk Esterase: Negative / RBC: x / WBC x   Sq Epi: x / Non Sq Epi: x / Bacteria: x      CAPILLARY BLOOD GLUCOSE            Urinalysis Basic - ( 15 Dec 2020 18:24 )    Color: Light Yellow / Appearance: Clear / S.013 / pH: x  Gluc: x / Ketone: Negative  / Bili: Negative / Urobili: <2 mg/dL   Blood: x / Protein: Negative / Nitrite: Negative   Leuk Esterase: Negative / RBC: x / WBC x   Sq Epi: x / Non Sq Epi: x / Bacteria: x        MEDICATIONS  (STANDING):  dicyclomine 10 milliGRAM(s) Oral two times a day before meals  influenza   Vaccine 0.5 milliLiter(s) IntraMuscular once  piperacillin/tazobactam IVPB.. 3.375 Gram(s) IV Intermittent every 8 hours  polyethylene glycol 3350 17 Gram(s) Oral daily  senna 1 Tablet(s) Oral daily    MEDICATIONS  (PRN):  bisacodyl 10 milliGRAM(s) Oral once PRN Constipation  morphine  - Injectable 2 milliGRAM(s) IV Push every 4 hours PRN Severe Pain (7 - 10)  oxycodone    5 mG/acetaminophen 325 mG 2 Tablet(s) Oral every 4 hours PRN Moderate Pain (4 - 6)  simethicone 80 milliGRAM(s) Chew every 6 hours PRN Gas          PHYSICAL EXAM:  GENERAL: NAD, well-groomed, well-developed  HEAD:  Atraumatic, Normocephalic  CHEST/LUNG: Clear to percussion bilaterally; No rales, rhonchi, wheezing, or rubs  HEART: Regular rate and rhythm; No murmurs, rubs, or gallops  ABDOMEN: Soft, Nontender, Nondistended; Bowel sounds present  EXTREMITIES:  2+ Peripheral Pulses, No clubbing, cyanosis, or edema  LYMPH: No lymphadenopathy noted  SKIN: No rashes or lesions    Care Discussed with Consultants/Other Providers [ ] YES  [ ] NO

## 2020-12-17 NOTE — PROGRESS NOTE ADULT - SUBJECTIVE AND OBJECTIVE BOX
CARDIOLOGY FOLLOW UP - Dr. Chavez    CC no cp or sob    +abd distention awaiting colonoscopy today       PHYSICAL EXAM:  T(C): 36.8 (12-17-20 @ 05:40), Max: 36.8 (12-17-20 @ 04:16)  HR: 75 (12-17-20 @ 05:40) (75 - 96)  BP: 116/75 (12-17-20 @ 05:40) (113/68 - 157/76)  RR: 17 (12-17-20 @ 05:40) (17 - 18)  SpO2: 100% (12-17-20 @ 05:40) (100% - 100%)  Wt(kg): --  I&O's Summary    16 Dec 2020 07:01  -  17 Dec 2020 07:00  --------------------------------------------------------  IN: 354 mL / OUT: 0 mL / NET: 354 mL        Appearance: Normal	  Cardiovascular: Normal S1 S2,RRR, No JVD, No murmurs  Respiratory: Lungs clear to auscultation	  Gastrointestinal:  Soft, Non-tender, + BS	  Extremities: Normal range of motion, No clubbing, cyanosis or edema      Home Medications:      MEDICATIONS  (STANDING):  dicyclomine 10 milliGRAM(s) Oral two times a day before meals  influenza   Vaccine 0.5 milliLiter(s) IntraMuscular once  piperacillin/tazobactam IVPB.. 3.375 Gram(s) IV Intermittent every 8 hours  polyethylene glycol 3350 17 Gram(s) Oral daily  senna 1 Tablet(s) Oral daily      TELEMETRY: off tele 	    ECG:  	  RADIOLOGY:   DIAGNOSTIC TESTING:  [ ] Echocardiogram:  [ ]  Catheterization:  [ ] Stress Test:    OTHER: 	    LABS:	 	                            11.3   6.49  )-----------( 253      ( 16 Dec 2020 06:58 )             35.9     12-17    135  |  104  |  9   ----------------------------<  119<H>  4.5   |  18<L>  |  0.61    Ca    9.1      17 Dec 2020 06:23  Phos  2.0     12-17  Mg     2.1     12-17

## 2020-12-17 NOTE — PACU DISCHARGE NOTE - NS MD DISCHARGE NOTE DISCHARGE
----- Message from Elsa Villalba RN sent at 11/6/2020  2:00 PM CST -----  Pt is being discharged from the hospital today and will need a follow up appointment in one week.  Pt's number is [phone]. He is very hard of hearing.     Floor

## 2020-12-18 ENCOUNTER — TRANSCRIPTION ENCOUNTER (OUTPATIENT)
Age: 45
End: 2020-12-18

## 2020-12-18 VITALS — WEIGHT: 136.69 LBS

## 2020-12-18 LAB
ANION GAP SERPL CALC-SCNC: 12 MMOL/L — SIGNIFICANT CHANGE UP (ref 7–14)
BUN SERPL-MCNC: 10 MG/DL — SIGNIFICANT CHANGE UP (ref 7–23)
CALCIUM SERPL-MCNC: 9.6 MG/DL — SIGNIFICANT CHANGE UP (ref 8.4–10.5)
CHLORIDE SERPL-SCNC: 102 MMOL/L — SIGNIFICANT CHANGE UP (ref 98–107)
CO2 SERPL-SCNC: 20 MMOL/L — LOW (ref 22–31)
CREAT SERPL-MCNC: 0.76 MG/DL — SIGNIFICANT CHANGE UP (ref 0.5–1.3)
GLUCOSE SERPL-MCNC: 164 MG/DL — HIGH (ref 70–99)
HCT VFR BLD CALC: 34.7 % — SIGNIFICANT CHANGE UP (ref 34.5–45)
HGB BLD-MCNC: 10.9 G/DL — LOW (ref 11.5–15.5)
MAGNESIUM SERPL-MCNC: 1.8 MG/DL — SIGNIFICANT CHANGE UP (ref 1.6–2.6)
MCHC RBC-ENTMCNC: 28.8 PG — SIGNIFICANT CHANGE UP (ref 27–34)
MCHC RBC-ENTMCNC: 31.4 GM/DL — LOW (ref 32–36)
MCV RBC AUTO: 91.6 FL — SIGNIFICANT CHANGE UP (ref 80–100)
NRBC # BLD: 0 /100 WBCS — SIGNIFICANT CHANGE UP
NRBC # FLD: 0 K/UL — SIGNIFICANT CHANGE UP
PHOSPHATE SERPL-MCNC: 2.5 MG/DL — SIGNIFICANT CHANGE UP (ref 2.5–4.5)
PLATELET # BLD AUTO: 237 K/UL — SIGNIFICANT CHANGE UP (ref 150–400)
POTASSIUM SERPL-MCNC: 4.2 MMOL/L — SIGNIFICANT CHANGE UP (ref 3.5–5.3)
POTASSIUM SERPL-SCNC: 4.2 MMOL/L — SIGNIFICANT CHANGE UP (ref 3.5–5.3)
RBC # BLD: 3.79 M/UL — LOW (ref 3.8–5.2)
RBC # FLD: 13.1 % — SIGNIFICANT CHANGE UP (ref 10.3–14.5)
SODIUM SERPL-SCNC: 134 MMOL/L — LOW (ref 135–145)
WBC # BLD: 7.58 K/UL — SIGNIFICANT CHANGE UP (ref 3.8–10.5)
WBC # FLD AUTO: 7.58 K/UL — SIGNIFICANT CHANGE UP (ref 3.8–10.5)

## 2020-12-18 PROCEDURE — 99232 SBSQ HOSP IP/OBS MODERATE 35: CPT

## 2020-12-18 RX ORDER — LANOLIN ALCOHOL/MO/W.PET/CERES
3 CREAM (GRAM) TOPICAL ONCE
Refills: 0 | Status: COMPLETED | OUTPATIENT
Start: 2020-12-18 | End: 2020-12-18

## 2020-12-18 RX ORDER — DOCUSATE SODIUM 100 MG
1 CAPSULE ORAL
Qty: 42 | Refills: 0
Start: 2020-12-18 | End: 2020-12-31

## 2020-12-18 RX ORDER — LANOLIN ALCOHOL/MO/W.PET/CERES
1 CREAM (GRAM) TOPICAL
Qty: 7 | Refills: 0
Start: 2020-12-18 | End: 2020-12-24

## 2020-12-18 RX ORDER — PIPERACILLIN AND TAZOBACTAM 4; .5 G/20ML; G/20ML
3.38 INJECTION, POWDER, LYOPHILIZED, FOR SOLUTION INTRAVENOUS EVERY 8 HOURS
Refills: 0 | Status: DISCONTINUED | OUTPATIENT
Start: 2020-12-18 | End: 2020-12-18

## 2020-12-18 RX ORDER — POLYETHYLENE GLYCOL 3350 17 G/17G
17 POWDER, FOR SOLUTION ORAL
Qty: 476 | Refills: 0
Start: 2020-12-18 | End: 2020-12-31

## 2020-12-18 RX ORDER — ALPRAZOLAM 0.25 MG
0.25 TABLET ORAL ONCE
Refills: 0 | Status: DISCONTINUED | OUTPATIENT
Start: 2020-12-18 | End: 2020-12-18

## 2020-12-18 RX ADMIN — MORPHINE SULFATE 2 MILLIGRAM(S): 50 CAPSULE, EXTENDED RELEASE ORAL at 09:38

## 2020-12-18 RX ADMIN — Medication 20 MILLIGRAM(S): at 11:52

## 2020-12-18 RX ADMIN — OXYCODONE AND ACETAMINOPHEN 2 TABLET(S): 5; 325 TABLET ORAL at 13:00

## 2020-12-18 RX ADMIN — Medication 0.25 MILLIGRAM(S): at 01:13

## 2020-12-18 RX ADMIN — PIPERACILLIN AND TAZOBACTAM 25 GRAM(S): 4; .5 INJECTION, POWDER, LYOPHILIZED, FOR SOLUTION INTRAVENOUS at 07:17

## 2020-12-18 RX ADMIN — POLYETHYLENE GLYCOL 3350 17 GRAM(S): 17 POWDER, FOR SOLUTION ORAL at 11:51

## 2020-12-18 RX ADMIN — MORPHINE SULFATE 2 MILLIGRAM(S): 50 CAPSULE, EXTENDED RELEASE ORAL at 09:53

## 2020-12-18 RX ADMIN — Medication 20 MILLIGRAM(S): at 07:21

## 2020-12-18 RX ADMIN — SENNA PLUS 1 TABLET(S): 8.6 TABLET ORAL at 11:51

## 2020-12-18 RX ADMIN — OXYCODONE AND ACETAMINOPHEN 2 TABLET(S): 5; 325 TABLET ORAL at 12:34

## 2020-12-18 RX ADMIN — Medication 3 MILLIGRAM(S): at 01:13

## 2020-12-18 RX ADMIN — INFLUENZA VIRUS VACCINE 0.5 MILLILITER(S): 15; 15; 15; 15 SUSPENSION INTRAMUSCULAR at 11:57

## 2020-12-18 NOTE — PROGRESS NOTE ADULT - PROBLEM SELECTOR PROBLEM 2
Diverticulitis
Abdominal pain
Diverticulitis
Lower abdominal pain

## 2020-12-18 NOTE — DIETITIAN INITIAL EVALUATION ADULT. - PERTINENT MEDS FT
MEDICATIONS  (STANDING):  dicyclomine 20 milliGRAM(s) Oral three times a day before meals  polyethylene glycol 3350 17 Gram(s) Oral daily  senna 1 Tablet(s) Oral daily

## 2020-12-18 NOTE — PROGRESS NOTE ADULT - SUBJECTIVE AND OBJECTIVE BOX
HITESH DEAN 45y [id number]    Patient is a 45y old  Female who presents with a chief complaint of lower abdominal pain (17 Dec 2020 17:15)      Follow Up/CC:  ID following for diverticulosis    Interval History/ROS: s/p colonoscopy, no fever    Allergies    codeine (Short breath)    Intolerances        ANTIMICROBIALS:  piperacillin/tazobactam IVPB.. 3.375 every 8 hours      MEDICATIONS  (STANDING):  dicyclomine 20 milliGRAM(s) Oral three times a day before meals  influenza   Vaccine 0.5 milliLiter(s) IntraMuscular once  piperacillin/tazobactam IVPB.. 3.375 Gram(s) IV Intermittent every 8 hours  polyethylene glycol 3350 17 Gram(s) Oral daily  senna 1 Tablet(s) Oral daily    MEDICATIONS  (PRN):  bisacodyl 10 milliGRAM(s) Oral once PRN Constipation  morphine  - Injectable 2 milliGRAM(s) IV Push every 4 hours PRN Severe Pain (7 - 10)  oxycodone    5 mG/acetaminophen 325 mG 2 Tablet(s) Oral every 4 hours PRN Moderate Pain (4 - 6)  simethicone 80 milliGRAM(s) Chew every 6 hours PRN Gas        Vital Signs Last 24 Hrs  T(C): 36.3 (18 Dec 2020 06:14), Max: 36.7 (17 Dec 2020 12:34)  T(F): 97.3 (18 Dec 2020 06:14), Max: 98.1 (17 Dec 2020 12:34)  HR: 84 (18 Dec 2020 06:14) (63 - 84)  BP: 134/94 (18 Dec 2020 06:14) (100/60 - 145/99)  BP(mean): --  RR: 18 (18 Dec 2020 06:14) (15 - 20)  SpO2: 100% (18 Dec 2020 06:14) (100% - 100%)    CBC Full  -  ( 18 Dec 2020 10:11 )  WBC Count : 7.58 K/uL  RBC Count : 3.79 M/uL  Hemoglobin : 10.9 g/dL  Hematocrit : 34.7 %  Platelet Count - Automated : 237 K/uL  Mean Cell Volume : 91.6 fL  Mean Cell Hemoglobin : 28.8 pg  Mean Cell Hemoglobin Concentration : 31.4 gm/dL  Auto Neutrophil # : x  Auto Lymphocyte # : x  Auto Monocyte # : x  Auto Eosinophil # : x  Auto Basophil # : x  Auto Neutrophil % : x  Auto Lymphocyte % : x  Auto Monocyte % : x  Auto Eosinophil % : x  Auto Basophil % : x    12-17    135  |  104  |  9   ----------------------------<  119<H>  4.5   |  18<L>  |  0.61    Ca    9.1      17 Dec 2020 06:23  Phos  2.0     12-17  Mg     2.1     12-17            MICROBIOLOGY:  .Urine Clean Catch (Midstream)  11-29-20   >100,000 CFU/ml Escherichia coli  --  Escherichia coli      RADIOLOGY    < from: Xray Abdomen 1 View PORTABLE -Urgent (Xray Abdomen 1 View PORTABLE -Urgent .) (12.15.20 @ 04:22) >  No acute intra-abdominal findings.    < end of copied text >

## 2020-12-18 NOTE — PROGRESS NOTE ADULT - PROVIDER SPECIALTY LIST ADULT
Cardiology
Gastroenterology
Hospitalist
Infectious Disease
Hospitalist
Anesthesia
Hospitalist
Cardiology
Gastroenterology
Infectious Disease
Infectious Disease

## 2020-12-18 NOTE — DISCHARGE NOTE NURSING/CASE MANAGEMENT/SOCIAL WORK - PATIENT PORTAL LINK FT
You can access the FollowMyHealth Patient Portal offered by [facility] by registering at the following website: [email or web address]. By joining [employer]’s FollowMyHealth portal, you will also be able to view your health information using other applications (apps) compatible with our system.

## 2020-12-18 NOTE — PROGRESS NOTE ADULT - SUBJECTIVE AND OBJECTIVE BOX
Summary:   45y  Female      Subjective:   feeling much better today     Objective:    MEDICATIONS  (STANDING):  dicyclomine 20 milliGRAM(s) Oral three times a day before meals  influenza   Vaccine 0.5 milliLiter(s) IntraMuscular once  polyethylene glycol 3350 17 Gram(s) Oral daily  senna 1 Tablet(s) Oral daily    MEDICATIONS  (PRN):  bisacodyl 10 milliGRAM(s) Oral once PRN Constipation  morphine  - Injectable 2 milliGRAM(s) IV Push every 4 hours PRN Severe Pain (7 - 10)  oxycodone    5 mG/acetaminophen 325 mG 2 Tablet(s) Oral every 4 hours PRN Moderate Pain (4 - 6)  simethicone 80 milliGRAM(s) Chew every 6 hours PRN Gas              Vital Signs Last 24 Hrs  T(C): 36.7 (18 Dec 2020 11:49), Max: 36.7 (17 Dec 2020 12:34)  T(F): 98 (18 Dec 2020 11:49), Max: 98.1 (17 Dec 2020 12:34)  HR: 79 (18 Dec 2020 11:49) (63 - 84)  BP: 148/81 (18 Dec 2020 11:49) (100/60 - 148/81)  BP(mean): --  RR: 18 (18 Dec 2020 11:49) (15 - 20)  SpO2: 100% (18 Dec 2020 11:49) (100% - 100%)      General:  Well developed, well nourished, alert and active, no pallor, NAD.  HEENT:    Normal appearance of conjunctiva, ears, nose, lips, oropharynx, and oral mucosa, anicteric.  Neck:  No masses, no asymmetry.  Lymph Nodes:  No lymphadenopathy.   Cardiovascular:  RRR normal S1/S2, no murmur.  Respiratory:  CTA B/L, normal respiratory effort.   Abdominal:   soft, no masses or tenderness, normoactive BS, NT/ND, no HSM.  Extremities:   No clubbing or cyanosis, normal capillary refill, no edema.   Skin:   No rash, jaundice, lesions, eczema.   Musculoskeletal:  No joint swelling, erythema or tenderness.   Neuro: No focal deficits.   Other:       LABS:                        10.9   7.58  )-----------( 237      ( 18 Dec 2020 10:11 )             34.7     12-18    134<L>  |  102  |  10  ----------------------------<  164<H>  4.2   |  20<L>  |  0.76    Ca    9.6      18 Dec 2020 10:11  Phos  2.5     12-18  Mg     1.8     12-18            RADIOLOGY & ADDITIONAL TESTS:

## 2020-12-18 NOTE — PROGRESS NOTE ADULT - PROBLEM SELECTOR PLAN 3
Advanced care planning was discussed with patient and family.  Advanced care planning forms were reviewed and discussed.  Risks, benefits and alternatives of gastroenterologic procedures were discussed in detail and all questions were answered.
Advanced care planning was discussed with patient and family.  Advanced care planning forms were reviewed and discussed.  Risks, benefits and alternatives of gastroenterologic procedures were discussed in detail and all questions were answered. 30 minutes spent.
-cont zosyn  -Ecoli in urine cx
-on zosyn  -Ecoli in urine cx

## 2020-12-18 NOTE — PROGRESS NOTE ADULT - CONSTITUTIONAL DETAILS
no distress/obese
no distress/obese
no distress/well-groomed
well-groomed/no distress
no distress/well-groomed/obese
obese/no distress/well-groomed

## 2020-12-18 NOTE — PROGRESS NOTE ADULT - SUBJECTIVE AND OBJECTIVE BOX
Anesthesia Post Operative Note    s/p day 1 of procedure: Colonoscopy    45y Female POSTOP DAY 1 S/P     Vital Signs Last 24 Hrs  T(C): 36.7 (18 Dec 2020 11:49), Max: 36.7 (17 Dec 2020 12:34)  T(F): 98 (18 Dec 2020 11:49), Max: 98.1 (17 Dec 2020 12:34)  HR: 79 (18 Dec 2020 11:49) (63 - 84)  BP: 148/81 (18 Dec 2020 11:49) (100/60 - 148/81)  BP(mean): --  RR: 18 (18 Dec 2020 11:49) (15 - 20)  SpO2: 100% (18 Dec 2020 11:49) (100% - 100%)    Patient seen at: 09:17 am    Doing well, no anesthetic complications or complaints noted or reported.  Pain is controlled.

## 2020-12-18 NOTE — PROGRESS NOTE ADULT - NEGATIVE MUSCULOSKELETAL SYMPTOMS
no back pain/no joint swelling
no joint swelling/no back pain
no back pain/no joint swelling
no joint swelling/no back pain

## 2020-12-18 NOTE — PROGRESS NOTE ADULT - ASSESSMENT
45 year old female with recurrent abdominal pain . 
 45yF w/pmhx endometrial cancer s/p hysterectomy p/w lower abd pain.    1. Abd pain, Acute diverticulitis   -ct abd with Colonic diverticulosis. + Hysterectomy  -on IV abx - s/p picc   -med f/u   -s/p Colonoscopy, GI , ID f/u   -dcp per primary team    dvt ppx   
 46 y/o F with PMHx of endometrial cancer s/p hysterectomy (no BSO) several years ago presents to the ED with severe lower abdominal pain.  I saw her for same last week, she was found to have diverticulitis, placed in CDU, then admitted for non resolving pain.  States she left hospital a few days ago because she was uncomfortable with dietary regimen and has been taking abx as prescribed . Pain has become intolerable again, though she says she was feeling much better at the time she left hospital.  Also endorses chills associated with severe pain though she doesn't feel as if she has a fever.  Some nausea as well. No diarrhea. No known sick contacts.      Problem/Plan - 1:  ·  Problem: Abdominal pain.  Plan: Likely sec to diverticulitis  pt left ama last admission  iv antibiotics as per ID   pain control  advance diet as tolerated   will monitor     Problem/Plan - 2:  ·  Problem: Diverticulitis.  Plan: as above.  
 45yF w/pmhx endometrial cancer s/p hysterectomy p/w lower abd pain.    1. Abd pain, Acute diverticulitis   - ct abd with Colonic diverticulosis. + Hysterectomy  -on IV abx   -med f/u   -gi f/u noted planning  colonoscopy   -cv stable , pt can proceed from a cardiac standpoint     dvt ppx   
 45yF w/pmhx endometrial cancer s/p hysterectomy p/w lower abd pain.    1. Abd pain, Acute diverticulitis   - ct abd with Colonic diverticulosis. + Hysterectomy  -on IV abx   -med f/u   -gi f/u noted planning colonoscopy   -cv stable , pt can proceed from a cardiac standpoint     dvt ppx   
44 y/o F with PMHx of endometrial cancer s/p hysterectomy (no BSO) several years ago presents to the ED with severe lower abdominal pain.  I saw her for same last week, she was found to have diverticulitis, placed in CDU, then admitted for non resolving pain.  States she left hospital a few days ago because she was uncomfortable with dietary regimen and has been taking abx as prescribed . Pain has become intolerable again, though she says she was feeling much better at the time she left hospital.  Also endorses chills associated with severe pain though she doesn't feel as if she has a fever.  Some nausea as well. No diarrhea. No known sick contacts.      Problem/Plan - 1:  ·  Problem: Abdominal pain.  Plan: Likely sec to diverticulitis  pt left ama last admission  iv antibiotics as per ID   pain control  advance diet as tolerated   will monitor     Problem/Plan - 2:  ·  Problem: Diverticulitis.  Plan: as above.
44 y/o F with PMHx of endometrial cancer s/p hysterectomy (no BSO) several years ago presents to the ED with severe lower abdominal pain.  I saw her for same last week, she was found to have diverticulitis, placed in CDU, then admitted for non resolving pain.  States she left hospital a few days ago because she was uncomfortable with dietary regimen and has been taking abx as prescribed . Pain has become intolerable again, though she says she was feeling much better at the time she left hospital.  Also endorses chills associated with severe pain though she doesn't feel as if she has a fever.  Some nausea as well. No diarrhea. No known sick contacts.      Problem/Plan - 1:  ·  Problem: Abdominal pain.  Plan: Likely sec to diverticulitis  pt left ama last admission  iv zosyn  pain control  npo  gi fu  check CT abdomen.     Problem/Plan - 2:  ·  Problem: Diverticulitis.  Plan: as above.
45 year old female with acute abdominal pain.
45 year old female with acute abdominal pain.
45 year old female with acute abdominal pain. Seen in ED
45 year old female with recurrent abdominal pain .
45yF w/pmhx endometrial cancer s/p hysterectomy p/w lower abd pain.    1. Abd pain, Acute diverticulitis   - ct abd with Colonic diverticulosis. + Hysterectomy  -on IV abx, clear liquid diet   -med f/u   -gi f/u   -cv stable     dvt ppx   
45yF w/pmhx endometrial cancer s/p hysterectomy p/w lower abd pain.    1. Abd pain, Acute diverticulitis   - ct abd with Colonic diverticulosis. + Hysterectomy  -on IV abx, clear liquid diet   -med f/u   -gi f/u   -cv stable     dvt ppx   
45yF w/pmhx endometrial cancer s/p hysterectomy p/w lower abd pain.    1. Abd pain, Acute diverticulitis   - ct abd with Colonic diverticulosis. + Hysterectomy  -on IV abx,low fiber diet  -med f/u   -gi f/u - outpatient colonoscopy   -cv stable     dvt ppx   
45yF w/pmhx endometrial cancer s/p hysterectomy p/w lower abd pain.    1. Abd pain, Acute diverticulitis   - ct abd with Colonic diverticulosis. + Hysterectomy  -on IV abx,low fiber diet  -med f/u   -gi f/u noted planning  colonoscopy   -cv stable , pt can proceed from a cardiac standpoint     dvt ppx   
45yF w/pmhx endometrial cancer s/p hysterectomy p/w lower abd pain.    1. Abd pain, Acute diverticulitis   - ct abd with Colonic diverticulosis. + Hysterectomy  -on IV abx,low fiber diet  -med f/u   -gi f/u noted planning  colonoscopy tomorrow   -cv stable , pt can proceed from a cardiac standpoint     dvt ppx   
46 y/o F with PMHx of endometrial cancer s/p hysterectomy (no BSO) several years ago presents to the ED with severe lower abdominal pain.  I saw her for same last week, she was found to have diverticulitis, placed in CDU, then admitted for non resolving pain.  States she left hospital a few days ago because she was uncomfortable with dietary regimen and has been taking abx as prescribed . Pain has become intolerable again, though she says she was feeling much better at the time she left hospital.  Also endorses chills associated with severe pain though she doesn't feel as if she has a fever.  Some nausea as well. No diarrhea. No known sick contacts.      Problem/Plan - 1:  ·  Problem: Abdominal pain.  Plan: Likely sec to diverticulitis  pt left ama last admission  iv antibiotics as per ID   pain control  advance diet as tolerated   will monitor     Problem/Plan - 2:  ·  Problem: Diverticulitis.  Plan: as above.
44 y/o F with PMHx of endometrial cancer s/p hysterectomy (no BSO) several years ago presents to the ED with severe lower abdominal pain.  I saw her for same last week, she was found to have diverticulitis, placed in CDU, then admitted for non resolving pain.  States she left hospital a few days ago because she was uncomfortable with dietary regimen and has been taking abx as prescribed . Pain has become intolerable again, though she says she was feeling much better at the time she left hospital.  Also endorses chills associated with severe pain though she doesn't feel as if she has a fever.  Some nausea as well. No diarrhea. No known sick contacts.       Abdominal pain.  Plan: Likely sec to diverticulitis  pt left ama last admission  iv antibiotics as per ID   pain control  advance diet as tolerated   will monitor   scope today 
45yF w/pmhx endometrial cancer s/p hysterectomy p/w lower abd pain. On CT pt found to have acute sigmoid diverticulitis

## 2020-12-18 NOTE — DIETITIAN INITIAL EVALUATION ADULT. - ETIOLOGY
Patient meets criteria for moderate malnutrition in the setting of acute illness alteration in GI tract function

## 2020-12-18 NOTE — DIETITIAN INITIAL EVALUATION ADULT. - REASON FOR ADMISSION
Patient is a 46 y/o F with PMHx of endometrial cancer s/p hysterectomy (no BSO) several years ago presents to the ED with severe lower abdominal pain, with recent hospitalization for diverticulitis.  GI following, likely with IBS-C.

## 2020-12-18 NOTE — PROGRESS NOTE ADULT - GASTROINTESTINAL DETAILS
soft/no distention/no rebound tenderness/no guarding
no rigidity/soft/no guarding/no distention/nontender
no distention/soft
no rebound tenderness/no rigidity/no guarding/soft/no distention
no distention/soft
Negative
soft/no distention/no guarding

## 2020-12-18 NOTE — DIETITIAN INITIAL EVALUATION ADULT. - PERSON TAUGHT/METHOD
verbal instruction/written material/skill demonstration/patient instructed/teach back - (Patient repeats in own words)/ask me 3

## 2020-12-18 NOTE — PROGRESS NOTE ADULT - ATTENDING COMMENTS
Agree with above NP note.  cv stable   cont current tx   abc per med/gi
Agree with above NP note.  cv stable   cont current tx   abc per med/gi
Agree with above NP note.  cv stable  abx per id
Agree with above NP note.  cv stable  await colonoscopy  cont current tx
Agree with above NP note.  cv stable  cont current tx
Agree with above NP note.  cv stable  cont current tx
Agree with above NP note.  cv stable  cont current tx per med/gi
Agree with above NP note.  cv stable  med/gi/sx f/u  abx   adv as diet as tolerated
Crispin Brooks  Attending Physician   Division of Infectious Disease  Pager #[phone]  After 5pm/weekend or no response, call #[phone]
Crispin Brooks  Attending Physician   Division of Infectious Disease  Pager #[phone]  After 5pm/weekend or no response, call #[phone]
Crispin Brooks  Attending Physician   Division of Infectious Disease  Pager #[phone]  After 5pm/weekend or no response, call #[phone]
Crispin Brokos  Attending Physician   Division of Infectious Disease  Pager #[phone]  After 5pm/weekend or no response, call #[phone]    Please call the ID service [phone] with questions or concerns over the weekend.
Crispin Brooks  Attending Physician   Division of Infectious Disease  Pager #[phone]  After 5pm/weekend or no response, call #[phone]    I am away on 12/10 and will return 12/11. ID coverage available. Call ID office @ [phone] with questions.
Crispin Brooks  Attending Physician   Division of Infectious Disease  Pager #[phone]  After 5pm/weekend or no response, call #[phone]

## 2020-12-18 NOTE — PROGRESS NOTE ADULT - PROBLEM SELECTOR PROBLEM 1
Abdominal pain
Diverticulitis
Abdominal pain
Diverticulitis

## 2020-12-18 NOTE — PROGRESS NOTE ADULT - PROBLEM SELECTOR PLAN 1
-possible smoldering diverticulitis - appreciate GI input  -got 11 days iv abx  -colonoscopy with no obvious infection, possible IBS issues  -can complete 14 days zosyn - midline in place

## 2020-12-18 NOTE — PROGRESS NOTE ADULT - SUBJECTIVE AND OBJECTIVE BOX
CARDIOLOGY FOLLOW UP - Dr. Chavez    CC no cp or sob   s/p PICC line   s/p colonoscopy yesterday       PHYSICAL EXAM:  T(C): 36.3 (12-18-20 @ 06:14), Max: 36.7 (12-17-20 @ 12:34)  HR: 84 (12-18-20 @ 06:14) (63 - 84)  BP: 134/94 (12-18-20 @ 06:14) (100/60 - 145/99)  RR: 18 (12-18-20 @ 06:14) (15 - 20)  SpO2: 100% (12-18-20 @ 06:14) (100% - 100%)  Wt(kg): --  I&O's Summary      Appearance: Normal	  Cardiovascular: Normal S1 S2,RRR, No JVD, No murmurs  Respiratory: Lungs clear to auscultation	  Gastrointestinal:  distended, Non-tender, + BS	  Extremities: Normal range of motion, No clubbing, cyanosis or edema      Home Medications:      MEDICATIONS  (STANDING):  dicyclomine 20 milliGRAM(s) Oral three times a day before meals  influenza   Vaccine 0.5 milliLiter(s) IntraMuscular once  polyethylene glycol 3350 17 Gram(s) Oral daily  senna 1 Tablet(s) Oral daily      TELEMETRY: off tele 	    ECG:  	  RADIOLOGY:   < from: Colonoscopy (12.17.20 @ 14:39) >  Findings:       Multiple small-mouthed diverticula were found in the sigmoid colon and        descending colon. There was no evidence of diverticular bleeding.       The exam was otherwise without abnormality.                                                                                   Moderate Sedation:       Moderate (conscious) sedation was personally administered by an        anesthesia professional. The following parameters were monitored: oxygen        saturation, heart rate, blood pressure, respiratory rate, EKG, adequacy        of pulmonary ventilation, and response to care.  Impression:          - Preparation of the colon was poor although no gross                        lesions apprecaited.                       - Moderate diverticulosis in the sigmoid colon and in                        the descending colon. There was no evidence of                        diverticular bleeding.                       - The examination was otherwise normal.                       -Likely IBS contributing to her symptoms            - No specimens collected.  Recommendation:      - Return patient to [facility] for ongoing care.                       - Low residue diet.                       -Continue Miralax 17 grams BID                       -Avoid Opoids              - Use Bentyl (dicyclomine) 20 mg PO TID 30 min AC.                                                                                   Attending Participation:       I personally performed the entire procedure.                                                        < end of copied text >    DIAGNOSTIC TESTING:  [ ] Echocardiogram:  [ ]  Catheterization:  [ ] Stress Test:    OTHER: 	    LABS:	 	                            10.9   7.58  )-----------( 237      ( 18 Dec 2020 10:11 )             34.7     12-18    134<L>  |  102  |  10  ----------------------------<  164<H>  4.2   |  20<L>  |  0.76    Ca    9.6      18 Dec 2020 10:11  Phos  2.5     12-18  Mg     1.8     12-18

## 2020-12-18 NOTE — DIETITIAN INITIAL EVALUATION ADULT. - PERTINENT LABORATORY DATA
12-18 Na134 mmol/L<L> Glu 164 mg/dL<H> K+ 4.2 mmol/L Cr  0.76 mg/dL BUN 10 mg/dL 12-18 Phos 2.5 mg/dL

## 2020-12-18 NOTE — PROGRESS NOTE ADULT - NEGATIVE GASTROINTESTINAL SYMPTOMS
no diarrhea/no vomiting
no vomiting/no diarrhea

## 2020-12-18 NOTE — PROGRESS NOTE ADULT - NEGATIVE NEUROLOGICAL SYMPTOMS
no headache/no confusion

## 2020-12-18 NOTE — PROGRESS NOTE ADULT - NEGATIVE CARDIOVASCULAR SYMPTOMS
no chest pain/no palpitations
no chest pain/no palpitations
no palpitations/no chest pain
no chest pain/no palpitations

## 2020-12-18 NOTE — PROGRESS NOTE ADULT - NEGATIVE OPHTHALMOLOGIC SYMPTOMS
no diplopia/no pain L/no pain R
no diplopia/no pain L/no pain R
no pain R/no diplopia/no pain L
no pain R/no pain L/no diplopia
no diplopia/no pain L/no pain R
no diplopia/no pain L/no pain R

## 2020-12-18 NOTE — PROGRESS NOTE ADULT - MS EXT PE MLT D E PC
no cyanosis/no pedal edema
no cyanosis
no cyanosis/no pedal edema
no cyanosis
no pedal edema/no cyanosis
no pedal edema/no cyanosis

## 2020-12-18 NOTE — ADVANCED PRACTICE NURSE CONSULT - ASSESSMENT
Patient is aware and alert. Arrow endurance extended dwell catheter  insertion along with risks, benefits, possible complications and infection prevention explained to patient who verbalized understanding. All questions addressed and patient gave verbal consent to place ARROW. Left arm cleansed with CHG. Using sterile technique under ultra sound guidance, placed ARROW extended dwell catheter 20G/6cm into Left Basilic vein. Brisk blood return and flushed with 20Mls of normal saline. Minimal blood loss and patient tolerated procedure well. CHG dressing placed. All sharps accounted for. Report given to district RN and ordering provider. LOT#: [id number], REF#: [id number]

## 2020-12-18 NOTE — PROGRESS NOTE ADULT - CARDIOVASCULAR DETAILS
positive S1/positive S2
positive S1/positive S2
positive S2/positive S1
positive S2/positive S1
positive S1/positive S2
positive S1/positive S2

## 2020-12-18 NOTE — PROGRESS NOTE ADULT - NEUROLOGICAL DETAILS
alert and oriented x 3/responds to verbal commands/normal strength
alert and oriented x 3/responds to verbal commands/normal strength
normal strength/responds to verbal commands/alert and oriented x 3
normal strength/alert and oriented x 3/responds to verbal commands

## 2020-12-18 NOTE — PROGRESS NOTE ADULT - NEGATIVE ENMT SYMPTOMS
no ear pain/no sinus symptoms/no throat pain
no ear pain/no sinus symptoms/no throat pain
no sinus symptoms/no throat pain/no ear pain
no throat pain/no ear pain/no sinus symptoms
no ear pain/no sinus symptoms/no throat pain
no ear pain/no sinus symptoms/no throat pain

## 2020-12-18 NOTE — PROGRESS NOTE ADULT - RS GEN PE MLT RESP DETAILS PC
breath sounds equal/respirations non-labored/clear to auscultation bilaterally/no wheezes
clear to auscultation bilaterally/respirations non-labored/no wheezes
clear to auscultation bilaterally/respirations non-labored/no wheezes
no wheezes/respirations non-labored/clear to auscultation bilaterally
respirations non-labored/no wheezes/clear to auscultation bilaterally
respirations non-labored/clear to auscultation bilaterally/no wheezes

## 2020-12-18 NOTE — DIETITIAN INITIAL EVALUATION ADULT. - ADD RECOMMEND
1- Monitor weights, labs, BM's, skin integrity, p.o. intake. 2- Multivitamin 3- RD remains available, re-consult as needed.
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
## 2020-12-18 NOTE — PROGRESS NOTE ADULT - REASON FOR ADMISSION
lower abdominal pain

## 2020-12-18 NOTE — PROGRESS NOTE ADULT - PROBLEM SELECTOR PROBLEM 3
ACP (advance care planning)
UTI (urinary tract infection)
UTI (urinary tract infection)

## 2020-12-18 NOTE — DIETITIAN INITIAL EVALUATION ADULT. - OTHER INFO
Initial Dietitian Evaluation 2/2 to extended length of stay. RDN met with patient at bedside. Pt reports fair PO intake at this time. No GI distress (nausea/vomiting). No reported difficulties chewing and swallowing foods and fluid.  Pt states that she has been completing ~ 50 % of her meals d/t abdominal distention and pain.  She reports constipation at baseline, sometimes going 4 days between BM.  -130lb, she states that she is not sure of weight loss d/t abdominal distention. Weight obtained on this admission 62kg 12/11, currently 61.2 12/17- suggest 1.3% Weight loss <1week.   Weight of 68.6 kg 11/30 noted from prior admission, question accuracy vs. significant weight loss x 2 weeks.  Pt endorses eating a lot of spicy  foods.  Recommended diet reviewed with patient at length and diet education materials Re: IBS diet provided.

## 2020-12-18 NOTE — PROGRESS NOTE ADULT - NEGATIVE SKIN SYMPTOMS
no itching/no rash
no rash/no itching

## 2020-12-18 NOTE — PROGRESS NOTE ADULT - NEGATIVE GENERAL SYMPTOMS
no chills/no fever
no fever/no chills

## 2021-01-04 ENCOUNTER — INPATIENT (INPATIENT)
Facility: HOSPITAL | Age: 46
LOS: 0 days | Discharge: HOME | End: 2021-01-05
Attending: INTERNAL MEDICINE | Admitting: INTERNAL MEDICINE
Payer: MEDICAID

## 2021-01-04 VITALS
TEMPERATURE: 97 F | OXYGEN SATURATION: 100 % | RESPIRATION RATE: 16 BRPM | HEART RATE: 88 BPM | HEIGHT: 60 IN | SYSTOLIC BLOOD PRESSURE: 130 MMHG | DIASTOLIC BLOOD PRESSURE: 94 MMHG

## 2021-01-04 DIAGNOSIS — Z90.710 ACQUIRED ABSENCE OF BOTH CERVIX AND UTERUS: Chronic | ICD-10-CM

## 2021-01-04 DIAGNOSIS — E86.0 DEHYDRATION: ICD-10-CM

## 2021-01-04 DIAGNOSIS — K57.92 DIVERTICULITIS OF INTESTINE, PART UNSPECIFIED, WITHOUT PERFORATION OR ABSCESS WITHOUT BLEEDING: ICD-10-CM

## 2021-01-04 DIAGNOSIS — K58.1 IRRITABLE BOWEL SYNDROME WITH CONSTIPATION: ICD-10-CM

## 2021-01-04 DIAGNOSIS — Z71.89 OTHER SPECIFIED COUNSELING: ICD-10-CM

## 2021-01-04 DIAGNOSIS — R34 ANURIA AND OLIGURIA: ICD-10-CM

## 2021-01-04 DIAGNOSIS — R10.9 UNSPECIFIED ABDOMINAL PAIN: ICD-10-CM

## 2021-01-04 PROBLEM — N83.209 UNSPECIFIED OVARIAN CYST, UNSPECIFIED SIDE: Chronic | Status: ACTIVE | Noted: 2020-12-08

## 2021-01-04 LAB
ALBUMIN SERPL ELPH-MCNC: 4.1 G/DL — SIGNIFICANT CHANGE UP (ref 3.3–5)
ALP SERPL-CCNC: 72 U/L — SIGNIFICANT CHANGE UP (ref 40–120)
ALT FLD-CCNC: <5 U/L — SIGNIFICANT CHANGE UP (ref 4–33)
ANION GAP SERPL CALC-SCNC: 9 MMOL/L — SIGNIFICANT CHANGE UP (ref 7–14)
APPEARANCE UR: ABNORMAL
AST SERPL-CCNC: 16 U/L — SIGNIFICANT CHANGE UP (ref 4–32)
BACTERIA # UR AUTO: ABNORMAL
BASOPHILS # BLD AUTO: 0.06 K/UL — SIGNIFICANT CHANGE UP (ref 0–0.2)
BASOPHILS NFR BLD AUTO: 0.8 % — SIGNIFICANT CHANGE UP (ref 0–2)
BILIRUB SERPL-MCNC: <0.2 MG/DL — SIGNIFICANT CHANGE UP (ref 0.2–1.2)
BILIRUB UR-MCNC: NEGATIVE — SIGNIFICANT CHANGE UP
BLOOD GAS VENOUS COMPREHENSIVE RESULT: SIGNIFICANT CHANGE UP
BUN SERPL-MCNC: 12 MG/DL — SIGNIFICANT CHANGE UP (ref 7–23)
CALCIUM SERPL-MCNC: 9.6 MG/DL — SIGNIFICANT CHANGE UP (ref 8.4–10.5)
CHLORIDE SERPL-SCNC: 105 MMOL/L — SIGNIFICANT CHANGE UP (ref 98–107)
CO2 SERPL-SCNC: 23 MMOL/L — SIGNIFICANT CHANGE UP (ref 22–31)
COLOR SPEC: SIGNIFICANT CHANGE UP
CREAT SERPL-MCNC: 0.71 MG/DL — SIGNIFICANT CHANGE UP (ref 0.5–1.3)
DIFF PNL FLD: NEGATIVE — SIGNIFICANT CHANGE UP
EOSINOPHIL # BLD AUTO: 0.22 K/UL — SIGNIFICANT CHANGE UP (ref 0–0.5)
EOSINOPHIL NFR BLD AUTO: 2.9 % — SIGNIFICANT CHANGE UP (ref 0–6)
EPI CELLS # UR: ABNORMAL
GLUCOSE BLDC GLUCOMTR-MCNC: 106 MG/DL — HIGH (ref 70–99)
GLUCOSE SERPL-MCNC: 105 MG/DL — HIGH (ref 70–99)
GLUCOSE UR QL: NEGATIVE — SIGNIFICANT CHANGE UP
HCT VFR BLD CALC: 34.7 % — SIGNIFICANT CHANGE UP (ref 34.5–45)
HGB BLD-MCNC: 10.9 G/DL — LOW (ref 11.5–15.5)
IANC: 4.02 K/UL — SIGNIFICANT CHANGE UP (ref 1.5–8.5)
IMM GRANULOCYTES NFR BLD AUTO: 0.1 % — SIGNIFICANT CHANGE UP (ref 0–1.5)
KETONES UR-MCNC: NEGATIVE — SIGNIFICANT CHANGE UP
LEUKOCYTE ESTERASE UR-ACNC: NEGATIVE — SIGNIFICANT CHANGE UP
LYMPHOCYTES # BLD AUTO: 2.74 K/UL — SIGNIFICANT CHANGE UP (ref 1–3.3)
LYMPHOCYTES # BLD AUTO: 36.5 % — SIGNIFICANT CHANGE UP (ref 13–44)
MCHC RBC-ENTMCNC: 28.5 PG — SIGNIFICANT CHANGE UP (ref 27–34)
MCHC RBC-ENTMCNC: 31.4 GM/DL — LOW (ref 32–36)
MCV RBC AUTO: 90.6 FL — SIGNIFICANT CHANGE UP (ref 80–100)
MONOCYTES # BLD AUTO: 0.45 K/UL — SIGNIFICANT CHANGE UP (ref 0–0.9)
MONOCYTES NFR BLD AUTO: 6 % — SIGNIFICANT CHANGE UP (ref 2–14)
NEUTROPHILS # BLD AUTO: 4.02 K/UL — SIGNIFICANT CHANGE UP (ref 1.8–7.4)
NEUTROPHILS NFR BLD AUTO: 53.7 % — SIGNIFICANT CHANGE UP (ref 43–77)
NITRITE UR-MCNC: NEGATIVE — SIGNIFICANT CHANGE UP
NRBC # BLD: 0 /100 WBCS — SIGNIFICANT CHANGE UP
NRBC # FLD: 0 K/UL — SIGNIFICANT CHANGE UP
PH UR: 7 — SIGNIFICANT CHANGE UP (ref 5–8)
PLATELET # BLD AUTO: 235 K/UL — SIGNIFICANT CHANGE UP (ref 150–400)
POTASSIUM SERPL-MCNC: 4.2 MMOL/L — SIGNIFICANT CHANGE UP (ref 3.5–5.3)
POTASSIUM SERPL-SCNC: 4.2 MMOL/L — SIGNIFICANT CHANGE UP (ref 3.5–5.3)
PROT SERPL-MCNC: 6.9 G/DL — SIGNIFICANT CHANGE UP (ref 6–8.3)
PROT UR-MCNC: ABNORMAL
RBC # BLD: 3.83 M/UL — SIGNIFICANT CHANGE UP (ref 3.8–5.2)
RBC # FLD: 12.7 % — SIGNIFICANT CHANGE UP (ref 10.3–14.5)
RBC CASTS # UR COMP ASSIST: 1 /HPF — SIGNIFICANT CHANGE UP (ref 0–4)
SARS-COV-2 RNA SPEC QL NAA+PROBE: SIGNIFICANT CHANGE UP
SODIUM SERPL-SCNC: 137 MMOL/L — SIGNIFICANT CHANGE UP (ref 135–145)
SP GR SPEC: 1.04 — HIGH (ref 1.01–1.02)
TSH SERPL-MCNC: 4.35 UIU/ML — HIGH (ref 0.27–4.2)
UROBILINOGEN FLD QL: SIGNIFICANT CHANGE UP
WBC # BLD: 7.5 K/UL — SIGNIFICANT CHANGE UP (ref 3.8–10.5)
WBC # FLD AUTO: 7.5 K/UL — SIGNIFICANT CHANGE UP (ref 3.8–10.5)
WBC UR QL: 1 /HPF — SIGNIFICANT CHANGE UP (ref 0–5)

## 2021-01-04 PROCEDURE — 71045 X-RAY EXAM CHEST 1 VIEW: CPT | Mod: 26

## 2021-01-04 PROCEDURE — 99253 IP/OBS CNSLTJ NEW/EST LOW 45: CPT

## 2021-01-04 PROCEDURE — 99285 EMERGENCY DEPT VISIT HI MDM: CPT

## 2021-01-04 PROCEDURE — 74177 CT ABD & PELVIS W/CONTRAST: CPT | Mod: 26

## 2021-01-04 PROCEDURE — 99223 1ST HOSP IP/OBS HIGH 75: CPT

## 2021-01-04 RX ORDER — PIPERACILLIN AND TAZOBACTAM 4; .5 G/20ML; G/20ML
3.38 INJECTION, POWDER, LYOPHILIZED, FOR SOLUTION INTRAVENOUS ONCE
Refills: 0 | Status: COMPLETED | OUTPATIENT
Start: 2021-01-04 | End: 2021-01-04

## 2021-01-04 RX ORDER — POLYETHYLENE GLYCOL 3350 17 G/17G
17 POWDER, FOR SOLUTION ORAL
Refills: 0 | Status: DISCONTINUED | OUTPATIENT
Start: 2021-01-04 | End: 2021-01-05

## 2021-01-04 RX ORDER — ONDANSETRON 8 MG/1
4 TABLET, FILM COATED ORAL EVERY 6 HOURS
Refills: 0 | Status: DISCONTINUED | OUTPATIENT
Start: 2021-01-04 | End: 2021-01-05

## 2021-01-04 RX ORDER — MORPHINE SULFATE 50 MG/1
4 CAPSULE, EXTENDED RELEASE ORAL EVERY 4 HOURS
Refills: 0 | Status: DISCONTINUED | OUTPATIENT
Start: 2021-01-04 | End: 2021-01-04

## 2021-01-04 RX ORDER — SODIUM CHLORIDE 9 MG/ML
1000 INJECTION, SOLUTION INTRAVENOUS
Refills: 0 | Status: COMPLETED | OUTPATIENT
Start: 2021-01-04 | End: 2021-01-05

## 2021-01-04 RX ORDER — SODIUM CHLORIDE 9 MG/ML
1000 INJECTION INTRAMUSCULAR; INTRAVENOUS; SUBCUTANEOUS ONCE
Refills: 0 | Status: COMPLETED | OUTPATIENT
Start: 2021-01-04 | End: 2021-01-04

## 2021-01-04 RX ORDER — MORPHINE SULFATE 50 MG/1
4 CAPSULE, EXTENDED RELEASE ORAL ONCE
Refills: 0 | Status: DISCONTINUED | OUTPATIENT
Start: 2021-01-04 | End: 2021-01-04

## 2021-01-04 RX ORDER — ONDANSETRON 8 MG/1
4 TABLET, FILM COATED ORAL ONCE
Refills: 0 | Status: COMPLETED | OUTPATIENT
Start: 2021-01-04 | End: 2021-01-04

## 2021-01-04 RX ORDER — LANOLIN ALCOHOL/MO/W.PET/CERES
3 CREAM (GRAM) TOPICAL AT BEDTIME
Refills: 0 | Status: DISCONTINUED | OUTPATIENT
Start: 2021-01-04 | End: 2021-01-05

## 2021-01-04 RX ORDER — SIMETHICONE 80 MG/1
80 TABLET, CHEWABLE ORAL THREE TIMES A DAY
Refills: 0 | Status: DISCONTINUED | OUTPATIENT
Start: 2021-01-05 | End: 2021-01-05

## 2021-01-04 RX ORDER — SIMETHICONE 80 MG/1
80 TABLET, CHEWABLE ORAL THREE TIMES A DAY
Refills: 0 | Status: COMPLETED | OUTPATIENT
Start: 2021-01-04 | End: 2021-01-04

## 2021-01-04 RX ORDER — PIPERACILLIN AND TAZOBACTAM 4; .5 G/20ML; G/20ML
3.38 INJECTION, POWDER, LYOPHILIZED, FOR SOLUTION INTRAVENOUS EVERY 8 HOURS
Refills: 0 | Status: DISCONTINUED | OUTPATIENT
Start: 2021-01-04 | End: 2021-01-04

## 2021-01-04 RX ORDER — SODIUM CHLORIDE 9 MG/ML
1000 INJECTION, SOLUTION INTRAVENOUS
Refills: 0 | Status: DISCONTINUED | OUTPATIENT
Start: 2021-01-04 | End: 2021-01-04

## 2021-01-04 RX ORDER — MINERAL OIL
133 OIL (ML) MISCELLANEOUS ONCE
Refills: 0 | Status: COMPLETED | OUTPATIENT
Start: 2021-01-04 | End: 2021-01-04

## 2021-01-04 RX ADMIN — PIPERACILLIN AND TAZOBACTAM 200 GRAM(S): 4; .5 INJECTION, POWDER, LYOPHILIZED, FOR SOLUTION INTRAVENOUS at 05:27

## 2021-01-04 RX ADMIN — MORPHINE SULFATE 4 MILLIGRAM(S): 50 CAPSULE, EXTENDED RELEASE ORAL at 03:19

## 2021-01-04 RX ADMIN — Medication 10 MILLIGRAM(S): at 20:37

## 2021-01-04 RX ADMIN — SODIUM CHLORIDE 100 MILLILITER(S): 9 INJECTION, SOLUTION INTRAVENOUS at 07:15

## 2021-01-04 RX ADMIN — ONDANSETRON 4 MILLIGRAM(S): 8 TABLET, FILM COATED ORAL at 03:26

## 2021-01-04 RX ADMIN — SIMETHICONE 80 MILLIGRAM(S): 80 TABLET, CHEWABLE ORAL at 21:38

## 2021-01-04 RX ADMIN — SODIUM CHLORIDE 1000 MILLILITER(S): 9 INJECTION INTRAMUSCULAR; INTRAVENOUS; SUBCUTANEOUS at 03:26

## 2021-01-04 RX ADMIN — Medication 3 MILLIGRAM(S): at 21:38

## 2021-01-04 RX ADMIN — SIMETHICONE 80 MILLIGRAM(S): 80 TABLET, CHEWABLE ORAL at 07:15

## 2021-01-04 RX ADMIN — MORPHINE SULFATE 4 MILLIGRAM(S): 50 CAPSULE, EXTENDED RELEASE ORAL at 10:30

## 2021-01-04 RX ADMIN — POLYETHYLENE GLYCOL 3350 17 GRAM(S): 17 POWDER, FOR SOLUTION ORAL at 18:25

## 2021-01-04 RX ADMIN — MORPHINE SULFATE 4 MILLIGRAM(S): 50 CAPSULE, EXTENDED RELEASE ORAL at 06:22

## 2021-01-04 RX ADMIN — SODIUM CHLORIDE 100 MILLILITER(S): 9 INJECTION, SOLUTION INTRAVENOUS at 18:25

## 2021-01-04 RX ADMIN — ONDANSETRON 4 MILLIGRAM(S): 8 TABLET, FILM COATED ORAL at 06:22

## 2021-01-04 RX ADMIN — MORPHINE SULFATE 4 MILLIGRAM(S): 50 CAPSULE, EXTENDED RELEASE ORAL at 06:40

## 2021-01-04 RX ADMIN — SIMETHICONE 80 MILLIGRAM(S): 80 TABLET, CHEWABLE ORAL at 14:56

## 2021-01-04 NOTE — H&P ADULT - PROBLEM SELECTOR PLAN 4
- decreased fluid intake due to abdominal discomfort  - mildly dry oral mucosa  - no renal compromise at present  - IVF hydration prescribed (see above)  - on clear liquid diet - as tolerated  - eval for improvement

## 2021-01-04 NOTE — CONSULT NOTE ADULT - PROBLEM SELECTOR RECOMMENDATION 2
-CT with question of early sigmoid diverticulitis   -fu ID recs re: role for abx   -recent Colonoscopy 12/2020 w/sigmoid and descending diverticulosis   -remainder of care as above -CT with question of early sigmoid diverticulitis   -fu ID recs re: abx   -recent Colonoscopy 12/2020 w/sigmoid and descending diverticulosis   -remainder of care as above

## 2021-01-04 NOTE — ED PROVIDER NOTE - PROGRESS NOTE DETAILS
domo: pt w/ diverticulitis on CT, will redose w/ zosyn (per ID reccs on last admission), admit to Dr. Cade.

## 2021-01-04 NOTE — H&P ADULT - NSHPLABSRESULTS_GEN_ALL_CORE
LAB-WORK/STUDIES:                          10.9   7.50  )-----------( 235      ( 04 Jan 2021 03:26 )             34.7     04 Jan 2021 03:26    137    |  105    |  12     ----------------------------<  105    4.2     |  23     |  0.71     Ca    9.6        04 Jan 2021 03:26    TPro  6.9    /  Alb  4.1    /  TBili  <0.2   /  DBili  x      /  AST  16     /  ALT  <5     /  AlkPhos  72     04 Jan 2021 03:26    LIVER FUNCTIONS - ( 04 Jan 2021 03:26 )  Alb: 4.1 g/dL / Pro: 6.9 g/dL / ALK PHOS: 72 U/L / ALT: <5 U/L / AST: 16 U/L / GGT: x             CAPILLARY BLOOD GLUCOSE    ======================================================        ======================================================    Vital Signs Last 24 Hrs  T(C): 36.7 (04 Jan 2021 03:01), Max: 36.7 (04 Jan 2021 03:01)  T(F): 98.1 (04 Jan 2021 03:01), Max: 98.1 (04 Jan 2021 03:01)  HR: 86 (04 Jan 2021 03:01) (86 - 88)  BP: 116/64 (04 Jan 2021 03:01) (116/64 - 130/94)  BP(mean): --  RR: 21 (04 Jan 2021 03:01) (16 - 21)  SpO2: 100% (04 Jan 2021 03:01) (100% - 100%) LAB-WORK/STUDIES:                          10.9   7.50  )-----------( 235      ( 04 Jan 2021 03:26 )             34.7     04 Jan 2021 03:26    137    |  105    |  12     ----------------------------<  105    4.2     |  23     |  0.71     Ca    9.6        04 Jan 2021 03:26    TPro  6.9    /  Alb  4.1    /  TBili  <0.2   /  DBili  x      /  AST  16     /  ALT  <5     /  AlkPhos  72     04 Jan 2021 03:26    LIVER FUNCTIONS - ( 04 Jan 2021 03:26 )  Alb: 4.1 g/dL / Pro: 6.9 g/dL / ALK PHOS: 72 U/L / ALT: <5 U/L / AST: 16 U/L / GGT: x             CAPILLARY BLOOD GLUCOSE    ======================================================    ECG = NSR at 80 bpm, QTc = 449    ======================================================    Vital Signs Last 24 Hrs  T(C): 36.7 (04 Jan 2021 03:01), Max: 36.7 (04 Jan 2021 03:01)  T(F): 98.1 (04 Jan 2021 03:01), Max: 98.1 (04 Jan 2021 03:01)  HR: 86 (04 Jan 2021 03:01) (86 - 88)  BP: 116/64 (04 Jan 2021 03:01) (116/64 - 130/94)  BP(mean): --  RR: 21 (04 Jan 2021 03:01) (16 - 21)  SpO2: 100% (04 Jan 2021 03:01) (100% - 100%)

## 2021-01-04 NOTE — CONSULT NOTE ADULT - SUBJECTIVE AND OBJECTIVE BOX
HITESH DEAN 45y Female  MRN-[id number]    Patient is a 45y old  Female who presents with a chief complaint of Diverticulitis (recurrent) (2021 05:43)      HPI:  45 year old female, with past history significant for Diverticulitis (recurrent), Endometrial cancer, BSO, presented to the ED secondary to lower abdominal pain and bloating.  Seen and evaluated at bedside; appears a bit uncomfortable due to painful abdominal discomfort, along w/ bloating.  Patient reports worsening pain of the abdomen, as well as bloating with absence of bowel movement for the past 5 days.  Has had similar presentations causing admission in the past.  At the time of being seen, pain was rated at ~ 8/10 - 3 hours after last morphine dose.  Reports syncope yesterday due to pain intensity; also has chills and sweating associated with pain.  Has occasional dizziness.  Last bowel movement was 5 days ago - despite compliance w/ bowel regimen and diet.  Has not been belching or passing gas (unknown when was last flatus),.  Also reports oliguria for the past day, but surmises that this could be due to inadequate fluid intake; has not been drinking enough because of worsening abdominal discomfort when this is done.    Vital signs upon ED arrival as follows: BP = 130/94, HR = 88, RR = 16, T = 36.3 C (97.4 F), o2 Sat = 100% on RA.  Diagnosed with Diverticulitis.  Prescribed zosyn, NaCl one liter, morphine 4 mg IV and Zofran 4 mg IV in the ED. (2021 05:43)      PAST MEDICAL & SURGICAL HISTORY:  Constipation    Irritable bowel syndrome (IBS)  ~ Constipation type    Ovarian cyst    Diverticulitis  ~ recurrent    Endometrial cancer    H/O: hysterectomy  ~ without oophorectomy        Allergies    codeine (Short breath)    Intolerances        ANTIMICROBIALS:  piperacillin/tazobactam IVPB.. 3.375 every 8 hours      MEDICATIONS  (STANDING):  lactated ringers. 1000 milliLiter(s) (100 mL/Hr) IV Continuous <Continuous>  mineral oil enema 133 milliLiter(s) Rectal once  piperacillin/tazobactam IVPB.. 3.375 Gram(s) IV Intermittent every 8 hours  polyethylene glycol 3350 17 Gram(s) Oral two times a day  simethicone 80 milliGRAM(s) Chew three times a day      Social History  Smoking:  Etoh:  Drug use:      FAMILY HISTORY:  Family history of smoking  ~ father    Family history of bone cancer  ~ grandmother    Family history of blood disease  ~ uncle        Vital Signs Last 24 Hrs  T(C): 36.6 (2021 10:05), Max: 36.8 (2021 06:25)  T(F): 97.9 (2021 10:05), Max: 98.2 (2021 06:25)  HR: 78 (2021 12:00) (78 - 88)  BP: 122/59 (2021 12:00) (116/64 - 130/94)  BP(mean): --  RR: 18 (2021 12:00) (16 - 21)  SpO2: 100% (2021 12:00) (100% - 100%)    CBC Full  -  ( 2021 03:26 )  WBC Count : 7.50 K/uL  RBC Count : 3.83 M/uL  Hemoglobin : 10.9 g/dL  Hematocrit : 34.7 %  Platelet Count - Automated : 235 K/uL  Mean Cell Volume : 90.6 fL  Mean Cell Hemoglobin : 28.5 pg  Mean Cell Hemoglobin Concentration : 31.4 gm/dL  Auto Neutrophil # : 4.02 K/uL  Auto Lymphocyte # : 2.74 K/uL  Auto Monocyte # : 0.45 K/uL  Auto Eosinophil # : 0.22 K/uL  Auto Basophil # : 0.06 K/uL  Auto Neutrophil % : 53.7 %  Auto Lymphocyte % : 36.5 %  Auto Monocyte % : 6.0 %  Auto Eosinophil % : 2.9 %  Auto Basophil % : 0.8 %    -    137  |  105  |  12  ----------------------------<  105<H>  4.2   |  23  |  0.71    Ca    9.6      2021 03:26    TPro  6.9  /  Alb  4.1  /  TBili  <0.2  /  DBili  x   /  AST  16  /  ALT  <5  /  AlkPhos  72  01-04    LIVER FUNCTIONS - ( 2021 03:26 )  Alb: 4.1 g/dL / Pro: 6.9 g/dL / ALK PHOS: 72 U/L / ALT: <5 U/L / AST: 16 U/L / GGT: x           Urinalysis Basic - ( 2021 07:22 )    Color: Light Yellow / Appearance: Slightly Turbid / S.037 / pH: x  Gluc: x / Ketone: Negative  / Bili: Negative / Urobili: <2 mg/dL   Blood: x / Protein: Trace / Nitrite: Negative   Leuk Esterase: Negative / RBC: 1 /HPF / WBC 1 /HPF   Sq Epi: x / Non Sq Epi: Moderate / Bacteria: Occasional        MICROBIOLOGY:        RADIOLOGY  < from: CT Abdomen and Pelvis w/ IV Cont (21 @ 04:29) >  Short segmental wall thickening of the proximal sigmoid colonic loop without significant pericolonic stranding. This may be on the basis of inadequate distention or represent mild early sigmoid diverticulitis in appropriate clinical setting. Consider nonemergent retrograde evaluation to exclude underlying malignancy. No bowel obstruction or extraluminal collection.    Normal appendix.    Additional findings as mentioned above.    < end of copied text >   HITESH DEAN 45y Female  MRN-[id number]    Patient is a 45y old  Female who presents with a chief complaint of Diverticulitis (recurrent) (2021 05:43)      HPI:  45 year old female, with past history significant for Diverticulitis (recurrent), Endometrial cancer, BSO, presented to the ED secondary to lower abdominal pain and bloating.  Seen and evaluated at bedside; appears a bit uncomfortable due to painful abdominal discomfort, along w/ bloating.  Patient reports worsening pain of the abdomen, as well as bloating with absence of bowel movement for the past 5 days.  Has had similar presentations causing admission in the past.  At the time of being seen, pain was rated at ~ 8/10 - 3 hours after last morphine dose.  Reports syncope yesterday due to pain intensity; also has chills and sweating associated with pain.  Has occasional dizziness.  Last bowel movement was 5 days ago - despite compliance w/ bowel regimen and diet.  Has not been belching or passing gas (unknown when was last flatus),.  Also reports oliguria for the past day, but surmises that this could be due to inadequate fluid intake; has not been drinking enough because of worsening abdominal discomfort when this is done.    Vital signs upon ED arrival as follows: BP = 130/94, HR = 88, RR = 16, T = 36.3 C (97.4 F), o2 Sat = 100% on RA.  Diagnosed with Diverticulitis.  Prescribed zosyn, NaCl one liter, morphine 4 mg IV and Zofran 4 mg IV in the ED. (2021 05:43)    No fever. No vomiting.    PAST MEDICAL & SURGICAL HISTORY:  Constipation    Irritable bowel syndrome (IBS)  ~ Constipation type    Ovarian cyst    Diverticulitis  ~ recurrent    Endometrial cancer    H/O: hysterectomy  ~ without oophorectomy        Allergies    codeine (Short breath)    Intolerances        ANTIMICROBIALS:  piperacillin/tazobactam IVPB.. 3.375 every 8 hours      MEDICATIONS  (STANDING):  lactated ringers. 1000 milliLiter(s) (100 mL/Hr) IV Continuous <Continuous>  mineral oil enema 133 milliLiter(s) Rectal once  piperacillin/tazobactam IVPB.. 3.375 Gram(s) IV Intermittent every 8 hours  polyethylene glycol 3350 17 Gram(s) Oral two times a day  simethicone 80 milliGRAM(s) Chew three times a day      Social History  Smoking:  Etoh:  Drug use:      FAMILY HISTORY:  Family history of smoking  ~ father    Family history of bone cancer  ~ grandmother    Family history of blood disease  ~ uncle        Vital Signs Last 24 Hrs  T(C): 36.6 (2021 10:05), Max: 36.8 (2021 06:25)  T(F): 97.9 (2021 10:05), Max: 98.2 (2021 06:25)  HR: 78 (2021 12:00) (78 - 88)  BP: 122/59 (2021 12:00) (116/64 - 130/94)  BP(mean): --  RR: 18 (2021 12:00) (16 - 21)  SpO2: 100% (2021 12:00) (100% - 100%)    CBC Full  -  ( 2021 03:26 )  WBC Count : 7.50 K/uL  RBC Count : 3.83 M/uL  Hemoglobin : 10.9 g/dL  Hematocrit : 34.7 %  Platelet Count - Automated : 235 K/uL  Mean Cell Volume : 90.6 fL  Mean Cell Hemoglobin : 28.5 pg  Mean Cell Hemoglobin Concentration : 31.4 gm/dL  Auto Neutrophil # : 4.02 K/uL  Auto Lymphocyte # : 2.74 K/uL  Auto Monocyte # : 0.45 K/uL  Auto Eosinophil # : 0.22 K/uL  Auto Basophil # : 0.06 K/uL  Auto Neutrophil % : 53.7 %  Auto Lymphocyte % : 36.5 %  Auto Monocyte % : 6.0 %  Auto Eosinophil % : 2.9 %  Auto Basophil % : 0.8 %    -04    137  |  105  |  12  ----------------------------<  105<H>  4.2   |  23  |  0.71    Ca    9.6      2021 03:26    TPro  6.9  /  Alb  4.1  /  TBili  <0.2  /  DBili  x   /  AST  16  /  ALT  <5  /  AlkPhos  72  01-04    LIVER FUNCTIONS - ( 2021 03:26 )  Alb: 4.1 g/dL / Pro: 6.9 g/dL / ALK PHOS: 72 U/L / ALT: <5 U/L / AST: 16 U/L / GGT: x           Urinalysis Basic - ( 2021 07:22 )    Color: Light Yellow / Appearance: Slightly Turbid / S.037 / pH: x  Gluc: x / Ketone: Negative  / Bili: Negative / Urobili: <2 mg/dL   Blood: x / Protein: Trace / Nitrite: Negative   Leuk Esterase: Negative / RBC: 1 /HPF / WBC 1 /HPF   Sq Epi: x / Non Sq Epi: Moderate / Bacteria: Occasional        MICROBIOLOGY:    COVID-19 PCR . (21 @ 03:31)   COVID-19 PCR: NotDetec: You can help in the fight against COVID-19. [facility] may contact   you to see if you are interested in voluntarily participating in one of     RADIOLOGY  < from: CT Abdomen and Pelvis w/ IV Cont (21 @ 04:29) >  Short segmental wall thickening of the proximal sigmoid colonic loop without significant pericolonic stranding. This may be on the basis of inadequate distention or represent mild early sigmoid diverticulitis in appropriate clinical setting. Consider nonemergent retrograde evaluation to exclude underlying malignancy. No bowel obstruction or extraluminal collection.    Normal appendix.    Additional findings as mentioned above.    < end of copied text >

## 2021-01-04 NOTE — ED PROVIDER NOTE - OBJECTIVE STATEMENT
45F hx endometrial cancer s/p hysterectomy w/o BSO, recurrent diverticulitis requiring admission p/w lower abdominal pain. Pt with two recent admissions in Dec 2020 for diverticulitis, states had full resolution of symptoms upon discharge (on 12/18) & continued outpt abx po management however several days ago noted return of similar lower abd pain w/ bloating, states last stooled 4d ago & last urinated this am, noted abdominal distention similar to prior hospital visit. Denies f/c/n/v. Notes syncopal event today when walking in home, caught by . Has been tolerating PO. No CP or extremity pain.

## 2021-01-04 NOTE — ED PROVIDER NOTE - NS ED ATTENDING STATEMENT MOD
I have personally seen and examined this patient.  I have fully participated in the care of this patient. I have reviewed all pertinent clinical information, including history, physical exam, plan and the Resident’s note and agree except as noted. no previous reaction

## 2021-01-04 NOTE — H&P ADULT - NSHPOUTPATIENTPROVIDERS_GEN_ALL_CORE
Rashad Gunter MD (GI)  Justus Chavez MD & Jr Chavez MD (Cardiologist) Rashad Gunter MD (GI)  Justus Chavez MD & Jr Chavez MD (Cardiologist)  Crispin Brooks (JANEE), (ID)

## 2021-01-04 NOTE — CONSULT NOTE ADULT - PROBLEM SELECTOR RECOMMENDATION 9
-no obvious infection currently  -no fever or WBC  -likely related to IBS  -had colonoscopy recently  -dc abx and observe - s/p 1 course of po abx and 1 course of IV abx

## 2021-01-04 NOTE — ED ADULT TRIAGE NOTE - DOMESTIC TRAVEL HIGH RISK QUESTION
See order  -started Abx bid for 5 days  -finish all med even when feeling better  -increase fluid intake  -Do not hold urine; void when urge to void  -go to immediate care or ED for fever >100  -good hand hygiene by washing hand with soap and water     No

## 2021-01-04 NOTE — H&P ADULT - PROBLEM SELECTOR PLAN 2
- per GI note of 12/2020  - was on Miralax and colace PTA; compliant w/ meds and dietary modifications, but still w/ signs/symptoms  - ensure optimal hydration; s/p 1 L NaCl in ED; on LR at 100 mL/Hr x 20 hours now  - dicyclomine held for now due to possible s/e  - f/u AM lab-work, including TSH level  - GI consult  - Rashad Gunter MD (please call) - per GI note of 12/2020  - was on Miralax and colace PTA; compliant w/ meds and dietary modifications, but still w/ signs/symptoms  - continued on Miralax  - ensure optimal hydration; s/p 1 L NaCl in ED; on LR at 100 mL/Hr x 20 hours now  - dicyclomine held for now due to possible s/e  - dulcolax 10 mg suppository x one prescribed.  Simethicone as well to help alleviate gas/bloating  - f/u AM lab-work, including TSH level  - GI consult  - Rashad Gunter MD (please call)

## 2021-01-04 NOTE — PATIENT PROFILE ADULT - NSPROPTRIGHTSUPPORTPHONE_GEN_A_NUR
[phone]

## 2021-01-04 NOTE — ED ADULT NURSE REASSESSMENT NOTE - NS ED NURSE REASSESS COMMENT FT1
ACP team made aware pt is complaining of dizziness, "room spinning". VS noted during the time. FS taken, no orders given as of yet.

## 2021-01-04 NOTE — H&P ADULT - NSICDXFAMILYHX_GEN_ALL_CORE_FT
FAMILY HISTORY:  No pertinent family history in first degree relatives     FAMILY HISTORY:  Family history of blood disease, ~ uncle  Family history of bone cancer, ~ grandmother  Family history of smoking, ~ father

## 2021-01-04 NOTE — ED PROVIDER NOTE - ATTENDING CONTRIBUTION TO CARE
44 y/o F with h/o endometrial ca s/p hysterectomy, recent hospitalization for diverticulitis here with abdominal pain.  Pt reports lower abd pain associated with nausea, nbnb vomiting, abd distension, constipation.  Not passing flatus.  No fever, cp, sob, cough, back pain, or urinary sxs.  Pt states sxs are similar to previous bouts of diverticulitis.  Took ibuprofen 4 hrs PTA without relief.  States she passed out earlier while walking due to pain.  Denies preceding chest pain, sob, palpitations.  Caught by her , no fall to ground or injury/trauma.  Well appearing, lying in stretcher, awake and alert, appearing uncomfortable and in pain, but nontoxic.  AF/VSS.  Lungs cta bl.  Cards nl S1/S2, RRR, no MRG.  Abd soft distended diffuse mid and lower abd tenderness, no rebound or guarding.  No pedal edema or calf tenderness.  Plan for labs, ekg, ct abd/pel, syncope likely in setting of pain but will obtain screening EKG and CXR to eval for arrhythmia, cardiomegaly.  Concern for recurrent diverticulitis, complication such as abscess formation or perforation although pt is not peritoneal, sbo.

## 2021-01-04 NOTE — H&P ADULT - HISTORY OF PRESENT ILLNESS
45 year old female, with past history significant for Diverticulitis (recurrent), Endometrial cancer, BSO, presented to the ED secondary to lower abdominal pain and bloating.  Seen and evaluated at bedside;    Vital signs upon ED arrival as follows: BP = 130/94, HR = 88, RR = 16, T = 36.3 C (97.4 F), o2 Sat = 100% on RA.  Diagnosed with Diverticulitis.  Prescribed zosyn, NaCl one liter, morphine 4 mg IV and Zofran 4 mg IV in the ED. 45 year old female, with past history significant for Diverticulitis (recurrent), Endometrial cancer, BSO, presented to the ED secondary to lower abdominal pain and bloating.  Seen and evaluated at bedside; appears a bit uncomfortable due to painful abdominal discomfort, along w/ bloating.  Patient reports worsening pain of the abdomen, as well as bloating with absence of bowel movement for the past 5 days.  Has had similar presentations causing admission in the past.  At the time of being seen, pain was rated at ~ 8/10 - 3 hours after last morphine dose.  Reports syncope yesterday due to pain intensity; also has chills and sweating associated with pain.  Has occasional dizziness.  Last bowel movement was 5 days ago - despite compliance w/ bowel regimen and diet.  Has not been belching or passing gas (unknown when was last flatus),.  Also reports oliguria for the past day, but surmises that this could be due to inadequate fluid intake; has not been drinking enough because of worsening abdominal discomfort when this is done.    Vital signs upon ED arrival as follows: BP = 130/94, HR = 88, RR = 16, T = 36.3 C (97.4 F), o2 Sat = 100% on RA.  Diagnosed with Diverticulitis.  Prescribed zosyn, NaCl one liter, morphine 4 mg IV and Zofran 4 mg IV in the ED. 45 year old female, with past history significant for Diverticulitis (recurrent), Endometrial cancer, presented to the ED secondary to lower abdominal pain and bloating.  Seen and evaluated at bedside; appears a bit uncomfortable due to painful abdominal discomfort, along w/ bloating.  Patient reports worsening pain of the abdomen, as well as bloating with absence of bowel movement for the past 5 days.  Has had similar presentations causing admission in the past.  At the time of being seen, pain was rated at ~ 8/10 - 3 hours after last morphine dose.  Reports syncope yesterday due to pain intensity; also has chills and sweating associated with pain.  Has occasional dizziness.  Last bowel movement was 5 days ago - despite compliance w/ bowel regimen and diet.  Has not been belching or passing gas (unknown when was last flatus),.  Also reports oliguria for the past day, but surmises that this could be due to inadequate fluid intake; has not been drinking enough because of worsening abdominal discomfort when this is done.    Vital signs upon ED arrival as follows: BP = 130/94, HR = 88, RR = 16, T = 36.3 C (97.4 F), o2 Sat = 100% on RA.  Diagnosed with Diverticulitis.  Prescribed zosyn, NaCl one liter, morphine 4 mg IV and Zofran 4 mg IV in the ED.

## 2021-01-04 NOTE — H&P ADULT - NSHPPHYSICALEXAM_GEN_ALL_CORE
PHYSICAL EXAMINATION:    APPEARANCE: Adequately groomed, adequately nourished female, lying propped up in stretcher - continuously moving R-hand back and forth over abdomen, and appears to be experiencing some abdominal discomfort	  HEENT: Mildly dry oral mucosa, PERRL, EOMI	  LYMPHATIC: No lymphadenopathy appreciated  CARDIOVASCULAR: (+) S1 S2.  No JVD.  No murmurs.  No edema  RESPIRATORY: No wheezing, rhonchi, crackles appreciated  GASTROINTESTINAL:  Abdominal bloating w/ hyperactive bowel sounds.  (+) tenderness to mild palpation  GENITOURINARY: Lower abd/suprapubic area tenderness.  No CVA tenderness B/L  SKIN: No rashes. No ecchymoses.  No cyanosis  PSYCHIATRIC: A&O x 3.  Mood & affect appropriate to situation  NEUROLOGICAL: Non-focal, PARRA x 4 against gravity  EXTREMITIES: Normal range of motion.  No clubbing, cyanosis or edema  VASCULAR: Peripheral pulses palpable

## 2021-01-04 NOTE — H&P ADULT - ASSESSMENT
[  ]  Lab studies reviewed  [  ]  Radiology reviewed  [  ]  Old records reviewed    45 year old female, with past history significant for Diverticulitis (recurrent), Endometrial cancer, BSO, presented to the ED secondary to lower abdominal pain and bloating.  Vital signs upon ED arrival as follows: BP = 130/94, HR = 88, RR = 16, T = 36.3 C (97.4 F), o2 Sat = 100% on RA.  Diagnosed with Diverticulitis. [ x ]  Lab studies reviewed  [ x ]  Radiology reviewed  [ x ]  Old records reviewed    45 year old female, with past history significant for Diverticulitis (recurrent), Endometrial cancer, BSO, presented to the ED secondary to lower abdominal pain and bloating.  Vital signs upon ED arrival as follows: BP = 130/94, HR = 88, RR = 16, T = 36.3 C (97.4 F), o2 Sat = 100% on RA.  Diagnosed with Diverticulitis. [ x ]  Lab studies reviewed  [ x ]  Radiology reviewed  [ x ]  Old records reviewed    45 year old female, with past history significant for Diverticulitis (recurrent), Endometrial cancer, BSO, presented to the ED secondary to lower abdominal pain and bloating.  Vital signs upon ED arrival as follows: BP = 130/94, HR = 88, RR = 16, T = 36.3 C (97.4 F), o2 Sat = 100% on RA.  Diagnosed with Diverticulitis.  Admitted for evaluation/management of:

## 2021-01-04 NOTE — H&P ADULT - NSHPSOCIALHISTORY_GEN_ALL_CORE
SOCIAL HISTORY:    Marital Status:  ( x )    (  ) Single        (  )        (  )        (  )   Occupation:   Lives with:        (  ) Alone       ( x ) Spouse      (  ) Children        (  ) Parents           (  ) Other    No history of smoking  No history of alcohol abuse  No history of illegal drug use    Recently moved to [place] from [place] SOCIAL HISTORY:    Marital Status:  ( x )  in 12/2020  (  ) Single        (  )        (  )        (  )   Occupation:   Lives with:        (  ) Alone       ( x ) Spouse      (  ) Children        (  ) Parents           (  ) Other    No history of smoking  No history of alcohol abuse  No history of illegal drug use    Recently moved to [place] from [place]

## 2021-01-04 NOTE — CONSULT NOTE ADULT - ASSESSMENT
45yF w/pmhx endometrial cancer s/p hysterectomy admitting with  abd pain. Cards eval for recent syncope    1. Syncope  -likely vasovagal in the setting of acute abd pain, decrease po intake  -ecg with no acs     2 Abd pain, diverticulitis   -ct abd noted  -management per ID, GI     dvt ppx         45yF w/pmhx endometrial cancer s/p hysterectomy, recent admits for diverticulitis admitted with  abd pain. Cards eval for recent syncope    1. Syncope  -likely vasovagal in the setting of acute abd pain, decrease po intake  -ecg with no acs     2 Abd pain, diverticulitis   -ct abd noted  -management per ID, GI     dvt ppx

## 2021-01-04 NOTE — H&P ADULT - NSICDXPASTSURGICALHX_GEN_ALL_CORE_FT
PAST SURGICAL HISTORY:  H/O: hysterectomy      PAST SURGICAL HISTORY:  H/O: hysterectomy ~ without oophorectomy

## 2021-01-04 NOTE — H&P ADULT - NSICDXPASTMEDICALHX_GEN_ALL_CORE_FT
PAST MEDICAL HISTORY:  Diverticulitis ~ recurrent    Endometrial cancer     Ovarian cyst      PAST MEDICAL HISTORY:  Constipation     Diverticulitis ~ recurrent    Endometrial cancer     Irritable bowel syndrome (IBS) ~ Constipation type    Ovarian cyst

## 2021-01-04 NOTE — H&P ADULT - NSHPREVIEWOFSYSTEMS_GEN_ALL_CORE
REVIEW OF SYSTEMS:    CONSTITUTIONAL: Chills and sweating during extreme pain.  Episode of syncope during pain.  No weakness or fever.  Hungry, but hesitant to eat due to abdominal discomfort  EYES/ENT: No visual changes.  No dysphagia  NECK: No pain or stiffness  RESPIRATORY: No cough or hemoptysis.  No shortness of breath  CARDIOVASCULAR: No chest pain or palpitation.  No lower extremity edema  GASTROINTESTINAL: Uncomfortable bloating of the abdomen along w/ pain; pain chiefly of the upper mid and lower abdominal areas.  Nausea without vomiting.  (+) constipation w/o bowel movement x 5 days.  GENITOURINARY: Oliguria w/o dysuria.  Lower abd area pain  MUSCULOSKELETAL: No joint pain, swelling, decreased ROM, erythema, warmth  NEUROLOGICAL: No numbness or weakness  PSYCHIATRY: Some anxiety related to GI issues.  No depression.  SKIN: No itching, burning, rashes, or lesions   All other review of systems is negative unless indicated above.

## 2021-01-04 NOTE — H&P ADULT - PROBLEM SELECTOR PLAN 1
- presented w/ abdominal pain, bloating  - CT of A/P w/ suspicion for "...mild early sigmoid diverticulitis..."  - colonoscopy on 12/17/2020 - "Moderate diverticulosis in the sigmoid colon and in the descending colon...Likely IBS..."  - started on zosyn in the ED; continued.  Per chart review, was treated w/ zosyn x 14 days in 12/2020  - IVF hydration in progress  - on morphine PRN pain (would evaluate due to opiate s/e w/ constipation)  - would get ID consult - Crispin Brooks (JANEE) [please call]  - GI consult  - Rashad Gunter MD (please call)  - f/u AM lab-work - presented w/ abdominal pain, bloating  - CT of A/P w/ suspicion for "...mild early sigmoid diverticulitis..."  No sign of abscess or bleeding  - colonoscopy on 12/17/2020 - "Moderate diverticulosis in the sigmoid colon and in the descending colon...Likely IBS..."  - started on zosyn in the ED; continued.  Per chart review, was treated w/ zosyn x 14 days in 12/2020  - IVF hydration in progress  - on morphine PRN pain (would evaluate due to opiate s/e w/ constipation)  - would get ID consult - Crispin Brooks (JANEE) [please call]  - GI consult  - Rashad Gunter MD (please call)  - f/u AM lab-work

## 2021-01-04 NOTE — ED ADULT NURSE NOTE - NSIMPLEMENTINTERV_GEN_ALL_ED
Implemented All Universal Safety Interventions:  [place] to call system. Call bell, personal items and telephone within reach. Instruct patient to call for assistance. Room bathroom lighting operational. Non-slip footwear when patient is off stretcher. Physically safe environment: no spills, clutter or unnecessary equipment. Stretcher in lowest position, wheels locked, appropriate side rails in place.

## 2021-01-04 NOTE — H&P ADULT - PROBLEM SELECTOR PLAN 3
- decreased volume of urine for the past day  - patient thinks due to inadequate fluid intake  - has lower abd/suprapubic area tenderness  - possibility of dicyclomine side effect (medication held for now)  - on IVF hydration  - patient wants fluid challenge prior to consideration for straight cath - decreased volume of urine for the past day  - patient thinks due to inadequate fluid intake  - has lower abd/suprapubic area tenderness; no CVA tenderness  - possibility of dicyclomine side effect (medication held for now)  - on IVF hydration; patient wants fluid challenge prior to consideration for straight cath

## 2021-01-04 NOTE — CONSULT NOTE ADULT - PROBLEM SELECTOR RECOMMENDATION 9
-daily labs   -CT reviewed with increased fecal load and poss early sigmoid diverticulitis  -suspect the diverticulitis in setting of severe constipation w/stool in diverticulum  -fu ID re: role for abx   -Enema x 1 ordered this morning; pending administration   -please give SMOG enema x 1 if no relief   -plan for aggressive bowel regimen   -clear diet  -above discussed with covering ER PA -daily labs   -CT reviewed with increased fecal load and poss early sigmoid diverticulitis  -suspect the diverticulitis in setting of severe constipation w/stool in diverticulum  -fu ID re: role for abx   -please hold Opioids  -Enema x 1 ordered this morning; pending administration   -please give SMOG enema x 1 if no relief   -plan for aggressive bowel regimen   -clear diet  -above discussed with covering ER PA -daily labs   -CT reviewed with increased fecal load and poss early sigmoid diverticulitis  -suspect the diverticulitis in setting of severe constipation w/stool in diverticulum  -fu ID re: abx  -please hold Opioids  -Enema x 1 ordered this morning; pending administration   -please give SMOG enema x 1 if no relief   -plan for aggressive bowel regimen   -clear diet  -above discussed with covering ER PA

## 2021-01-04 NOTE — CONSULT NOTE ADULT - ASSESSMENT
45 year old female, with past history significant for Diverticulitis (recurrent), Endometrial cancer, BSO, presented to the ED secondary to lower abdominal pain and bloating.

## 2021-01-04 NOTE — CONSULT NOTE ADULT - ASSESSMENT
45 year old female, with past history significant for Diverticulitis (recurrent), Endometrial cancer, BSO, presented to the ED secondary to lower abdominal pain and bloating

## 2021-01-04 NOTE — ED PROVIDER NOTE - CLINICAL SUMMARY MEDICAL DECISION MAKING FREE TEXT BOX
45F p/w lower abd pain x several days, on exam w/ abd distention, discomfort, concern for possible SBO vs recurrent diverticulitis - will rpt scan given presentation & physical exam, send labs, plan for likely admission.

## 2021-01-04 NOTE — ED ADULT NURSE NOTE - CHIEF COMPLAINT QUOTE
pt arrives from home c/o abd distention x 2 days, pain on palpation, abd visibly distended, reports last BM x 5 days ago, had syncopal episode prior to EMS arrival, LOC for 2 minutes, hx uterine Ca with hysterectomy x 7 years ago, EKG in progress

## 2021-01-04 NOTE — ED ADULT NURSE REASSESSMENT NOTE - NS ED NURSE REASSESS COMMENT FT1
pt states voided normal amount of urine for her and does not feel like she is retaining, sample collected and sent. Pt endorses pain relief maintenance fluids initiated as ordered VSS awaiting bed assignment

## 2021-01-04 NOTE — CONSULT NOTE ADULT - SUBJECTIVE AND OBJECTIVE BOX
CARDIOLOGY CONSULT - Dr. Chavez         HPI:  45 year old female, with past history significant for Diverticulitis (recurrent), Endometrial cancer, BSO, presented to the ED secondary to lower abdominal pain and bloating. Patient reports worsening pain of the abdomen, as well as bloating with absence of bowel movement for the past 5 days.  Has had similar presentations causing admission in the past.  Reports syncope yesterday due to pain intensity; also has chills and sweating associated with pain.  Has occasional dizziness. She denies any cp, palps . reports decrease po intake. patient known from prior admission. On exam c/o abd discomfort, bloating   ros otherwise negative       PAST MEDICAL & SURGICAL HISTORY:  Constipation    Irritable bowel syndrome (IBS)  ~ Constipation type    Ovarian cyst    Diverticulitis  ~ recurrent    Endometrial cancer    H/O: hysterectomy  ~ without oophorectomy            PREVIOUS DIAGNOSTIC TESTING:    [ ] Echocardiogram:   [ ]  Catheterization:  [ ] Stress Test:  	    MEDICATIONS:  Home Medications:      MEDICATIONS  (STANDING):  lactated ringers. 1000 milliLiter(s) (100 mL/Hr) IV Continuous <Continuous>  mineral oil enema 133 milliLiter(s) Rectal once  piperacillin/tazobactam IVPB.. 3.375 Gram(s) IV Intermittent every 8 hours  polyethylene glycol 3350 17 Gram(s) Oral two times a day  simethicone 80 milliGRAM(s) Chew three times a day      FAMILY HISTORY:  Family history of smoking  ~ father    Family history of bone cancer  ~ grandmother    Family history of blood disease  ~ uncle        SOCIAL HISTORY:    [ x] Non-smoker  [ ] Smoker  [ ] Alcohol    Allergies    codeine (Short breath)    Intolerances    	    REVIEW OF SYSTEMS:  CONSTITUTIONAL: No fever, weight loss, or fatigue  EYES: No eye pain, visual disturbances, or discharge  ENMT:  No difficulty hearing, tinnitus, vertigo; No sinus or throat pain  NECK: No pain or stiffness  RESPIRATORY: No cough, wheezing, chills or hemoptysis; No Shortness of Breath  CARDIOVASCULAR:  see hpi   GASTROINTESTINAL: No abdominal or epigastric pain. No nausea, vomiting, or hematemesis; No diarrhea or constipation. No melena or hematochezia.  GENITOURINARY: No dysuria, frequency, hematuria, or incontinence  NEUROLOGICAL: No headaches, memory loss, loss of strength, numbness, or tremors  SKIN: No itching, burning, rashes, or lesions   	    [ x] All others negative	  [ ] Unable to obtain    PHYSICAL EXAM:  T(C): 36.6 (01-04-21 @ 10:05), Max: 36.8 (01-04-21 @ 06:25)  HR: 78 (01-04-21 @ 12:00) (78 - 88)  BP: 122/59 (01-04-21 @ 12:00) (116/64 - 130/94)  RR: 18 (01-04-21 @ 12:00) (16 - 21)  SpO2: 100% (01-04-21 @ 12:00) (100% - 100%)  Wt(kg): --  I&O's Summary      Appearance: Normal	  Psychiatry: A & O x 3, Mood & affect appropriate  HEENT:   Normal oral mucosa, PERRL, EOMI	  Lymphatic: No lymphadenopathy  Cardiovascular: Normal S1 S2,RRR, No JVD, No murmurs  Respiratory: Lungs clear to auscultation	  Gastrointestinal:  Soft, Non-tender, + BS	  Skin: No rashes, No ecchymoses, No cyanosis	  Neurologic: Non-focal  Extremities: Normal range of motion, No clubbing, cyanosis or edema  Vascular: Peripheral pulses palpable 2+ bilaterally    TELEMETRY: 	    ECG:  nsr 	  RADIOLOGY:  < from: CT Abdomen and Pelvis w/ IV Cont (01.04.21 @ 04:29) >  FINDINGS:    Lower Thorax: No consolidation or effusion.    Liver: No suspicious lesions.  Biliary: No significant dilatation. No calcified gallstones within the gallbladder.  Spleen: No suspicious lesions.  Pancreas: No inflammatory changes or ductal dilatation.  Adrenals: Normal.  Kidneys: Symmetrically enhancing without hydronephrosis.  Vessels: Normal caliber.  GI tract: Colonic diverticulosis. There is short segmental wall thickening of the proximal sigmoid colonic loop without significant pericolonic stranding. This may be on the basis of inadequate distention or represent mild early sigmoid diverticulitis in appropriate clinical setting. Consider nonemergent retrograde evaluation to exclude underlying malignancy. Moderate fecal burden within the colonic loops. No evidence of small bowel obstruction. Normal appendix.    Peritoneum/retroperitoneum and mesentery: No free air. No organized fluid collection. No adenopathy.    Pelvic organs/Bladder: Suggestion of supracervical hysterectomy. Small left adnexal hypodensities, likely ovarian cysts. Consider nonemergent correlation pelvic ultrasound if indicated. Bladder is normal.    Abdominal wall: A small fat containing umbilical hernia is noted.  Bones and soft tissues: Within normal limits.    IMPRESSION:    Short segmental wall thickening of the proximal sigmoid colonic loop without significant pericolonic stranding. This may be on the basis of inadequate distention or represent mild early sigmoid diverticulitis in appropriate clinical setting. Consider nonemergent retrograde evaluation to exclude underlying malignancy. No bowel obstruction or extraluminal collection.    Normal appendix.    Additional findings as mentioned above.          < from: Xray Chest 1 View- PORTABLE-Urgent (Xray Chest 1 View- PORTABLE-Urgent .) (01.04.21 @ 03:31) >  FINDINGS:    The lungs are clear.  The heart sizeis normal.  The visualized osseous structures are unremarkable.    IMPRESSION:    Clear lungs.    < end of copied text >  OTHER: 	  	  LABS:	 	    CARDIAC MARKERS:                                  10.9   7.50  )-----------( 235      ( 04 Jan 2021 03:26 )             34.7     01-04    137  |  105  |  12  ----------------------------<  105<H>  4.2   |  23  |  0.71    Ca    9.6      04 Jan 2021 03:26    TPro  6.9  /  Alb  4.1  /  TBili  <0.2  /  DBili  x   /  AST  16  /  ALT  <5  /  AlkPhos  72  01-04      proBNP:   Lipid Profile:   HgA1c:   TSH: Thyroid Stimulating Hormone, Serum: 4.35 uIU/mL (01-04 @ 03:33)

## 2021-01-04 NOTE — CONSULT NOTE ADULT - ATTENDING COMMENTS
Crispin Brooks  Attending Physician   Division of Infectious Disease  Pager #[phone]  After 5pm/weekend or no response, call #[phone]
Agree with above NP note.  patient is a 45 year old woman with history of endometrial cancer s/p hysterectomy, recent admits for diverticulitis, now admitted again with abd pain and recent syncope    syncope likely secondary to decreased po intake, hypovolemia   med/gi f/u for abd pain  check echo

## 2021-01-04 NOTE — ED ADULT TRIAGE NOTE - CHIEF COMPLAINT QUOTE
pt arrives from home c/o abd distention x 2 days, pain on palpation, abd visibly distended, reports last BM x 5 days ago, had syncopal episode prior to EMS arrival, LOC for 2 minutes, LMP a few days ago, EKG in progress pt arrives from home c/o abd distention x 2 days, pain on palpation, abd visibly distended, reports last BM x 5 days ago, had syncopal episode prior to EMS arrival, LOC for 2 minutes, hx uterine Ca with hysterectomy x 7 years ago, EKG in progress

## 2021-01-04 NOTE — CONSULT NOTE ADULT - SUBJECTIVE AND OBJECTIVE BOX
Chief Complaint:  Patient is a 45y old  Female who presents with a chief complaint of Diverticulitis (recurrent) (2021 12:11)    Constipation    Irritable bowel syndrome (IBS)    Irritable bowel syndrome    Ovarian cyst    Diverticulitis    Endometrial cancer    No pertinent past medical history    H/O: hysterectomy    No significant past surgical history       HPI:  45 year old female, with past history significant for Diverticulitis (recurrent), Endometrial cancer, BSO, presented to the ED secondary to lower abdominal pain and bloating.  Seen and evaluated at bedside; appears a bit uncomfortable due to painful abdominal discomfort, along w/ bloating.  Patient reports worsening pain of the abdomen, as well as bloating with absence of bowel movement for the past 5 days.  Has had similar presentations causing admission in the past.  At the time of being seen, pain was rated at ~ 8/10 - 3 hours after last morphine dose.  Reports syncope yesterday due to pain intensity; also has chills and sweating associated with pain.  Has occasional dizziness.  Last bowel movement was 5 days ago - despite compliance w/ bowel regimen and diet.  Has not been belching or passing gas (unknown when was last flatus),.  Also reports oliguria for the past day, but surmises that this could be due to inadequate fluid intake; has not been drinking enough because of worsening abdominal discomfort when this is done. GI consulted for Abd pain and constipation as noted above. Reports no bm x 5 days, becoming overly distended. No c/o nausea or vomiting. Low appetite as result of pain/distention. Recent Colonoscopy with Dr. Lakhani 2020 with poor prep but moderate diverticulosis in sigmoid and descending colon.     Vital signs upon ED arrival as follows: BP = 130/94, HR = 88, RR = 16, T = 36.3 C (97.4 F), o2 Sat = 100% on RA.  Diagnosed with Diverticulitis.  Prescribed zosyn, NaCl one liter, morphine 4 mg IV and Zofran 4 mg IV in the ED. (2021 05:43)      codeine (Short breath)      bisacodyl Suppository 10 milliGRAM(s) Rectal once PRN  lactated ringers. 1000 milliLiter(s) IV Continuous <Continuous>  melatonin 3 milliGRAM(s) Oral at bedtime PRN  mineral oil enema 133 milliLiter(s) Rectal once  morphine  - Injectable 4 milliGRAM(s) IV Push every 4 hours PRN  ondansetron Injectable 4 milliGRAM(s) IV Push every 6 hours PRN  piperacillin/tazobactam IVPB.. 3.375 Gram(s) IV Intermittent every 8 hours  polyethylene glycol 3350 17 Gram(s) Oral two times a day  simethicone 80 milliGRAM(s) Chew three times a day        FAMILY HISTORY:  Family history of smoking  ~ father    Family history of bone cancer  ~ grandmother    Family history of blood disease  ~ uncle          Review of Systems:    General:  No wt loss, fevers, chills, night sweats, fatigue  Eyes:  Good vision, no reported pain  ENT:  No sore throat, pain, runny nose, dysphagia  CV:  No pain, palpitations, no lightheadedness  Resp:  No dyspnea, cough, tachypnea, wheezing  GI: constipation x 5 days, abd bloating; no n/v  :  No pain, bleeding, incontinence, nocturia  Muscle:  No pain, weakness  Neuro:  No weakness, tingling, memory problems  Psych:  No fatigue, insomnia, mood problems, depression  Endocrine:  No polyuria, polydypsia, cold/heat intolerance  Heme:  No petechiae, ecchymosis, easy bruisability  Skin:  No rash, tattoos, scars, edema    Relevant Family History:   n/c    Relevant Social History: n/c      Physical Exam:    Vital Signs:  Vital Signs Last 24 Hrs  T(C): 36.6 (2021 10:05), Max: 36.8 (2021 06:25)  T(F): 97.9 (2021 10:05), Max: 98.2 (2021 06:25)  HR: 78 (2021 12:00) (78 - 88)  BP: 122/59 (2021 12:00) (116/64 - 130/94)  BP(mean): --  RR: 18 (2021 12:00) (16 - 21)  SpO2: 100% (2021 12:00) (100% - 100%)  Daily Height in cm: 152.4 (2021 02:00)    Daily     General:  Appears stated age, well-groomed, nad  HEENT:  NC/AT,  conjunctivae clear and pink, no thyromegaly, nodules, adenopathy, no JVD  Chest:  Full & symmetric excursion, no increased effort, breath sounds clear  Cardiovascular:  Regular rhythm, S1, S2, no murmur/rub/S3/S4, no abdominal bruit, no edema  Abdomen:  Soft, +ttp b/l lower quad, +distended, normoactive bowel sounds,  no masses ,no hepatosplenomeagaly, no signs of chronic liver disease  Extremities:  no cyanosis,clubbing or edema  Skin:  No rash/erythema/ecchymoses/petechiae/wounds/abscess/warm/dry  Neuro/Psych:  A&O x3 , no asterixis, no tremor, no encephalopathy    Laboratory:                            10.9   7.50  )-----------( 235      ( 2021 03:26 )             34.7     -04    137  |  105  |  12  ----------------------------<  105<H>  4.2   |  23  |  0.71    Ca    9.6      2021 03:26    TPro  6.9  /  Alb  4.1  /  TBili  <0.2  /  DBili  x   /  AST  16  /  ALT  <5  /  AlkPhos  72  -04    LIVER FUNCTIONS - ( 2021 03:26 )  Alb: 4.1 g/dL / Pro: 6.9 g/dL / ALK PHOS: 72 U/L / ALT: <5 U/L / AST: 16 U/L / GGT: x             Urinalysis Basic - ( 2021 07:22 )    Color: Light Yellow / Appearance: Slightly Turbid / S.037 / pH: x  Gluc: x / Ketone: Negative  / Bili: Negative / Urobili: <2 mg/dL   Blood: x / Protein: Trace / Nitrite: Negative   Leuk Esterase: Negative / RBC: 1 /HPF / WBC 1 /HPF   Sq Epi: x / Non Sq Epi: Moderate / Bacteria: Occasional        Imaging:    < from: CT Abdomen and Pelvis w/ IV Cont (21 @ 04:29) >    EXAM:  CT ABDOMEN AND PELVIS IC        PROCEDURE DATE:  2021         INTERPRETATION:  CT ABDOMEN AND PELVIS WITH CONTRAST    INDICATION: Abdominal pain.    TECHNIQUE: Contrast enhanced CT of the abdomen and pelvis. Images are reformatted in the sagittal and coronal planes.    90 mL of Omnipaque 350 contrast material was injected IV.    COMPARISON: None.    FINDINGS:    Lower Thorax: No consolidation or effusion.    Liver: No suspicious lesions.  Biliary: No significant dilatation. No calcified gallstones within the gallbladder.  Spleen: No suspicious lesions.  Pancreas: No inflammatory changes or ductal dilatation.  Adrenals: Normal.  Kidneys: Symmetrically enhancing without hydronephrosis.  Vessels: Normal caliber.  GI tract: Colonic diverticulosis. There is short segmental wall thickening of the proximal sigmoid colonic loop without significant pericolonic stranding. This may be on the basis of inadequate distention or represent mild early sigmoid diverticulitis in appropriate clinical setting. Consider nonemergent retrograde evaluation to exclude underlying malignancy. Moderate fecal burden within the colonic loops. No evidence of small bowel obstruction. Normal appendix.    Peritoneum/retroperitoneum and mesentery: No free air. No organized fluid collection. No adenopathy.    Pelvic organs/Bladder: Suggestion of supracervical hysterectomy. Small left adnexal hypodensities, likely ovarian cysts. Consider nonemergent correlation pelvic ultrasound if indicated. Bladder is normal.    Abdominal wall: A small fat containing umbilical hernia is noted.  Bones and soft tissues: Within normal limits.    IMPRESSION:    Short segmental wall thickening of the proximal sigmoid colonic loop without significant pericolonic stranding. This may be on the basis of inadequate distention or represent mild early sigmoid diverticulitis in appropriate clinical setting. Consider nonemergent retrograde evaluation to exclude underlying malignancy. No bowel obstruction or extraluminal collection.    Normal appendix.    Additional findings as mentioned above.              AMY VIVAS MD; Attending Radiologist  This document has been electronically signed. 2021  4:49AM    < end of copied text >

## 2021-01-04 NOTE — ED ADULT NURSE NOTE - OBJECTIVE STATEMENT
Break RN: Pt presents to rm 19, alert&orientedx4, ambulatory, pmhx Uterine CA (hysterectomy 7 years ago) coming to ED for lower abd pain x1day. Pt states her abd has been getting distended since yesterday, thought she was just feeling "bloated", and started feeling extreme pain while lying down. Pt also had witnessed syncopal episode at home prior arrival to ED, states she was walking to towards her , experienced LOC ~5 minutes, denies hitting head or other injuries. Pt appears slightly anxious, abd visibly distended, tender and hard to touch, last BM x5days ago. NSR on cardiac monitor, breathing even and non-labored, denies N/V/D, fevers, chest pain or SOB. Awaiting further plan of care

## 2021-01-04 NOTE — ED ADULT NURSE REASSESSMENT NOTE - NS ED NURSE REASSESS COMMENT FT1
Pt received from prior nurse AOx4, ambulatory at baseline, currently complaining of lower abd discomfort and hunger. Requesting for food. 20G noted to the LAC. LR @ 100cc/hr infusion in progress.

## 2021-01-05 ENCOUNTER — TRANSCRIPTION ENCOUNTER (OUTPATIENT)
Age: 46
End: 2021-01-05

## 2021-01-05 VITALS
RESPIRATION RATE: 16 BRPM | TEMPERATURE: 98 F | DIASTOLIC BLOOD PRESSURE: 53 MMHG | SYSTOLIC BLOOD PRESSURE: 101 MMHG | HEART RATE: 86 BPM | OXYGEN SATURATION: 100 %

## 2021-01-05 DIAGNOSIS — K59.00 CONSTIPATION, UNSPECIFIED: ICD-10-CM

## 2021-01-05 PROCEDURE — 99232 SBSQ HOSP IP/OBS MODERATE 35: CPT

## 2021-01-05 PROCEDURE — 93306 TTE W/DOPPLER COMPLETE: CPT | Mod: 26

## 2021-01-05 RX ORDER — MULTIVIT WITH MIN/MFOLATE/K2 340-15/3 G
1 POWDER (GRAM) ORAL ONCE
Refills: 0 | Status: COMPLETED | OUTPATIENT
Start: 2021-01-05 | End: 2021-01-05

## 2021-01-05 RX ORDER — LINACLOTIDE 145 UG/1
1 CAPSULE, GELATIN COATED ORAL
Qty: 30 | Refills: 0
Start: 2021-01-05 | End: 2021-02-03

## 2021-01-05 RX ORDER — SIMETHICONE 80 MG/1
1 TABLET, CHEWABLE ORAL
Qty: 0 | Refills: 0 | DISCHARGE
Start: 2021-01-05

## 2021-01-05 RX ADMIN — Medication 1 ENEMA: at 10:38

## 2021-01-05 RX ADMIN — POLYETHYLENE GLYCOL 3350 17 GRAM(S): 17 POWDER, FOR SOLUTION ORAL at 18:02

## 2021-01-05 RX ADMIN — Medication 1 BOTTLE: at 10:38

## 2021-01-05 RX ADMIN — SODIUM CHLORIDE 100 MILLILITER(S): 9 INJECTION, SOLUTION INTRAVENOUS at 05:52

## 2021-01-05 RX ADMIN — POLYETHYLENE GLYCOL 3350 17 GRAM(S): 17 POWDER, FOR SOLUTION ORAL at 05:50

## 2021-01-05 NOTE — PROGRESS NOTE ADULT - PROBLEM SELECTOR PLAN 1
-daily labs   -CT reviewed with increased fecal load and possible early sigmoid diverticulitis  -suspect ?diverticulitis in setting of severe constipation w/stool in diverticulum  -agree w/ID to defer abx; input appreciated  -please hold Opioids  -Mg Citrate x 1 bottle ordered  -Enema x 1 this morning re-ordered; encourage compliance   -please give SMOG enema x 1 if no bm after Enema this morning   -plan for aggressive bowel regimen   -to be started on Linzess on discharge   -clear diet -daily labs   -CT reviewed with increased fecal load and possible early sigmoid diverticulitis  -suspect ?diverticulitis in setting of severe constipation w/stool in diverticulum  -agree w/ID to defer abx; input appreciated  -please hold Opioids  -Mg Citrate x 1 bottle ordered  -SMOG enema x 1 ordered this am  -aggressive bowel regimen   -to be started on Linzess on discharge   -clear diet

## 2021-01-05 NOTE — DISCHARGE NOTE PROVIDER - CARE PROVIDER_API CALL
Rashad Lakhani)  Gastroenterology; Internal Medicine  [address] [place] [address]  Phone: [phone]  Fax: [phone]  Follow Up Time:     Justus Chavez  CARDIOVASCULAR DISEASE  [address] [facility], [address]  [place] [address]  Phone: [phone]  Fax: [phone]  Follow Up Time:

## 2021-01-05 NOTE — DISCHARGE NOTE PROVIDER - CARE PROVIDERS DIRECT ADDRESSES
[email or web address]

## 2021-01-05 NOTE — PROGRESS NOTE ADULT - GASTROINTESTINAL
Paracentesis Nursing Note  Yue Portillo presents today to [facility] for a paracentesis.    During today's appointment orders from Tala Meyer MD were completed.    Progress Note:  Patient identification verified by name and date of birth.  Assessment completed.  Vitals monitored throughout appointment and recorded in Doc Flowsheets.  See proceduralist note in ultrasound.    Date of consent or authorization: 4/11/18.  Invasive Procedure Safety Checklist was completed and sent for scanning.     Paracentesis performed by VIGNESH Cornelius.    The following labs were communicated to provider performing paracentesis:  Lab Results   Component Value Date    PLT 64 05/04/2018       Total amount of ascites fluid drained: 1.4 liters.  Color of ascites fluid: yellow.  Total amount of albumin given: 12.5  grams.    Patient tolerated procedure well.    Post procedure,denies pain or discomfort post paracentesis.      Discharge Plan:  Discharge instructions were reviewed with patient.  Patient/Representative verbalized understanding and all questions were answered.   Discharged from [facility] in stable condition.    RENE OLMEDO, AMIE    Administrations This Visit     albumin human 25 % injection 12.5 g     Admin Date Action Dose Route Administered By             05/09/2018 New Bag 12.5 g Intravenous Chiquita Conti RN                    lidocaine 1 % 20 mL     Admin Date Action Dose Route Administered By             05/09/2018 Given by Other 15 mL Other Chiquita Conti RN                          /58  Pulse 87  Temp 97.9  F (36.6  C) (Oral)  Resp 16  SpO2 100%       detailed exam details…

## 2021-01-05 NOTE — DISCHARGE NOTE PROVIDER - NSDCMRMEDTOKEN_GEN_ALL_CORE_FT
bisacodyl 5 mg oral delayed release tablet: 2 tab(s) orally once a day (at bedtime), As Needed   Colace 100 mg oral capsule: 1 cap(s) orally 3 times a day   dicyclomine 20 mg oral tablet: 1 tab(s) orally every 6 hours, As Needed for pain    Linzess 145 mcg oral capsule: 1 cap(s) orally once a day   melatonin 3 mg oral tablet: 1 tab(s) orally once a day (at bedtime)   polyethylene glycol 3350 oral powder for reconstitution: 17 gram(s) orally 2 times a day   simethicone 80 mg oral tablet, chewable: 1 tab(s) orally 3 times a day, As needed, Gas

## 2021-01-05 NOTE — PROGRESS NOTE ADULT - ATTENDING COMMENTS
Agree with above NP note.  CV stable   med/gi f/u for abd pain  check echo
Crispin Brooks  Attending Physician   Division of Infectious Disease  Pager #[phone]  After 5pm/weekend or no response, call #[phone]

## 2021-01-05 NOTE — PROGRESS NOTE ADULT - SUBJECTIVE AND OBJECTIVE BOX
Patient is a 45y old  Female who presents with a chief complaint of Diverticulitis (recurrent) (2021 10:17)      INTERVAL HPI/OVERNIGHT EVENTS:  T(C): 36.7 (21 @ 13:27), Max: 36.8 (21 @ 05:47)  HR: 86 (21 @ 13:27) (69 - 86)  BP: 101/53 (21 @ 13:27) (101/53 - 124/64)  RR: 16 (21 @ 13:27) (16 - 18)  SpO2: 100% (21 @ 13:27) (100% - 100%)  Wt(kg): --  I&O's Summary      LABS:                        10.9   7.50  )-----------( 235      ( 2021 03:26 )             34.7     -    137  |  105  |  12  ----------------------------<  105<H>  4.2   |  23  |  0.71    Ca    9.6      2021 03:26    TPro  6.9  /  Alb  4.1  /  TBili  <0.2  /  DBili  x   /  AST  16  /  ALT  <5  /  AlkPhos  72  01-04      Urinalysis Basic - ( 2021 07:22 )    Color: Light Yellow / Appearance: Slightly Turbid / S.037 / pH: x  Gluc: x / Ketone: Negative  / Bili: Negative / Urobili: <2 mg/dL   Blood: x / Protein: Trace / Nitrite: Negative   Leuk Esterase: Negative / RBC: 1 /HPF / WBC 1 /HPF   Sq Epi: x / Non Sq Epi: Moderate / Bacteria: Occasional      CAPILLARY BLOOD GLUCOSE            Urinalysis Basic - ( 2021 07:22 )    Color: Light Yellow / Appearance: Slightly Turbid / S.037 / pH: x  Gluc: x / Ketone: Negative  / Bili: Negative / Urobili: <2 mg/dL   Blood: x / Protein: Trace / Nitrite: Negative   Leuk Esterase: Negative / RBC: 1 /HPF / WBC 1 /HPF   Sq Epi: x / Non Sq Epi: Moderate / Bacteria: Occasional        MEDICATIONS  (STANDING):  polyethylene glycol 3350 17 Gram(s) Oral two times a day  sorbitol 70%/mineral oil/magnesium hydroxide/glycerin Enema 120 milliLiter(s) Rectal once    MEDICATIONS  (PRN):  melatonin 3 milliGRAM(s) Oral at bedtime PRN Insomnia  ondansetron Injectable 4 milliGRAM(s) IV Push every 6 hours PRN Nausea and/or Vomiting  simethicone 80 milliGRAM(s) Chew three times a day PRN Gas          PHYSICAL EXAM:  GENERAL: NAD, well-groomed, well-developed  HEAD:  Atraumatic, Normocephalic  CHEST/LUNG: Clear to percussion bilaterally; No rales, rhonchi, wheezing, or rubs  HEART: Regular rate and rhythm; No murmurs, rubs, or gallops  ABDOMEN: Soft, Nontender, Nondistended; Bowel sounds present  EXTREMITIES:  2+ Peripheral Pulses, No clubbing, cyanosis, or edema  LYMPH: No lymphadenopathy noted  SKIN: No rashes or lesions    Care Discussed with Consultants/Other Providers [ ] YES  [ ] NO

## 2021-01-05 NOTE — PROGRESS NOTE ADULT - SUBJECTIVE AND OBJECTIVE BOX
CARDIOLOGY FOLLOW UP - Dr. Chavez    CC no cp or sob    c/o dizziness when standing, lower abd and epigastric discomfort + bloating     PHYSICAL EXAM:  T(C): 36.8 (01-05-21 @ 05:47), Max: 36.8 (01-05-21 @ 05:47)  HR: 74 (01-05-21 @ 05:47) (69 - 82)  BP: 124/64 (01-05-21 @ 05:47) (109/59 - 124/64)  RR: 18 (01-05-21 @ 05:47) (18 - 18)  SpO2: 100% (01-05-21 @ 05:47) (100% - 100%)  Wt(kg): --  I&O's Summary      Appearance: Normal	  Cardiovascular: Normal S1 S2,RRR, No JVD, No murmurs  Respiratory: Lungs clear to auscultation	  Gastrointestinal:  Soft, Non-tender, + BS	  Extremities: Normal range of motion, No clubbing, cyanosis or edema      Home Medications:      MEDICATIONS  (STANDING):  polyethylene glycol 3350 17 Gram(s) Oral two times a day      TELEMETRY: 	    ECG:  	  RADIOLOGY:   DIAGNOSTIC TESTING:  [ ] Echocardiogram:  [ ]  Catheterization:  [ ] Stress Test:    OTHER: 	    LABS:	 	                            10.9   7.50  )-----------( 235      ( 04 Jan 2021 03:26 )             34.7     01-04    137  |  105  |  12  ----------------------------<  105<H>  4.2   |  23  |  0.71    Ca    9.6      04 Jan 2021 03:26    TPro  6.9  /  Alb  4.1  /  TBili  <0.2  /  DBili  x   /  AST  16  /  ALT  <5  /  AlkPhos  72  01-04

## 2021-01-05 NOTE — DISCHARGE NOTE NURSING/CASE MANAGEMENT/SOCIAL WORK - PATIENT PORTAL LINK FT
You can access the FollowMyHealth Patient Portal offered by [facility] by registering at the following website: [email or web address]. By joining [facility]’s FollowMyHealth portal, you will also be able to view your health information using other applications (apps) compatible with our system.

## 2021-01-05 NOTE — DISCHARGE NOTE PROVIDER - NSDCCPCAREPLAN_GEN_ALL_CORE_FT
PRINCIPAL DISCHARGE DIAGNOSIS  Diagnosis: Diverticulitis  Assessment and Plan of Treatment: - CT of A/P w/ suspicion for "mild early sigmoid diverticulitis..."    - you were treated with Zosyn x 14 days in 12/2020 - discontinued 1/4/21. You were seen by GI Dr Gunter and ID Dr Brooks who recommended to observe off antibiotics for now      SECONDARY DISCHARGE DIAGNOSES  Diagnosis: Syncope  Assessment and Plan of Treatment: - Likely vasovagal per cardiology Dr Chavez due to abdomenal pain  - you had ECHO done- showed EF 65%. Normal left ventricular systolic function. Follow up with your PCP as outpatient    Diagnosis: Constipation  Assessment and Plan of Treatment: - you had no Bowel Movement x5 days as outpatient. You were seen by GI was treated with aggressive bowel regimen. On 1/5 you had good bowel movement   - you were recommended to start on Linzess on discharge continue taking as prescribed

## 2021-01-05 NOTE — PROGRESS NOTE ADULT - SUBJECTIVE AND OBJECTIVE BOX
INTERVAL HPI/OVERNIGHT EVENTS:    without bm; (+) bloating   passing flatus   no n/v  allegedly refused enema in ED     MEDICATIONS  (STANDING):  magnesium citrate Oral Solution 1 Bottle Oral once  polyethylene glycol 3350 17 Gram(s) Oral two times a day  saline laxative (FLEET) Rectal Enema 1 Enema Rectal once    MEDICATIONS  (PRN):  melatonin 3 milliGRAM(s) Oral at bedtime PRN Insomnia  ondansetron Injectable 4 milliGRAM(s) IV Push every 6 hours PRN Nausea and/or Vomiting  simethicone 80 milliGRAM(s) Chew three times a day PRN Gas      Allergies    codeine (Short breath)    Intolerances        Review of Systems:    General:  No wt loss, fevers, chills, night sweats, fatigue   Eyes:  Good vision, no reported pain  ENT:  No sore throat, pain, runny nose, dysphagia  CV:  No pain, palpitations, hypo/hypertension  Resp:  No dyspnea, cough, tachypnea, wheezing  GI:  +distention, No nausea, No vomiting, No diarrhea, +constipation, No weight loss, No fever, No pruritis, No rectal bleeding, No melena, No dysphagia  :  No pain, bleeding, incontinence, nocturia  Muscle:  No pain, weakness  Neuro:  No weakness, tingling, memory problems  Psych:  No fatigue, insomnia, mood problems, depression  Endocrine:  No polyuria, polydypsia, cold/heat intolerance  Heme:  No petechiae, ecchymosis, easy bruisability  Skin:  No rash, tattoos, scars, edema      Vital Signs Last 24 Hrs  T(C): 36.8 (2021 05:47), Max: 36.8 (2021 05:47)  T(F): 98.2 (2021 05:47), Max: 98.2 (2021 05:47)  HR: 74 (2021 05:47) (69 - 78)  BP: 124/64 (2021 05:47) (109/59 - 124/64)  BP(mean): --  RR: 18 (2021 05:47) (18 - 18)  SpO2: 100% (2021 05:47) (100% - 100%)    PHYSICAL EXAM:    Constitutional: NAD  HEENT: EOMI, throat clear  Neck: No LAD, supple  Respiratory: CTA and P  Cardiovascular: S1 and S2, RRR, no M  Gastrointestinal: BS+, soft, +distention, neg HSM,  Extremities: No peripheral edema, neg clubbing, cyanosis  Vascular: 2+ peripheral pulses  Neurological: A/O x 3, no focal deficits  Psychiatric: Normal mood, normal affect  Skin: No rashes      LABS:                        10.9   7.50  )-----------( 235      ( 2021 03:26 )             34.7     -    137  |  105  |  12  ----------------------------<  105<H>  4.2   |  23  |  0.71    Ca    9.6      2021 03:26    TPro  6.9  /  Alb  4.1  /  TBili  <0.2  /  DBili  x   /  AST  16  /  ALT  <5  /  AlkPhos  72  01-04      Urinalysis Basic - ( 2021 07:22 )    Color: Light Yellow / Appearance: Slightly Turbid / S.037 / pH: x  Gluc: x / Ketone: Negative  / Bili: Negative / Urobili: <2 mg/dL   Blood: x / Protein: Trace / Nitrite: Negative   Leuk Esterase: Negative / RBC: 1 /HPF / WBC 1 /HPF   Sq Epi: x / Non Sq Epi: Moderate / Bacteria: Occasional        RADIOLOGY & ADDITIONAL TESTS:   INTERVAL HPI/OVERNIGHT EVENTS:    without bm  (+) bloating (less distended than yesterday)  passing flatus         MEDICATIONS  (STANDING):  magnesium citrate Oral Solution 1 Bottle Oral once  polyethylene glycol 3350 17 Gram(s) Oral two times a day  saline laxative (FLEET) Rectal Enema 1 Enema Rectal once    MEDICATIONS  (PRN):  melatonin 3 milliGRAM(s) Oral at bedtime PRN Insomnia  ondansetron Injectable 4 milliGRAM(s) IV Push every 6 hours PRN Nausea and/or Vomiting  simethicone 80 milliGRAM(s) Chew three times a day PRN Gas      Allergies    codeine (Short breath)    Intolerances        Review of Systems:    General:  No wt loss, fevers, chills, night sweats, fatigue   Eyes:  Good vision, no reported pain  ENT:  No sore throat, pain, runny nose, dysphagia  CV:  No pain, palpitations, hypo/hypertension  Resp:  No dyspnea, cough, tachypnea, wheezing  GI:  +distention, No nausea, No vomiting, No diarrhea, +constipation, No weight loss, No fever, No pruritis, No rectal bleeding, No melena, No dysphagia  :  No pain, bleeding, incontinence, nocturia  Muscle:  No pain, weakness  Neuro:  No weakness, tingling, memory problems  Psych:  No fatigue, insomnia, mood problems, depression  Endocrine:  No polyuria, polydypsia, cold/heat intolerance  Heme:  No petechiae, ecchymosis, easy bruisability  Skin:  No rash, tattoos, scars, edema      Vital Signs Last 24 Hrs  T(C): 36.8 (2021 05:47), Max: 36.8 (2021 05:47)  T(F): 98.2 (2021 05:47), Max: 98.2 (2021 05:47)  HR: 74 (2021 05:47) (69 - 78)  BP: 124/64 (2021 05:47) (109/59 - 124/64)  BP(mean): --  RR: 18 (2021 05:47) (18 - 18)  SpO2: 100% (2021 05:47) (100% - 100%)    PHYSICAL EXAM:    Constitutional: NAD  HEENT: EOMI, throat clear  Neck: No LAD, supple  Respiratory: CTA and P  Cardiovascular: S1 and S2, RRR, no M  Gastrointestinal: BS+, soft, +distention, neg HSM,  Extremities: No peripheral edema, neg clubbing, cyanosis  Vascular: 2+ peripheral pulses  Neurological: A/O x 3, no focal deficits  Psychiatric: Normal mood, normal affect  Skin: No rashes      LABS:                        10.9   7.50  )-----------( 235      ( 2021 03:26 )             34.7     -    137  |  105  |  12  ----------------------------<  105<H>  4.2   |  23  |  0.71    Ca    9.6      2021 03:26    TPro  6.9  /  Alb  4.1  /  TBili  <0.2  /  DBili  x   /  AST  16  /  ALT  <5  /  AlkPhos  72  01-04      Urinalysis Basic - ( 2021 07:22 )    Color: Light Yellow / Appearance: Slightly Turbid / S.037 / pH: x  Gluc: x / Ketone: Negative  / Bili: Negative / Urobili: <2 mg/dL   Blood: x / Protein: Trace / Nitrite: Negative   Leuk Esterase: Negative / RBC: 1 /HPF / WBC 1 /HPF   Sq Epi: x / Non Sq Epi: Moderate / Bacteria: Occasional        RADIOLOGY & ADDITIONAL TESTS:

## 2021-01-05 NOTE — PROGRESS NOTE ADULT - PROBLEM SELECTOR PLAN 2
-CT with question of early sigmoid diverticulitis   -agree w/ID to defer abx; input appreciated  -recent Colonoscopy 12/2020 w/sigmoid and descending diverticulosis   -remainder of care as above
-GI following

## 2021-01-05 NOTE — DISCHARGE NOTE PROVIDER - HOSPITAL COURSE
45 year old female, with past history significant for Diverticulitis (recurrent), Endometrial cancer, BSO, presented to the ED secondary to lower abdominal pain and bloating with likely reuccrent diverticulitis and syncope    Diverticulitis - CT of A/P w/ suspicion for "mild early sigmoid diverticulitis..."  No sign of abscess or bleeding  - was treated w/ zosyn x 14 days in 12/2020 - discontinued 1/4, s/p IVF hydration  - ID Dr Brooks consulted- observe off Abx  - GI Dr Gunter consulted- colonoscopy on 12/17/2020 - "Moderate diverticulosis in the sigmoid colon and in the descending colon...Likely IBS..."    Constipation- no BM x5 days (resolved)  - GI consulted -Mg Citrate x 1 bottle, SMOG enema x 1   - aggressive bowel regimen, started on Linzess on discharge     Syncope  - Likely vasovagal per cardiology (fuschetto)  - ECHO- EF 65%. Normal left ventricular systolic function    Dispo - home with outpatient follow up

## 2021-01-05 NOTE — PROGRESS NOTE ADULT - SUBJECTIVE AND OBJECTIVE BOX
HITESH DEAN 45y [id number]    Patient is a 45y old  Female who presents with a chief complaint of Diverticulitis (recurrent) (2021 15:23)      Follow Up/CC:  ID following for abd pain    Interval History/ROS: no fever    Allergies    codeine (Short breath)    Intolerances        ANTIMICROBIALS:      MEDICATIONS  (STANDING):  polyethylene glycol 3350 17 Gram(s) Oral two times a day  sorbitol 70%/mineral oil/magnesium hydroxide/glycerin Enema 120 milliLiter(s) Rectal once    MEDICATIONS  (PRN):  melatonin 3 milliGRAM(s) Oral at bedtime PRN Insomnia  ondansetron Injectable 4 milliGRAM(s) IV Push every 6 hours PRN Nausea and/or Vomiting  simethicone 80 milliGRAM(s) Chew three times a day PRN Gas        Vital Signs Last 24 Hrs  T(C): 36.7 (2021 13:27), Max: 36.8 (2021 05:47)  T(F): 98.1 (2021 13:27), Max: 98.2 (2021 05:47)  HR: 86 (2021 13:27) (69 - 86)  BP: 101/53 (2021 13:27) (101/53 - 124/64)  BP(mean): --  RR: 16 (2021 13:27) (16 - 18)  SpO2: 100% (2021 13:27) (100% - 100%)    CBC Full  -  ( 2021 03:26 )  WBC Count : 7.50 K/uL  RBC Count : 3.83 M/uL  Hemoglobin : 10.9 g/dL  Hematocrit : 34.7 %  Platelet Count - Automated : 235 K/uL  Mean Cell Volume : 90.6 fL  Mean Cell Hemoglobin : 28.5 pg  Mean Cell Hemoglobin Concentration : 31.4 gm/dL  Auto Neutrophil # : 4.02 K/uL  Auto Lymphocyte # : 2.74 K/uL  Auto Monocyte # : 0.45 K/uL  Auto Eosinophil # : 0.22 K/uL  Auto Basophil # : 0.06 K/uL  Auto Neutrophil % : 53.7 %  Auto Lymphocyte % : 36.5 %  Auto Monocyte % : 6.0 %  Auto Eosinophil % : 2.9 %  Auto Basophil % : 0.8 %    -04    137  |  105  |  12  ----------------------------<  105<H>  4.2   |  23  |  0.71    Ca    9.6      2021 03:26    TPro  6.9  /  Alb  4.1  /  TBili  <0.2  /  DBili  x   /  AST  16  /  ALT  <5  /  AlkPhos  72  -    LIVER FUNCTIONS - ( 2021 03:26 )  Alb: 4.1 g/dL / Pro: 6.9 g/dL / ALK PHOS: 72 U/L / ALT: <5 U/L / AST: 16 U/L / GGT: x           Urinalysis Basic - ( 2021 07:22 )    Color: Light Yellow / Appearance: Slightly Turbid / S.037 / pH: x  Gluc: x / Ketone: Negative  / Bili: Negative / Urobili: <2 mg/dL   Blood: x / Protein: Trace / Nitrite: Negative   Leuk Esterase: Negative / RBC: 1 /HPF / WBC 1 /HPF   Sq Epi: x / Non Sq Epi: Moderate / Bacteria: Occasional        MICROBIOLOGY:  .Urine Clean Catch (Midstream)  21   50,000 - 99,000 CFU/mL Escherichia coli  <10,000 CFU/ml Normal Urogenital willie present  --  --      RADIOLOGY    < from: CT Abdomen and Pelvis w/ IV Cont (21 @ 04:29) >  Short segmental wall thickening of the proximal sigmoid colonic loop without significant pericolonic stranding. This may be on the basis of inadequate distention or represent mild early sigmoid diverticulitis in appropriate clinical setting. Consider nonemergent retrograde evaluation to exclude underlying malignancy. No bowel obstruction or extraluminal collection.    Normal appendix.    Additional findings as mentioned above.    < end of copied text >

## 2021-01-05 NOTE — PROGRESS NOTE ADULT - ASSESSMENT
45yF w/pmhx diverticulitis , endometrial cancer s/p hysterectomy, recent admits for diverticulitis admitted with  abd pain. Cards eval for recent syncope    1. Syncope  -likely vasovagal in the setting of acute abd pain, decrease po intake  -ecg with no acs   -check orthostatics  -check echo    2 Abd pain, diverticulitis   -ct abd noted  -management per ID, GI     dvt ppx       
45 year old female, with past history significant for Diverticulitis (recurrent), Endometrial cancer, BSO, presented to the ED secondary to lower abdominal pain and bloating
Diverticulitis, constipation  - pain control  - monitor opff antibiotics  - gi and id fu  - bowel regimen  - dc planning 
45 year old female, with past history significant for Diverticulitis (recurrent), Endometrial cancer, BSO, presented to the ED secondary to lower abdominal pain and bloating.

## 2021-01-05 NOTE — PROGRESS NOTE ADULT - REASON FOR ADMISSION
Diverticulitis (recurrent)

## 2021-03-07 NOTE — H&P ADULT - HISTORY OF PRESENT ILLNESS
46 y/o F with PMHx of endometrial cancer s/p hysterectomy (no BSO) several years ago presents to the ED with severe lower abdominal pain.  I saw her for same last week, she was found to have diverticulitis, placed in CDU, then admitted for non resolving pain.  States she left hospital a few days ago because she was uncomfortable with dietary regimen and has been taking abx as prescribed . Pain has become intolerable again, though she says she was feeling much better at the time she left hospital.  Also endorses chills associated with severe pain though she doesn't feel as if she has a fever.  Some nausea as well. No diarrhea. No known sick contacts. [Visual inspection, sensory exam] : Foot exam, including visual inspection, sensory exam with mono filament, and pulse exam, was performed within the last 12 months

## 2021-08-26 NOTE — PROGRESS NOTE ADULT - MINUTES
Pt is A&OX4, on room air, complained of distended abdomen and discomfort, no need for pain medication, abdomen bloated, BS present, passed gas, tolerated clear liquid diet, diet upgraded to vegetarian, had 2 small BM at pm, refused Miralax, voided well. B/L legs edema, doppler pending, per vascular lab will be done late tonight or tomorrow morning. Medications given as ordered, hydration with LR at 100 ml/hr. Continued to provide care, offer emotional support, will notify MD of any changes in status.   35

## 2021-10-06 NOTE — H&P ADULT - ASSESSMENT
44 yo female w/pmhx endometrial cancer s/p hysterectomy p/w lower abd pain of 2 days duration. CTAP w/ acute sigmoid diverticulitis, on Unasyn. TVUS also with L ovarian cyst c/w hemorrhagic cyst. Brow Lift Text: A midfrontal incision was made medially to the defect to allow access to the tissues just superior to the left eyebrow. Following careful dissection inferiorly in a supraperiosteal plane to the level of the left eyebrow, several 3-0 monocryl sutures were used to resuspend the eyebrow orbicularis oculi muscular unit to the superior frontal bone periosteum. This resulted in an appropriate reapproximation of static eyebrow symmetry and correction of the left brow ptosis.

## 2021-11-03 NOTE — ED ADULT TRIAGE NOTE - NS ED TRIAGE AVPU SCALE
Alert-The patient is alert, awake and responds to voice. The patient is oriented to time, place, and person. The triage nurse is able to obtain subjective information. No. SYDNI screening performed.  STOP BANG Legend: 0-2 = LOW Risk; 3-4 = INTERMEDIATE Risk; 5-8 = HIGH Risk

## 2021-12-17 ENCOUNTER — EMERGENCY (EMERGENCY)
Facility: HOSPITAL | Age: 46
LOS: 1 days | Discharge: HOME | End: 2021-12-17
Attending: STUDENT IN AN ORGANIZED HEALTH CARE EDUCATION/TRAINING PROGRAM | Admitting: STUDENT IN AN ORGANIZED HEALTH CARE EDUCATION/TRAINING PROGRAM
Payer: COMMERCIAL

## 2021-12-17 VITALS
HEART RATE: 91 BPM | DIASTOLIC BLOOD PRESSURE: 77 MMHG | RESPIRATION RATE: 18 BRPM | TEMPERATURE: 98 F | HEIGHT: 60 IN | OXYGEN SATURATION: 100 % | SYSTOLIC BLOOD PRESSURE: 107 MMHG

## 2021-12-17 DIAGNOSIS — Z90.710 ACQUIRED ABSENCE OF BOTH CERVIX AND UTERUS: Chronic | ICD-10-CM

## 2021-12-17 PROCEDURE — 99285 EMERGENCY DEPT VISIT HI MDM: CPT

## 2021-12-17 NOTE — ED ADULT TRIAGE NOTE - CHIEF COMPLAINT QUOTE
Pt st" I have lower abd pain started earlier this week now my lower back hurts...my belly is very swollen and it is hard to breath. ." Denies trauma denies urinary symptoms. hx uterine ca, partial hysterectomy 2014

## 2021-12-18 PROBLEM — K58.9 IRRITABLE BOWEL SYNDROME WITHOUT DIARRHEA: Chronic | Status: ACTIVE | Noted: 2021-01-04

## 2021-12-18 PROBLEM — K58.9 IRRITABLE BOWEL SYNDROME, UNSPECIFIED: Chronic | Status: ACTIVE | Noted: 2021-01-04

## 2021-12-18 PROBLEM — K57.92 DIVERTICULITIS OF INTESTINE, PART UNSPECIFIED, WITHOUT PERFORATION OR ABSCESS WITHOUT BLEEDING: Chronic | Status: ACTIVE | Noted: 2020-12-08

## 2021-12-18 LAB
ALBUMIN SERPL ELPH-MCNC: 4.8 G/DL — SIGNIFICANT CHANGE UP (ref 3.3–5)
ALP SERPL-CCNC: 92 U/L — SIGNIFICANT CHANGE UP (ref 40–120)
ALT FLD-CCNC: 15 U/L — SIGNIFICANT CHANGE UP (ref 4–33)
ANION GAP SERPL CALC-SCNC: 12 MMOL/L — SIGNIFICANT CHANGE UP (ref 7–14)
APPEARANCE UR: CLEAR — SIGNIFICANT CHANGE UP
AST SERPL-CCNC: 26 U/L — SIGNIFICANT CHANGE UP (ref 4–32)
BACTERIA # UR AUTO: NEGATIVE — SIGNIFICANT CHANGE UP
BASOPHILS # BLD AUTO: 0.04 K/UL — SIGNIFICANT CHANGE UP (ref 0–0.2)
BASOPHILS NFR BLD AUTO: 0.5 % — SIGNIFICANT CHANGE UP (ref 0–2)
BILIRUB SERPL-MCNC: 0.2 MG/DL — SIGNIFICANT CHANGE UP (ref 0.2–1.2)
BILIRUB UR-MCNC: NEGATIVE — SIGNIFICANT CHANGE UP
BUN SERPL-MCNC: 12 MG/DL — SIGNIFICANT CHANGE UP (ref 7–23)
CALCIUM SERPL-MCNC: 10.3 MG/DL — SIGNIFICANT CHANGE UP (ref 8.4–10.5)
CHLORIDE SERPL-SCNC: 103 MMOL/L — SIGNIFICANT CHANGE UP (ref 98–107)
CO2 SERPL-SCNC: 23 MMOL/L — SIGNIFICANT CHANGE UP (ref 22–31)
COLOR SPEC: SIGNIFICANT CHANGE UP
CREAT SERPL-MCNC: 0.66 MG/DL — SIGNIFICANT CHANGE UP (ref 0.5–1.3)
DIFF PNL FLD: ABNORMAL
EOSINOPHIL # BLD AUTO: 0.16 K/UL — SIGNIFICANT CHANGE UP (ref 0–0.5)
EOSINOPHIL NFR BLD AUTO: 1.9 % — SIGNIFICANT CHANGE UP (ref 0–6)
EPI CELLS # UR: 1 /HPF — SIGNIFICANT CHANGE UP (ref 0–5)
GLUCOSE SERPL-MCNC: 91 MG/DL — SIGNIFICANT CHANGE UP (ref 70–99)
GLUCOSE UR QL: NEGATIVE — SIGNIFICANT CHANGE UP
HCT VFR BLD CALC: 39 % — SIGNIFICANT CHANGE UP (ref 34.5–45)
HGB BLD-MCNC: 12.2 G/DL — SIGNIFICANT CHANGE UP (ref 11.5–15.5)
HYALINE CASTS # UR AUTO: 0 /LPF — SIGNIFICANT CHANGE UP (ref 0–7)
IANC: 4.92 K/UL — SIGNIFICANT CHANGE UP (ref 1.5–8.5)
IMM GRANULOCYTES NFR BLD AUTO: 0.2 % — SIGNIFICANT CHANGE UP (ref 0–1.5)
KETONES UR-MCNC: NEGATIVE — SIGNIFICANT CHANGE UP
LEUKOCYTE ESTERASE UR-ACNC: NEGATIVE — SIGNIFICANT CHANGE UP
LIDOCAIN IGE QN: 38 U/L — SIGNIFICANT CHANGE UP (ref 7–60)
LYMPHOCYTES # BLD AUTO: 2.96 K/UL — SIGNIFICANT CHANGE UP (ref 1–3.3)
LYMPHOCYTES # BLD AUTO: 34.5 % — SIGNIFICANT CHANGE UP (ref 13–44)
MCHC RBC-ENTMCNC: 28.4 PG — SIGNIFICANT CHANGE UP (ref 27–34)
MCHC RBC-ENTMCNC: 31.3 GM/DL — LOW (ref 32–36)
MCV RBC AUTO: 90.7 FL — SIGNIFICANT CHANGE UP (ref 80–100)
MONOCYTES # BLD AUTO: 0.49 K/UL — SIGNIFICANT CHANGE UP (ref 0–0.9)
MONOCYTES NFR BLD AUTO: 5.7 % — SIGNIFICANT CHANGE UP (ref 2–14)
NEUTROPHILS # BLD AUTO: 4.92 K/UL — SIGNIFICANT CHANGE UP (ref 1.8–7.4)
NEUTROPHILS NFR BLD AUTO: 57.2 % — SIGNIFICANT CHANGE UP (ref 43–77)
NITRITE UR-MCNC: NEGATIVE — SIGNIFICANT CHANGE UP
NRBC # BLD: 0 /100 WBCS — SIGNIFICANT CHANGE UP
NRBC # FLD: 0 K/UL — SIGNIFICANT CHANGE UP
PH UR: 5 — SIGNIFICANT CHANGE UP (ref 5–8)
PLATELET # BLD AUTO: 267 K/UL — SIGNIFICANT CHANGE UP (ref 150–400)
POTASSIUM SERPL-MCNC: 4.1 MMOL/L — SIGNIFICANT CHANGE UP (ref 3.5–5.3)
POTASSIUM SERPL-SCNC: 4.1 MMOL/L — SIGNIFICANT CHANGE UP (ref 3.5–5.3)
PROT SERPL-MCNC: 7.7 G/DL — SIGNIFICANT CHANGE UP (ref 6–8.3)
PROT UR-MCNC: NEGATIVE — SIGNIFICANT CHANGE UP
RBC # BLD: 4.3 M/UL — SIGNIFICANT CHANGE UP (ref 3.8–5.2)
RBC # FLD: 12.5 % — SIGNIFICANT CHANGE UP (ref 10.3–14.5)
RBC CASTS # UR COMP ASSIST: 8 /HPF — HIGH (ref 0–4)
SARS-COV-2 RNA SPEC QL NAA+PROBE: SIGNIFICANT CHANGE UP
SODIUM SERPL-SCNC: 138 MMOL/L — SIGNIFICANT CHANGE UP (ref 135–145)
SP GR SPEC: 1.01 — SIGNIFICANT CHANGE UP (ref 1–1.05)
TROPONIN T, HIGH SENSITIVITY RESULT: <6 NG/L — SIGNIFICANT CHANGE UP
UROBILINOGEN FLD QL: SIGNIFICANT CHANGE UP
WBC # BLD: 8.59 K/UL — SIGNIFICANT CHANGE UP (ref 3.8–10.5)
WBC # FLD AUTO: 8.59 K/UL — SIGNIFICANT CHANGE UP (ref 3.8–10.5)
WBC UR QL: 1 /HPF — SIGNIFICANT CHANGE UP (ref 0–5)

## 2021-12-18 PROCEDURE — 71046 X-RAY EXAM CHEST 2 VIEWS: CPT | Mod: 26

## 2021-12-18 PROCEDURE — 74177 CT ABD & PELVIS W/CONTRAST: CPT | Mod: 26,MA

## 2021-12-18 PROCEDURE — 74019 RADEX ABDOMEN 2 VIEWS: CPT | Mod: 26

## 2021-12-18 RX ORDER — SODIUM CHLORIDE 9 MG/ML
1000 INJECTION, SOLUTION INTRAVENOUS ONCE
Refills: 0 | Status: COMPLETED | OUTPATIENT
Start: 2021-12-18 | End: 2021-12-18

## 2021-12-18 RX ORDER — FAMOTIDINE 10 MG/ML
20 INJECTION INTRAVENOUS ONCE
Refills: 0 | Status: COMPLETED | OUTPATIENT
Start: 2021-12-18 | End: 2021-12-18

## 2021-12-18 RX ORDER — HYDROMORPHONE HYDROCHLORIDE 2 MG/ML
0.5 INJECTION INTRAMUSCULAR; INTRAVENOUS; SUBCUTANEOUS ONCE
Refills: 0 | Status: DISCONTINUED | OUTPATIENT
Start: 2021-12-18 | End: 2021-12-18

## 2021-12-18 RX ORDER — ONDANSETRON 8 MG/1
4 TABLET, FILM COATED ORAL ONCE
Refills: 0 | Status: COMPLETED | OUTPATIENT
Start: 2021-12-18 | End: 2021-12-18

## 2021-12-18 RX ADMIN — FAMOTIDINE 20 MILLIGRAM(S): 10 INJECTION INTRAVENOUS at 04:28

## 2021-12-18 RX ADMIN — Medication 30 MILLILITER(S): at 04:28

## 2021-12-18 RX ADMIN — ONDANSETRON 4 MILLIGRAM(S): 8 TABLET, FILM COATED ORAL at 01:22

## 2021-12-18 RX ADMIN — HYDROMORPHONE HYDROCHLORIDE 0.5 MILLIGRAM(S): 2 INJECTION INTRAMUSCULAR; INTRAVENOUS; SUBCUTANEOUS at 03:51

## 2021-12-18 RX ADMIN — SODIUM CHLORIDE 1000 MILLILITER(S): 9 INJECTION, SOLUTION INTRAVENOUS at 01:19

## 2021-12-18 RX ADMIN — HYDROMORPHONE HYDROCHLORIDE 0.5 MILLIGRAM(S): 2 INJECTION INTRAMUSCULAR; INTRAVENOUS; SUBCUTANEOUS at 01:28

## 2021-12-18 NOTE — ED PROVIDER NOTE - NSICDXPASTMEDICALHX_GEN_ALL_CORE_FT
PAST MEDICAL HISTORY:  Constipation     Diverticulitis ~ recurrent    Endometrial cancer     Irritable bowel syndrome (IBS) ~ Constipation type    Ovarian cyst

## 2021-12-18 NOTE — ED PROVIDER NOTE - PROGRESS NOTE DETAILS
Yvonne Blank DO (PGY1): Pt CT scan and XR negative for SBO. Pt tolerating PO. Pt made aware of lab and imaging results. Questions regarding their symptoms were addressed. Advised to follow up with GI. Given strict return precautions. Pt verbalized understanding.

## 2021-12-18 NOTE — ED PROVIDER NOTE - CARE PLAN
1 Principal Discharge DX:	Small bowel obstruction   Principal Discharge DX:	Abdominal pain with vomiting

## 2021-12-18 NOTE — ED PROVIDER NOTE - NSFOLLOWUPINSTRUCTIONS_ED_ALL_ED_FT
1. TAKE ALL MEDICATIONS AS DIRECTED.  FOR PAIN YOU CAN TAKE ACETAMINOPHEN (TYLENOL) AS NEEDED, AS DIRECTED ON PACKAGING.  2. FOLLOW UP WITH Gasteroenterologist.  YOU WERE GIVEN COPIES OF ALL LABS AND IMAGING RESULTS FROM YOUR ER VISIT--PLEASE TAKE THEM WITH YOU TO YOUR APPOINTMENT.  3. IF NEEDED, CALL [phone] TO FIND A PRIMARY CARE PHYSICIAN.  OR CALL [phone] TO MAKE AN APPOINTMENT WITH THE MEDICAL CLINIC.  4. RETURN TO THE ER FOR ANY WORSENING SYMPTOMS.     Nausea / Vomiting    Nausea is the feeling that you have to vomit. As nausea gets worse, it can lead to vomiting. Vomiting puts you at an increased risk for dehydration. Older adults and people with other diseases or a weak immune system are at higher risk for dehydration. Drink clear fluids in small but frequent amounts as tolerated. Eat bland, easy-to-digest foods in small amounts as tolerated.    SEEK IMMEDIATE MEDICAL CARE IF YOU HAVE ANY OF THE FOLLOWING SYMPTOMS: fever, inability to keep sufficient fluids down, black or bloody vomitus, black or bloody stools, lightheadedness/dizziness, chest pain, severe headache, rash, shortness of breath, cold or clammy skin, confusion, pain with urination, or any signs of dehydration.     Abdominal Pain    Many things can cause abdominal pain. Many times, abdominal pain is not caused by a disease and will improve without treatment. Your health care provider will do a physical exam to determine if there is a dangerous cause of your pain; blood tests and imaging may help determine the cause of your pain. However, in many cases, no cause may be found and you may need further testing as an outpatient. Monitor your abdominal pain for any changes.     SEEK IMMEDIATE MEDICAL CARE IF YOU HAVE ANY OF THE FOLLOWING SYMPTOMS: worsening abdominal pain, uncontrollable vomiting, profuse diarrhea, inability to have bowel movements or pass gas, black or bloody stools, fever accompanying chest pain or back pain, or fainting. These symptoms may represent a serious problem that is an emergency. Do not wait to see if the symptoms will go away. Get medical help right away. Call [phone] and do not drive yourself to the hospital.

## 2021-12-18 NOTE — ED PROVIDER NOTE - ATTENDING CONTRIBUTION TO CARE
47 y/o F with PMH HTN, uterine ca s/p hysterectomy, diverticulitis p/w increased lower abdominal pain x2 days. Pt reports having increased lower abdominal pain which is sharp in nature accompanied by worsening abdominal swelling and vomiting. Pt states her last bm was 3 days ago. Pt states pain is 8/10 throbbing in nature and comes in waves she states he is no passing gas. She reports vomiting earlier today. she denies chest pain, fever, chills, diarrhea, dysuria. she took some motrin w/ mild improvement   denies fever, chills, chest pain, SOB, +Abdominal pain, diarrhea, dysuria, syncope, bleeding, new rash,weakness, numbness, blurred vision  + vomiting  ROS  otherwise negative as per HPI  Gen: Awake, Alert, WD, WN, NAD  Head:  NC/AT  Eyes:  PERRL, EOMI, Conjunctiva pink, lids normal, no scleral icterus  ENT: OP clear, no exudates,  moist mucus membranes  Neck: supple, nontender, no meningismus, no JVD, trachea midline  Cardiac/CV:  S1 S2, RRR, no M/G/R  Respiratory/Pulm:  CTAB, good air movement, normal resp effort, no wheezes/stridor/retractions/rales/rhonchi  Gastrointestinal/Abdomen:  Soft, tender epigastric distended, decreased BS, no rebound/guarding  Back:  no CVAT, no MLT  Ext:  warm, well perfused, moving all extremities spontaneously, no peripheral edema, distal pulses intact  Skin: intact, no rash  Neuro:  AAOx3, sensation intact, motor 5/5 x 4 extremities, normal gait, speech clear  MDM As above

## 2021-12-18 NOTE — ED PROVIDER NOTE - NSFOLLOWUPCLINICS_GEN_ALL_ED_FT
Gastroenterology at [facility]  Gastroenterology  [address] [place] [address]  Phone: [phone]  Fax:     [facility]  Gastroenterology  [address] [place] [id number]  Phone: [phone]  Fax:

## 2021-12-18 NOTE — ED PROVIDER NOTE - PATIENT PORTAL LINK FT
You can access the FollowMyHealth Patient Portal offered by [facility] by registering at the following website: [email or web address]. By joining [employer]’s FollowMyHealth portal, you will also be able to view your health information using other applications (apps) compatible with our system.

## 2021-12-18 NOTE — ED PROVIDER NOTE - NSICDXFAMILYHX_GEN_ALL_CORE_FT
FAMILY HISTORY:  Family history of blood disease, ~ uncle  Family history of bone cancer, ~ grandmother  Family history of smoking, ~ father

## 2021-12-18 NOTE — ED PROVIDER NOTE - PHYSICAL EXAMINATION
GENERAL: Awake. Alert. NAD. Well nourished.  HEENT: NC/AT, Conjunctiva pink, no scleral icterus. Airway patent. Moist mucous membranes.  LUNGS: CTAB. No wheezes or rales noted.  CARDIAC: Chest non-tender to palpation. RRR. S1 and S2 intact. No murmurs noted.  ABDOMEN: No masses noted. Soft, distended, TTP in RUQ and LUQ, no rebound, no guarding.  BACK: No midline spinal tenderness, no CVA tenderness  EXT: No edema, no calf tenderness, distal pulses 2+ bilaterally  NEURO: A&Ox3. Moving all extremities. Sensation and strength intact throughout.   SKIN: Warm and dry.   PSYCH: Normal affect.

## 2021-12-18 NOTE — ED PROVIDER NOTE - CLINICAL SUMMARY MEDICAL DECISION MAKING FREE TEXT BOX
47 y/o F with PMH HTN, uterine ca s/p hysterectomy, diverticulitis p/w increased lower abdominal pain x2 days and vomiting along with abdominal distention. last bm 3 days prior, pain in waves, concern for bowel obstruction vs diverticulitis less likely pancreatitis. cbc, cmp, pain control, IVF, cxr, covid, ct abdomen pelvis.

## 2021-12-18 NOTE — ED ADULT NURSE NOTE - OBJECTIVE STATEMENT
Received patient with c/o abdominal and back pain with nausea states she feel like her abdomen is getting bigger, labs drawn and sent, medicated as ordered, NAD noted will continue to monitor pending further evaluation.

## 2021-12-18 NOTE — ED PROVIDER NOTE - OBJECTIVE STATEMENT
46F PMH uterine cancer s/p hysterectomy, diverticulitis, HTN complaining of 2 days of constant lower abdominal pain, associated with nausea and NBNB vomiting 46F PMH uterine cancer s/p hysterectomy, diverticulitis, HTN complaining of 2 days of constant lower abdominal pain, associated with nausea and NBNB vomiting, last bowel movement 3 days ago, not passing gas. Reports abdominal bloating and distention over the past 2 days and SOB today due to bloating. Denies chest pain, fever, chills, cough, headache, lightheadedness, weakness, dysuria, hematuria.

## 2021-12-20 ENCOUNTER — EMERGENCY (EMERGENCY)
Facility: HOSPITAL | Age: 46
LOS: 1 days | Discharge: HOME | End: 2021-12-20
Attending: HOSPITALIST | Admitting: EMERGENCY MEDICINE
Payer: COMMERCIAL

## 2021-12-20 VITALS
RESPIRATION RATE: 16 BRPM | HEART RATE: 87 BPM | HEIGHT: 60 IN | DIASTOLIC BLOOD PRESSURE: 85 MMHG | TEMPERATURE: 99 F | OXYGEN SATURATION: 100 % | SYSTOLIC BLOOD PRESSURE: 128 MMHG

## 2021-12-20 DIAGNOSIS — Z90.710 ACQUIRED ABSENCE OF BOTH CERVIX AND UTERUS: Chronic | ICD-10-CM

## 2021-12-20 PROCEDURE — 99285 EMERGENCY DEPT VISIT HI MDM: CPT

## 2021-12-20 NOTE — ED ADULT NURSE NOTE - OBJECTIVE STATEMENT
Patient is awake, A&O x 4, appears uncomfortable but in no apparent distress.  Presents to ED for severe lower abdominal pain and not resolved with OTC meds.  Patient was seen 2 days ago in ED, had CT scan but sx not resolved.  No nausea, vomiting or diarrhea.  Respirations equal and labored.  20g IV right AC, labs drawn and sent and will continue to monitor patient.

## 2021-12-20 NOTE — ED ADULT NURSE NOTE - CHIEF COMPLAINT QUOTE
Pt c/o intermittent lower abdominal pain beginning 6 hours ago. states vomited 1 time. Took Motrin without relief. denies urinary symptom, fever/chills. appears to be in no distress. hx. uterine CA.

## 2021-12-21 VITALS
DIASTOLIC BLOOD PRESSURE: 78 MMHG | OXYGEN SATURATION: 100 % | SYSTOLIC BLOOD PRESSURE: 123 MMHG | TEMPERATURE: 99 F | RESPIRATION RATE: 18 BRPM | HEART RATE: 61 BPM

## 2021-12-21 LAB
-  AMIKACIN: SIGNIFICANT CHANGE UP
-  AMOXICILLIN/CLAVULANIC ACID: SIGNIFICANT CHANGE UP
-  AMPICILLIN/SULBACTAM: SIGNIFICANT CHANGE UP
-  AMPICILLIN: SIGNIFICANT CHANGE UP
-  AZTREONAM: SIGNIFICANT CHANGE UP
-  CEFAZOLIN: SIGNIFICANT CHANGE UP
-  CEFEPIME: SIGNIFICANT CHANGE UP
-  CEFOXITIN: SIGNIFICANT CHANGE UP
-  CEFTRIAXONE: SIGNIFICANT CHANGE UP
-  CIPROFLOXACIN: SIGNIFICANT CHANGE UP
-  ERTAPENEM: SIGNIFICANT CHANGE UP
-  GENTAMICIN: SIGNIFICANT CHANGE UP
-  IMIPENEM: SIGNIFICANT CHANGE UP
-  LEVOFLOXACIN: SIGNIFICANT CHANGE UP
-  MEROPENEM: SIGNIFICANT CHANGE UP
-  NITROFURANTOIN: SIGNIFICANT CHANGE UP
-  PIPERACILLIN/TAZOBACTAM: SIGNIFICANT CHANGE UP
-  TIGECYCLINE: SIGNIFICANT CHANGE UP
-  TOBRAMYCIN: SIGNIFICANT CHANGE UP
-  TRIMETHOPRIM/SULFAMETHOXAZOLE: SIGNIFICANT CHANGE UP
ALBUMIN SERPL ELPH-MCNC: 4.7 G/DL — SIGNIFICANT CHANGE UP (ref 3.3–5)
ALP SERPL-CCNC: 87 U/L — SIGNIFICANT CHANGE UP (ref 40–120)
ALT FLD-CCNC: 13 U/L — SIGNIFICANT CHANGE UP (ref 4–33)
ANION GAP SERPL CALC-SCNC: 13 MMOL/L — SIGNIFICANT CHANGE UP (ref 7–14)
APPEARANCE UR: ABNORMAL
AST SERPL-CCNC: 22 U/L — SIGNIFICANT CHANGE UP (ref 4–32)
BACTERIA # UR AUTO: ABNORMAL
BASOPHILS # BLD AUTO: 0.04 K/UL — SIGNIFICANT CHANGE UP (ref 0–0.2)
BASOPHILS NFR BLD AUTO: 0.4 % — SIGNIFICANT CHANGE UP (ref 0–2)
BILIRUB SERPL-MCNC: 0.2 MG/DL — SIGNIFICANT CHANGE UP (ref 0.2–1.2)
BILIRUB UR-MCNC: NEGATIVE — SIGNIFICANT CHANGE UP
BUN SERPL-MCNC: 12 MG/DL — SIGNIFICANT CHANGE UP (ref 7–23)
CALCIUM SERPL-MCNC: 9.9 MG/DL — SIGNIFICANT CHANGE UP (ref 8.4–10.5)
CHLORIDE SERPL-SCNC: 102 MMOL/L — SIGNIFICANT CHANGE UP (ref 98–107)
CO2 SERPL-SCNC: 22 MMOL/L — SIGNIFICANT CHANGE UP (ref 22–31)
COLOR SPEC: SIGNIFICANT CHANGE UP
CREAT SERPL-MCNC: 0.65 MG/DL — SIGNIFICANT CHANGE UP (ref 0.5–1.3)
CULTURE RESULTS: SIGNIFICANT CHANGE UP
DIFF PNL FLD: ABNORMAL
EOSINOPHIL # BLD AUTO: 0.13 K/UL — SIGNIFICANT CHANGE UP (ref 0–0.5)
EOSINOPHIL NFR BLD AUTO: 1.2 % — SIGNIFICANT CHANGE UP (ref 0–6)
EPI CELLS # UR: 19 /HPF — HIGH (ref 0–5)
GLUCOSE SERPL-MCNC: 99 MG/DL — SIGNIFICANT CHANGE UP (ref 70–99)
GLUCOSE UR QL: NEGATIVE — SIGNIFICANT CHANGE UP
HCG SERPL-ACNC: <5 MIU/ML — SIGNIFICANT CHANGE UP
HCT VFR BLD CALC: 40.6 % — SIGNIFICANT CHANGE UP (ref 34.5–45)
HGB BLD-MCNC: 12.8 G/DL — SIGNIFICANT CHANGE UP (ref 11.5–15.5)
HYALINE CASTS # UR AUTO: 1 /LPF — SIGNIFICANT CHANGE UP (ref 0–7)
IANC: 6.35 K/UL — SIGNIFICANT CHANGE UP (ref 1.5–8.5)
IMM GRANULOCYTES NFR BLD AUTO: 0.3 % — SIGNIFICANT CHANGE UP (ref 0–1.5)
KETONES UR-MCNC: NEGATIVE — SIGNIFICANT CHANGE UP
LEUKOCYTE ESTERASE UR-ACNC: NEGATIVE — SIGNIFICANT CHANGE UP
LIDOCAIN IGE QN: 36 U/L — SIGNIFICANT CHANGE UP (ref 7–60)
LYMPHOCYTES # BLD AUTO: 3.32 K/UL — HIGH (ref 1–3.3)
LYMPHOCYTES # BLD AUTO: 31.6 % — SIGNIFICANT CHANGE UP (ref 13–44)
MCHC RBC-ENTMCNC: 28.6 PG — SIGNIFICANT CHANGE UP (ref 27–34)
MCHC RBC-ENTMCNC: 31.5 GM/DL — LOW (ref 32–36)
MCV RBC AUTO: 90.8 FL — SIGNIFICANT CHANGE UP (ref 80–100)
METHOD TYPE: SIGNIFICANT CHANGE UP
MONOCYTES # BLD AUTO: 0.62 K/UL — SIGNIFICANT CHANGE UP (ref 0–0.9)
MONOCYTES NFR BLD AUTO: 5.9 % — SIGNIFICANT CHANGE UP (ref 2–14)
NEUTROPHILS # BLD AUTO: 6.35 K/UL — SIGNIFICANT CHANGE UP (ref 1.8–7.4)
NEUTROPHILS NFR BLD AUTO: 60.6 % — SIGNIFICANT CHANGE UP (ref 43–77)
NITRITE UR-MCNC: NEGATIVE — SIGNIFICANT CHANGE UP
NRBC # BLD: 0 /100 WBCS — SIGNIFICANT CHANGE UP
NRBC # FLD: 0 K/UL — SIGNIFICANT CHANGE UP
ORGANISM # SPEC MICROSCOPIC CNT: SIGNIFICANT CHANGE UP
ORGANISM # SPEC MICROSCOPIC CNT: SIGNIFICANT CHANGE UP
PH UR: 7 — SIGNIFICANT CHANGE UP (ref 5–8)
PLATELET # BLD AUTO: 289 K/UL — SIGNIFICANT CHANGE UP (ref 150–400)
POTASSIUM SERPL-MCNC: 4.4 MMOL/L — SIGNIFICANT CHANGE UP (ref 3.5–5.3)
POTASSIUM SERPL-SCNC: 4.4 MMOL/L — SIGNIFICANT CHANGE UP (ref 3.5–5.3)
PROT SERPL-MCNC: 7.7 G/DL — SIGNIFICANT CHANGE UP (ref 6–8.3)
PROT UR-MCNC: ABNORMAL
RBC # BLD: 4.47 M/UL — SIGNIFICANT CHANGE UP (ref 3.8–5.2)
RBC # FLD: 12.6 % — SIGNIFICANT CHANGE UP (ref 10.3–14.5)
RBC CASTS # UR COMP ASSIST: 2 /HPF — SIGNIFICANT CHANGE UP (ref 0–4)
SARS-COV-2 RNA SPEC QL NAA+PROBE: SIGNIFICANT CHANGE UP
SODIUM SERPL-SCNC: 137 MMOL/L — SIGNIFICANT CHANGE UP (ref 135–145)
SP GR SPEC: 1.01 — SIGNIFICANT CHANGE UP (ref 1–1.05)
SPECIMEN SOURCE: SIGNIFICANT CHANGE UP
UROBILINOGEN FLD QL: SIGNIFICANT CHANGE UP
WBC # BLD: 10.49 K/UL — SIGNIFICANT CHANGE UP (ref 3.8–10.5)
WBC # FLD AUTO: 10.49 K/UL — SIGNIFICANT CHANGE UP (ref 3.8–10.5)
WBC UR QL: 3 /HPF — SIGNIFICANT CHANGE UP (ref 0–5)

## 2021-12-21 PROCEDURE — 74177 CT ABD & PELVIS W/CONTRAST: CPT | Mod: 26,MA

## 2021-12-21 RX ORDER — SODIUM CHLORIDE 9 MG/ML
1000 INJECTION INTRAMUSCULAR; INTRAVENOUS; SUBCUTANEOUS ONCE
Refills: 0 | Status: COMPLETED | OUTPATIENT
Start: 2021-12-21 | End: 2021-12-21

## 2021-12-21 RX ORDER — ACETAMINOPHEN 500 MG
1000 TABLET ORAL ONCE
Refills: 0 | Status: COMPLETED | OUTPATIENT
Start: 2021-12-21 | End: 2021-12-21

## 2021-12-21 RX ORDER — METRONIDAZOLE 500 MG
500 TABLET ORAL ONCE
Refills: 0 | Status: COMPLETED | OUTPATIENT
Start: 2021-12-21 | End: 2021-12-21

## 2021-12-21 RX ORDER — MOXIFLOXACIN HYDROCHLORIDE TABLETS, 400 MG 400 MG/1
1 TABLET, FILM COATED ORAL
Qty: 20 | Refills: 0
Start: 2021-12-21 | End: 2021-12-30

## 2021-12-21 RX ORDER — METRONIDAZOLE 500 MG
1 TABLET ORAL
Qty: 20 | Refills: 0
Start: 2021-12-21 | End: 2021-12-30

## 2021-12-21 RX ORDER — CIPROFLOXACIN LACTATE 400MG/40ML
500 VIAL (ML) INTRAVENOUS ONCE
Refills: 0 | Status: COMPLETED | OUTPATIENT
Start: 2021-12-21 | End: 2021-12-21

## 2021-12-21 RX ADMIN — Medication 500 MILLIGRAM(S): at 07:05

## 2021-12-21 RX ADMIN — Medication 400 MILLIGRAM(S): at 02:09

## 2021-12-21 RX ADMIN — SODIUM CHLORIDE 1000 MILLILITER(S): 9 INJECTION INTRAMUSCULAR; INTRAVENOUS; SUBCUTANEOUS at 01:59

## 2021-12-21 RX ADMIN — Medication 100 MILLIGRAM(S): at 07:05

## 2021-12-21 NOTE — ED PROVIDER NOTE - CLINICAL SUMMARY MEDICAL DECISION MAKING FREE TEXT BOX
46F with worsening abdominal pain and difficulty having a BM with nausea. r/o SBO. patient with normal appearing CT 3 days ago. will rpt, d/w patient and she is comfortable with plan. will add tvus.

## 2021-12-21 NOTE — ED PROVIDER NOTE - OBJECTIVE STATEMENT
46F with hx of uterine ca s/p hysterectomy, diverticulitis, p/w worsening and different lower abdominal pain. patient states she get intermittent severer cramping and tries to have a BM but not much comes out. she feels nausea and vomited once. abdomen appears more distended as well. no fever or dysuria. states pain is different than when she was seen 3 days ago.

## 2021-12-21 NOTE — ED PROVIDER NOTE - PATIENT PORTAL LINK FT
You can access the FollowMyHealth Patient Portal offered by [facility] by registering at the following website: [email or web address]. By joining [employer]’s FollowMyHealth portal, you will also be able to view your health information using other applications (apps) compatible with our system.

## 2021-12-21 NOTE — ED PROVIDER NOTE - NSFOLLOWUPINSTRUCTIONS_ED_ALL_ED_FT
Billy Zhang (DO)  Gastroenterology; Internal Medicine  [id number]  [place] [address]  Phone: [phone]  Fax: [phone]    Wilber Kam)  ColonRectal Surgery; Surgery  [facility], [address] [place] [address]  Phone: [phone]  Fax: [phone]    Diverticulitis    WHAT YOU NEED TO KNOW:    Diverticulitis is a condition that causes small pockets along your intestine called diverticula to become inflamed or infected. This is caused by hard bowel movements, food, or bacteria that get stuck in the pockets.    DISCHARGE INSTRUCTIONS:    Seek care immediately if:    You have bowel movement or foul-smelling discharge leaking from your vagina or in your urine.    You have severe diarrhea.    You urinate less than usual or not at all.    You are not able to have a bowel movement.    You cannot stop vomiting.    You have severe abdominal pain, a fever, and your abdomen is larger than usual.    You have new or increased blood in your bowel movements.  Contact your healthcare provider if:    You have pain when you urinate.    Your symptoms get worse or do not go away.    You have questions or concerns about your condition or care.  Medicines:    Antibiotics may be given to help prevent or treat a bacterial infection.    Prescription pain medicine may be given. Ask your healthcare provider how to take this medicine safely. Some prescription pain medicines contain acetaminophen. Do not take other medicines that contain acetaminophen without talking to your healthcare provider. Too much acetaminophen may cause liver damage. Prescription pain medicine may cause constipation. Ask your healthcare provider how to prevent or treat constipation.    Take your medicine as directed. Contact your healthcare provider if you think your medicine is not helping or if you have side effects. Tell him or her if you are allergic to any medicine. Keep a list of the medicines, vitamins, and herbs you take. Include the amounts, and when and why you take them. Bring the list or the pill bottles to follow-up visits. Carry your medicine list with you in case of an emergency.  Clear liquid diet: A clear liquid diet includes any liquids that you can see through. Examples include water, ginger-olamide, cranberry or apple juice, frozen fruit ice, or broth. Stay on a clear liquid diet until your symptoms are gone, or as directed.    Follow up with your healthcare provider as directed: You may need to return for a colonoscopy. When your symptoms are gone, you may need a low-fat, high-fiber diet to help prevent diverticulitis from developing again. Your healthcare provider or dietitian can help you create meal plans. Write down your questions so you remember to ask them during your visits.    Diverticulitis Diet    WHAT YOU NEED TO KNOW:    A diverticulitis diet includes foods that allow your intestines to rest while you have diverticulitis. Diverticulitis is a condition that causes small pockets along your intestine called diverticula to become inflamed or infected. This is caused by hard bowel movement, food, or bacteria that get stuck in the pockets.    DISCHARGE INSTRUCTIONS:    Foods that may be recommended while you have diverticulitis:    A clear liquid diet may be recommended for 2 to 3 days. A clear liquid diet includes clear liquids, and foods that are liquid at room temperature. Examples include the following:  Water and clear juices (such as apple, cranberry, or grape), strained citrus juices or fruit punch    Coffee or tea (without cream or milk)    Clear sports drinks or soft drinks, such as ginger ale, lemon-lime soda, or club soda (no cola or root beer)    Clear broth, bouillon, or consommé    Plain popsicles (no popsicles with pureed fruit or fiber)    Flavored gelatin without fruit    Low-fiber foods may be recommended until your symptoms improve. Examples include the following:  Cream of wheat and finely ground grits    White bread, white pasta, and white rice    Canned and well-cooked fruit without skins or seeds, and juice without pulp    Canned and well-cooked vegetables without skins or seeds, and vegetable juice    Cow's milk, lactose-free milk, soy milk, and rice milk    Yogurt, cottage cheese, and sherbet    Eggs, poultry (such as chicken and turkey), fish, and tender, ground, well-cooked beef    Tofu and smooth nut butters, such as peanut butter    Broth and strained soups made of low-fiber foods  High-fiber foods can help prevent diverticulosis and diverticulitis. Your healthcare provider will tell you when you can add high-fiber foods back into your diet. Examples include the following:    Whole grains and breads, and cereals made with whole grains    Dried fruit, fresh fruit with skin, and fruit pulp    Raw vegetables    Cooked greens, such as spinach    Tough meat and meat with gristle    Legumes, such as nova beans and lentils  Contact your healthcare provider if:    Your symptoms do not get better, or they get worse.    You have questions about the foods you should eat.    You have questions or concerns about your condition or care.

## 2021-12-22 LAB
CULTURE RESULTS: SIGNIFICANT CHANGE UP
SPECIMEN SOURCE: SIGNIFICANT CHANGE UP

## 2022-01-04 ENCOUNTER — APPOINTMENT (OUTPATIENT)
Dept: COLORECTAL SURGERY | Facility: CLINIC | Age: 47
End: 2022-01-04

## 2022-01-11 ENCOUNTER — APPOINTMENT (OUTPATIENT)
Dept: COLORECTAL SURGERY | Facility: CLINIC | Age: 47
End: 2022-01-11
Payer: COMMERCIAL

## 2022-01-11 VITALS
HEART RATE: 88 BPM | RESPIRATION RATE: 14 BRPM | WEIGHT: 145 LBS | DIASTOLIC BLOOD PRESSURE: 82 MMHG | OXYGEN SATURATION: 100 % | TEMPERATURE: 98.3 F | BODY MASS INDEX: 27.38 KG/M2 | HEIGHT: 61 IN | SYSTOLIC BLOOD PRESSURE: 126 MMHG

## 2022-01-11 DIAGNOSIS — Z87.19 PERSONAL HISTORY OF OTHER DISEASES OF THE DIGESTIVE SYSTEM: ICD-10-CM

## 2022-01-11 DIAGNOSIS — Z78.9 OTHER SPECIFIED HEALTH STATUS: ICD-10-CM

## 2022-01-11 PROCEDURE — 99204 OFFICE O/P NEW MOD 45 MIN: CPT

## 2022-01-11 NOTE — PHYSICAL EXAM
[Abdomen Tenderness] : ~T ~M Abdominal tenderness [Normal Breath Sounds] : Normal breath sounds [Normal Heart Sounds] : normal heart sounds [Normal Rate and Rhythm] : normal rate and rhythm [Alert] : alert [Oriented to Person] : oriented to person [Oriented to Place] : oriented to place [Oriented to Time] : oriented to time [Calm] : calm [de-identified] : round +BS  [de-identified] : well nourished female [de-identified] : NC/AT [de-identified] : +ROM [de-identified] : intact

## 2022-01-11 NOTE — ASSESSMENT
[FreeTextEntry1] : Acute on chronic diverticulitis\par -Given acute abdominal pain, I recommended patient restarted antibiotics.\par -She will call the office in one week, If no improvement, we'll repeat imaging\par -I recommended patient undergo laparoscopic-assisted sigmoid colon resection for recurrent diverticulitis and likely chronic smoldering diverticular disease. We discussed risks and benefits of the procedure at length including bleeding, infection, risk of injury nearby structures, possible anastomotic dehiscence and possible stoma creation\par -Enhance recovery protocol was reviewed\par -All questions were answered\par -Bowel prep to be given\par -Patient is near to proceed and wishes to schedule. We will schedule after patient colonoscopy on February 5\par -CT scans and imaging were reviewed

## 2022-01-11 NOTE — HISTORY OF PRESENT ILLNESS
[FreeTextEntry1] : 47yo F pt with known diverticulosis presents with constant lower abd pain. Pt just finished her abx PCN from her GI Dr starting 12/23/2021. Pt referred here for diverticular surgery consultation. \par Pt has multiple Episodes of diverticulitis Most recent on12/21/21.  Additional CT scan performed in December 2020.  Patient reports multiple other episodes occurring intermittently. and other 2 times.  She recently came off a round of antibiotics and reports increasing pain since that time. No fevers or chills no nausea or vomiting. Past medical history of hysterectomy and abdominoplasty.\par Last colonoscopy was 2020 Which was otherwise normal. She is currently scheduled for a colonoscopy on February 5 with GI Dr. Zhang. \par She denies family history of colon cancer or inflammatory bowel disease\par Patient was referred from hospitalist after recent hospitalization

## 2022-01-11 NOTE — CONSULT LETTER
[Dear  ___] : Dear  [unfilled], [Consult Letter:] : I had the pleasure of evaluating your patient, [unfilled]. [Please see my note below.] : Please see my note below. [Consult Closing:] : Thank you very much for allowing me to participate in the care of this patient.  If you have any questions, please do not hesitate to contact me. [Sincerely,] : Sincerely, [FreeTextEntry2] : [place] Cade [FreeTextEntry3] : Wilber Kam MD FACS\par Chief Colon and Rectal Surgery\par [facility]

## 2022-01-24 ENCOUNTER — OUTPATIENT (OUTPATIENT)
Dept: OUTPATIENT SERVICES | Facility: HOSPITAL | Age: 47
LOS: 1 days | End: 2022-01-24

## 2022-01-24 VITALS
RESPIRATION RATE: 15 BRPM | HEIGHT: 60 IN | WEIGHT: 160.06 LBS | SYSTOLIC BLOOD PRESSURE: 133 MMHG | DIASTOLIC BLOOD PRESSURE: 94 MMHG | OXYGEN SATURATION: 98 % | HEART RATE: 97 BPM | TEMPERATURE: 99 F

## 2022-01-24 DIAGNOSIS — Z98.890 OTHER SPECIFIED POSTPROCEDURAL STATES: Chronic | ICD-10-CM

## 2022-01-24 DIAGNOSIS — Z90.710 ACQUIRED ABSENCE OF BOTH CERVIX AND UTERUS: Chronic | ICD-10-CM

## 2022-01-24 DIAGNOSIS — K57.92 DIVERTICULITIS OF INTESTINE, PART UNSPECIFIED, WITHOUT PERFORATION OR ABSCESS WITHOUT BLEEDING: ICD-10-CM

## 2022-01-24 LAB
A1C WITH ESTIMATED AVERAGE GLUCOSE RESULT: 5.8 % — HIGH (ref 4–5.6)
ALBUMIN SERPL ELPH-MCNC: 4.7 G/DL — SIGNIFICANT CHANGE UP (ref 3.3–5)
ALP SERPL-CCNC: 106 U/L — SIGNIFICANT CHANGE UP (ref 40–120)
ALT FLD-CCNC: 13 U/L — SIGNIFICANT CHANGE UP (ref 4–33)
ANION GAP SERPL CALC-SCNC: 13 MMOL/L — SIGNIFICANT CHANGE UP (ref 7–14)
AST SERPL-CCNC: 21 U/L — SIGNIFICANT CHANGE UP (ref 4–32)
BILIRUB SERPL-MCNC: 0.3 MG/DL — SIGNIFICANT CHANGE UP (ref 0.2–1.2)
BLD GP AB SCN SERPL QL: NEGATIVE — SIGNIFICANT CHANGE UP
BUN SERPL-MCNC: 10 MG/DL — SIGNIFICANT CHANGE UP (ref 7–23)
CALCIUM SERPL-MCNC: 10.1 MG/DL — SIGNIFICANT CHANGE UP (ref 8.4–10.5)
CHLORIDE SERPL-SCNC: 102 MMOL/L — SIGNIFICANT CHANGE UP (ref 98–107)
CO2 SERPL-SCNC: 21 MMOL/L — LOW (ref 22–31)
CREAT SERPL-MCNC: 0.72 MG/DL — SIGNIFICANT CHANGE UP (ref 0.5–1.3)
ESTIMATED AVERAGE GLUCOSE: 120 — SIGNIFICANT CHANGE UP
GLUCOSE SERPL-MCNC: 109 MG/DL — HIGH (ref 70–99)
HCT VFR BLD CALC: 39.8 % — SIGNIFICANT CHANGE UP (ref 34.5–45)
HGB BLD-MCNC: 13.1 G/DL — SIGNIFICANT CHANGE UP (ref 11.5–15.5)
MCHC RBC-ENTMCNC: 28.5 PG — SIGNIFICANT CHANGE UP (ref 27–34)
MCHC RBC-ENTMCNC: 32.9 GM/DL — SIGNIFICANT CHANGE UP (ref 32–36)
MCV RBC AUTO: 86.7 FL — SIGNIFICANT CHANGE UP (ref 80–100)
NRBC # BLD: 0 /100 WBCS — SIGNIFICANT CHANGE UP
NRBC # FLD: 0 K/UL — SIGNIFICANT CHANGE UP
PLATELET # BLD AUTO: 294 K/UL — SIGNIFICANT CHANGE UP (ref 150–400)
POTASSIUM SERPL-MCNC: 3.6 MMOL/L — SIGNIFICANT CHANGE UP (ref 3.5–5.3)
POTASSIUM SERPL-SCNC: 3.6 MMOL/L — SIGNIFICANT CHANGE UP (ref 3.5–5.3)
PROT SERPL-MCNC: 7.7 G/DL — SIGNIFICANT CHANGE UP (ref 6–8.3)
RBC # BLD: 4.59 M/UL — SIGNIFICANT CHANGE UP (ref 3.8–5.2)
RBC # FLD: 12.5 % — SIGNIFICANT CHANGE UP (ref 10.3–14.5)
RH IG SCN BLD-IMP: POSITIVE — SIGNIFICANT CHANGE UP
SODIUM SERPL-SCNC: 136 MMOL/L — SIGNIFICANT CHANGE UP (ref 135–145)
WBC # BLD: 6.92 K/UL — SIGNIFICANT CHANGE UP (ref 3.8–10.5)
WBC # FLD AUTO: 6.92 K/UL — SIGNIFICANT CHANGE UP (ref 3.8–10.5)

## 2022-01-24 RX ORDER — SODIUM CHLORIDE 9 MG/ML
1000 INJECTION, SOLUTION INTRAVENOUS
Refills: 0 | Status: DISCONTINUED | OUTPATIENT
Start: 2022-02-07 | End: 2022-02-07

## 2022-01-24 NOTE — H&P PST ADULT - NSICDXPASTMEDICALHX_GEN_ALL_CORE_FT
PAST MEDICAL HISTORY:  Diverticulitis ~ recurrent    Endometrial cancer     Irritable bowel syndrome (IBS) ~ Constipation type    Ovarian cyst

## 2022-01-24 NOTE — H&P PST ADULT - PROBLEM SELECTOR PLAN 1
Scheduled for Laparoscopy Low Anterior Resection on 02/07/22.  Preop instructions provided and patient verbalizes understanding.  Labs done and results pending.  Famotidine provided with instructions. Hibiclens provided with instructions and was signed by patient. Teach-back method was utilized to assess patient's understanding. Patient verbalized understanding.

## 2022-01-24 NOTE — H&P PST ADULT - NSANTHOSAYNRD_GEN_A_CORE
No. THEODORA screening performed.  STOP BANG Legend: 0-2 = LOW Risk; 3-4 = INTERMEDIATE Risk; 5-8 = HIGH Risk

## 2022-01-24 NOTE — H&P PST ADULT - HISTORY OF PRESENT ILLNESS
46 yr old female with c/o "severe" abdominal pains and bloated started 2020 presents for preop evaluation.  Pt was dx with Diverticulitis Part  46 yr old female with c/o "severe" abdominal pains and bloated started 2020 presents for preop evaluation.  Pt was dx with Diverticulitis and is now scheduled for Laparoscopy Low Anterior Resection tentatively on 02/07/22.

## 2022-01-24 NOTE — H&P PST ADULT - NSICDXFAMILYHX_GEN_ALL_CORE_FT
FAMILY HISTORY:  Family history of blood disease, ~ uncle  Family history of bone cancer, ~ grandmother  Family history of smoking, ~ father  FH: heart failure    Mother  Still living? Yes, Estimated age: Age Unknown  Family hx of hypertension, Age at diagnosis: Age Unknown

## 2022-02-04 NOTE — ASU PATIENT PROFILE, ADULT - FALL HARM RISK - UNIVERSAL INTERVENTIONS
Bed in lowest position, wheels locked, appropriate side rails in place/Call bell, personal items and telephone in reach/Instruct patient to call for assistance before getting out of bed or chair/Non-slip footwear when patient is out of bed/[place] to call system/Physically safe environment - no spills, clutter or unnecessary equipment/Purposeful Proactive Rounding/Room/bathroom lighting operational, light cord in reach

## 2022-02-06 ENCOUNTER — TRANSCRIPTION ENCOUNTER (OUTPATIENT)
Age: 47
End: 2022-02-06

## 2022-02-07 ENCOUNTER — RESULT REVIEW (OUTPATIENT)
Age: 47
End: 2022-02-07

## 2022-02-07 ENCOUNTER — APPOINTMENT (OUTPATIENT)
Dept: COLORECTAL SURGERY | Facility: HOSPITAL | Age: 47
End: 2022-02-07
Payer: COMMERCIAL

## 2022-02-07 ENCOUNTER — INPATIENT (INPATIENT)
Facility: HOSPITAL | Age: 47
LOS: 5 days | Discharge: HOME | End: 2022-02-13
Attending: STUDENT IN AN ORGANIZED HEALTH CARE EDUCATION/TRAINING PROGRAM | Admitting: STUDENT IN AN ORGANIZED HEALTH CARE EDUCATION/TRAINING PROGRAM
Payer: COMMERCIAL

## 2022-02-07 VITALS
TEMPERATURE: 98 F | HEART RATE: 80 BPM | DIASTOLIC BLOOD PRESSURE: 71 MMHG | OXYGEN SATURATION: 99 % | RESPIRATION RATE: 16 BRPM | WEIGHT: 160.06 LBS | SYSTOLIC BLOOD PRESSURE: 131 MMHG | HEIGHT: 60 IN

## 2022-02-07 DIAGNOSIS — Z90.710 ACQUIRED ABSENCE OF BOTH CERVIX AND UTERUS: Chronic | ICD-10-CM

## 2022-02-07 DIAGNOSIS — Z98.890 OTHER SPECIFIED POSTPROCEDURAL STATES: Chronic | ICD-10-CM

## 2022-02-07 DIAGNOSIS — K57.92 DIVERTICULITIS OF INTESTINE, PART UNSPECIFIED, WITHOUT PERFORATION OR ABSCESS WITHOUT BLEEDING: ICD-10-CM

## 2022-02-07 LAB
GLUCOSE BLDC GLUCOMTR-MCNC: 126 MG/DL — HIGH (ref 70–99)
GLUCOSE BLDC GLUCOMTR-MCNC: 132 MG/DL — HIGH (ref 70–99)
HCG UR QL: NEGATIVE — SIGNIFICANT CHANGE UP
RH IG SCN BLD-IMP: POSITIVE — SIGNIFICANT CHANGE UP

## 2022-02-07 PROCEDURE — 50715 RELEASE OF URETER: CPT | Mod: LT,59

## 2022-02-07 PROCEDURE — 44213 LAP MOBIL SPLENIC FL ADD-ON: CPT

## 2022-02-07 PROCEDURE — 45330 DIAGNOSTIC SIGMOIDOSCOPY: CPT

## 2022-02-07 PROCEDURE — 88304 TISSUE EXAM BY PATHOLOGIST: CPT | Mod: 26

## 2022-02-07 PROCEDURE — 44207 L COLECTOMY/COLOPROCTOSTOMY: CPT

## 2022-02-07 PROCEDURE — 88307 TISSUE EXAM BY PATHOLOGIST: CPT | Mod: 26

## 2022-02-07 DEVICE — STAPLER COVIDIEN GIA 80-3.0MM PURPLE: Type: IMPLANTABLE DEVICE | Status: FUNCTIONAL

## 2022-02-07 DEVICE — STAPLER COVIDIEN TA 45 BLUE: Type: IMPLANTABLE DEVICE | Status: FUNCTIONAL

## 2022-02-07 RX ORDER — HYDROMORPHONE HYDROCHLORIDE 2 MG/ML
0.5 INJECTION INTRAMUSCULAR; INTRAVENOUS; SUBCUTANEOUS
Refills: 0 | Status: DISCONTINUED | OUTPATIENT
Start: 2022-02-07 | End: 2022-02-08

## 2022-02-07 RX ORDER — GABAPENTIN 400 MG/1
600 CAPSULE ORAL ONCE
Refills: 0 | Status: COMPLETED | OUTPATIENT
Start: 2022-02-07 | End: 2022-02-07

## 2022-02-07 RX ORDER — ONDANSETRON 8 MG/1
4 TABLET, FILM COATED ORAL EVERY 6 HOURS
Refills: 0 | Status: DISCONTINUED | OUTPATIENT
Start: 2022-02-07 | End: 2022-02-10

## 2022-02-07 RX ORDER — HEPARIN SODIUM 5000 [USP'U]/ML
5000 INJECTION INTRAVENOUS; SUBCUTANEOUS EVERY 8 HOURS
Refills: 0 | Status: DISCONTINUED | OUTPATIENT
Start: 2022-02-07 | End: 2022-02-13

## 2022-02-07 RX ORDER — OXYCODONE HYDROCHLORIDE 5 MG/1
5 TABLET ORAL EVERY 4 HOURS
Refills: 0 | Status: DISCONTINUED | OUTPATIENT
Start: 2022-02-07 | End: 2022-02-08

## 2022-02-07 RX ORDER — NALOXONE HYDROCHLORIDE 4 MG/.1ML
0.1 SPRAY NASAL
Refills: 0 | Status: DISCONTINUED | OUTPATIENT
Start: 2022-02-07 | End: 2022-02-10

## 2022-02-07 RX ORDER — SODIUM CHLORIDE 9 MG/ML
1000 INJECTION, SOLUTION INTRAVENOUS
Refills: 0 | Status: DISCONTINUED | OUTPATIENT
Start: 2022-02-07 | End: 2022-02-07

## 2022-02-07 RX ORDER — FENTANYL CITRATE 50 UG/ML
25 INJECTION INTRAVENOUS
Refills: 0 | Status: DISCONTINUED | OUTPATIENT
Start: 2022-02-07 | End: 2022-02-07

## 2022-02-07 RX ORDER — ACETAMINOPHEN 500 MG
1000 TABLET ORAL EVERY 6 HOURS
Refills: 0 | Status: COMPLETED | OUTPATIENT
Start: 2022-02-07 | End: 2022-02-08

## 2022-02-07 RX ORDER — MORPHINE SULFATE 50 MG/1
0.1 CAPSULE, EXTENDED RELEASE ORAL ONCE
Refills: 0 | Status: DISCONTINUED | OUTPATIENT
Start: 2022-02-07 | End: 2022-02-08

## 2022-02-07 RX ORDER — INFLUENZA VIRUS VACCINE 15; 15; 15; 15 UG/.5ML; UG/.5ML; UG/.5ML; UG/.5ML
0.5 SUSPENSION INTRAMUSCULAR ONCE
Refills: 0 | Status: DISCONTINUED | OUTPATIENT
Start: 2022-02-07 | End: 2022-02-13

## 2022-02-07 RX ORDER — SODIUM CHLORIDE 9 MG/ML
1000 INJECTION, SOLUTION INTRAVENOUS
Refills: 0 | Status: DISCONTINUED | OUTPATIENT
Start: 2022-02-07 | End: 2022-02-09

## 2022-02-07 RX ORDER — ONDANSETRON 8 MG/1
4 TABLET, FILM COATED ORAL ONCE
Refills: 0 | Status: DISCONTINUED | OUTPATIENT
Start: 2022-02-07 | End: 2022-02-07

## 2022-02-07 RX ORDER — OXYCODONE HYDROCHLORIDE 5 MG/1
5 TABLET ORAL ONCE
Refills: 0 | Status: DISCONTINUED | OUTPATIENT
Start: 2022-02-07 | End: 2022-02-07

## 2022-02-07 RX ORDER — CELECOXIB 200 MG/1
200 CAPSULE ORAL ONCE
Refills: 0 | Status: COMPLETED | OUTPATIENT
Start: 2022-02-07 | End: 2022-02-07

## 2022-02-07 RX ORDER — FENTANYL CITRATE 50 UG/ML
50 INJECTION INTRAVENOUS
Refills: 0 | Status: DISCONTINUED | OUTPATIENT
Start: 2022-02-07 | End: 2022-02-07

## 2022-02-07 RX ORDER — OXYCODONE HYDROCHLORIDE 5 MG/1
2.5 TABLET ORAL EVERY 4 HOURS
Refills: 0 | Status: DISCONTINUED | OUTPATIENT
Start: 2022-02-07 | End: 2022-02-08

## 2022-02-07 RX ADMIN — GABAPENTIN 600 MILLIGRAM(S): 400 CAPSULE ORAL at 12:33

## 2022-02-07 RX ADMIN — CELECOXIB 200 MILLIGRAM(S): 200 CAPSULE ORAL at 12:39

## 2022-02-07 RX ADMIN — Medication 1000 MILLIGRAM(S): at 21:34

## 2022-02-07 RX ADMIN — OXYCODONE HYDROCHLORIDE 5 MILLIGRAM(S): 5 TABLET ORAL at 22:20

## 2022-02-07 RX ADMIN — FENTANYL CITRATE 50 MICROGRAM(S): 50 INJECTION INTRAVENOUS at 20:30

## 2022-02-07 RX ADMIN — FENTANYL CITRATE 50 MICROGRAM(S): 50 INJECTION INTRAVENOUS at 20:05

## 2022-02-07 RX ADMIN — HYDROMORPHONE HYDROCHLORIDE 0.5 MILLIGRAM(S): 2 INJECTION INTRAMUSCULAR; INTRAVENOUS; SUBCUTANEOUS at 21:56

## 2022-02-07 RX ADMIN — HEPARIN SODIUM 5000 UNIT(S): 5000 INJECTION INTRAVENOUS; SUBCUTANEOUS at 22:38

## 2022-02-07 RX ADMIN — OXYCODONE HYDROCHLORIDE 5 MILLIGRAM(S): 5 TABLET ORAL at 21:49

## 2022-02-07 RX ADMIN — OXYCODONE HYDROCHLORIDE 5 MILLIGRAM(S): 5 TABLET ORAL at 23:59

## 2022-02-07 RX ADMIN — HYDROMORPHONE HYDROCHLORIDE 0.5 MILLIGRAM(S): 2 INJECTION INTRAMUSCULAR; INTRAVENOUS; SUBCUTANEOUS at 21:26

## 2022-02-07 RX ADMIN — Medication 400 MILLIGRAM(S): at 20:50

## 2022-02-07 NOTE — BRIEF OPERATIVE NOTE - NSICDXBRIEFPROCEDURE_GEN_ALL_CORE_FT
PROCEDURES:  Laparoscopic assisted low anterior resection 07-Feb-2022 18:22:14  Javier Gao  Flexible sigmoidoscopy 07-Feb-2022 18:22:31  Javier Gao

## 2022-02-07 NOTE — PROGRESS NOTE ADULT - SUBJECTIVE AND OBJECTIVE BOX
HITESH [id number]  46yFemale  T(C): 36.9 (02-07-22 @ 18:40), Max: 36.9 (02-07-22 @ 11:22)  HR: 74 (02-07-22 @ 19:15) (74 - 82)  BP: 96/52 (02-07-22 @ 19:15) (96/52 - 131/71)  RR: 14 (02-07-22 @ 19:15) (14 - 16)  SpO2: 100% (02-07-22 @ 19:15) (99% - 100%)  Wt(kg): --  02-07 @ 07:01  -  02-07 @ 20:00  --------------------------------------------------------  IN: 30 mL / OUT: 0 mL / NET: 30 mL

## 2022-02-07 NOTE — PATIENT PROFILE ADULT - FALL HARM RISK - HARM RISK INTERVENTIONS

## 2022-02-07 NOTE — ASU PREOP CHECKLIST - SELECT TESTS ORDERED
FS - 126/CBC/CMP/PT/PTT/INR/Type and Cross/Type and Screen/UCG/EKG/POCT Blood Glucose/COVID-19 FS - 126 @1130/CBC/CMP/PT/PTT/INR/Type and Cross/Type and Screen/UCG/EKG/POCT Blood Glucose/COVID-19

## 2022-02-08 LAB
ANION GAP SERPL CALC-SCNC: 11 MMOL/L — SIGNIFICANT CHANGE UP (ref 7–14)
BUN SERPL-MCNC: 10 MG/DL — SIGNIFICANT CHANGE UP (ref 7–23)
CALCIUM SERPL-MCNC: 8.9 MG/DL — SIGNIFICANT CHANGE UP (ref 8.4–10.5)
CHLORIDE SERPL-SCNC: 101 MMOL/L — SIGNIFICANT CHANGE UP (ref 98–107)
CO2 SERPL-SCNC: 22 MMOL/L — SIGNIFICANT CHANGE UP (ref 22–31)
CREAT SERPL-MCNC: 0.66 MG/DL — SIGNIFICANT CHANGE UP (ref 0.5–1.3)
GLUCOSE SERPL-MCNC: 126 MG/DL — HIGH (ref 70–99)
HCT VFR BLD CALC: 31.9 % — LOW (ref 34.5–45)
HCT VFR BLD CALC: 32.1 % — LOW (ref 34.5–45)
HGB BLD-MCNC: 10.2 G/DL — LOW (ref 11.5–15.5)
HGB BLD-MCNC: 10.6 G/DL — LOW (ref 11.5–15.5)
MAGNESIUM SERPL-MCNC: 1.8 MG/DL — SIGNIFICANT CHANGE UP (ref 1.6–2.6)
MCHC RBC-ENTMCNC: 28.6 PG — SIGNIFICANT CHANGE UP (ref 27–34)
MCHC RBC-ENTMCNC: 29 PG — SIGNIFICANT CHANGE UP (ref 27–34)
MCHC RBC-ENTMCNC: 32 GM/DL — SIGNIFICANT CHANGE UP (ref 32–36)
MCHC RBC-ENTMCNC: 33 GM/DL — SIGNIFICANT CHANGE UP (ref 32–36)
MCV RBC AUTO: 87.7 FL — SIGNIFICANT CHANGE UP (ref 80–100)
MCV RBC AUTO: 89.4 FL — SIGNIFICANT CHANGE UP (ref 80–100)
NRBC # BLD: 0 /100 WBCS — SIGNIFICANT CHANGE UP
NRBC # BLD: 0 /100 WBCS — SIGNIFICANT CHANGE UP
NRBC # FLD: 0 K/UL — SIGNIFICANT CHANGE UP
NRBC # FLD: 0 K/UL — SIGNIFICANT CHANGE UP
PHOSPHATE SERPL-MCNC: 3 MG/DL — SIGNIFICANT CHANGE UP (ref 2.5–4.5)
PLATELET # BLD AUTO: 196 K/UL — SIGNIFICANT CHANGE UP (ref 150–400)
PLATELET # BLD AUTO: 230 K/UL — SIGNIFICANT CHANGE UP (ref 150–400)
POTASSIUM SERPL-MCNC: 3.8 MMOL/L — SIGNIFICANT CHANGE UP (ref 3.5–5.3)
POTASSIUM SERPL-SCNC: 3.8 MMOL/L — SIGNIFICANT CHANGE UP (ref 3.5–5.3)
RBC # BLD: 3.57 M/UL — LOW (ref 3.8–5.2)
RBC # BLD: 3.66 M/UL — LOW (ref 3.8–5.2)
RBC # FLD: 12.5 % — SIGNIFICANT CHANGE UP (ref 10.3–14.5)
RBC # FLD: 12.6 % — SIGNIFICANT CHANGE UP (ref 10.3–14.5)
SODIUM SERPL-SCNC: 134 MMOL/L — LOW (ref 135–145)
WBC # BLD: 8.19 K/UL — SIGNIFICANT CHANGE UP (ref 3.8–10.5)
WBC # BLD: 9.62 K/UL — SIGNIFICANT CHANGE UP (ref 3.8–10.5)
WBC # FLD AUTO: 8.19 K/UL — SIGNIFICANT CHANGE UP (ref 3.8–10.5)
WBC # FLD AUTO: 9.62 K/UL — SIGNIFICANT CHANGE UP (ref 3.8–10.5)

## 2022-02-08 RX ORDER — ACETAMINOPHEN 500 MG
1000 TABLET ORAL EVERY 6 HOURS
Refills: 0 | Status: DISCONTINUED | OUTPATIENT
Start: 2022-02-08 | End: 2022-02-09

## 2022-02-08 RX ORDER — BENZOCAINE AND MENTHOL 5; 1 G/100ML; G/100ML
1 LIQUID ORAL
Refills: 0 | Status: DISCONTINUED | OUTPATIENT
Start: 2022-02-08 | End: 2022-02-10

## 2022-02-08 RX ORDER — HYDROMORPHONE HYDROCHLORIDE 2 MG/ML
0.5 INJECTION INTRAMUSCULAR; INTRAVENOUS; SUBCUTANEOUS ONCE
Refills: 0 | Status: DISCONTINUED | OUTPATIENT
Start: 2022-02-08 | End: 2022-02-08

## 2022-02-08 RX ORDER — HYDROMORPHONE HYDROCHLORIDE 2 MG/ML
0.5 INJECTION INTRAMUSCULAR; INTRAVENOUS; SUBCUTANEOUS
Refills: 0 | Status: DISCONTINUED | OUTPATIENT
Start: 2022-02-08 | End: 2022-02-09

## 2022-02-08 RX ORDER — HYDROMORPHONE HYDROCHLORIDE 2 MG/ML
0.5 INJECTION INTRAMUSCULAR; INTRAVENOUS; SUBCUTANEOUS EVERY 4 HOURS
Refills: 0 | Status: DISCONTINUED | OUTPATIENT
Start: 2022-02-08 | End: 2022-02-08

## 2022-02-08 RX ORDER — HYDROMORPHONE HYDROCHLORIDE 2 MG/ML
0.5 INJECTION INTRAMUSCULAR; INTRAVENOUS; SUBCUTANEOUS
Refills: 0 | Status: DISCONTINUED | OUTPATIENT
Start: 2022-02-08 | End: 2022-02-10

## 2022-02-08 RX ORDER — HYDROMORPHONE HYDROCHLORIDE 2 MG/ML
0.25 INJECTION INTRAMUSCULAR; INTRAVENOUS; SUBCUTANEOUS EVERY 4 HOURS
Refills: 0 | Status: DISCONTINUED | OUTPATIENT
Start: 2022-02-08 | End: 2022-02-08

## 2022-02-08 RX ORDER — MAGNESIUM SULFATE 500 MG/ML
1 VIAL (ML) INJECTION ONCE
Refills: 0 | Status: COMPLETED | OUTPATIENT
Start: 2022-02-08 | End: 2022-02-08

## 2022-02-08 RX ADMIN — Medication 400 MILLIGRAM(S): at 18:28

## 2022-02-08 RX ADMIN — OXYCODONE HYDROCHLORIDE 5 MILLIGRAM(S): 5 TABLET ORAL at 00:45

## 2022-02-08 RX ADMIN — HEPARIN SODIUM 5000 UNIT(S): 5000 INJECTION INTRAVENOUS; SUBCUTANEOUS at 21:19

## 2022-02-08 RX ADMIN — HYDROMORPHONE HYDROCHLORIDE 0.5 MILLIGRAM(S): 2 INJECTION INTRAMUSCULAR; INTRAVENOUS; SUBCUTANEOUS at 19:00

## 2022-02-08 RX ADMIN — Medication 400 MILLIGRAM(S): at 13:28

## 2022-02-08 RX ADMIN — OXYCODONE HYDROCHLORIDE 5 MILLIGRAM(S): 5 TABLET ORAL at 09:56

## 2022-02-08 RX ADMIN — HYDROMORPHONE HYDROCHLORIDE 0.5 MILLIGRAM(S): 2 INJECTION INTRAMUSCULAR; INTRAVENOUS; SUBCUTANEOUS at 23:55

## 2022-02-08 RX ADMIN — Medication 400 MILLIGRAM(S): at 23:08

## 2022-02-08 RX ADMIN — Medication 400 MILLIGRAM(S): at 01:47

## 2022-02-08 RX ADMIN — OXYCODONE HYDROCHLORIDE 5 MILLIGRAM(S): 5 TABLET ORAL at 05:54

## 2022-02-08 RX ADMIN — OXYCODONE HYDROCHLORIDE 5 MILLIGRAM(S): 5 TABLET ORAL at 05:17

## 2022-02-08 RX ADMIN — SODIUM CHLORIDE 40 MILLILITER(S): 9 INJECTION, SOLUTION INTRAVENOUS at 13:29

## 2022-02-08 RX ADMIN — HEPARIN SODIUM 5000 UNIT(S): 5000 INJECTION INTRAVENOUS; SUBCUTANEOUS at 13:29

## 2022-02-08 RX ADMIN — HYDROMORPHONE HYDROCHLORIDE 0.5 MILLIGRAM(S): 2 INJECTION INTRAMUSCULAR; INTRAVENOUS; SUBCUTANEOUS at 12:20

## 2022-02-08 RX ADMIN — HYDROMORPHONE HYDROCHLORIDE 0.5 MILLIGRAM(S): 2 INJECTION INTRAMUSCULAR; INTRAVENOUS; SUBCUTANEOUS at 18:26

## 2022-02-08 RX ADMIN — HYDROMORPHONE HYDROCHLORIDE 0.5 MILLIGRAM(S): 2 INJECTION INTRAMUSCULAR; INTRAVENOUS; SUBCUTANEOUS at 14:48

## 2022-02-08 RX ADMIN — HYDROMORPHONE HYDROCHLORIDE 0.5 MILLIGRAM(S): 2 INJECTION INTRAMUSCULAR; INTRAVENOUS; SUBCUTANEOUS at 20:44

## 2022-02-08 RX ADMIN — ONDANSETRON 4 MILLIGRAM(S): 8 TABLET, FILM COATED ORAL at 14:48

## 2022-02-08 RX ADMIN — OXYCODONE HYDROCHLORIDE 5 MILLIGRAM(S): 5 TABLET ORAL at 10:50

## 2022-02-08 RX ADMIN — HYDROMORPHONE HYDROCHLORIDE 0.5 MILLIGRAM(S): 2 INJECTION INTRAMUSCULAR; INTRAVENOUS; SUBCUTANEOUS at 22:41

## 2022-02-08 RX ADMIN — HYDROMORPHONE HYDROCHLORIDE 0.5 MILLIGRAM(S): 2 INJECTION INTRAMUSCULAR; INTRAVENOUS; SUBCUTANEOUS at 11:41

## 2022-02-08 RX ADMIN — Medication 400 MILLIGRAM(S): at 07:32

## 2022-02-08 RX ADMIN — HEPARIN SODIUM 5000 UNIT(S): 5000 INJECTION INTRAVENOUS; SUBCUTANEOUS at 05:17

## 2022-02-08 RX ADMIN — HYDROMORPHONE HYDROCHLORIDE 0.5 MILLIGRAM(S): 2 INJECTION INTRAMUSCULAR; INTRAVENOUS; SUBCUTANEOUS at 21:00

## 2022-02-08 RX ADMIN — HYDROMORPHONE HYDROCHLORIDE 0.5 MILLIGRAM(S): 2 INJECTION INTRAMUSCULAR; INTRAVENOUS; SUBCUTANEOUS at 17:04

## 2022-02-08 RX ADMIN — BENZOCAINE AND MENTHOL 1 LOZENGE: 5; 1 LIQUID ORAL at 15:23

## 2022-02-08 RX ADMIN — Medication 100 GRAM(S): at 09:59

## 2022-02-08 RX ADMIN — SODIUM CHLORIDE 100 MILLILITER(S): 9 INJECTION, SOLUTION INTRAVENOUS at 16:07

## 2022-02-08 RX ADMIN — Medication 1000 MILLIGRAM(S): at 23:28

## 2022-02-08 RX ADMIN — HYDROMORPHONE HYDROCHLORIDE 0.5 MILLIGRAM(S): 2 INJECTION INTRAMUSCULAR; INTRAVENOUS; SUBCUTANEOUS at 15:21

## 2022-02-08 RX ADMIN — HYDROMORPHONE HYDROCHLORIDE 0.5 MILLIGRAM(S): 2 INJECTION INTRAMUSCULAR; INTRAVENOUS; SUBCUTANEOUS at 00:00

## 2022-02-08 NOTE — PROGRESS NOTE ADULT - SUBJECTIVE AND OBJECTIVE BOX
Anesthesia Pain Management Service    SUBJECTIVE: Patient s/p spinal morphine with pain. Pt c/o pain at surgical site and in her throat.  Pain Scale Score:  8/10. Refer to charted pain scores    THERAPY:    s/p spinal PF morphine yesterday.      MEDICATIONS  (STANDING):  acetaminophen   IVPB .. 1000 milliGRAM(s) IV Intermittent every 6 hours  heparin   Injectable 5000 Unit(s) SubCutaneous every 8 hours  influenza   Vaccine 0.5 milliLiter(s) IntraMuscular once  lactated ringers. 1000 milliLiter(s) (40 mL/Hr) IV Continuous <Continuous>    MEDICATIONS  (PRN):  benzocaine 15 mG/menthol 3.6 mG Lozenge 1 Lozenge Oral every 3 hours PRN Sore Throat  HYDROmorphone  Injectable 0.5 milliGRAM(s) IV Push every 3 hours PRN Moderate and Severe Pain (4 - 10)  HYDROmorphone  Injectable 0.5 milliGRAM(s) IV Push every 3 hours PRN Severe Breakthrough  Pain (7 - 10)  naloxone Injectable 0.1 milliGRAM(s) IV Push every 3 minutes PRN For ANY of the following changes in patient status:  A. RR LESS THAN 10 breaths per minute, B. Oxygen saturation LESS THAN 90%, C. Sedation score of 6  ondansetron Injectable 4 milliGRAM(s) IV Push every 6 hours PRN Nausea      OBJECTIVE: Pt laying in bed.    Sedation Score:	[ x] Alert	[ ] Drowsy	[ ] Arousable	[ ] Asleep	[ ] Unresponsive    Side Effects:	[ x] None	[ ] Nausea	[ ] Vomiting	[ ] Pruritus  		  [ ] Weakness		[ ] Numbness	[ ] Other:    Vital Signs Last 24 Hrs  T(C): 37.1 (08 Feb 2022 09:30), Max: 37.1 (08 Feb 2022 09:30)  T(F): 98.8 (08 Feb 2022 09:30), Max: 98.8 (08 Feb 2022 09:30)  HR: 85 (08 Feb 2022 09:30) (73 - 87)  BP: 114/58 (08 Feb 2022 09:30) (95/58 - 115/69)  BP(mean): 64 (07 Feb 2022 23:26) (63 - 87)  RR: 17 (08 Feb 2022 09:30) (14 - 25)  SpO2: 97% (08 Feb 2022 09:30) (94% - 100%)    ASSESSMENT/ PLAN  [x ] Patient transitioned to prn analgesics  [x] Pain management per primary service, pain service to sign off   [x]Documentation and Verification of current medications     Comments: Since pt c/o throat pain, ordered cepacol lozenges. Discussed pt w A team and adjusted pain regimen. PRN Oral/IV opioids and/or non-opioid Adjuvant analgesics to be used at this point.    Progress Note written now but Patient was seen earlier.

## 2022-02-08 NOTE — PROGRESS NOTE ADULT - ASSESSMENT
46F s/p LAP LAR 2/7. Recovering well.       - Pain control  - Diet: CLD, ADAT  - DVT ppx  - Encourage OOB and ambulating as tolerated  - F/U Labs  - IS    A Team  [id number]   46F s/p LAP LAR 2/7 for diverticulitis. Recovering well.     - Pain control: dilaudid q3 (NO TORADOL) (Keep off oxy until patient tolerating reg diet)  - FU CBC  - Keep centeno  - Diet: CLD  - DVT ppx  - Encourage OOB and ambulating as tolerated  - F/U Labs  - IS    A Team  [id number]

## 2022-02-08 NOTE — PROGRESS NOTE ADULT - SUBJECTIVE AND OBJECTIVE BOX
GENERAL SURGERY PROGRESS NOTE   ___________________________________________________________________    HITESH DEAN | [id number] | 46y Female | [facility] LT9T 924 A | LOS 1d    Attending: Wilber Kam    ___________________________________________________________________    CC: Patient is a 46y old  Female who presents with a chief complaint of diverticulitis (07 Feb 2022 19:59)      SUBJECTIVE:   Patient seen today during morning rounds at bedside and found to be without acute distress. Denies chest pain, fever, severe pain, or SOB.     Overnight: Unremarkable    Allegies: codeine (Short breath)   NKDA    OBJECTIVE:  Vitals:  Height (cm): 152.4  Weight (kg): 72.6  BMI (kg/m2): 31.3  T(C): 36.8 (02-07-22 @ 23:26), Max: 36.9 (02-07-22 @ 11:22)  HR: 83 (02-07-22 @ 23:26) (73 - 87)  BP: 112/54 (02-07-22 @ 23:26) (95/58 - 131/71)  RR: 16 (02-07-22 @ 23:26) (14 - 25)  SpO2: 96% (02-07-22 @ 23:26) (94% - 100%)      OUT:    Indwelling Catheter - Urethral (mL): 835 mL  Total OUT: 835 mL        Physical Exam:   Constitutional: resting in bed with no acute distress  Respiratory: unlabored breathing, clear respiration  Gastrointestinal: Abdomen soft, non distended, non-tender  Extremities:  No edema, no calf tenderness  Skin: no cyanosis or rash observed    Medications:  heparin   Injectable 5000 SubCutaneous every 8 hours    acetaminophen   IVPB .. 1000 milliGRAM(s) IV Intermittent every 6 hours  influenza   Vaccine 0.5 milliLiter(s) IntraMuscular once  morphine PF Spinal 0.1 milliGRAM(s) IntraThecal. once        Laboratory:    Chemistry:            Reviewed laboratory and imaging     GENERAL SURGERY PROGRESS NOTE   ___________________________________________________________________    HITESH DEAN | [id number] | 46y Female | [facility] LT9T 924 A | LOS 1d    Attending: Wilber Kam    ___________________________________________________________________    CC: Patient is a 46y old  Female who presents with a chief complaint of diverticulitis (07 Feb 2022 19:59)      SUBJECTIVE:   Patient seen today during morning rounds at bedside, complains of abdominal pain and nausea. Patient has not yet ambulated. Has not yet passed gas or stool.     Overnight: Unremarkable    Allegies: codeine (Short breath)   NKDA    OBJECTIVE:  Vitals:  Height (cm): 152.4  Weight (kg): 72.6  BMI (kg/m2): 31.3  T(C): 36.8 (02-07-22 @ 23:26), Max: 36.9 (02-07-22 @ 11:22)  HR: 83 (02-07-22 @ 23:26) (73 - 87)  BP: 112/54 (02-07-22 @ 23:26) (95/58 - 131/71)  RR: 16 (02-07-22 @ 23:26) (14 - 25)  SpO2: 96% (02-07-22 @ 23:26) (94% - 100%)      OUT:    Indwelling Catheter - Urethral (mL): 835 mL  Total OUT: 835 mL        Physical Exam:   Constitutional: resting in bed with no acute distress  Respiratory: unlabored breathing, clear respiration  Gastrointestinal: Abdomen soft, non distended, TTP and guarding around incision sites.   Extremities:  No edema, no calf tenderness  Skin: no cyanosis or rash observed    Medications:  heparin   Injectable 5000 SubCutaneous every 8 hours    acetaminophen   IVPB .. 1000 milliGRAM(s) IV Intermittent every 6 hours  influenza   Vaccine 0.5 milliLiter(s) IntraMuscular once  morphine PF Spinal 0.1 milliGRAM(s) IntraThecal. once        Laboratory:    Chemistry:            Reviewed laboratory and imaging

## 2022-02-08 NOTE — PROGRESS NOTE ADULT - SUBJECTIVE AND OBJECTIVE BOX
Patient is a 46y Female     Patient is a 46y old  Female who presents with a chief complaint of diverticulitis (07 Feb 2022 19:59)      HPI:  46 yr old female with c/o "severe" abdominal pains and bloated started 2020 presents for preop evaluation.  Pt was dx with Diverticulitis and is now scheduled for Laparoscopy Low Anterior Resection tentatively on 02/07/22. (24 Jan 2022 17:07)      PAST MEDICAL & SURGICAL HISTORY:  Endometrial cancer    Diverticulitis  ~ recurrent    Ovarian cyst    Irritable bowel syndrome (IBS)  ~ Constipation type    H/O: hysterectomy  ~ without oophorectomy    H/O abdominoplasty        MEDICATIONS  (STANDING):  acetaminophen   IVPB .. 1000 milliGRAM(s) IV Intermittent every 6 hours  heparin   Injectable 5000 Unit(s) SubCutaneous every 8 hours  influenza   Vaccine 0.5 milliLiter(s) IntraMuscular once  lactated ringers. 1000 milliLiter(s) (40 mL/Hr) IV Continuous <Continuous>  morphine PF Spinal 0.1 milliGRAM(s) IntraThecal. once      Allergies    codeine (Short breath)    Intolerances        SOCIAL HISTORY:  Denies ETOh,Smoking,     FAMILY HISTORY:  Family history of blood disease  ~ uncle    Family history of bone cancer  ~ grandmother    Family history of smoking  ~ father    FH: heart failure    Family hx of hypertension (Mother)        REVIEW OF SYSTEMS:    CONSTITUTIONAL: No weakness, fevers or chills  EYES/ENT: No visual changes;  No vertigo or throat pain   NECK: No pain or stiffness  RESPIRATORY: No cough, wheezing, hemoptysis; No shortness of breath  CARDIOVASCULAR: No chest pain or palpitations  GASTROINTESTINAL: No abdominal or epigastric pain. No nausea, vomiting, or hematemesis; No diarrhea or constipation. No melena or hematochezia.  GENITOURINARY: No dysuria, frequency or hematuria  NEUROLOGICAL: No numbness or weakness  SKIN: No itching, burning, rashes, or lesions   All other review of systems is negative unless indicated above.    VITAL:  T(C): , Max: 36.9 (02-07-22 @ 11:22)  T(F): , Max: 98.5 (02-08-22 @ 02:00)  HR: 78 (02-08-22 @ 05:46)  BP: 99/52 (02-08-22 @ 05:46)  BP(mean): 64 (02-07-22 @ 23:26)  RR: 14 (02-08-22 @ 05:46)  SpO2: 98% (02-08-22 @ 05:46)  Wt(kg): --    I and O's:    02-07 @ 07:01  -  02-08 @ 07:00  --------------------------------------------------------  IN: 230 mL / OUT: 1235 mL / NET: -1005 mL      Height (cm): 152.4 (02-07 @ 11:22)  Weight (kg): 72.6 (02-07 @ 11:22)  BMI (kg/m2): 31.3 (02-07 @ 11:22)  BSA (m2): 1.7 (02-07 @ 11:22)    PHYSICAL EXAM:    Constitutional: NAD  HEENT: PERRLA,   Neck: No JVD  Respiratory: CTA B/L  Cardiovascular: S1 and S2  Gastrointestinal: BS+, soft, NT/ND  Extremities: No peripheral edema  Neurological: A/O x 3, no focal deficits  Psychiatric: Normal mood, normal affect  : No Roberts  Skin: No rashes  Access: Not applicable  Back: No CVA tenderness    LABS:                RADIOLOGY & ADDITIONAL STUDIES:

## 2022-02-08 NOTE — PROGRESS NOTE ADULT - ASSESSMENT
conservative gi magenmtn  no acute gi complaitns  continue current regimen  pain, likely ibs and diverrtituclitis

## 2022-02-08 NOTE — PROGRESS NOTE ADULT - SUBJECTIVE AND OBJECTIVE BOX
ANESTHESIA POSTOP CHECK    46y Female POSTOP DAY 1 S/P LAP LAR    Vital Signs Last 24 Hrs  T(C): 37.1 (08 Feb 2022 09:30), Max: 37.1 (08 Feb 2022 09:30)  T(F): 98.8 (08 Feb 2022 09:30), Max: 98.8 (08 Feb 2022 09:30)  HR: 85 (08 Feb 2022 09:30) (73 - 87)  BP: 114/58 (08 Feb 2022 09:30) (95/58 - 115/69)  BP(mean): 64 (07 Feb 2022 23:26) (63 - 87)  RR: 17 (08 Feb 2022 09:30) (14 - 25)  SpO2: 97% (08 Feb 2022 09:30) (94% - 100%)  I&O's Summary    07 Feb 2022 07:01  -  08 Feb 2022 07:00  --------------------------------------------------------  IN: 230 mL / OUT: 1235 mL / NET: -1005 mL    08 Feb 2022 07:01  -  08 Feb 2022 11:23  --------------------------------------------------------  IN: 0 mL / OUT: 300 mL / NET: -300 mL        [X ] NO APPARENT ANESTHESIA COMPLICATIONS      Comments:

## 2022-02-09 LAB
ANION GAP SERPL CALC-SCNC: 8 MMOL/L — SIGNIFICANT CHANGE UP (ref 7–14)
BUN SERPL-MCNC: 6 MG/DL — LOW (ref 7–23)
CALCIUM SERPL-MCNC: 8.3 MG/DL — LOW (ref 8.4–10.5)
CHLORIDE SERPL-SCNC: 106 MMOL/L — SIGNIFICANT CHANGE UP (ref 98–107)
CO2 SERPL-SCNC: 23 MMOL/L — SIGNIFICANT CHANGE UP (ref 22–31)
CREAT SERPL-MCNC: 0.61 MG/DL — SIGNIFICANT CHANGE UP (ref 0.5–1.3)
GLUCOSE SERPL-MCNC: 90 MG/DL — SIGNIFICANT CHANGE UP (ref 70–99)
HCT VFR BLD CALC: 31.2 % — LOW (ref 34.5–45)
HGB BLD-MCNC: 9.9 G/DL — LOW (ref 11.5–15.5)
MAGNESIUM SERPL-MCNC: 1.9 MG/DL — SIGNIFICANT CHANGE UP (ref 1.6–2.6)
MCHC RBC-ENTMCNC: 28.4 PG — SIGNIFICANT CHANGE UP (ref 27–34)
MCHC RBC-ENTMCNC: 31.7 GM/DL — LOW (ref 32–36)
MCV RBC AUTO: 89.4 FL — SIGNIFICANT CHANGE UP (ref 80–100)
NRBC # BLD: 0 /100 WBCS — SIGNIFICANT CHANGE UP
NRBC # FLD: 0 K/UL — SIGNIFICANT CHANGE UP
PHOSPHATE SERPL-MCNC: 2 MG/DL — LOW (ref 2.5–4.5)
PLATELET # BLD AUTO: 180 K/UL — SIGNIFICANT CHANGE UP (ref 150–400)
POTASSIUM SERPL-MCNC: 4.1 MMOL/L — SIGNIFICANT CHANGE UP (ref 3.5–5.3)
POTASSIUM SERPL-SCNC: 4.1 MMOL/L — SIGNIFICANT CHANGE UP (ref 3.5–5.3)
RBC # BLD: 3.49 M/UL — LOW (ref 3.8–5.2)
RBC # FLD: 12.6 % — SIGNIFICANT CHANGE UP (ref 10.3–14.5)
SODIUM SERPL-SCNC: 137 MMOL/L — SIGNIFICANT CHANGE UP (ref 135–145)
WBC # BLD: 6.64 K/UL — SIGNIFICANT CHANGE UP (ref 3.8–10.5)
WBC # FLD AUTO: 6.64 K/UL — SIGNIFICANT CHANGE UP (ref 3.8–10.5)

## 2022-02-09 RX ORDER — POTASSIUM PHOSPHATE, MONOBASIC POTASSIUM PHOSPHATE, DIBASIC 236; 224 MG/ML; MG/ML
15 INJECTION, SOLUTION INTRAVENOUS ONCE
Refills: 0 | Status: COMPLETED | OUTPATIENT
Start: 2022-02-09 | End: 2022-02-09

## 2022-02-09 RX ORDER — KETOROLAC TROMETHAMINE 30 MG/ML
15 SYRINGE (ML) INJECTION EVERY 6 HOURS
Refills: 0 | Status: DISCONTINUED | OUTPATIENT
Start: 2022-02-09 | End: 2022-02-12

## 2022-02-09 RX ORDER — ACETAMINOPHEN 500 MG
1000 TABLET ORAL EVERY 6 HOURS
Refills: 0 | Status: COMPLETED | OUTPATIENT
Start: 2022-02-09 | End: 2022-02-10

## 2022-02-09 RX ORDER — KETOROLAC TROMETHAMINE 30 MG/ML
15 SYRINGE (ML) INJECTION EVERY 6 HOURS
Refills: 0 | Status: DISCONTINUED | OUTPATIENT
Start: 2022-02-09 | End: 2022-02-09

## 2022-02-09 RX ORDER — SODIUM CHLORIDE 9 MG/ML
1000 INJECTION, SOLUTION INTRAVENOUS
Refills: 0 | Status: DISCONTINUED | OUTPATIENT
Start: 2022-02-09 | End: 2022-02-10

## 2022-02-09 RX ADMIN — SODIUM CHLORIDE 75 MILLILITER(S): 9 INJECTION, SOLUTION INTRAVENOUS at 11:25

## 2022-02-09 RX ADMIN — HYDROMORPHONE HYDROCHLORIDE 0.5 MILLIGRAM(S): 2 INJECTION INTRAMUSCULAR; INTRAVENOUS; SUBCUTANEOUS at 16:51

## 2022-02-09 RX ADMIN — HYDROMORPHONE HYDROCHLORIDE 0.5 MILLIGRAM(S): 2 INJECTION INTRAMUSCULAR; INTRAVENOUS; SUBCUTANEOUS at 10:07

## 2022-02-09 RX ADMIN — POTASSIUM PHOSPHATE, MONOBASIC POTASSIUM PHOSPHATE, DIBASIC 62.5 MILLIMOLE(S): 236; 224 INJECTION, SOLUTION INTRAVENOUS at 09:52

## 2022-02-09 RX ADMIN — HYDROMORPHONE HYDROCHLORIDE 0.5 MILLIGRAM(S): 2 INJECTION INTRAMUSCULAR; INTRAVENOUS; SUBCUTANEOUS at 16:36

## 2022-02-09 RX ADMIN — Medication 1000 MILLIGRAM(S): at 18:35

## 2022-02-09 RX ADMIN — ONDANSETRON 4 MILLIGRAM(S): 8 TABLET, FILM COATED ORAL at 11:25

## 2022-02-09 RX ADMIN — Medication 15 MILLIGRAM(S): at 13:23

## 2022-02-09 RX ADMIN — Medication 400 MILLIGRAM(S): at 11:25

## 2022-02-09 RX ADMIN — Medication 400 MILLIGRAM(S): at 06:00

## 2022-02-09 RX ADMIN — Medication 15 MILLIGRAM(S): at 18:35

## 2022-02-09 RX ADMIN — HYDROMORPHONE HYDROCHLORIDE 0.5 MILLIGRAM(S): 2 INJECTION INTRAMUSCULAR; INTRAVENOUS; SUBCUTANEOUS at 04:31

## 2022-02-09 RX ADMIN — HEPARIN SODIUM 5000 UNIT(S): 5000 INJECTION INTRAVENOUS; SUBCUTANEOUS at 05:13

## 2022-02-09 RX ADMIN — Medication 1000 MILLIGRAM(S): at 23:46

## 2022-02-09 RX ADMIN — Medication 15 MILLIGRAM(S): at 18:05

## 2022-02-09 RX ADMIN — Medication 400 MILLIGRAM(S): at 23:16

## 2022-02-09 RX ADMIN — SODIUM CHLORIDE 100 MILLILITER(S): 9 INJECTION, SOLUTION INTRAVENOUS at 18:05

## 2022-02-09 RX ADMIN — Medication 15 MILLIGRAM(S): at 13:08

## 2022-02-09 RX ADMIN — HYDROMORPHONE HYDROCHLORIDE 0.5 MILLIGRAM(S): 2 INJECTION INTRAMUSCULAR; INTRAVENOUS; SUBCUTANEOUS at 09:52

## 2022-02-09 RX ADMIN — Medication 400 MILLIGRAM(S): at 18:05

## 2022-02-09 RX ADMIN — Medication 1000 MILLIGRAM(S): at 11:20

## 2022-02-09 RX ADMIN — HEPARIN SODIUM 5000 UNIT(S): 5000 INJECTION INTRAVENOUS; SUBCUTANEOUS at 13:08

## 2022-02-09 RX ADMIN — HEPARIN SODIUM 5000 UNIT(S): 5000 INJECTION INTRAVENOUS; SUBCUTANEOUS at 21:34

## 2022-02-09 NOTE — PROVIDER CONTACT NOTE (OTHER) - ASSESSMENT
Pt has centeno, order has been discontinued, as per provider centeno order will not be reinstated, will have discussion with patient in am rounds regarding centeno removal

## 2022-02-09 NOTE — PROGRESS NOTE ADULT - ASSESSMENT
46F s/p LAP LAR 2/7 for diverticulitis. Recovering well.     - Pain control:  Toradol if creatinine within normal limits, breakthrough dilaudid  - Keep centeno  - Diet: CLD  - DVT ppx  - Encourage OOB and ambulating as tolerated  - F/U Labs  - IS    A Team  [id number]

## 2022-02-09 NOTE — PROGRESS NOTE ADULT - SUBJECTIVE AND OBJECTIVE BOX
Patient is a 46y Female     Patient is a 46y old  Female who presents with a chief complaint of abd pain (08 Feb 2022 07:18)      HPI:  46 yr old female with c/o "severe" abdominal pains and bloated started 2020 presents for preop evaluation.  Pt was dx with Diverticulitis and is now scheduled for Laparoscopy Low Anterior Resection tentatively on 02/07/22. (24 Jan 2022 17:07)      PAST MEDICAL & SURGICAL HISTORY:  Endometrial cancer    Diverticulitis  ~ recurrent    Ovarian cyst    Irritable bowel syndrome (IBS)  ~ Constipation type    H/O: hysterectomy  ~ without oophorectomy    H/O abdominoplasty        MEDICATIONS  (STANDING):  acetaminophen   IVPB .. 1000 milliGRAM(s) IV Intermittent every 6 hours  heparin   Injectable 5000 Unit(s) SubCutaneous every 8 hours  influenza   Vaccine 0.5 milliLiter(s) IntraMuscular once  lactated ringers. 1000 milliLiter(s) (100 mL/Hr) IV Continuous <Continuous>      Allergies    codeine (Short breath)    Intolerances        SOCIAL HISTORY:  Denies ETOh,Smoking,     FAMILY HISTORY:  Family history of blood disease  ~ uncle    Family history of bone cancer  ~ grandmother    Family history of smoking  ~ father    FH: heart failure    Family hx of hypertension (Mother)        REVIEW OF SYSTEMS:    CONSTITUTIONAL: No weakness, fevers or chills  EYES/ENT: No visual changes;  No vertigo or throat pain   NECK: No pain or stiffness  RESPIRATORY: No cough, wheezing, hemoptysis; No shortness of breath  CARDIOVASCULAR: No chest pain or palpitations  GASTROINTESTINAL: No abdominal or epigastric pain. No nausea, vomiting, or hematemesis; No diarrhea or constipation. No melena or hematochezia.  GENITOURINARY: No dysuria, frequency or hematuria  NEUROLOGICAL: No numbness or weakness  SKIN: No itching, burning, rashes, or lesions   All other review of systems is negative unless indicated above.    VITAL:  T(C): , Max: 37.4 (02-09-22 @ 02:28)  T(F): , Max: 99.4 (02-09-22 @ 02:28)  HR: 70 (02-09-22 @ 05:21)  BP: 127/70 (02-09-22 @ 05:21)  BP(mean): --  RR: 18 (02-09-22 @ 05:21)  SpO2: 96% (02-09-22 @ 05:21)  Wt(kg): --    I and O's:    02-07 @ 07:01  -  02-08 @ 07:00  --------------------------------------------------------  IN: 230 mL / OUT: 1235 mL / NET: -1005 mL    02-08 @ 07:01  -  02-09 @ 07:00  --------------------------------------------------------  IN: 1560 mL / OUT: 2000 mL / NET: -440 mL          PHYSICAL EXAM:    Constitutional: NAD  HEENT: PERRLA,   Neck: No JVD  Respiratory: CTA B/L  Cardiovascular: S1 and S2  Gastrointestinal: BS+, soft, NT/ND  Extremities: No peripheral edema  Neurological: A/O x 3, no focal deficits  Psychiatric: Normal mood, normal affect  : No Roberts  Skin: No rashes  Access: Not applicable  Back: No CVA tenderness    LABS:                        9.9    6.64  )-----------( 180      ( 09 Feb 2022 07:29 )             31.2     02-09    137  |  106  |  6<L>  ----------------------------<  90  4.1   |  23  |  0.61    Ca    8.3<L>      09 Feb 2022 07:29  Phos  2.0     02-09  Mg     1.90     02-09            RADIOLOGY & ADDITIONAL STUDIES:

## 2022-02-09 NOTE — PROGRESS NOTE ADULT - SUBJECTIVE AND OBJECTIVE BOX
TEAM Surgery Progress Note    INTERVAL EVENTS: No acute events overnight.  SUBJECTIVE: Patient seen and examined at bedside with surgical team, continues to have complaints of pain. Complains of nausea and has not yet tolerated CLD. Dressing changed at bedside.     OBJECTIVE:    Vital Signs Last 24 Hrs  T(C): 36.5 (09 Feb 2022 05:21), Max: 37.4 (09 Feb 2022 02:28)  T(F): 97.7 (09 Feb 2022 05:21), Max: 99.4 (09 Feb 2022 02:28)  HR: 70 (09 Feb 2022 05:21) (70 - 88)  BP: 127/70 (09 Feb 2022 05:21) (105/55 - 127/70)  BP(mean): --  RR: 18 (09 Feb 2022 05:21) (17 - 18)  SpO2: 96% (09 Feb 2022 05:21) (94% - 99%)I&O's Detail    08 Feb 2022 07:01  -  09 Feb 2022 07:00  --------------------------------------------------------  IN:    Lactated Ringers: 1200 mL    Oral Fluid: 360 mL  Total IN: 1560 mL    OUT:    Indwelling Catheter - Urethral (mL): 2000 mL  Total OUT: 2000 mL    Total NET: -440 mL      MEDICATIONS  (STANDING):  acetaminophen   IVPB .. 1000 milliGRAM(s) IV Intermittent every 6 hours  heparin   Injectable 5000 Unit(s) SubCutaneous every 8 hours  influenza   Vaccine 0.5 milliLiter(s) IntraMuscular once  ketorolac   Injectable 15 milliGRAM(s) IV Push every 6 hours  ketorolac   Injectable 15 milliGRAM(s) IV Push every 6 hours  lactated ringers. 1000 milliLiter(s) (100 mL/Hr) IV Continuous <Continuous>    MEDICATIONS  (PRN):  benzocaine 15 mG/menthol 3.6 mG Lozenge 1 Lozenge Oral every 3 hours PRN Sore Throat  HYDROmorphone  Injectable 0.5 milliGRAM(s) IV Push every 3 hours PRN Moderate and Severe Pain (4 - 10)  HYDROmorphone  Injectable 0.5 milliGRAM(s) IV Push every 3 hours PRN Severe Breakthrough  Pain (7 - 10)  naloxone Injectable 0.1 milliGRAM(s) IV Push every 3 minutes PRN For ANY of the following changes in patient status:  A. RR LESS THAN 10 breaths per minute, B. Oxygen saturation LESS THAN 90%, C. Sedation score of 6  ondansetron Injectable 4 milliGRAM(s) IV Push every 6 hours PRN Nausea      PHYSICAL EXAM:  Constitutional: resting in bed with no acute distress  Respiratory: unlabored breathing, clear respiration  Gastrointestinal: Abdomen soft, non distended, TTP and guarding around incision sites.   Extremities:  No edema, no calf tenderness  Skin: no cyanosis or rash observed    LABS:                        9.9    6.64  )-----------( 180      ( 09 Feb 2022 07:29 )             31.2     02-09    137  |  106  |  6<L>  ----------------------------<  90  4.1   |  23  |  0.61    Ca    8.3<L>      09 Feb 2022 07:29  Phos  2.0     02-09  Mg     1.90     02-09                IMAGING:

## 2022-02-10 ENCOUNTER — TRANSCRIPTION ENCOUNTER (OUTPATIENT)
Age: 47
End: 2022-02-10

## 2022-02-10 LAB
ANION GAP SERPL CALC-SCNC: 10 MMOL/L — SIGNIFICANT CHANGE UP (ref 7–14)
APPEARANCE UR: CLEAR — SIGNIFICANT CHANGE UP
BACTERIA # UR AUTO: ABNORMAL
BILIRUB UR-MCNC: NEGATIVE — SIGNIFICANT CHANGE UP
BUN SERPL-MCNC: 4 MG/DL — LOW (ref 7–23)
CALCIUM SERPL-MCNC: 9.1 MG/DL — SIGNIFICANT CHANGE UP (ref 8.4–10.5)
CHLORIDE SERPL-SCNC: 107 MMOL/L — SIGNIFICANT CHANGE UP (ref 98–107)
CO2 SERPL-SCNC: 21 MMOL/L — LOW (ref 22–31)
COLOR SPEC: YELLOW — SIGNIFICANT CHANGE UP
CREAT SERPL-MCNC: 0.63 MG/DL — SIGNIFICANT CHANGE UP (ref 0.5–1.3)
DIFF PNL FLD: ABNORMAL
EPI CELLS # UR: 7 /HPF — HIGH (ref 0–5)
GLUCOSE SERPL-MCNC: 147 MG/DL — HIGH (ref 70–99)
GLUCOSE UR QL: NEGATIVE — SIGNIFICANT CHANGE UP
HCT VFR BLD CALC: 33.2 % — LOW (ref 34.5–45)
HGB BLD-MCNC: 10.8 G/DL — LOW (ref 11.5–15.5)
HYALINE CASTS # UR AUTO: 1 /LPF — SIGNIFICANT CHANGE UP (ref 0–7)
KETONES UR-MCNC: ABNORMAL
LEUKOCYTE ESTERASE UR-ACNC: NEGATIVE — SIGNIFICANT CHANGE UP
MAGNESIUM SERPL-MCNC: 1.9 MG/DL — SIGNIFICANT CHANGE UP (ref 1.6–2.6)
MCHC RBC-ENTMCNC: 28.8 PG — SIGNIFICANT CHANGE UP (ref 27–34)
MCHC RBC-ENTMCNC: 32.5 GM/DL — SIGNIFICANT CHANGE UP (ref 32–36)
MCV RBC AUTO: 88.5 FL — SIGNIFICANT CHANGE UP (ref 80–100)
NITRITE UR-MCNC: NEGATIVE — SIGNIFICANT CHANGE UP
NRBC # BLD: 0 /100 WBCS — SIGNIFICANT CHANGE UP
NRBC # FLD: 0 K/UL — SIGNIFICANT CHANGE UP
PH UR: 7 — SIGNIFICANT CHANGE UP (ref 5–8)
PHOSPHATE SERPL-MCNC: 2.5 MG/DL — SIGNIFICANT CHANGE UP (ref 2.5–4.5)
PLATELET # BLD AUTO: 213 K/UL — SIGNIFICANT CHANGE UP (ref 150–400)
POTASSIUM SERPL-MCNC: 4.2 MMOL/L — SIGNIFICANT CHANGE UP (ref 3.5–5.3)
POTASSIUM SERPL-SCNC: 4.2 MMOL/L — SIGNIFICANT CHANGE UP (ref 3.5–5.3)
PROT UR-MCNC: ABNORMAL
RBC # BLD: 3.75 M/UL — LOW (ref 3.8–5.2)
RBC # FLD: 12.5 % — SIGNIFICANT CHANGE UP (ref 10.3–14.5)
RBC CASTS # UR COMP ASSIST: 26 /HPF — HIGH (ref 0–4)
SODIUM SERPL-SCNC: 138 MMOL/L — SIGNIFICANT CHANGE UP (ref 135–145)
SP GR SPEC: 1.03 — SIGNIFICANT CHANGE UP (ref 1–1.05)
UROBILINOGEN FLD QL: SIGNIFICANT CHANGE UP
WBC # BLD: 6.5 K/UL — SIGNIFICANT CHANGE UP (ref 3.8–10.5)
WBC # FLD AUTO: 6.5 K/UL — SIGNIFICANT CHANGE UP (ref 3.8–10.5)
WBC UR QL: 4 /HPF — SIGNIFICANT CHANGE UP (ref 0–5)

## 2022-02-10 PROCEDURE — 71045 X-RAY EXAM CHEST 1 VIEW: CPT | Mod: 26

## 2022-02-10 RX ORDER — HYDROMORPHONE HYDROCHLORIDE 2 MG/ML
0.5 INJECTION INTRAMUSCULAR; INTRAVENOUS; SUBCUTANEOUS EVERY 4 HOURS
Refills: 0 | Status: DISCONTINUED | OUTPATIENT
Start: 2022-02-10 | End: 2022-02-11

## 2022-02-10 RX ORDER — ACETAMINOPHEN 500 MG
1000 TABLET ORAL EVERY 6 HOURS
Refills: 0 | Status: COMPLETED | OUTPATIENT
Start: 2022-02-10 | End: 2022-02-11

## 2022-02-10 RX ORDER — DEXTROSE MONOHYDRATE, SODIUM CHLORIDE, AND POTASSIUM CHLORIDE 50; .745; 4.5 G/1000ML; G/1000ML; G/1000ML
1000 INJECTION, SOLUTION INTRAVENOUS
Refills: 0 | Status: DISCONTINUED | OUTPATIENT
Start: 2022-02-10 | End: 2022-02-12

## 2022-02-10 RX ORDER — ACETAMINOPHEN 500 MG
1000 TABLET ORAL ONCE
Refills: 0 | Status: COMPLETED | OUTPATIENT
Start: 2022-02-10 | End: 2022-02-10

## 2022-02-10 RX ORDER — SODIUM,POTASSIUM PHOSPHATES 278-250MG
1 POWDER IN PACKET (EA) ORAL THREE TIMES A DAY
Refills: 0 | Status: DISCONTINUED | OUTPATIENT
Start: 2022-02-10 | End: 2022-02-10

## 2022-02-10 RX ORDER — POTASSIUM PHOSPHATE, MONOBASIC POTASSIUM PHOSPHATE, DIBASIC 236; 224 MG/ML; MG/ML
15 INJECTION, SOLUTION INTRAVENOUS ONCE
Refills: 0 | Status: COMPLETED | OUTPATIENT
Start: 2022-02-10 | End: 2022-02-10

## 2022-02-10 RX ADMIN — HEPARIN SODIUM 5000 UNIT(S): 5000 INJECTION INTRAVENOUS; SUBCUTANEOUS at 13:08

## 2022-02-10 RX ADMIN — Medication 1000 MILLIGRAM(S): at 19:14

## 2022-02-10 RX ADMIN — HYDROMORPHONE HYDROCHLORIDE 0.5 MILLIGRAM(S): 2 INJECTION INTRAMUSCULAR; INTRAVENOUS; SUBCUTANEOUS at 15:15

## 2022-02-10 RX ADMIN — HYDROMORPHONE HYDROCHLORIDE 0.5 MILLIGRAM(S): 2 INJECTION INTRAMUSCULAR; INTRAVENOUS; SUBCUTANEOUS at 10:42

## 2022-02-10 RX ADMIN — Medication 15 MILLIGRAM(S): at 21:39

## 2022-02-10 RX ADMIN — HEPARIN SODIUM 5000 UNIT(S): 5000 INJECTION INTRAVENOUS; SUBCUTANEOUS at 21:09

## 2022-02-10 RX ADMIN — Medication 15 MILLIGRAM(S): at 13:30

## 2022-02-10 RX ADMIN — Medication 15 MILLIGRAM(S): at 13:08

## 2022-02-10 RX ADMIN — Medication 1000 MILLIGRAM(S): at 06:33

## 2022-02-10 RX ADMIN — HEPARIN SODIUM 5000 UNIT(S): 5000 INJECTION INTRAVENOUS; SUBCUTANEOUS at 06:03

## 2022-02-10 RX ADMIN — Medication 400 MILLIGRAM(S): at 06:03

## 2022-02-10 RX ADMIN — HYDROMORPHONE HYDROCHLORIDE 0.5 MILLIGRAM(S): 2 INJECTION INTRAMUSCULAR; INTRAVENOUS; SUBCUTANEOUS at 19:45

## 2022-02-10 RX ADMIN — Medication 15 MILLIGRAM(S): at 21:09

## 2022-02-10 RX ADMIN — Medication 400 MILLIGRAM(S): at 13:08

## 2022-02-10 RX ADMIN — Medication 15 MILLIGRAM(S): at 07:27

## 2022-02-10 RX ADMIN — HYDROMORPHONE HYDROCHLORIDE 0.5 MILLIGRAM(S): 2 INJECTION INTRAMUSCULAR; INTRAVENOUS; SUBCUTANEOUS at 14:58

## 2022-02-10 RX ADMIN — HYDROMORPHONE HYDROCHLORIDE 0.5 MILLIGRAM(S): 2 INJECTION INTRAMUSCULAR; INTRAVENOUS; SUBCUTANEOUS at 10:28

## 2022-02-10 RX ADMIN — Medication 1000 MILLIGRAM(S): at 14:16

## 2022-02-10 RX ADMIN — Medication 15 MILLIGRAM(S): at 01:40

## 2022-02-10 RX ADMIN — POTASSIUM PHOSPHATE, MONOBASIC POTASSIUM PHOSPHATE, DIBASIC 62.5 MILLIMOLE(S): 236; 224 INJECTION, SOLUTION INTRAVENOUS at 17:39

## 2022-02-10 RX ADMIN — Medication 15 MILLIGRAM(S): at 02:00

## 2022-02-10 RX ADMIN — Medication 400 MILLIGRAM(S): at 18:42

## 2022-02-10 NOTE — PROVIDER CONTACT NOTE (MEDICATION) - ASSESSMENT
patient complaining of full body pain. vitals within range except for being febrile at 100.4.  incisions are well approximated with staples, no sign of infection. patient passed her trial to void

## 2022-02-10 NOTE — PROGRESS NOTE ADULT - SUBJECTIVE AND OBJECTIVE BOX
GENERAL SURGERY PROGRESS NOTE   ___________________________________________________________________    HITESH DEAN | [id number] | 46y Female | [facility] 8S 869 A | LOS 3d    Attending: Wilber Kam    ___________________________________________________________________    CC: Patient is a 46y old  Female who presents with a chief complaint of diverticultisi (2022 08:37)      SUBJECTIVE:   Patient seen today during morning rounds at bedside and found to be without acute distress. Denies chest pain, fever, severe pain, or SOB.     Overnight: Unremarkable    Allegies: codeine (Short breath)   NKDA    OBJECTIVE:  Vitals:    T(C): 36.8 (02-10-22 @ 02:32), Max: 36.9 (22 @ 17:34)  HR: 61 (02-10-22 @ 02:32) (61 - 88)  BP: 150/88 (02-10-22 @ 02:32) (127/70 - 150/88)  RR: 17 (02-10-22 @ 02:32) (16 - 18)  SpO2: 97% (02-10-22 @ 02:32) (96% - 100%)      OUT:    Indwelling Catheter - Urethral (mL): 2000 mL  Total OUT: 2000 mL      OUT:    Indwelling Catheter - Urethral (mL): 3100 mL    Voided (mL): 0 mL  Total OUT: 3100 mL        PHYSICAL EXAM:  Constitutional: resting in bed with no acute distress  Respiratory: unlabored breathing, clear respiration  Gastrointestinal: Abdomen soft, non distended, TTP and guarding around incision sites.   Extremities:  No edema, no calf tenderness  Skin: no cyanosis or rash observed      Medications:  heparin   Injectable 5000 SubCutaneous every 8 hours    acetaminophen   IVPB .. 1000 milliGRAM(s) IV Intermittent every 6 hours  influenza   Vaccine 0.5 milliLiter(s) IntraMuscular once  ketorolac   Injectable 15 milliGRAM(s) IV Push every 6 hours        Laboratory:  WBC: 6.64 H&H: 9.9/31.2 Plt: 180  WBC: 8.19 H&H: 10.2/31.9 Plt: 196    Chemistry:                               Phos: 2.0 M.90  137  |  106  |  6   ----------------------------<  90  4.1   |  23  |  0.61        ,                              Phos: 3.0 M.80  134  |  101  |  10  ----------------------------<  126  3.8   |  22  |  0.66                  Reviewed laboratory and imaging     GENERAL SURGERY PROGRESS NOTE   ___________________________________________________________________    HITESH DEAN | [id number] | 46y Female | [facility] 8S 869 A | LOS 3d    Attending: Wilber Kam    ___________________________________________________________________    CC: Patient is a 46y old  Female who presents with a chief complaint of diverticultisi (2022 08:37)      SUBJECTIVE:   Patient seen today during morning rounds at bedside by team. No acute events overnight. Pt tolerating diet, +bowel function, pain controlled. Pt states ambulating/OOB to chair    Overnight: Unremarkable    Allegies: codeine (Short breath)   NKDA    OBJECTIVE:  Vitals:    T(C): 36.8 (02-10-22 @ 02:32), Max: 36.9 (22 @ 17:34)  HR: 61 (02-10-22 @ 02:32) (61 - 88)  BP: 150/88 (02-10-22 @ 02:32) (127/70 - 150/88)  RR: 17 (02-10-22 @ 02:32) (16 - 18)  SpO2: 97% (02-10-22 @ 02:32) (96% - 100%)      OUT:    Indwelling Catheter - Urethral (mL): 2000 mL  Total OUT: 2000 mL      OUT:    Indwelling Catheter - Urethral (mL): 3100 mL    Voided (mL): 0 mL  Total OUT: 3100 mL        PHYSICAL EXAM:  Constitutional: NAD  Respiratory: unlabored respiration  Gastrointestinal: Abdomen soft, non distended, TTP and guarding around incision sites.   Extremities:  No edema, no calf tenderness  Skin: no cyanosis or rash observed      Medications:  heparin   Injectable 5000 SubCutaneous every 8 hours    acetaminophen   IVPB .. 1000 milliGRAM(s) IV Intermittent every 6 hours  influenza   Vaccine 0.5 milliLiter(s) IntraMuscular once  ketorolac   Injectable 15 milliGRAM(s) IV Push every 6 hours        Laboratory:  WBC: 6.64 H&H: 9.9/31.2 Plt: 180  WBC: 8.19 H&H: 10.2/31.9 Plt: 196    Chemistry:                               Phos: 2.0 M.90  137  |  106  |  6   ----------------------------<  90  4.1   |  23  |  0.61        ,                              Phos: 3.0 M.80  134  |  101  |  10  ----------------------------<  126  3.8   |  22  |  0.66                  Reviewed laboratory and imaging

## 2022-02-10 NOTE — DISCHARGE NOTE PROVIDER - CARE PROVIDER_API CALL
Wilber Kam)  ColonRectal Surgery; Surgery  [facility], [address] [place] [address]  Phone: [phone]  Fax: [phone]  Follow Up Time: 1 week

## 2022-02-10 NOTE — PROGRESS NOTE ADULT - ASSESSMENT
46F s/p LAP LAR 2/7 for diverticulitis. Recovering well.     - Pain control:  Toradol if creatinine within normal limits, breakthrough dilaudid  - Diet: CLD  - DVT ppx  - Encourage OOB and ambulating as tolerated  - F/U Labs  - IS    A Team  [id number] 46F s/p LAP LAR 2/7 for diverticulitis. Recovering well.     - Pain control:  Toradol/tylenol, breakthrough dilaudid  - Diet: CLD  - DVT ppx  - Encourage OOB and ambulating as tolerated  -TOV  - F/U Labs  - IS    A Team  [id number] 46F s/p LAP LAR 2/7 for diverticulitis. Recovering well.     - Pain control:  Toradol/tylenol, breakthrough dilaudid  - Diet: LRD  - DVT ppx  - Encourage OOB and ambulating as tolerated  -TOV  - F/U Labs  - IS    A Team  [id number]

## 2022-02-10 NOTE — DISCHARGE NOTE PROVIDER - NSDCCPCAREPLAN_GEN_ALL_CORE_FT
PRINCIPAL DISCHARGE DIAGNOSIS  Diagnosis: Diverticulitis of intestine without perforation or abscess  Assessment and Plan of Treatment: s/p lower anterior resection  WOUND CARE:  Please keep incisions clean and dry. Please do not Scrub or rub incisions. DO NOT use lotion or powder on incisions.   BATHING: You may shower and/or sponge bathe. You may use warm soapy water in the shower and rinse, pat dry. NO baths no pools for 6 weeks.  ACTIVITY: No heavy lifting or straining. Otherwise, you may return to your usual level of physical activity.   Take Tylenol 500mg every 6 hours as needed for mild to moderate pain if your pain continues take oxycodone 5 mg. If you are taking narcotic pain (oxycodone) medication DO NOT drive a car, operate machinery or make important decisions.  DIET: Return to your usual diet.  NOTIFY YOUR SURGEON IF YOU HAVE: any bleeding that does not stop, any pus draining from your wound(s), any fever (over 100.4 F) persistent nausea/vomiting, or if your pain is not controlled on your discharge pain medications, unable to urinate.  Please follow up with your primary care physician in one week regarding your hospitalization, bring copies of your discharge paperwork.  Please follow up with your surgeon, Dr. Wilber Kam      SECONDARY DISCHARGE DIAGNOSES  Diagnosis: Diverticulitis of intestine without perforation or abscess  Assessment and Plan of Treatment:      PRINCIPAL DISCHARGE DIAGNOSIS  Diagnosis: Diverticulitis of intestine without perforation or abscess  Assessment and Plan of Treatment: s/p lower anterior resection  WOUND CARE:  Please keep incisions clean and dry. Please do not Scrub or rub incisions. DO NOT use lotion or powder on incisions.   BATHING: You may shower and/or sponge bathe. You may use warm soapy water in the shower and rinse, pat dry. NO baths no pools for 6 weeks.  ACTIVITY: No heavy lifting or straining. Otherwise, you may return to your usual level of physical activity.   PAIN: Patient may take tylenol every 6 hours for pain. You may alternate with Ibuprofen 3 hours after dose of Tylenol. Ibuprofen can be taken every 6 hours.Patient is also being prescribed 5mg oxycodone which can be taken every 24 hours for severe pain at bedtime. Do not exceed more than 4000mg of Tylenol in one 24 hour setting.   DIET: Return to your usual diet.  NOTIFY YOUR SURGEON IF YOU HAVE: any bleeding that does not stop, any pus draining from your wound(s), any fever (over 100.4 F) persistent nausea/vomiting, or if your pain is not controlled on your discharge pain medications, unable to urinate.  Please follow up with your primary care physician in one week regarding your hospitalization, bring copies of your discharge paperwork.  Please follow up with your surgeon, Dr. Wilber Kam      SECONDARY DISCHARGE DIAGNOSES  Diagnosis: Diverticulitis of intestine without perforation or abscess  Assessment and Plan of Treatment:

## 2022-02-10 NOTE — PROGRESS NOTE ADULT - SUBJECTIVE AND OBJECTIVE BOX
Patient is a 46y Female     Patient is a 46y old  Female who presents with a chief complaint of diverticultisi (09 Feb 2022 08:37)      HPI:  46 yr old female with c/o "severe" abdominal pains and bloated started 2020 presents for preop evaluation.  Pt was dx with Diverticulitis and is now scheduled for Laparoscopy Low Anterior Resection tentatively on 02/07/22. (24 Jan 2022 17:07)      PAST MEDICAL & SURGICAL HISTORY:  Endometrial cancer    Diverticulitis  ~ recurrent    Ovarian cyst    Irritable bowel syndrome (IBS)  ~ Constipation type    H/O: hysterectomy  ~ without oophorectomy    H/O abdominoplasty        MEDICATIONS  (STANDING):  heparin   Injectable 5000 Unit(s) SubCutaneous every 8 hours  influenza   Vaccine 0.5 milliLiter(s) IntraMuscular once  ketorolac   Injectable 15 milliGRAM(s) IV Push every 6 hours      Allergies    codeine (Short breath)    Intolerances        SOCIAL HISTORY:  Denies ETOh,Smoking,     FAMILY HISTORY:  Family history of blood disease  ~ uncle    Family history of bone cancer  ~ grandmother    Family history of smoking  ~ father    FH: heart failure    Family hx of hypertension (Mother)        REVIEW OF SYSTEMS:    CONSTITUTIONAL: No weakness, fevers or chills  EYES/ENT: No visual changes;  No vertigo or throat pain   NECK: No pain or stiffness  RESPIRATORY: No cough, wheezing, hemoptysis; No shortness of breath  CARDIOVASCULAR: No chest pain or palpitations  GASTROINTESTINAL: No abdominal or epigastric pain. No nausea, vomiting, or hematemesis; No diarrhea or constipation. No melena or hematochezia.  GENITOURINARY: No dysuria, frequency or hematuria  NEUROLOGICAL: No numbness or weakness  SKIN: No itching, burning, rashes, or lesions   All other review of systems is negative unless indicated above.    VITAL:  T(C): , Max: 36.9 (02-09-22 @ 17:34)  T(F): , Max: 98.4 (02-09-22 @ 17:34)  HR: 68 (02-10-22 @ 06:00)  BP: 139/80 (02-10-22 @ 06:00)  BP(mean): --  RR: 18 (02-10-22 @ 06:00)  SpO2: 99% (02-10-22 @ 06:00)  Wt(kg): --    I and O's:    02-08 @ 07:01 - 02-09 @ 07:00  --------------------------------------------------------  IN: 1560 mL / OUT: 2000 mL / NET: -440 mL    02-09 @ 07:01  -  02-10 @ 07:00  --------------------------------------------------------  IN: 2880 mL / OUT: 3900 mL / NET: -1020 mL          PHYSICAL EXAM:    Constitutional: NAD  HEENT: PERRLA,   Neck: No JVD  Respiratory: CTA B/L  Cardiovascular: S1 and S2  Gastrointestinal: BS+, soft, NT/ND  Extremities: No peripheral edema  Neurological: A/O x 3, no focal deficits  Psychiatric: Normal mood, normal affect  : No Roberts  Skin: No rashes  Access: Not applicable  Back: No CVA tenderness    LABS:                        9.9    6.64  )-----------( 180      ( 09 Feb 2022 07:29 )             31.2     02-09    137  |  106  |  6<L>  ----------------------------<  90  4.1   |  23  |  0.61    Ca    8.3<L>      09 Feb 2022 07:29  Phos  2.0     02-09  Mg     1.90     02-09            RADIOLOGY & ADDITIONAL STUDIES:

## 2022-02-10 NOTE — DISCHARGE NOTE PROVIDER - NSDCMRMEDTOKEN_GEN_ALL_CORE_FT
amoxicillin-clavulanate 125 mg-31.25 mg oral tablet, chewable: 1 tab(s) orally 4 times a day  dicyclomine 20 mg oral tablet: 1 tab(s) orally every 6 hours, As Needed for pain     acetaminophen 10 mg/mL intravenous solution: 100 milliliter(s) intravenous every 6 hours  dicyclomine 20 mg oral tablet: 1 tab(s) orally every 6 hours, As Needed for pain    ibuprofen 400 mg oral tablet: 1 tab(s) orally every 6 hours  oxyCODONE 5 mg oral tablet: 1 tab(s) orally once a day (at bedtime), As Needed MDD:1 tab    dicyclomine 20 mg oral tablet: 1 tab(s) orally every 6 hours, As Needed for pain    ibuprofen 400 mg oral tablet: 1 tab(s) orally every 6 hours  oxyCODONE 5 mg oral tablet: 1 tab(s) orally once a day (at bedtime), As Needed MDD:1 tab

## 2022-02-10 NOTE — DISCHARGE NOTE PROVIDER - HOSPITAL COURSE
46 yr old female with Diverticulitis presented to [facility] on 2/7 for Laparoscopy Low Anterior Resection with Dr Wilber Kam  Patient went to the OR on 2/7 is s/p LAR, Patient tolerated the procedure well, without complication. Patient remained hemodynamically stable in the PACU and transferred to the surgical floor. Diet was restarted and advanced as tolerated. Pain control was transitioned from IV to PO pain meds. At this time, patient is currently ambulating, voiding, tolerating a regular diet. Pain well controlled on PO pain meds. Patient has been deemed stable for discharge home with follow up as an outpatient. 46 yr old female with Diverticulitis presented to [facility] on 2/7 for Laparoscopy Low Anterior Resection with Dr Wilber Kam  Patient went to the OR on 2/7 is s/p LAR, Patient tolerated the procedure well, without complication. Patient remained hemodynamically stable in the PACU and transferred to the surgical floor. Diet was restarted and advanced as tolerated. Pain control was transitioned from IV to PO pain meds. At this time, patient is currently ambulating, voiding, tolerating a regular diet. Pain well controlled on PO pain meds. Patient has been deemed stable for discharge home with follow up as an outpatient. Patient was able to walk upstairs without assistance prior to discharge.

## 2022-02-11 LAB
ANION GAP SERPL CALC-SCNC: 10 MMOL/L — SIGNIFICANT CHANGE UP (ref 7–14)
BUN SERPL-MCNC: 6 MG/DL — LOW (ref 7–23)
CALCIUM SERPL-MCNC: 9.2 MG/DL — SIGNIFICANT CHANGE UP (ref 8.4–10.5)
CHLORIDE SERPL-SCNC: 102 MMOL/L — SIGNIFICANT CHANGE UP (ref 98–107)
CO2 SERPL-SCNC: 21 MMOL/L — LOW (ref 22–31)
CREAT SERPL-MCNC: 0.54 MG/DL — SIGNIFICANT CHANGE UP (ref 0.5–1.3)
GLUCOSE SERPL-MCNC: 143 MG/DL — HIGH (ref 70–99)
HCT VFR BLD CALC: 32.2 % — LOW (ref 34.5–45)
HGB BLD-MCNC: 10.4 G/DL — LOW (ref 11.5–15.5)
MAGNESIUM SERPL-MCNC: 1.9 MG/DL — SIGNIFICANT CHANGE UP (ref 1.6–2.6)
MCHC RBC-ENTMCNC: 28.3 PG — SIGNIFICANT CHANGE UP (ref 27–34)
MCHC RBC-ENTMCNC: 32.3 GM/DL — SIGNIFICANT CHANGE UP (ref 32–36)
MCV RBC AUTO: 87.7 FL — SIGNIFICANT CHANGE UP (ref 80–100)
NRBC # BLD: 0 /100 WBCS — SIGNIFICANT CHANGE UP
NRBC # FLD: 0 K/UL — SIGNIFICANT CHANGE UP
PHOSPHATE SERPL-MCNC: 2.8 MG/DL — SIGNIFICANT CHANGE UP (ref 2.5–4.5)
PLATELET # BLD AUTO: 210 K/UL — SIGNIFICANT CHANGE UP (ref 150–400)
POTASSIUM SERPL-MCNC: 4 MMOL/L — SIGNIFICANT CHANGE UP (ref 3.5–5.3)
POTASSIUM SERPL-SCNC: 4 MMOL/L — SIGNIFICANT CHANGE UP (ref 3.5–5.3)
RBC # BLD: 3.67 M/UL — LOW (ref 3.8–5.2)
RBC # FLD: 12.7 % — SIGNIFICANT CHANGE UP (ref 10.3–14.5)
SODIUM SERPL-SCNC: 133 MMOL/L — LOW (ref 135–145)
SURGICAL PATHOLOGY STUDY: SIGNIFICANT CHANGE UP
WBC # BLD: 9.22 K/UL — SIGNIFICANT CHANGE UP (ref 3.8–10.5)
WBC # FLD AUTO: 9.22 K/UL — SIGNIFICANT CHANGE UP (ref 3.8–10.5)

## 2022-02-11 PROCEDURE — 71045 X-RAY EXAM CHEST 1 VIEW: CPT | Mod: 26

## 2022-02-11 PROCEDURE — 74018 RADEX ABDOMEN 1 VIEW: CPT | Mod: 26

## 2022-02-11 RX ORDER — SODIUM CHLORIDE 9 MG/ML
500 INJECTION, SOLUTION INTRAVENOUS ONCE
Refills: 0 | Status: COMPLETED | OUTPATIENT
Start: 2022-02-11 | End: 2022-02-11

## 2022-02-11 RX ADMIN — Medication 400 MILLIGRAM(S): at 05:49

## 2022-02-11 RX ADMIN — Medication 15 MILLIGRAM(S): at 10:03

## 2022-02-11 RX ADMIN — Medication 400 MILLIGRAM(S): at 17:42

## 2022-02-11 RX ADMIN — HEPARIN SODIUM 5000 UNIT(S): 5000 INJECTION INTRAVENOUS; SUBCUTANEOUS at 15:31

## 2022-02-11 RX ADMIN — Medication 15 MILLIGRAM(S): at 02:43

## 2022-02-11 RX ADMIN — DEXTROSE MONOHYDRATE, SODIUM CHLORIDE, AND POTASSIUM CHLORIDE 75 MILLILITER(S): 50; .745; 4.5 INJECTION, SOLUTION INTRAVENOUS at 17:41

## 2022-02-11 RX ADMIN — Medication 400 MILLIGRAM(S): at 11:49

## 2022-02-11 RX ADMIN — Medication 15 MILLIGRAM(S): at 20:39

## 2022-02-11 RX ADMIN — HYDROMORPHONE HYDROCHLORIDE 0.5 MILLIGRAM(S): 2 INJECTION INTRAMUSCULAR; INTRAVENOUS; SUBCUTANEOUS at 06:04

## 2022-02-11 RX ADMIN — HYDROMORPHONE HYDROCHLORIDE 0.5 MILLIGRAM(S): 2 INJECTION INTRAMUSCULAR; INTRAVENOUS; SUBCUTANEOUS at 06:34

## 2022-02-11 RX ADMIN — Medication 15 MILLIGRAM(S): at 15:32

## 2022-02-11 RX ADMIN — HEPARIN SODIUM 5000 UNIT(S): 5000 INJECTION INTRAVENOUS; SUBCUTANEOUS at 05:50

## 2022-02-11 RX ADMIN — Medication 1000 MILLIGRAM(S): at 01:10

## 2022-02-11 RX ADMIN — Medication 1000 MILLIGRAM(S): at 06:19

## 2022-02-11 RX ADMIN — Medication 15 MILLIGRAM(S): at 09:52

## 2022-02-11 RX ADMIN — Medication 400 MILLIGRAM(S): at 00:40

## 2022-02-11 RX ADMIN — Medication 1000 MILLIGRAM(S): at 11:16

## 2022-02-11 RX ADMIN — Medication 15 MILLIGRAM(S): at 03:13

## 2022-02-11 RX ADMIN — Medication 15 MILLIGRAM(S): at 20:09

## 2022-02-11 RX ADMIN — Medication 1000 MILLIGRAM(S): at 17:44

## 2022-02-11 RX ADMIN — DEXTROSE MONOHYDRATE, SODIUM CHLORIDE, AND POTASSIUM CHLORIDE 75 MILLILITER(S): 50; .745; 4.5 INJECTION, SOLUTION INTRAVENOUS at 09:34

## 2022-02-11 RX ADMIN — SODIUM CHLORIDE 1500 MILLILITER(S): 9 INJECTION, SOLUTION INTRAVENOUS at 09:34

## 2022-02-11 RX ADMIN — Medication 15 MILLIGRAM(S): at 17:16

## 2022-02-11 RX ADMIN — HEPARIN SODIUM 5000 UNIT(S): 5000 INJECTION INTRAVENOUS; SUBCUTANEOUS at 22:06

## 2022-02-11 RX ADMIN — DEXTROSE MONOHYDRATE, SODIUM CHLORIDE, AND POTASSIUM CHLORIDE 75 MILLILITER(S): 50; .745; 4.5 INJECTION, SOLUTION INTRAVENOUS at 00:40

## 2022-02-11 NOTE — PROGRESS NOTE ADULT - SUBJECTIVE AND OBJECTIVE BOX
S: Pt seen and examined.                MEDICATIONS  (STANDING):  acetaminophen   IVPB .. 1000 milliGRAM(s) IV Intermittent every 6 hours  dextrose 5% + sodium chloride 0.45% with potassium chloride 20 mEq/L 1000 milliLiter(s) (75 mL/Hr) IV Continuous <Continuous>  heparin   Injectable 5000 Unit(s) SubCutaneous every 8 hours  influenza   Vaccine 0.5 milliLiter(s) IntraMuscular once  ketorolac   Injectable 15 milliGRAM(s) IV Push every 6 hours    MEDICATIONS  (PRN):  HYDROmorphone  Injectable 0.5 milliGRAM(s) IV Push every 4 hours PRN break through      LABS:                        10.8   6.50  )-----------( 213      ( 10 Feb 2022 07:22 )             33.2     02-10    138  |  107  |  4<L>  ----------------------------<  147<H>  4.2   |  21<L>  |  0.63    Ca    9.1      10 Feb 2022 07:22  Phos  2.5     02-10  Mg     1.90     02-10        Urinalysis Basic - ( 10 Feb 2022 20:14 )    Color: x / Appearance: x / SG: x / pH: x  Gluc: x / Ketone: x  / Bili: x / Urobili: x   Blood: x / Protein: x / Nitrite: x   Leuk Esterase: x / RBC: 26 /HPF / WBC 4 /HPF   Sq Epi: x / Non Sq Epi: 7 /HPF / Bacteria: Moderate          Vital Signs Last 24 Hrs  T(C): 36.8 (10 Feb 2022 21:47), Max: 38 (10 Feb 2022 13:28)  T(F): 98.3 (10 Feb 2022 21:47), Max: 100.4 (10 Feb 2022 13:28)  HR: 85 (10 Feb 2022 21:47) (65 - 96)  BP: 151/89 (10 Feb 2022 21:47) (122/69 - 151/89)  BP(mean): --  RR: 18 (10 Feb 2022 21:47) (17 - 18)  SpO2: 98% (10 Feb 2022 21:47) (97% - 100%)      I&O's Summary    09 Feb 2022 07:01  -  10 Feb 2022 07:00  --------------------------------------------------------  IN: 2880 mL / OUT: 3900 mL / NET: -1020 mL    10 Feb 2022 07:01  -  11 Feb 2022 04:36  --------------------------------------------------------  IN: 950 mL / OUT: 1200 mL / NET: -250 mL      I&O's Detail    09 Feb 2022 07:01  -  10 Feb 2022 07:00  --------------------------------------------------------  IN:    dextrose 5% + sodium chloride 0.45% w/ Additives: 1800 mL    IV PiggyBack: 450 mL    Oral Fluid: 630 mL  Total IN: 2880 mL    OUT:    Indwelling Catheter - Urethral (mL): 3900 mL    Voided (mL): 0 mL  Total OUT: 3900 mL    Total NET: -1020 mL      10 Feb 2022 07:01  -  11 Feb 2022 04:36  --------------------------------------------------------  IN:    dextrose 5% + sodium chloride 0.45% w/ Additives: 300 mL    IV PiggyBack: 450 mL    Oral Fluid: 200 mL  Total IN: 950 mL    OUT:    Voided (mL): 1200 mL  Total OUT: 1200 mL    Total NET: -250 mL          PHYSICAL EXAM:  Constitutional: NAD  Respiratory: unlabored respiration  Gastrointestinal: Abdomen soft, non distended, TTP and guarding around incision sites.   Extremities:  No edema, no calf tenderness  Skin: no cyanosis or rash observed    .  .  .  .  . A TEAM SURGERY PROGRESS NOTE    INTERVAL EVENTS: Patient is POD#4 s/p laparoscopic LAR. No acute events overnight.    SUBJECTIVE: Patient seen and examined by surgical team. Patient with abdominal pain and nausea. Reports she did not eat any of her low fiber diet yesterday 2/2 nausea. Tolerating clears. Denies passing flatus. Denies fever, chills, CP, SOB, vomiting.     MEDICATIONS  (STANDING):  acetaminophen   IVPB .. 1000 milliGRAM(s) IV Intermittent every 6 hours  dextrose 5% + sodium chloride 0.45% with potassium chloride 20 mEq/L 1000 milliLiter(s) (75 mL/Hr) IV Continuous <Continuous>  heparin   Injectable 5000 Unit(s) SubCutaneous every 8 hours  influenza   Vaccine 0.5 milliLiter(s) IntraMuscular once  ketorolac   Injectable 15 milliGRAM(s) IV Push every 6 hours    MEDICATIONS  (PRN):  HYDROmorphone  Injectable 0.5 milliGRAM(s) IV Push every 4 hours PRN break through      LABS:                        10.8   6.50  )-----------( 213      ( 10 Feb 2022 07:22 )             33.2     02-10    138  |  107  |  4<L>  ----------------------------<  147<H>  4.2   |  21<L>  |  0.63    Ca    9.1      10 Feb 2022 07:22  Phos  2.5     02-10  Mg     1.90     02-10        Urinalysis Basic - ( 10 Feb 2022 20:14 )    Color: x / Appearance: x / SG: x / pH: x  Gluc: x / Ketone: x  / Bili: x / Urobili: x   Blood: x / Protein: x / Nitrite: x   Leuk Esterase: x / RBC: 26 /HPF / WBC 4 /HPF   Sq Epi: x / Non Sq Epi: 7 /HPF / Bacteria: Moderate          Vital Signs Last 24 Hrs  T(C): 36.8 (10 Feb 2022 21:47), Max: 38 (10 Feb 2022 13:28)  T(F): 98.3 (10 Feb 2022 21:47), Max: 100.4 (10 Feb 2022 13:28)  HR: 85 (10 Feb 2022 21:47) (65 - 96)  BP: 151/89 (10 Feb 2022 21:47) (122/69 - 151/89)  BP(mean): --  RR: 18 (10 Feb 2022 21:47) (17 - 18)  SpO2: 98% (10 Feb 2022 21:47) (97% - 100%)      I&O's Summary    09 Feb 2022 07:01  -  10 Feb 2022 07:00  --------------------------------------------------------  IN: 2880 mL / OUT: 3900 mL / NET: -1020 mL    10 Feb 2022 07:01  -  11 Feb 2022 04:36  --------------------------------------------------------  IN: 950 mL / OUT: 1200 mL / NET: -250 mL      I&O's Detail    09 Feb 2022 07:01  -  10 Feb 2022 07:00  --------------------------------------------------------  IN:    dextrose 5% + sodium chloride 0.45% w/ Additives: 1800 mL    IV PiggyBack: 450 mL    Oral Fluid: 630 mL  Total IN: 2880 mL    OUT:    Indwelling Catheter - Urethral (mL): 3900 mL    Voided (mL): 0 mL  Total OUT: 3900 mL    Total NET: -1020 mL      10 Feb 2022 07:01  -  11 Feb 2022 04:36  --------------------------------------------------------  IN:    dextrose 5% + sodium chloride 0.45% w/ Additives: 300 mL    IV PiggyBack: 450 mL    Oral Fluid: 200 mL  Total IN: 950 mL    OUT:    Voided (mL): 1200 mL  Total OUT: 1200 mL    Total NET: -250 mL          PHYSICAL EXAM:  Constitutional: NAD  Respiratory: Unlabored breathing  Gastrointestinal: Abdomen softly distended, appropriately TTP mid-lower abdomen. No rebound or guarding. Incisions C/D/I.    Extremities:  No edema, no calf tenderness  Skin: no cyanosis or rash observed    .  .  .  .  .

## 2022-02-11 NOTE — PROGRESS NOTE ADULT - SUBJECTIVE AND OBJECTIVE BOX
HITESH [id number]  46yFemale  T(C): 36.8 (02-10-22 @ 21:47), Max: 38 (02-10-22 @ 13:28)  HR: 85 (02-10-22 @ 21:47) (65 - 96)  BP: 151/89 (02-10-22 @ 21:47) (122/69 - 151/89)  RR: 18 (02-10-22 @ 21:47) (17 - 18)  SpO2: 98% (02-10-22 @ 21:47) (97% - 100%)  Wt(kg): --  02-09 @ 07:01  -  02-10 @ 07:00  --------------------------------------------------------  IN: 2880 mL / OUT: 3900 mL / NET: -1020 mL    02-10 @ 07:01  -  02-11 @ 06:16  --------------------------------------------------------  IN: 950 mL / OUT: 1200 mL / NET: -250 mL      normal cephalic atraumatic  s1s2   clear to ascultation bilaterally  soft, non tender, non distended no guarding or rebound  no clubbing cyanosis or edema

## 2022-02-12 LAB
ANION GAP SERPL CALC-SCNC: 11 MMOL/L — SIGNIFICANT CHANGE UP (ref 7–14)
BUN SERPL-MCNC: 6 MG/DL — LOW (ref 7–23)
CALCIUM SERPL-MCNC: 9.2 MG/DL — SIGNIFICANT CHANGE UP (ref 8.4–10.5)
CHLORIDE SERPL-SCNC: 105 MMOL/L — SIGNIFICANT CHANGE UP (ref 98–107)
CO2 SERPL-SCNC: 20 MMOL/L — LOW (ref 22–31)
CREAT SERPL-MCNC: 0.59 MG/DL — SIGNIFICANT CHANGE UP (ref 0.5–1.3)
GLUCOSE SERPL-MCNC: 118 MG/DL — HIGH (ref 70–99)
HCT VFR BLD CALC: 32.6 % — LOW (ref 34.5–45)
HGB BLD-MCNC: 10.4 G/DL — LOW (ref 11.5–15.5)
MAGNESIUM SERPL-MCNC: 1.9 MG/DL — SIGNIFICANT CHANGE UP (ref 1.6–2.6)
MCHC RBC-ENTMCNC: 28.3 PG — SIGNIFICANT CHANGE UP (ref 27–34)
MCHC RBC-ENTMCNC: 31.9 GM/DL — LOW (ref 32–36)
MCV RBC AUTO: 88.6 FL — SIGNIFICANT CHANGE UP (ref 80–100)
NRBC # BLD: 0 /100 WBCS — SIGNIFICANT CHANGE UP
NRBC # FLD: 0 K/UL — SIGNIFICANT CHANGE UP
PHOSPHATE SERPL-MCNC: 2.9 MG/DL — SIGNIFICANT CHANGE UP (ref 2.5–4.5)
PLATELET # BLD AUTO: 228 K/UL — SIGNIFICANT CHANGE UP (ref 150–400)
POTASSIUM SERPL-MCNC: 4.1 MMOL/L — SIGNIFICANT CHANGE UP (ref 3.5–5.3)
POTASSIUM SERPL-SCNC: 4.1 MMOL/L — SIGNIFICANT CHANGE UP (ref 3.5–5.3)
RBC # BLD: 3.68 M/UL — LOW (ref 3.8–5.2)
RBC # FLD: 12.8 % — SIGNIFICANT CHANGE UP (ref 10.3–14.5)
SODIUM SERPL-SCNC: 136 MMOL/L — SIGNIFICANT CHANGE UP (ref 135–145)
WBC # BLD: 8.18 K/UL — SIGNIFICANT CHANGE UP (ref 3.8–10.5)
WBC # FLD AUTO: 8.18 K/UL — SIGNIFICANT CHANGE UP (ref 3.8–10.5)

## 2022-02-12 RX ORDER — KETOROLAC TROMETHAMINE 30 MG/ML
15 SYRINGE (ML) INJECTION EVERY 6 HOURS
Refills: 0 | Status: DISCONTINUED | OUTPATIENT
Start: 2022-02-12 | End: 2022-02-13

## 2022-02-12 RX ORDER — SODIUM,POTASSIUM PHOSPHATES 278-250MG
1 POWDER IN PACKET (EA) ORAL ONCE
Refills: 0 | Status: COMPLETED | OUTPATIENT
Start: 2022-02-12 | End: 2022-02-12

## 2022-02-12 RX ORDER — ACETAMINOPHEN 500 MG
1000 TABLET ORAL EVERY 6 HOURS
Refills: 0 | Status: COMPLETED | OUTPATIENT
Start: 2022-02-12 | End: 2022-02-12

## 2022-02-12 RX ORDER — MAGNESIUM SULFATE 500 MG/ML
2 VIAL (ML) INJECTION ONCE
Refills: 0 | Status: COMPLETED | OUTPATIENT
Start: 2022-02-12 | End: 2022-02-12

## 2022-02-12 RX ORDER — HYDROMORPHONE HYDROCHLORIDE 2 MG/ML
0.5 INJECTION INTRAMUSCULAR; INTRAVENOUS; SUBCUTANEOUS ONCE
Refills: 0 | Status: DISCONTINUED | OUTPATIENT
Start: 2022-02-12 | End: 2022-02-12

## 2022-02-12 RX ADMIN — Medication 15 MILLIGRAM(S): at 05:48

## 2022-02-12 RX ADMIN — Medication 15 MILLIGRAM(S): at 10:12

## 2022-02-12 RX ADMIN — HEPARIN SODIUM 5000 UNIT(S): 5000 INJECTION INTRAVENOUS; SUBCUTANEOUS at 21:07

## 2022-02-12 RX ADMIN — Medication 15 MILLIGRAM(S): at 21:37

## 2022-02-12 RX ADMIN — Medication 15 MILLIGRAM(S): at 15:25

## 2022-02-12 RX ADMIN — Medication 25 GRAM(S): at 13:50

## 2022-02-12 RX ADMIN — Medication 1 PACKET(S): at 13:49

## 2022-02-12 RX ADMIN — Medication 1000 MILLIGRAM(S): at 03:46

## 2022-02-12 RX ADMIN — Medication 400 MILLIGRAM(S): at 05:48

## 2022-02-12 RX ADMIN — Medication 400 MILLIGRAM(S): at 17:59

## 2022-02-12 RX ADMIN — HEPARIN SODIUM 5000 UNIT(S): 5000 INJECTION INTRAVENOUS; SUBCUTANEOUS at 13:51

## 2022-02-12 RX ADMIN — Medication 15 MILLIGRAM(S): at 09:57

## 2022-02-12 RX ADMIN — HEPARIN SODIUM 5000 UNIT(S): 5000 INJECTION INTRAVENOUS; SUBCUTANEOUS at 05:48

## 2022-02-12 RX ADMIN — Medication 400 MILLIGRAM(S): at 03:16

## 2022-02-12 RX ADMIN — Medication 15 MILLIGRAM(S): at 15:10

## 2022-02-12 RX ADMIN — Medication 15 MILLIGRAM(S): at 21:07

## 2022-02-12 RX ADMIN — HYDROMORPHONE HYDROCHLORIDE 0.5 MILLIGRAM(S): 2 INJECTION INTRAMUSCULAR; INTRAVENOUS; SUBCUTANEOUS at 02:02

## 2022-02-12 RX ADMIN — Medication 1000 MILLIGRAM(S): at 18:30

## 2022-02-12 RX ADMIN — Medication 400 MILLIGRAM(S): at 12:25

## 2022-02-12 RX ADMIN — HYDROMORPHONE HYDROCHLORIDE 0.5 MILLIGRAM(S): 2 INJECTION INTRAMUSCULAR; INTRAVENOUS; SUBCUTANEOUS at 02:32

## 2022-02-12 RX ADMIN — Medication 15 MILLIGRAM(S): at 06:18

## 2022-02-12 RX ADMIN — Medication 1000 MILLIGRAM(S): at 12:40

## 2022-02-12 RX ADMIN — Medication 1000 MILLIGRAM(S): at 06:18

## 2022-02-12 NOTE — PROGRESS NOTE ADULT - SUBJECTIVE AND OBJECTIVE BOX
+Flatus, -BM Persistent stable pain, pt reports being hungry    abd soft nd, incisional tenderness      Objective:    MEDICATIONS  (STANDING):  acetaminophen   IVPB .. 1000 milliGRAM(s) IV Intermittent every 6 hours  dextrose 5% + sodium chloride 0.45% with potassium chloride 20 mEq/L 1000 milliLiter(s) (75 mL/Hr) IV Continuous <Continuous>  heparin   Injectable 5000 Unit(s) SubCutaneous every 8 hours  influenza   Vaccine 0.5 milliLiter(s) IntraMuscular once  ketorolac   Injectable 15 milliGRAM(s) IV Push every 6 hours    MEDICATIONS  (PRN):      Vital Signs Last 24 Hrs  T(C): 36.7 (12 Feb 2022 06:00), Max: 37.1 (11 Feb 2022 21:07)  T(F): 98.1 (12 Feb 2022 06:00), Max: 98.7 (11 Feb 2022 21:07)  HR: 82 (12 Feb 2022 06:00) (81 - 88)  BP: 147/89 (12 Feb 2022 06:00) (134/75 - 158/85)  BP(mean): --  RR: 18 (12 Feb 2022 06:00) (18 - 18)  SpO2: 100% (12 Feb 2022 06:00) (97% - 100%)    I&O's Detail    11 Feb 2022 07:01  -  12 Feb 2022 07:00  --------------------------------------------------------  IN:    dextrose 5% + sodium chloride 0.45% w/ Additives: 600 mL    Oral Fluid: 440 mL  Total IN: 1040 mL    OUT:    Voided (mL): 2050 mL  Total OUT: 2050 mL    Total NET: -1010 mL          Daily     Daily     LABS:                        10.4   8.18  )-----------( 228      ( 12 Feb 2022 08:27 )             32.6     02-12    136  |  105  |  6<L>  ----------------------------<  118<H>  4.1   |  20<L>  |  0.59    Ca    9.2      12 Feb 2022 08:27  Phos  2.9     02-12  Mg     1.90     02-12        Urinalysis Basic - ( 10 Feb 2022 20:14 )    Color: x / Appearance: x / SG: x / pH: x  Gluc: x / Ketone: x  / Bili: x / Urobili: x   Blood: x / Protein: x / Nitrite: x   Leuk Esterase: x / RBC: 26 /HPF / WBC 4 /HPF   Sq Epi: x / Non Sq Epi: 7 /HPF / Bacteria: Moderate        RADIOLOGY & ADDITIONAL STUDIES:

## 2022-02-12 NOTE — PROGRESS NOTE ADULT - SUBJECTIVE AND OBJECTIVE BOX
Patient is a 46y Female     Patient is a 46y old  Female who presents with a chief complaint of diverticulitis (11 Feb 2022 06:16)      HPI:  46 yr old female with c/o "severe" abdominal pains and bloated started 2020 presents for preop evaluation.  Pt was dx with Diverticulitis and is now scheduled for Laparoscopy Low Anterior Resection tentatively on 02/07/22. (24 Jan 2022 17:07)      PAST MEDICAL & SURGICAL HISTORY:  Endometrial cancer    Diverticulitis  ~ recurrent    Ovarian cyst    Irritable bowel syndrome (IBS)  ~ Constipation type    H/O: hysterectomy  ~ without oophorectomy    H/O abdominoplasty        MEDICATIONS  (STANDING):  acetaminophen   IVPB .. 1000 milliGRAM(s) IV Intermittent every 6 hours  heparin   Injectable 5000 Unit(s) SubCutaneous every 8 hours  influenza   Vaccine 0.5 milliLiter(s) IntraMuscular once  ketorolac   Injectable 15 milliGRAM(s) IV Push every 6 hours      Allergies    codeine (Short breath)    Intolerances        SOCIAL HISTORY:  Denies ETOh,Smoking,     FAMILY HISTORY:  Family history of blood disease  ~ uncle    Family history of bone cancer  ~ grandmother    Family history of smoking  ~ father    FH: heart failure    Family hx of hypertension (Mother)        REVIEW OF SYSTEMS:    CONSTITUTIONAL: No weakness, fevers or chills  EYES/ENT: No visual changes;  No vertigo or throat pain   NECK: No pain or stiffness  RESPIRATORY: No cough, wheezing, hemoptysis; No shortness of breath  CARDIOVASCULAR: No chest pain or palpitations  GASTROINTESTINAL: No abdominal or epigastric pain. No nausea, vomiting, or hematemesis; No diarrhea or constipation. No melena or hematochezia.  GENITOURINARY: No dysuria, frequency or hematuria  NEUROLOGICAL: No numbness or weakness  SKIN: No itching, burning, rashes, or lesions   All other review of systems is negative unless indicated above.    VITAL:  T(C): , Max: 37.1 (02-11-22 @ 21:07)  T(F): , Max: 98.7 (02-11-22 @ 21:07)  HR: 88 (02-12-22 @ 15:13)  BP: 155/91 (02-12-22 @ 15:13)  BP(mean): --  RR: 18 (02-12-22 @ 15:13)  SpO2: 100% (02-12-22 @ 15:13)  Wt(kg): --    I and O's:    02-10 @ 07:01  -  02-11 @ 07:00  --------------------------------------------------------  IN: 950 mL / OUT: 1200 mL / NET: -250 mL    02-11 @ 07:01  -  02-12 @ 07:00  --------------------------------------------------------  IN: 1040 mL / OUT: 2050 mL / NET: -1010 mL          PHYSICAL EXAM:    Constitutional: NAD  HEENT: PERRLA,   Neck: No JVD  Respiratory: CTA B/L  Cardiovascular: S1 and S2  Gastrointestinal: BS+, soft, NT/ND  Extremities: No peripheral edema  Neurological: A/O x 3, no focal deficits  Psychiatric: Normal mood, normal affect  : No Roberts  Skin: No rashes  Access: Not applicable  Back: No CVA tenderness    LABS:                        10.4   8.18  )-----------( 228      ( 12 Feb 2022 08:27 )             32.6     02-12    136  |  105  |  6<L>  ----------------------------<  118<H>  4.1   |  20<L>  |  0.59    Ca    9.2      12 Feb 2022 08:27  Phos  2.9     02-12  Mg     1.90     02-12            RADIOLOGY & ADDITIONAL STUDIES:

## 2022-02-12 NOTE — CHART NOTE - NSCHARTNOTEFT_GEN_A_CORE
Called by JOHANN Diaz to evaluate pt for pain. Pt states that her pain has worsened, states 8/10 abdominal pain, mild nausea. Passed flatus 5 min ago. Vitals bp 157/91, hr 88, 99%, t 98.6. On exam, pt in moderate distress. Abdomen soft, nd, ttp epigastrium but worst in LLQ, no guarding, no rigidity. Will give dilaudid 0.5mg IV x1. Will follow.     JOSE Guillen, PGY-1  A Team  [id number]
Pt complaining of pain throughout day, not fully controlled. This pain has led to poor po intake. Pt was started on fluids to compensate for poor po intake earlier in evening. Tylenol standing order had fallen off and was reordered. Will follow.    JOSE Guillen, PGY-1  A Team  [id number]
Pt seen at bedside. Informed of plan to remove centeno catheter. Pt refuses removal, states that "my uncle and brother " due to falling in hospitals and that she "doesn't want to take that risk". Sympathetic listening provided, counseled that PCA/RN staff would be more than happy to assist her getting to the bathroom and using commode. Pt again refuses removal. Counseled on risk of prolonged centeno catheter use and development of UTI. Pt endorses that she understands risk and refuses removal of the centeno catheter for a third time.     JOSE Guillen, PGY-1  A Team  [id number]
GENERAL SURGERY POST-OPERATIVE NOTE    HITESH DEAN | [id number] | [id number]    Procedure: s/p LAP LAR    SUBJECTIVE: Patient seen after procedure. Patient tolerated procedure well. States that she is in a lot of pain. No PO intake. No GI function.   SOB:  [ ] YES [ x] NO  Chest Discomfort: [ ] YES [x ] NO    Nausea: [ ] YES [x ] NO           Vomiting: [ ] YES [x ] NO  Diarrhea: [ ] YES [x ] NO         Void: [ ]YES [x ]No           Pain Control Adequate: [x ] YES [ ] NO    PAST MEDICAL & SURGICAL HISTORY:  Endometrial cancer    Diverticulitis  ~ recurrent    Ovarian cyst    Irritable bowel syndrome (IBS)  ~ Constipation type    H/O: hysterectomy  ~ without oophorectomy    H/O abdominoplasty        PHYSICAL EXAM:  Gen: Pt irritable   Resp: breathing comfortably on room air  CV: RRR  Abdomen: 3 port sites and midline dressing c/d/i. Pt refused to allow palpation of abdomen.       Vital Signs Last 24 Hrs  T(C): 36.8 (07 Feb 2022 23:26), Max: 36.9 (07 Feb 2022 11:22)  T(F): 98.2 (07 Feb 2022 23:26), Max: 98.4 (07 Feb 2022 11:22)  HR: 83 (07 Feb 2022 23:26) (73 - 87)  BP: 112/54 (07 Feb 2022 23:26) (95/58 - 131/71)  BP(mean): 64 (07 Feb 2022 23:26) (63 - 87)  RR: 16 (07 Feb 2022 23:26) (14 - 25)  SpO2: 96% (07 Feb 2022 23:26) (94% - 100%)  I&O's Summary    07 Feb 2022 07:01  -  07 Feb 2022 23:44  --------------------------------------------------------  IN: 230 mL / OUT: 485 mL / NET: -255 mL      I&O's Detail    07 Feb 2022 07:01  -  07 Feb 2022 23:44  --------------------------------------------------------  IN:    Lactated Ringers: 30 mL    Lactated Ringers: 200 mL  Total IN: 230 mL    OUT:    Indwelling Catheter - Urethral (mL): 485 mL  Total OUT: 485 mL    Total NET: -255 mL                   Assessment:  The patient is a 46y F who is now several hours post-op from a LAP LAR. Recovering.     Plan:  - Pain control as needed, will give additional dose Oxycodone   - Diet: CLD  - DVT ppx  - Encourage OOB and ambulating as tolerated  - F/U Labs  - IS    JOSE Guillen, PGY-1  A Team  [id number]

## 2022-02-13 ENCOUNTER — TRANSCRIPTION ENCOUNTER (OUTPATIENT)
Age: 47
End: 2022-02-13

## 2022-02-13 VITALS
OXYGEN SATURATION: 100 % | SYSTOLIC BLOOD PRESSURE: 156 MMHG | RESPIRATION RATE: 17 BRPM | TEMPERATURE: 97 F | HEART RATE: 99 BPM | DIASTOLIC BLOOD PRESSURE: 95 MMHG

## 2022-02-13 LAB
ANION GAP SERPL CALC-SCNC: 12 MMOL/L — SIGNIFICANT CHANGE UP (ref 7–14)
BUN SERPL-MCNC: 10 MG/DL — SIGNIFICANT CHANGE UP (ref 7–23)
CALCIUM SERPL-MCNC: 9 MG/DL — SIGNIFICANT CHANGE UP (ref 8.4–10.5)
CHLORIDE SERPL-SCNC: 104 MMOL/L — SIGNIFICANT CHANGE UP (ref 98–107)
CO2 SERPL-SCNC: 20 MMOL/L — LOW (ref 22–31)
CREAT SERPL-MCNC: 0.54 MG/DL — SIGNIFICANT CHANGE UP (ref 0.5–1.3)
GLUCOSE SERPL-MCNC: 106 MG/DL — HIGH (ref 70–99)
HCT VFR BLD CALC: 33.1 % — LOW (ref 34.5–45)
HGB BLD-MCNC: 10.7 G/DL — LOW (ref 11.5–15.5)
MAGNESIUM SERPL-MCNC: 2.1 MG/DL — SIGNIFICANT CHANGE UP (ref 1.6–2.6)
MCHC RBC-ENTMCNC: 28.3 PG — SIGNIFICANT CHANGE UP (ref 27–34)
MCHC RBC-ENTMCNC: 32.3 GM/DL — SIGNIFICANT CHANGE UP (ref 32–36)
MCV RBC AUTO: 87.6 FL — SIGNIFICANT CHANGE UP (ref 80–100)
NRBC # BLD: 0 /100 WBCS — SIGNIFICANT CHANGE UP
NRBC # FLD: 0 K/UL — SIGNIFICANT CHANGE UP
PHOSPHATE SERPL-MCNC: 3.1 MG/DL — SIGNIFICANT CHANGE UP (ref 2.5–4.5)
PLATELET # BLD AUTO: 256 K/UL — SIGNIFICANT CHANGE UP (ref 150–400)
POTASSIUM SERPL-MCNC: 4.2 MMOL/L — SIGNIFICANT CHANGE UP (ref 3.5–5.3)
POTASSIUM SERPL-SCNC: 4.2 MMOL/L — SIGNIFICANT CHANGE UP (ref 3.5–5.3)
RBC # BLD: 3.78 M/UL — LOW (ref 3.8–5.2)
RBC # FLD: 12.9 % — SIGNIFICANT CHANGE UP (ref 10.3–14.5)
SODIUM SERPL-SCNC: 136 MMOL/L — SIGNIFICANT CHANGE UP (ref 135–145)
WBC # BLD: 8.59 K/UL — SIGNIFICANT CHANGE UP (ref 3.8–10.5)
WBC # FLD AUTO: 8.59 K/UL — SIGNIFICANT CHANGE UP (ref 3.8–10.5)

## 2022-02-13 RX ORDER — ACETAMINOPHEN 500 MG
1000 TABLET ORAL EVERY 6 HOURS
Refills: 0 | Status: DISCONTINUED | OUTPATIENT
Start: 2022-02-13 | End: 2022-02-13

## 2022-02-13 RX ORDER — OXYCODONE HYDROCHLORIDE 5 MG/1
1 TABLET ORAL
Qty: 10 | Refills: 0
Start: 2022-02-13 | End: 2022-02-22

## 2022-02-13 RX ORDER — ACETAMINOPHEN 500 MG
100 TABLET ORAL
Qty: 0 | Refills: 0 | DISCHARGE
Start: 2022-02-13

## 2022-02-13 RX ORDER — IBUPROFEN 200 MG
1 TABLET ORAL
Qty: 16 | Refills: 0
Start: 2022-02-13 | End: 2022-02-16

## 2022-02-13 RX ORDER — IBUPROFEN 200 MG
1 TABLET ORAL
Qty: 0 | Refills: 0 | DISCHARGE
Start: 2022-02-13

## 2022-02-13 RX ORDER — IBUPROFEN 200 MG
400 TABLET ORAL EVERY 6 HOURS
Refills: 0 | Status: DISCONTINUED | OUTPATIENT
Start: 2022-02-13 | End: 2022-02-13

## 2022-02-13 RX ORDER — HYDROMORPHONE HYDROCHLORIDE 2 MG/ML
0.5 INJECTION INTRAMUSCULAR; INTRAVENOUS; SUBCUTANEOUS ONCE
Refills: 0 | Status: DISCONTINUED | OUTPATIENT
Start: 2022-02-13 | End: 2022-02-13

## 2022-02-13 RX ADMIN — HYDROMORPHONE HYDROCHLORIDE 0.5 MILLIGRAM(S): 2 INJECTION INTRAMUSCULAR; INTRAVENOUS; SUBCUTANEOUS at 00:32

## 2022-02-13 RX ADMIN — Medication 15 MILLIGRAM(S): at 05:32

## 2022-02-13 RX ADMIN — HEPARIN SODIUM 5000 UNIT(S): 5000 INJECTION INTRAVENOUS; SUBCUTANEOUS at 15:33

## 2022-02-13 RX ADMIN — Medication 400 MILLIGRAM(S): at 13:24

## 2022-02-13 RX ADMIN — HEPARIN SODIUM 5000 UNIT(S): 5000 INJECTION INTRAVENOUS; SUBCUTANEOUS at 05:02

## 2022-02-13 RX ADMIN — Medication 15 MILLIGRAM(S): at 05:02

## 2022-02-13 RX ADMIN — Medication 1000 MILLIGRAM(S): at 13:44

## 2022-02-13 RX ADMIN — HYDROMORPHONE HYDROCHLORIDE 0.5 MILLIGRAM(S): 2 INJECTION INTRAMUSCULAR; INTRAVENOUS; SUBCUTANEOUS at 00:47

## 2022-02-13 RX ADMIN — Medication 400 MILLIGRAM(S): at 13:25

## 2022-02-13 RX ADMIN — Medication 1000 MILLIGRAM(S): at 06:40

## 2022-02-13 RX ADMIN — Medication 400 MILLIGRAM(S): at 06:10

## 2022-02-13 RX ADMIN — Medication 400 MILLIGRAM(S): at 13:44

## 2022-02-13 NOTE — DISCHARGE NOTE NURSING/CASE MANAGEMENT/SOCIAL WORK - NSDCVIVACCINE_GEN_ALL_CORE_FT
influenza, injectable, quadrivalent, preservative free; 18-Dec-2020 11:57; Ashley Jefferson (RN); [employer]; 33bn3 (Exp. Date: 30-Jun-2021); IntraMuscular; Deltoid Left.; 0.5 milliLiter(s); VIS (VIS Published: 15-Aug-2019, VIS Presented: 18-Dec-2020);

## 2022-02-13 NOTE — PROGRESS NOTE ADULT - PROVIDER SPECIALTY LIST ADULT
Anesthesia
Colorectal Surgery
Gastroenterology
Surgery
Gastroenterology
Gastroenterology
Pain Medicine
Surgery
Surgery
Gastroenterology
Surgery
Gastroenterology

## 2022-02-13 NOTE — PROGRESS NOTE ADULT - SUBJECTIVE AND OBJECTIVE BOX
Patient is a 46y Female     Patient is a 46y old  Female who presents with a chief complaint of divertiuvlits (12 Feb 2022 16:29)      HPI:  46 yr old female with c/o "severe" abdominal pains and bloated started 2020 presents for preop evaluation.  Pt was dx with Diverticulitis and is now scheduled for Laparoscopy Low Anterior Resection tentatively on 02/07/22. (24 Jan 2022 17:07)      PAST MEDICAL & SURGICAL HISTORY:  Endometrial cancer    Diverticulitis  ~ recurrent    Ovarian cyst    Irritable bowel syndrome (IBS)  ~ Constipation type    H/O: hysterectomy  ~ without oophorectomy    H/O abdominoplasty        MEDICATIONS  (STANDING):  acetaminophen   IVPB .. 1000 milliGRAM(s) IV Intermittent every 6 hours  heparin   Injectable 5000 Unit(s) SubCutaneous every 8 hours  ibuprofen  Tablet. 400 milliGRAM(s) Oral every 6 hours  influenza   Vaccine 0.5 milliLiter(s) IntraMuscular once      Allergies    codeine (Short breath)    Intolerances        SOCIAL HISTORY:  Denies ETOh,Smoking,     FAMILY HISTORY:  Family history of blood disease  ~ uncle    Family history of bone cancer  ~ grandmother    Family history of smoking  ~ father    FH: heart failure    Family hx of hypertension (Mother)        REVIEW OF SYSTEMS:    CONSTITUTIONAL: No weakness, fevers or chills  EYES/ENT: No visual changes;  No vertigo or throat pain   NECK: No pain or stiffness  RESPIRATORY: No cough, wheezing, hemoptysis; No shortness of breath  CARDIOVASCULAR: No chest pain or palpitations  GASTROINTESTINAL: No abdominal or epigastric pain. No nausea, vomiting, or hematemesis; No diarrhea or constipation. No melena or hematochezia.  GENITOURINARY: No dysuria, frequency or hematuria  NEUROLOGICAL: No numbness or weakness  SKIN: No itching, burning, rashes, or lesions   All other review of systems is negative unless indicated above.    VITAL:  T(C): , Max: 37.5 (02-12-22 @ 22:00)  T(F): , Max: 99.5 (02-12-22 @ 22:00)  HR: 92 (02-13-22 @ 06:00)  BP: 153/93 (02-13-22 @ 06:00)  BP(mean): --  RR: 16 (02-13-22 @ 06:00)  SpO2: 98% (02-13-22 @ 06:00)  Wt(kg): --    I and O's:    02-11 @ 07:01  -  02-12 @ 07:00  --------------------------------------------------------  IN: 1040 mL / OUT: 2050 mL / NET: -1010 mL    02-12 @ 07:01 - 02-13 @ 07:00  --------------------------------------------------------  IN: 240 mL / OUT: 650 mL / NET: -410 mL          PHYSICAL EXAM:    Constitutional: NAD  HEENT: PERRLA,   Neck: No JVD  Respiratory: CTA B/L  Cardiovascular: S1 and S2  Gastrointestinal: BS+, soft, NT/ND  Extremities: No peripheral edema  Neurological: A/O x 3, no focal deficits  Psychiatric: Normal mood, normal affect  : No Roberts  Skin: No rashes  Access: Not applicable  Back: No CVA tenderness    LABS:                        10.7   8.59  )-----------( 256      ( 13 Feb 2022 07:38 )             33.1     02-13    136  |  104  |  10  ----------------------------<  106<H>  4.2   |  20<L>  |  0.54    Ca    9.0      13 Feb 2022 07:38  Phos  3.1     02-13  Mg     2.10     02-13            RADIOLOGY & ADDITIONAL STUDIES:

## 2022-02-13 NOTE — PROGRESS NOTE ADULT - REASON FOR ADMISSION
abd pain, diverticulitis
diverticulitis
divertiuvlits
kvng
abd pain
diverticulitis
diverticulitis
diverticultisi

## 2022-02-13 NOTE — DISCHARGE NOTE NURSING/CASE MANAGEMENT/SOCIAL WORK - NSDCPEFALRISK_GEN_ALL_CORE
For information on Fall & Injury Prevention, visit: [email or web address] OR  [email or web address] OR  https://www.cdc.gov/steadi/patient.html

## 2022-02-13 NOTE — DISCHARGE NOTE NURSING/CASE MANAGEMENT/SOCIAL WORK - PATIENT PORTAL LINK FT
You can access the FollowMyHealth Patient Portal offered by [facility] by registering at the following website: [email or web address]. By joining [facility]’s FollowMyHealth portal, you will also be able to view your health information using other applications (apps) compatible with our system.

## 2022-02-14 ENCOUNTER — NON-APPOINTMENT (OUTPATIENT)
Age: 47
End: 2022-02-14

## 2022-02-15 ENCOUNTER — NON-APPOINTMENT (OUTPATIENT)
Age: 47
End: 2022-02-15

## 2022-02-15 RX ORDER — AMOXICILLIN AND CLAVULANATE POTASSIUM 875; 125 MG/1; MG/1
875-125 TABLET, COATED ORAL
Qty: 14 | Refills: 0 | Status: DISCONTINUED | COMMUNITY
Start: 2022-02-03 | End: 2022-02-15

## 2022-02-15 RX ORDER — METRONIDAZOLE 500 MG/1
500 TABLET ORAL
Qty: 3 | Refills: 0 | Status: DISCONTINUED | COMMUNITY
Start: 2022-01-12 | End: 2022-02-15

## 2022-02-15 RX ORDER — NEOMYCIN SULFATE 500 MG/1
500 TABLET ORAL
Qty: 3 | Refills: 0 | Status: DISCONTINUED | COMMUNITY
Start: 2022-01-12 | End: 2022-02-15

## 2022-02-15 RX ORDER — AMOXICILLIN AND CLAVULANATE POTASSIUM 875; 125 MG/1; MG/1
875-125 TABLET, COATED ORAL
Qty: 20 | Refills: 0 | Status: DISCONTINUED | COMMUNITY
Start: 2022-01-11 | End: 2022-02-15

## 2022-02-15 RX ORDER — AMOXICILLIN AND CLAVULANATE POTASSIUM 875; 125 MG/1; MG/1
875-125 TABLET, COATED ORAL
Qty: 28 | Refills: 0 | Status: DISCONTINUED | COMMUNITY
Start: 2022-01-20 | End: 2022-02-15

## 2022-02-16 ENCOUNTER — NON-APPOINTMENT (OUTPATIENT)
Age: 47
End: 2022-02-16

## 2022-02-22 ENCOUNTER — APPOINTMENT (OUTPATIENT)
Dept: COLORECTAL SURGERY | Facility: CLINIC | Age: 47
End: 2022-02-22
Payer: COMMERCIAL

## 2022-02-22 VITALS
SYSTOLIC BLOOD PRESSURE: 137 MMHG | OXYGEN SATURATION: 100 % | DIASTOLIC BLOOD PRESSURE: 80 MMHG | TEMPERATURE: 98 F | HEART RATE: 94 BPM

## 2022-02-22 PROCEDURE — 99024 POSTOP FOLLOW-UP VISIT: CPT

## 2022-02-22 NOTE — ASSESSMENT
[FreeTextEntry1] : Recurrent diverticulitis\par -Patient progressing well\par -Pathology reviewed\par -Continue low residue diet for 2 more weeks followed by high-fiber diet\par -Followup in one week for remaining staple removal

## 2022-03-01 ENCOUNTER — APPOINTMENT (OUTPATIENT)
Dept: COLORECTAL SURGERY | Facility: CLINIC | Age: 47
End: 2022-03-01
Payer: COMMERCIAL

## 2022-03-01 PROCEDURE — 99024 POSTOP FOLLOW-UP VISIT: CPT

## 2022-03-01 NOTE — HISTORY OF PRESENT ILLNESS
[FreeTextEntry1] : Status post laparoscopic sigmoid colon resection. Patient progressing well. Persistent but improving pain. No fevers or chills tolerating diet no nausea or vomiting

## 2022-03-01 NOTE — ASSESSMENT
[FreeTextEntry1] : Chronic diverticular disease\par -Patient progressing well\par -Continue low residue diet for one more week and slowly increase fiber\par -Followup in 3-4 weeks for reevaluation

## 2022-03-03 NOTE — DISCHARGE NOTE PROVIDER - NSDCQMERRANDS_GEN_ALL_CORE
Problem: Pain:  Goal: Pain level will decrease  Description: Pain level will decrease  3/3/2022 0840 by Pushpa Barrios RN  Outcome: Ongoing  3/2/2022 2218 by Dillon Christian RN  Outcome: Ongoing  Goal: Control of acute pain  Description: Control of acute pain  3/3/2022 0840 by Pushpa Barrios RN  Outcome: Ongoing  3/2/2022 2218 by Dillon Christian RN  Outcome: Ongoing  Goal: Control of chronic pain  Description: Control of chronic pain  3/3/2022 0840 by Pushpa Barrios RN  Outcome: Ongoing  3/2/2022 2218 by Dillon Christian RN  Outcome: Ongoing No

## 2022-03-13 ENCOUNTER — INPATIENT (INPATIENT)
Facility: HOSPITAL | Age: 47
LOS: 5 days | Discharge: HOME | End: 2022-03-19
Attending: STUDENT IN AN ORGANIZED HEALTH CARE EDUCATION/TRAINING PROGRAM | Admitting: STUDENT IN AN ORGANIZED HEALTH CARE EDUCATION/TRAINING PROGRAM
Payer: COMMERCIAL

## 2022-03-13 VITALS
RESPIRATION RATE: 17 BRPM | HEART RATE: 88 BPM | WEIGHT: 139.99 LBS | DIASTOLIC BLOOD PRESSURE: 74 MMHG | HEIGHT: 60 IN | TEMPERATURE: 98 F | OXYGEN SATURATION: 98 % | SYSTOLIC BLOOD PRESSURE: 117 MMHG

## 2022-03-13 DIAGNOSIS — Z98.890 OTHER SPECIFIED POSTPROCEDURAL STATES: Chronic | ICD-10-CM

## 2022-03-13 DIAGNOSIS — Z90.710 ACQUIRED ABSENCE OF BOTH CERVIX AND UTERUS: Chronic | ICD-10-CM

## 2022-03-13 DIAGNOSIS — T81.9XXA UNSPECIFIED COMPLICATION OF PROCEDURE, INITIAL ENCOUNTER: ICD-10-CM

## 2022-03-13 LAB
ALBUMIN SERPL ELPH-MCNC: 4.7 G/DL — SIGNIFICANT CHANGE UP (ref 3.3–5)
ALP SERPL-CCNC: 115 U/L — SIGNIFICANT CHANGE UP (ref 40–120)
ALT FLD-CCNC: 6 U/L — SIGNIFICANT CHANGE UP (ref 4–33)
ANION GAP SERPL CALC-SCNC: 13 MMOL/L — SIGNIFICANT CHANGE UP (ref 7–14)
APPEARANCE UR: ABNORMAL
APTT BLD: 32 SEC — SIGNIFICANT CHANGE UP (ref 27–36.3)
AST SERPL-CCNC: 13 U/L — SIGNIFICANT CHANGE UP (ref 4–32)
BACTERIA # UR AUTO: ABNORMAL
BASOPHILS # BLD AUTO: 0.05 K/UL — SIGNIFICANT CHANGE UP (ref 0–0.2)
BASOPHILS NFR BLD AUTO: 0.6 % — SIGNIFICANT CHANGE UP (ref 0–2)
BILIRUB SERPL-MCNC: 0.3 MG/DL — SIGNIFICANT CHANGE UP (ref 0.2–1.2)
BILIRUB UR-MCNC: NEGATIVE — SIGNIFICANT CHANGE UP
BLD GP AB SCN SERPL QL: NEGATIVE — SIGNIFICANT CHANGE UP
BLOOD GAS VENOUS COMPREHENSIVE RESULT: SIGNIFICANT CHANGE UP
BUN SERPL-MCNC: 14 MG/DL — SIGNIFICANT CHANGE UP (ref 7–23)
CALCIUM SERPL-MCNC: 9.8 MG/DL — SIGNIFICANT CHANGE UP (ref 8.4–10.5)
CHLORIDE SERPL-SCNC: 101 MMOL/L — SIGNIFICANT CHANGE UP (ref 98–107)
CO2 SERPL-SCNC: 23 MMOL/L — SIGNIFICANT CHANGE UP (ref 22–31)
COLOR SPEC: SIGNIFICANT CHANGE UP
CREAT SERPL-MCNC: 0.77 MG/DL — SIGNIFICANT CHANGE UP (ref 0.5–1.3)
DIFF PNL FLD: ABNORMAL
EGFR: 96 ML/MIN/1.73M2 — SIGNIFICANT CHANGE UP
EOSINOPHIL # BLD AUTO: 0.57 K/UL — HIGH (ref 0–0.5)
EOSINOPHIL NFR BLD AUTO: 7.3 % — HIGH (ref 0–6)
EPI CELLS # UR: 8 /HPF — HIGH (ref 0–5)
FLUAV AG NPH QL: SIGNIFICANT CHANGE UP
FLUBV AG NPH QL: SIGNIFICANT CHANGE UP
GLUCOSE BLDC GLUCOMTR-MCNC: 109 MG/DL — HIGH (ref 70–99)
GLUCOSE SERPL-MCNC: 118 MG/DL — HIGH (ref 70–99)
GLUCOSE UR QL: NEGATIVE — SIGNIFICANT CHANGE UP
HCT VFR BLD CALC: 36.9 % — SIGNIFICANT CHANGE UP (ref 34.5–45)
HGB BLD-MCNC: 11.6 G/DL — SIGNIFICANT CHANGE UP (ref 11.5–15.5)
IANC: 3.85 K/UL — SIGNIFICANT CHANGE UP (ref 1.5–8.5)
IMM GRANULOCYTES NFR BLD AUTO: 0.3 % — SIGNIFICANT CHANGE UP (ref 0–1.5)
INR BLD: 1.07 RATIO — SIGNIFICANT CHANGE UP (ref 0.88–1.16)
KETONES UR-MCNC: NEGATIVE — SIGNIFICANT CHANGE UP
LEUKOCYTE ESTERASE UR-ACNC: NEGATIVE — SIGNIFICANT CHANGE UP
LYMPHOCYTES # BLD AUTO: 2.86 K/UL — SIGNIFICANT CHANGE UP (ref 1–3.3)
LYMPHOCYTES # BLD AUTO: 36.9 % — SIGNIFICANT CHANGE UP (ref 13–44)
MCHC RBC-ENTMCNC: 27.9 PG — SIGNIFICANT CHANGE UP (ref 27–34)
MCHC RBC-ENTMCNC: 31.4 GM/DL — LOW (ref 32–36)
MCV RBC AUTO: 88.7 FL — SIGNIFICANT CHANGE UP (ref 80–100)
MONOCYTES # BLD AUTO: 0.41 K/UL — SIGNIFICANT CHANGE UP (ref 0–0.9)
MONOCYTES NFR BLD AUTO: 5.3 % — SIGNIFICANT CHANGE UP (ref 2–14)
NEUTROPHILS # BLD AUTO: 3.85 K/UL — SIGNIFICANT CHANGE UP (ref 1.8–7.4)
NEUTROPHILS NFR BLD AUTO: 49.6 % — SIGNIFICANT CHANGE UP (ref 43–77)
NITRITE UR-MCNC: NEGATIVE — SIGNIFICANT CHANGE UP
NRBC # BLD: 0 /100 WBCS — SIGNIFICANT CHANGE UP
NRBC # FLD: 0 K/UL — SIGNIFICANT CHANGE UP
PH UR: 6 — SIGNIFICANT CHANGE UP (ref 5–8)
PLATELET # BLD AUTO: 278 K/UL — SIGNIFICANT CHANGE UP (ref 150–400)
POTASSIUM SERPL-MCNC: 4.1 MMOL/L — SIGNIFICANT CHANGE UP (ref 3.5–5.3)
POTASSIUM SERPL-SCNC: 4.1 MMOL/L — SIGNIFICANT CHANGE UP (ref 3.5–5.3)
PROT SERPL-MCNC: 7.9 G/DL — SIGNIFICANT CHANGE UP (ref 6–8.3)
PROT UR-MCNC: ABNORMAL
PROTHROM AB SERPL-ACNC: 12.4 SEC — SIGNIFICANT CHANGE UP (ref 10.5–13.4)
RBC # BLD: 4.16 M/UL — SIGNIFICANT CHANGE UP (ref 3.8–5.2)
RBC # FLD: 13 % — SIGNIFICANT CHANGE UP (ref 10.3–14.5)
RBC CASTS # UR COMP ASSIST: 5 /HPF — HIGH (ref 0–4)
RH IG SCN BLD-IMP: POSITIVE — SIGNIFICANT CHANGE UP
RSV RNA NPH QL NAA+NON-PROBE: SIGNIFICANT CHANGE UP
SARS-COV-2 RNA SPEC QL NAA+PROBE: SIGNIFICANT CHANGE UP
SODIUM SERPL-SCNC: 137 MMOL/L — SIGNIFICANT CHANGE UP (ref 135–145)
SP GR SPEC: 1.03 — SIGNIFICANT CHANGE UP (ref 1–1.05)
UROBILINOGEN FLD QL: SIGNIFICANT CHANGE UP
WBC # BLD: 7.76 K/UL — SIGNIFICANT CHANGE UP (ref 3.8–10.5)
WBC # FLD AUTO: 7.76 K/UL — SIGNIFICANT CHANGE UP (ref 3.8–10.5)
WBC UR QL: 2 /HPF — SIGNIFICANT CHANGE UP (ref 0–5)

## 2022-03-13 PROCEDURE — 74177 CT ABD & PELVIS W/CONTRAST: CPT | Mod: 26,MA

## 2022-03-13 PROCEDURE — 99285 EMERGENCY DEPT VISIT HI MDM: CPT

## 2022-03-13 RX ORDER — SODIUM CHLORIDE 9 MG/ML
1000 INJECTION INTRAMUSCULAR; INTRAVENOUS; SUBCUTANEOUS ONCE
Refills: 0 | Status: COMPLETED | OUTPATIENT
Start: 2022-03-13 | End: 2022-03-13

## 2022-03-13 RX ORDER — OXYCODONE HYDROCHLORIDE 5 MG/1
2.5 TABLET ORAL EVERY 6 HOURS
Refills: 0 | Status: DISCONTINUED | OUTPATIENT
Start: 2022-03-13 | End: 2022-03-14

## 2022-03-13 RX ORDER — OXYCODONE HYDROCHLORIDE 5 MG/1
5 TABLET ORAL EVERY 6 HOURS
Refills: 0 | Status: DISCONTINUED | OUTPATIENT
Start: 2022-03-13 | End: 2022-03-13

## 2022-03-13 RX ORDER — FENTANYL CITRATE 50 UG/ML
50 INJECTION INTRAVENOUS ONCE
Refills: 0 | Status: DISCONTINUED | OUTPATIENT
Start: 2022-03-13 | End: 2022-03-13

## 2022-03-13 RX ORDER — INFLUENZA VIRUS VACCINE 15; 15; 15; 15 UG/.5ML; UG/.5ML; UG/.5ML; UG/.5ML
0.5 SUSPENSION INTRAMUSCULAR ONCE
Refills: 0 | Status: COMPLETED | OUTPATIENT
Start: 2022-03-13 | End: 2022-03-13

## 2022-03-13 RX ORDER — SODIUM CHLORIDE 9 MG/ML
1000 INJECTION, SOLUTION INTRAVENOUS
Refills: 0 | Status: DISCONTINUED | OUTPATIENT
Start: 2022-03-13 | End: 2022-03-15

## 2022-03-13 RX ORDER — HYDROMORPHONE HYDROCHLORIDE 2 MG/ML
0.5 INJECTION INTRAMUSCULAR; INTRAVENOUS; SUBCUTANEOUS EVERY 4 HOURS
Refills: 0 | Status: DISCONTINUED | OUTPATIENT
Start: 2022-03-13 | End: 2022-03-14

## 2022-03-13 RX ORDER — ONDANSETRON 8 MG/1
4 TABLET, FILM COATED ORAL ONCE
Refills: 0 | Status: COMPLETED | OUTPATIENT
Start: 2022-03-13 | End: 2022-03-13

## 2022-03-13 RX ORDER — PIPERACILLIN AND TAZOBACTAM 4; .5 G/20ML; G/20ML
3.38 INJECTION, POWDER, LYOPHILIZED, FOR SOLUTION INTRAVENOUS EVERY 8 HOURS
Refills: 0 | Status: DISCONTINUED | OUTPATIENT
Start: 2022-03-13 | End: 2022-03-19

## 2022-03-13 RX ORDER — IBUPROFEN 200 MG
400 TABLET ORAL EVERY 6 HOURS
Refills: 0 | Status: DISCONTINUED | OUTPATIENT
Start: 2022-03-13 | End: 2022-03-13

## 2022-03-13 RX ORDER — OXYCODONE HYDROCHLORIDE 5 MG/1
5 TABLET ORAL ONCE
Refills: 0 | Status: DISCONTINUED | OUTPATIENT
Start: 2022-03-13 | End: 2022-03-13

## 2022-03-13 RX ORDER — ACETAMINOPHEN 500 MG
1000 TABLET ORAL EVERY 6 HOURS
Refills: 0 | Status: COMPLETED | OUTPATIENT
Start: 2022-03-13 | End: 2022-03-13

## 2022-03-13 RX ORDER — ENOXAPARIN SODIUM 100 MG/ML
40 INJECTION SUBCUTANEOUS EVERY 24 HOURS
Refills: 0 | Status: DISCONTINUED | OUTPATIENT
Start: 2022-03-13 | End: 2022-03-19

## 2022-03-13 RX ORDER — PIPERACILLIN AND TAZOBACTAM 4; .5 G/20ML; G/20ML
3.38 INJECTION, POWDER, LYOPHILIZED, FOR SOLUTION INTRAVENOUS ONCE
Refills: 0 | Status: COMPLETED | OUTPATIENT
Start: 2022-03-13 | End: 2022-03-13

## 2022-03-13 RX ORDER — OXYCODONE HYDROCHLORIDE 5 MG/1
10 TABLET ORAL EVERY 6 HOURS
Refills: 0 | Status: DISCONTINUED | OUTPATIENT
Start: 2022-03-13 | End: 2022-03-13

## 2022-03-13 RX ADMIN — SODIUM CHLORIDE 1000 MILLILITER(S): 9 INJECTION INTRAMUSCULAR; INTRAVENOUS; SUBCUTANEOUS at 03:41

## 2022-03-13 RX ADMIN — HYDROMORPHONE HYDROCHLORIDE 0.5 MILLIGRAM(S): 2 INJECTION INTRAMUSCULAR; INTRAVENOUS; SUBCUTANEOUS at 23:47

## 2022-03-13 RX ADMIN — Medication 400 MILLIGRAM(S): at 14:03

## 2022-03-13 RX ADMIN — OXYCODONE HYDROCHLORIDE 10 MILLIGRAM(S): 5 TABLET ORAL at 16:26

## 2022-03-13 RX ADMIN — PIPERACILLIN AND TAZOBACTAM 25 GRAM(S): 4; .5 INJECTION, POWDER, LYOPHILIZED, FOR SOLUTION INTRAVENOUS at 16:00

## 2022-03-13 RX ADMIN — HYDROMORPHONE HYDROCHLORIDE 0.5 MILLIGRAM(S): 2 INJECTION INTRAMUSCULAR; INTRAVENOUS; SUBCUTANEOUS at 23:42

## 2022-03-13 RX ADMIN — FENTANYL CITRATE 50 MICROGRAM(S): 50 INJECTION INTRAVENOUS at 09:36

## 2022-03-13 RX ADMIN — ONDANSETRON 4 MILLIGRAM(S): 8 TABLET, FILM COATED ORAL at 07:23

## 2022-03-13 RX ADMIN — Medication 400 MILLIGRAM(S): at 13:00

## 2022-03-13 RX ADMIN — Medication 1000 MILLIGRAM(S): at 14:37

## 2022-03-13 RX ADMIN — OXYCODONE HYDROCHLORIDE 2.5 MILLIGRAM(S): 5 TABLET ORAL at 22:18

## 2022-03-13 RX ADMIN — FENTANYL CITRATE 50 MICROGRAM(S): 50 INJECTION INTRAVENOUS at 11:43

## 2022-03-13 RX ADMIN — OXYCODONE HYDROCHLORIDE 2.5 MILLIGRAM(S): 5 TABLET ORAL at 21:48

## 2022-03-13 RX ADMIN — ONDANSETRON 4 MILLIGRAM(S): 8 TABLET, FILM COATED ORAL at 11:54

## 2022-03-13 RX ADMIN — PIPERACILLIN AND TAZOBACTAM 25 GRAM(S): 4; .5 INJECTION, POWDER, LYOPHILIZED, FOR SOLUTION INTRAVENOUS at 23:42

## 2022-03-13 RX ADMIN — FENTANYL CITRATE 50 MICROGRAM(S): 50 INJECTION INTRAVENOUS at 07:08

## 2022-03-13 RX ADMIN — OXYCODONE HYDROCHLORIDE 5 MILLIGRAM(S): 5 TABLET ORAL at 04:04

## 2022-03-13 RX ADMIN — ONDANSETRON 4 MILLIGRAM(S): 8 TABLET, FILM COATED ORAL at 23:42

## 2022-03-13 RX ADMIN — HYDROMORPHONE HYDROCHLORIDE 0.5 MILLIGRAM(S): 2 INJECTION INTRAMUSCULAR; INTRAVENOUS; SUBCUTANEOUS at 18:01

## 2022-03-13 RX ADMIN — FENTANYL CITRATE 50 MICROGRAM(S): 50 INJECTION INTRAVENOUS at 05:18

## 2022-03-13 RX ADMIN — Medication 400 MILLIGRAM(S): at 12:00

## 2022-03-13 RX ADMIN — SODIUM CHLORIDE 125 MILLILITER(S): 9 INJECTION, SOLUTION INTRAVENOUS at 12:00

## 2022-03-13 RX ADMIN — HYDROMORPHONE HYDROCHLORIDE 0.5 MILLIGRAM(S): 2 INJECTION INTRAMUSCULAR; INTRAVENOUS; SUBCUTANEOUS at 17:46

## 2022-03-13 RX ADMIN — OXYCODONE HYDROCHLORIDE 10 MILLIGRAM(S): 5 TABLET ORAL at 15:37

## 2022-03-13 RX ADMIN — PIPERACILLIN AND TAZOBACTAM 200 GRAM(S): 4; .5 INJECTION, POWDER, LYOPHILIZED, FOR SOLUTION INTRAVENOUS at 08:20

## 2022-03-13 RX ADMIN — ENOXAPARIN SODIUM 40 MILLIGRAM(S): 100 INJECTION SUBCUTANEOUS at 15:25

## 2022-03-13 NOTE — H&P ADULT - NSHPREVIEWOFSYSTEMS_GEN_ALL_CORE
REVIEW OF SYSTEMS:  CONSTITUTIONAL: No fever, weight loss, or fatigue  EYES: No eye pain, visual disturbances, or discharge  ENMT:  No tinnitus, vertigo, dysphagia  RESPIRATORY: No cough, SOB, wheezing, chills or hemoptysis  CARDIOVASCULAR: No chest pain, palpitations, dizziness, or leg swelling  GASTROINTESTINAL: Having abdominal pain and nausea, vomiting, No diarrhea.  GENITOURINARY: No dysuria, frequency, hematuria, or incontinence.  NEUROLOGICAL: No headaches, memory loss, loss of strength, numbness, or tremors  SKIN: No itching, burning, rashes, or lesions   ENDOCRINE: No heat or cold intolerance  MUSCULOSKELETAL: No joint pain or swelling

## 2022-03-13 NOTE — H&P ADULT - ASSESSMENT
ASSESSMENT:  47F with history of Diverticulitis (s/p LAR with Dr. Kam 2/7 ) presents to the ED abdominal pain located in suprapubic region for the past day, worse today n/w concern for anastomotic leak.     PLAN:    - Admit to A Team Surgery under Dr. Kam  - NPO/IVF  - IR consult for intraabdominal abscess drainage  - Zosyn for anastomotic leak  - Serial abdominal exams  - Lovenox DVT ppx  - Discussed with Dr. Jaramillo Gamma    [id number]     ASSESSMENT:  47F with history of Diverticulitis (s/p LAR with Dr. Kam 2/7 ) presents to the ED abdominal pain located in suprapubic region for the past day, worse today n/w concern for new collection surrounding anastomosis.     PLAN:    - Admit to A Team Surgery under Dr. Kam  - NPO/IVF  - IR consult for intraabdominal abscess drainage  - Zosyn for abdominal collection  - Serial abdominal exams  - Lovenox DVT ppx  - Discussed with Dr. Ramirez    [id number]

## 2022-03-13 NOTE — H&P ADULT - NSHPLABSRESULTS_GEN_ALL_CORE
11.6   7.76  )-----------( 278      ( 13 Mar 2022 03:53 )             36.9       03-13    137  |  101  |  14  ----------------------------<  118<H>  4.1   |  23  |  0.77    Ca    9.8      13 Mar 2022 03:56    TPro  7.9  /  Alb  4.7  /  TBili  0.3  /  DBili  x   /  AST  13  /  ALT  6   /  AlkPhos  115  03-13      < from: CT Abdomen and Pelvis w/ IV Cont (03.13.22 @ 04:22) >    FINDINGS:  LOWER CHEST: Within normal limits.    LIVER: Within normal limits.  BILE DUCTS: Normal caliber.  GALLBLADDER: Within normal limits.  SPLEEN: Within normal limits.  PANCREAS: Within normal limits.  ADRENALS: Within normal limits.  KIDNEYS/URETERS: Within normal limits.    BLADDER: Within normal limits.  REPRODUCTIVE ORGANS: Status post hysterectomy. Two left ovarian cysts;   superior cyst measuring up to 2.4 cm an inferior cyst measuring up to 2.6   cm and is mildly increased in sizecompared to prior exam from 12/21/2021.    BOWEL AND PERITONEUM: No bowel obstruction. Normal appendix. Interval   sigmoid resection with distal colonic anastomosis. Just superior and   lateral to the anastomotic site, there is a air-containing collection   measuring approximately 2.6 x 1.5 cm with surrounding fat stranding   (2:97). Trace pelvic free fluid. No pneumoperitoneum. No mesenteric   lymphadenopathy.  VESSELS: Within normal limits.  RETROPERITONEUM/LYMPH NODES: No lymphadenopathy.  ABDOMINAL WALL: Postoperative changes.  BONES: Degenerative changes of the spine.    IMPRESSION:    Interval sigmoid resection with distal colonic anastomosis. Pericolonic   air-containing collection adjacent to the anastomotic suture measuring   2.6 x 1.5 cm with surrounding fat stranding raising concern for   anastomotic leak. Recommend repeat CT or fluoroscopic exam after a rectal   contrast to exclude leak.    < end of copied text >

## 2022-03-13 NOTE — ED ADULT NURSE NOTE - OBJECTIVE STATEMENT
received pt in room 11. pt s/p laparoscopic diverticulitis surgery 2/7. went for post op visit yesterday and staples removed.  c/o increased abd pain, nausea. 20 gauge inserted left ac. blood and urine sent.  given meds as ordered

## 2022-03-13 NOTE — PATIENT PROFILE ADULT - HAVE YOU EXPERIENCED VIOLENCE OR A TRAUMATIC EVENT?
Statin Intolerance  Statin intolerance is the inability to take a certain type of cholesterol-lowering medicine (statin) because of unwanted side effects, such as muscle pain. Statins may be prescribed to improve cholesterol levels and lower the risk for heart attack, heart disease, and stroke. People with statin intolerance may experience muscle pain and cramps (myalgia) that go away when the statin is stopped.  What are the causes?  The cause of this condition is not known.  What increases the risk?  You may be at higher risk for statin intolerance if you:  · Take more than one type of cholesterol-lowering medicine at a time.  · Need a higher than normal dosage of a statin.  · Have a history of high CK (creatine kinase) in the blood. CK is an enzyme that is released when muscle tissue is damaged.  · Are a woman.  · Have a body size that is smaller than normal.  · Are age 70 or older.  · Drink a lot of alcohol.  · Are of  descent.  · Have kidney, liver, or muscle disease.  · Have a low level of hormones that control how your body uses energy (hypothyroidism).  · Take certain medicines, including:  ? Certain medicines for mental illness (antipsychotics).  ? Some types of antibiotics.  ? Certain medicines used for blood pressure or heart disease.  ? Medicines that reduce the activity of the body's disease-fighting system (immunosuppressants).  ? Medicines to treat hepatitis C and HIV/AIDS (human immunodeficiency virus/acquired immunodeficiency syndrome).  · Follow an intense exercise program.  · Drink a lot of grapefruit juice.  · Have a lack of vitamin D (deficiency).  What are the signs or symptoms?  Signs and symptoms of statin intolerance include:  · General muscle aches (myalgia). This may feel similar to muscle aches that are caused by the flu.  · Muscle pain, tenderness, cramps, or weakness (myositis).  · Severe muscle pain, weakness, and raised blood CK levels (rhabdomyolysis).  Symptoms usually go away  when the statin is stopped.  Rarely, liver damage can also occur, which can cause:  · Loss of appetite.  · Pain in the upper right abdomen.  · Yellowing of the skin or the white parts of the eyes (jaundice).  How is this diagnosed?  This condition is diagnosed based on your symptoms, your medical history, and a physical exam. You may also have blood tests.  How is this treated?  Your health care provider may have you stop taking the statin for a short time to see if your symptoms go away. Then your provider may restart your statin, or:  · Change you to a different statin.  · Lower the dosage of your statin.  · Have you take your statin less often.  · Change you to another type of cholesterol-lowering medicine.  · Stop or change any medicines that might be interfering with your statin.  · Limit how much grapefruit juice you drink.  · Recommend stopping intense exercise.  Follow these instructions at home:  Medicines  · Take your statin medicine as told by your health care provider. Do not stop taking the statin unless your health care provider tells you to stop.  · Take other over-the-counter and prescription medicines only as told by your health care provider.  · Check with your health care provider before taking any new medicines. Certain medicines can increase your risk for statin intolerance.  Lifestyle  · Limit alcohol intake to no more than 1 drink a day for nonpregnant women and 2 drinks a day for men. One drink equals 12 oz of beer, 5 oz of wine, or 1½ oz of hard liquor.  · Do not use any products that contain nicotine or tobacco, such as cigarettes and e-cigarettes. If you need help quitting, ask your health care provider.  General instructions    · Have blood tests to check CK levels or liver enzymes as told by your health care provider.  · Exercise as directed. Ask your health care provider what exercises are best for you. Do not start a new exercise program before talking about it with your health care  provider.  · Follow instructions from your health care provider about eating or drinking restrictions. Your health care provider may recommend:  ? Limiting the amount of grapefruit juice you drink, or not drinking it at all.  ? Eating a diet that is low in saturated fats and high in fiber.  · Maintain a healthy weight with diet and exercise.  · Keep all follow-up visits as told by your health care provider. This is important.  Contact a health care provider if:  · You have any symptoms of statin intolerance.  Summary  · Statins are important medicines for improving your cholesterol, which may reduce your risk for heart attack and stroke.  · Some people are not able to continue taking a particular statin because of muscle problems (myalgia) or other side effects.  · Myalgia is the most common symptom of statin intolerance. Often, the muscle pain and cramps from myalgia go away when the statin is stopped.  · Although rare, liver damage can occur as a result of statin intolerance. You should have routine blood tests to check your liver enzymes.  · In most cases, statin intolerance can be managed and you can continue to take a cholesterol-lowering medicine.  This information is not intended to replace advice given to you by your health care provider. Make sure you discuss any questions you have with your health care provider.  Document Released: 05/31/2018 Document Revised: 05/31/2018 Document Reviewed: 05/31/2018  [employer] Interactive Patient Education © 2019 [employer]  Mediterranean Diet  A Mediterranean diet refers to food and lifestyle choices that are based on the traditions of countries located on the [place]. This way of eating has been shown to help prevent certain conditions and improve outcomes for people who have chronic diseases, like kidney disease and heart disease.  What are tips for following this plan?  Lifestyle  · Cook and eat meals together with your family, when possible.  · Drink  enough fluid to keep your urine clear or pale yellow.  · Be physically active every day. This includes:  ? Aerobic exercise like running or swimming.  ? Leisure activities like gardening, walking, or housework.  · Get 7-8 hours of sleep each night.  · If recommended by your health care provider, drink red wine in moderation. This means 1 glass a day for nonpregnant women and 2 glasses a day for men. A glass of wine equals 5 oz (150 mL).  Reading food labels    · Check the serving size of packaged foods. For foods such as rice and pasta, the serving size refers to the amount of cooked product, not dry.  · Check the total fat in packaged foods. Avoid foods that have saturated fat or trans fats.  · Check the ingredients list for added sugars, such as corn syrup.  Shopping  · At the grocery store, buy most of your food from the areas near the walls of the store. This includes:  ? Fresh fruits and vegetables (produce).  ? Grains, beans, nuts, and seeds. Some of these may be available in unpackaged forms or large amounts (in bulk).  ? Fresh seafood.  ? Poultry and eggs.  ? Low-fat dairy products.  · Buy whole ingredients instead of prepackaged foods.  · Buy fresh fruits and vegetables in-season from local farmers markets.  · Buy frozen fruits and vegetables in resealable bags.  · If you do not have access to quality fresh seafood, buy precooked frozen shrimp or canned fish, such as tuna, salmon, or sardines.  · Buy small amounts of raw or cooked vegetables, salads, or olives from the deli or salad bar at your store.  · Stock your pantry so you always have certain foods on hand, such as olive oil, canned tuna, canned tomatoes, rice, pasta, and beans.  Cooking  · Cook foods with extra-virgin olive oil instead of using butter or other vegetable oils.  · Have meat as a side dish, and have vegetables or grains as your main dish. This means having meat in small portions or adding small amounts of meat to foods like pasta or  stew.  · Use beans or vegetables instead of meat in common dishes like chili or lasagna.  · [place] with different cooking methods. Try roasting or broiling vegetables instead of steaming or sautéeing them.  · Add frozen vegetables to soups, stews, pasta, or rice.  · Add nuts or seeds for added healthy fat at each meal. You can add these to yogurt, salads, or vegetable dishes.  · Marinate fish or vegetables using olive oil, lemon juice, garlic, and fresh herbs.  Meal planning    · Plan to eat 1 vegetarian meal one day each week. Try to work up to 2 vegetarian meals, if possible.  · Eat seafood 2 or more times a week.  · Have healthy snacks readily available, such as:  ? Vegetable sticks with hummus.  ? Greek yogurt.  ? Fruit and nut trail mix.  · Eat balanced meals throughout the week. This includes:  ? Fruit: 2-3 servings a day  ? Vegetables: 4-5 servings a day  ? Low-fat dairy: 2 servings a day  ? Fish, poultry, or lean meat: 1 serving a day  ? Beans and legumes: 2 or more servings a week  ? Nuts and seeds: 1-2 servings a day  ? Whole grains: 6-8 servings a day  ? Extra-virgin olive oil: 3-4 servings a day  · Limit red meat and sweets to only a few servings a month  What are my food choices?  · Mediterranean diet  ? Recommended  ? Grains: Whole-grain pasta. Brown rice. Bulgar wheat. Polenta. Couscous. Whole-wheat bread. Oatmeal. Quinoa.  ? Vegetables: Artichokes. Beets. Broccoli. Cabbage. Carrots. Eggplant. Green beans. Chard. Kale. Spinach. Onions. Leeks. Peas. Squash. Tomatoes. Peppers. Radishes.  ? Fruits: Apples. Apricots. Avocado. Berries. Bananas. Cherries. Dates. Figs. Grapes. Toby. Melon. Oranges. Peaches. Plums. Pomegranate.  ? Meats and other protein foods: Beans. Almonds. Sunflower seeds. Pine nuts. Peanuts. Cod. [place]. Scallops. Shrimp. Tuna. Tilapia. Clams. Oysters. Eggs.  ? Dairy: Low-fat milk. Cheese. Greek yogurt.  ? Beverages: Water. Red wine. Herbal tea.  ? Fats and oils: Extra virgin olive  oil. Avocado oil. Grape seed oil.  ? Sweets and desserts: Greek yogurt with honey. Baked apples. Poached pears. Trail mix.  ? Seasoning and other foods: Basil. Cilantro. Coriander. Cumin. Mint. Parsley. [place]. Rosemary. Tarragon. Garlic. Oregano. Thyme. Pepper. Balsalmic vinegar. Tahini. Hummus. Tomato sauce. Olives. Mushrooms.  ? Limit these  ? Grains: Prepackaged pasta or rice dishes. Prepackaged cereal with added sugar.  ? Vegetables: Deep fried potatoes (french fries).  ? Fruits: Fruit canned in syrup.  ? Meats and other protein foods: Beef. Pork. Lamb. Poultry with skin. Hot dogs. May.  ? Dairy: Ice cream. Sour cream. Whole milk.  ? Beverages: Juice. Sugar-sweetened soft drinks. Beer. Liquor and spirits.  ? Fats and oils: Butter. Canola oil. Vegetable oil. Beef fat (tallow). Lard.  ? Sweets and desserts: Cookies. Cakes. Pies. Candy.  ? Seasoning and other foods: Mayonnaise. Premade sauces and marinades.  ? The items listed may not be a complete list. Talk with your dietitian about what dietary choices are right for you.  Summary  · The Mediterranean diet includes both food and lifestyle choices.  · Eat a variety of fresh fruits and vegetables, beans, nuts, seeds, and whole grains.  · Limit the amount of red meat and sweets that you eat.  · Talk with your health care provider about whether it is safe for you to drink red wine in moderation. This means 1 glass a day for nonpregnant women and 2 glasses a day for men. A glass of wine equals 5 oz (150 mL).  This information is not intended to replace advice given to you by your health care provider. Make sure you discuss any questions you have with your health care provider.  Document Released: 08/10/2017 Document Revised: 09/12/2017 Document Reviewed: 08/10/2017  [employer] Interactive Patient Education © 2019 [employer]  Cholesterol  Cholesterol is a white, waxy, fat-like substance that is needed by the human body in small amounts. The liver makes all the  cholesterol we need. Cholesterol is carried from the liver by the blood through the blood vessels. Deposits of cholesterol (plaques) may build up on blood vessel (artery) walls. Plaques make the arteries narrower and stiffer. Cholesterol plaques increase the risk for heart attack and stroke.  You cannot feel your cholesterol level even if it is very high. The only way to know that it is high is to have a blood test. Once you know your cholesterol levels, you should keep a record of the test results. Work with your health care provider to keep your levels in the desired range.  What do the results mean?  · Total cholesterol is a rough measure of all the cholesterol in your blood.  · LDL (low-density lipoprotein) is the “bad” cholesterol. This is the type that causes plaque to build up on the artery walls. You want this level to be low.  · HDL (high-density lipoprotein) is the “good” cholesterol because it cleans the arteries and carries the LDL away. You want this level to be high.  · Triglycerides are fat that the body can either burn for energy or store. High levels are closely linked to heart disease.  What are the desired levels of cholesterol?  · Total cholesterol below 200.  · LDL below 100 for people who are at risk, below 70 for people at very high risk.  · HDL above 40 is good. A level of 60 or higher is considered to be protective against heart disease.  · Triglycerides below 150.  How can I lower my cholesterol?  Diet  Follow your diet program as told by your health care provider.  · Choose fish or white meat chicken and turkey, roasted or baked. Limit fatty cuts of red meat, fried foods, and processed meats, such as sausage and lunch meats.  · Eat lots of fresh fruits and vegetables.  · Choose whole grains, beans, pasta, potatoes, and cereals.  · Choose olive oil, corn oil, or canola oil, and use only small amounts.  · Avoid butter, mayonnaise, shortening, or palm kernel oils.  · Avoid foods with trans  fats.  · Drink skim or nonfat milk and eat low-fat or nonfat yogurt and cheeses. Avoid whole milk, cream, ice cream, egg yolks, and full-fat cheeses.  · Healthier desserts include devin food cake, zac snaps, animal crackers, hard candy, popsicles, and low-fat or nonfat frozen yogurt. Avoid pastries, cakes, pies, and cookies.    Exercise  · Follow your exercise program as told by your health care provider. A regular program:  ? Helps to decrease LDL and raise HDL.  ? Helps with weight control.  · Do things that increase your activity level, such as gardening, walking, and taking the stairs.  · Ask your health care provider about ways that you can be more active in your daily life.  Medicine  · Take over-the-counter and prescription medicines only as told by your health care provider.  ? Medicine may be prescribed by your health care provider to help lower cholesterol and decrease the risk for heart disease. This is usually done if diet and exercise have failed to bring down cholesterol levels.  ? If you have several risk factors, you may need medicine even if your levels are normal.  This information is not intended to replace advice given to you by your health care provider. Make sure you discuss any questions you have with your health care provider.  Document Released: 09/12/2002 Document Revised: 07/15/2017 Document Reviewed: 06/17/2017  [employer] Interactive Patient Education © 2019 [employer]     no

## 2022-03-13 NOTE — ED PROVIDER NOTE - OBJECTIVE STATEMENT
47F PMH Diverticulitis with surgery done 2/7 (Laparoscopy Low Anterior Resection with Dr Wilber Kam), presents to the ED abdominal pain located mid lower abdomen near site of surg incision for the past 2 weeks, worse today, sharp in quality. Pt had dark/red bowel movement then felt the pain afterwards. 9/10 in severity. Tried tylenol without much relief. Reports fever earlier today and some nausea. States she had some pink drainage from surg site. Surg site staples removed 2 weeks ago. Denies vomiting/diarrhea, urinary sxs, cough/sob/chest pain. Not on ACs. 47F PMH Diverticulitis with surgery done 2/7 (Laparoscopy Low Anterior Resection with Dr Wilber Kam), presents to the ED abdominal pain located mid lower abdomen left of surg incision for the past day, worse today, sharp in quality. Pt had 3 dark/red bowel movements then felt the pain afterwards. 9/10 in severity. Tried tylenol without much relief, last taken 3 hrs ago. Reports fever earlier today and some nausea. Surg site staples removed 2 weeks ago. Denies vomiting/diarrhea, urinary sxs, cough/sob/chest pain. Not on ACs.

## 2022-03-13 NOTE — ED ADULT NURSE REASSESSMENT NOTE - NS ED NURSE REASSESS COMMENT FT1
report received from overnight RN Lena. pt is a&ox4. NAD.  pt denies any distress at this time. safety precautions maintained. pt reminded of use for call bell and confirmed at bedside.

## 2022-03-13 NOTE — ED PROVIDER NOTE - PHYSICAL EXAMINATION
GENERAL: no acute distress, non-toxic appearing  HEENT: normal conjunctiva, oral mucosa moist  CARDIAC: regular rate and regular rhythm, bp reassuring  PULM: clear to ascultation bilaterally, no incr wob  GI: abdomen nondistended, soft, tender to mid lower abd near surg site  : no suprapubic tenderness  NEURO: alert and oriented x 3, normal speech, moving all extremities without lateralization  MSK: no visible deformities, no peripheral edema  SKIN: surg site scar clean, no pus draining, no surrounding erythema/warmth/induration/fluctuance/crepitus  PSYCH: appropriate mood and affect

## 2022-03-13 NOTE — PATIENT PROFILE ADULT - FALL HARM RISK - HARM RISK INTERVENTIONS

## 2022-03-13 NOTE — ED PROVIDER NOTE - CLINICAL SUMMARY MEDICAL DECISION MAKING FREE TEXT BOX
Carol, PGY3: Concern for pos diverticulitis vs fistula, low suspicion gu/gyn related. Will do labs, analgesia, type/coags, CT, surg cs. Reassess.

## 2022-03-13 NOTE — CONSULT NOTE ADULT - SUBJECTIVE AND OBJECTIVE BOX
Vascular & Interventional Radiology Brief Consult Note    Evaluate for Procedure: Abdominal collection drainage    HPI: 47F with history of Diverticulitis (s/p LAR with Dr. Kam 2/7 ) presents to the ED abdominal pain located in suprapubic region for the past day, worse today n/w concern for new collection surrounding anastomosis.     Allergies: codeine (Short breath)    Medications (Abx/Cardiac/Anticoagulation/Blood Products)  enoxaparin Injectable: 40 milliGRAM(s) SubCutaneous (03-13 @ 15:25)  piperacillin/tazobactam IVPB.: 200 mL/Hr IV Intermittent (03-13 @ 08:20)  piperacillin/tazobactam IVPB..: 25 mL/Hr IV Intermittent (03-13 @ 16:00)    Data:  160  63.5  T(C): 36.8  HR: 63  BP: 111/73  RR: 17  SpO2: 100%    -WBC 7.76 / HgB 11.6 / Hct 36.9 / Plt 278  -Na 137 / Cl 101 / BUN 14 / Glucose 118  -K 4.1 / CO2 23 / Cr 0.77  -ALT 6 / Alk Phos 115 / T.Bili 0.3  -INR1.07    Imaging: Reviewed    Recommendations:  -No safe percutaneous window for drainage. No IR intervention recommended.

## 2022-03-13 NOTE — ED ADULT TRIAGE NOTE - CHIEF COMPLAINT QUOTE
Pt arrives to ED via EMS from home c/o abd pain and blood in her stool described as dark red.  Pt had abd surgery for diverticulitis on 3/7/22.  Pt reports staples were removed from surgical site by her surgeon but reports there is some discharge present.

## 2022-03-13 NOTE — H&P ADULT - NSHPPHYSICALEXAM_GEN_ALL_CORE
Gen: NAD, WN/WD  HEENT: No scleral icterus, hearing grossly intact  Neck: Supple, trachea midline  Lung: Non-labored respirations  Heart: RRR  GI: Soft, tender in suprapubic, non-distended, well healed abdominal incisions  Extrem: WWP, no edema, palpable pulses bilaterally  Neuro: A&O x3, moves all extremities spontaneously, follows commands

## 2022-03-13 NOTE — ED PROVIDER NOTE - CARE PLAN
Principal Discharge DX:	LLQ pain   1 Principal Discharge DX:	Post-operative complication  Secondary Diagnosis:	LLQ pain

## 2022-03-13 NOTE — H&P ADULT - HISTORY OF PRESENT ILLNESS
47F with history of Diverticulitis (s/p LAR with Dr. Kam 2/7 ) presents to the ED abdominal pain located in suprapubic region for the past day, worse today, sharp in quality. She had 3 dark/red bowel movements then felt the pain afterwards and pain is 9/10 in severity. She tried Tylenol without much relief, last taken 3 hrs ago. She has fever and nausea. She had surg site staples removed 2 weeks ago and wound without issue since. She denies vomiting/diarrhea, urinary sxs, cough/sob/chest pain. Not on ACs.

## 2022-03-13 NOTE — ED PROVIDER NOTE - ATTENDING CONTRIBUTION TO CARE
I performed a face-to-face evaluation of the patient and performed a history and physical examination. I agree with the history and physical examination. If this was a PA visit, I personally saw the patient with the PA and performed a substantive portion of the visit including all aspects of the medical decision making.    Recent diverticulitis, s/p surgery. P/w LLQ pain to the L of the surgical scar. Fever. Blood in stool. Concern for post-op complication (seroma, hematoma, abscess) vs. diverticulitis. Check labs, CT. Call Surg.

## 2022-03-14 ENCOUNTER — TRANSCRIPTION ENCOUNTER (OUTPATIENT)
Age: 47
End: 2022-03-14

## 2022-03-14 LAB
ALBUMIN SERPL ELPH-MCNC: 4 G/DL — SIGNIFICANT CHANGE UP (ref 3.3–5)
ALBUMIN SERPL ELPH-MCNC: 4 G/DL — SIGNIFICANT CHANGE UP (ref 3.3–5)
ALP SERPL-CCNC: 95 U/L — SIGNIFICANT CHANGE UP (ref 40–120)
ALP SERPL-CCNC: 97 U/L — SIGNIFICANT CHANGE UP (ref 40–120)
ALT FLD-CCNC: 6 U/L — SIGNIFICANT CHANGE UP (ref 4–33)
ALT FLD-CCNC: <5 U/L — SIGNIFICANT CHANGE UP (ref 4–33)
ANION GAP SERPL CALC-SCNC: 11 MMOL/L — SIGNIFICANT CHANGE UP (ref 7–14)
ANION GAP SERPL CALC-SCNC: 12 MMOL/L — SIGNIFICANT CHANGE UP (ref 7–14)
AST SERPL-CCNC: 13 U/L — SIGNIFICANT CHANGE UP (ref 4–32)
AST SERPL-CCNC: 15 U/L — SIGNIFICANT CHANGE UP (ref 4–32)
BASOPHILS # BLD AUTO: 0.05 K/UL — SIGNIFICANT CHANGE UP (ref 0–0.2)
BASOPHILS NFR BLD AUTO: 0.9 % — SIGNIFICANT CHANGE UP (ref 0–2)
BILIRUB SERPL-MCNC: 0.3 MG/DL — SIGNIFICANT CHANGE UP (ref 0.2–1.2)
BILIRUB SERPL-MCNC: 0.3 MG/DL — SIGNIFICANT CHANGE UP (ref 0.2–1.2)
BUN SERPL-MCNC: 9 MG/DL — SIGNIFICANT CHANGE UP (ref 7–23)
BUN SERPL-MCNC: 9 MG/DL — SIGNIFICANT CHANGE UP (ref 7–23)
CALCIUM SERPL-MCNC: 9.2 MG/DL — SIGNIFICANT CHANGE UP (ref 8.4–10.5)
CALCIUM SERPL-MCNC: 9.3 MG/DL — SIGNIFICANT CHANGE UP (ref 8.4–10.5)
CHLORIDE SERPL-SCNC: 100 MMOL/L — SIGNIFICANT CHANGE UP (ref 98–107)
CHLORIDE SERPL-SCNC: 104 MMOL/L — SIGNIFICANT CHANGE UP (ref 98–107)
CK MB BLD-MCNC: <1.7 % — SIGNIFICANT CHANGE UP (ref 0–2.5)
CK MB CFR SERPL CALC: <1 NG/ML — SIGNIFICANT CHANGE UP
CK SERPL-CCNC: 60 U/L — SIGNIFICANT CHANGE UP (ref 25–170)
CO2 SERPL-SCNC: 21 MMOL/L — LOW (ref 22–31)
CO2 SERPL-SCNC: 22 MMOL/L — SIGNIFICANT CHANGE UP (ref 22–31)
CREAT SERPL-MCNC: 0.81 MG/DL — SIGNIFICANT CHANGE UP (ref 0.5–1.3)
CREAT SERPL-MCNC: 0.83 MG/DL — SIGNIFICANT CHANGE UP (ref 0.5–1.3)
EGFR: 87 ML/MIN/1.73M2 — SIGNIFICANT CHANGE UP
EGFR: 90 ML/MIN/1.73M2 — SIGNIFICANT CHANGE UP
EOSINOPHIL # BLD AUTO: 0.63 K/UL — HIGH (ref 0–0.5)
EOSINOPHIL NFR BLD AUTO: 10.9 % — HIGH (ref 0–6)
GLUCOSE SERPL-MCNC: 115 MG/DL — HIGH (ref 70–99)
GLUCOSE SERPL-MCNC: 117 MG/DL — HIGH (ref 70–99)
HCT VFR BLD CALC: 34.7 % — SIGNIFICANT CHANGE UP (ref 34.5–45)
HCT VFR BLD CALC: 34.9 % — SIGNIFICANT CHANGE UP (ref 34.5–45)
HGB BLD-MCNC: 11 G/DL — LOW (ref 11.5–15.5)
HGB BLD-MCNC: 11.2 G/DL — LOW (ref 11.5–15.5)
IANC: 3.04 K/UL — SIGNIFICANT CHANGE UP (ref 1.5–8.5)
IMM GRANULOCYTES NFR BLD AUTO: 0.2 % — SIGNIFICANT CHANGE UP (ref 0–1.5)
LYMPHOCYTES # BLD AUTO: 1.79 K/UL — SIGNIFICANT CHANGE UP (ref 1–3.3)
LYMPHOCYTES # BLD AUTO: 31 % — SIGNIFICANT CHANGE UP (ref 13–44)
MAGNESIUM SERPL-MCNC: 2.1 MG/DL — SIGNIFICANT CHANGE UP (ref 1.6–2.6)
MCHC RBC-ENTMCNC: 27.8 PG — SIGNIFICANT CHANGE UP (ref 27–34)
MCHC RBC-ENTMCNC: 28.2 PG — SIGNIFICANT CHANGE UP (ref 27–34)
MCHC RBC-ENTMCNC: 31.7 GM/DL — LOW (ref 32–36)
MCHC RBC-ENTMCNC: 32.1 GM/DL — SIGNIFICANT CHANGE UP (ref 32–36)
MCV RBC AUTO: 87.8 FL — SIGNIFICANT CHANGE UP (ref 80–100)
MCV RBC AUTO: 87.9 FL — SIGNIFICANT CHANGE UP (ref 80–100)
MONOCYTES # BLD AUTO: 0.26 K/UL — SIGNIFICANT CHANGE UP (ref 0–0.9)
MONOCYTES NFR BLD AUTO: 4.5 % — SIGNIFICANT CHANGE UP (ref 2–14)
NEUTROPHILS # BLD AUTO: 3.04 K/UL — SIGNIFICANT CHANGE UP (ref 1.8–7.4)
NEUTROPHILS NFR BLD AUTO: 52.5 % — SIGNIFICANT CHANGE UP (ref 43–77)
NRBC # BLD: 0 /100 WBCS — SIGNIFICANT CHANGE UP
NRBC # BLD: 0 /100 WBCS — SIGNIFICANT CHANGE UP
NRBC # FLD: 0 K/UL — SIGNIFICANT CHANGE UP
NRBC # FLD: 0 K/UL — SIGNIFICANT CHANGE UP
PHOSPHATE SERPL-MCNC: 3.3 MG/DL — SIGNIFICANT CHANGE UP (ref 2.5–4.5)
PLATELET # BLD AUTO: 249 K/UL — SIGNIFICANT CHANGE UP (ref 150–400)
PLATELET # BLD AUTO: 252 K/UL — SIGNIFICANT CHANGE UP (ref 150–400)
POTASSIUM SERPL-MCNC: 4 MMOL/L — SIGNIFICANT CHANGE UP (ref 3.5–5.3)
POTASSIUM SERPL-MCNC: 4.1 MMOL/L — SIGNIFICANT CHANGE UP (ref 3.5–5.3)
POTASSIUM SERPL-SCNC: 4 MMOL/L — SIGNIFICANT CHANGE UP (ref 3.5–5.3)
POTASSIUM SERPL-SCNC: 4.1 MMOL/L — SIGNIFICANT CHANGE UP (ref 3.5–5.3)
PROT SERPL-MCNC: 6.6 G/DL — SIGNIFICANT CHANGE UP (ref 6–8.3)
PROT SERPL-MCNC: 6.7 G/DL — SIGNIFICANT CHANGE UP (ref 6–8.3)
RBC # BLD: 3.95 M/UL — SIGNIFICANT CHANGE UP (ref 3.8–5.2)
RBC # BLD: 3.97 M/UL — SIGNIFICANT CHANGE UP (ref 3.8–5.2)
RBC # FLD: 12.8 % — SIGNIFICANT CHANGE UP (ref 10.3–14.5)
RBC # FLD: 12.9 % — SIGNIFICANT CHANGE UP (ref 10.3–14.5)
SODIUM SERPL-SCNC: 133 MMOL/L — LOW (ref 135–145)
SODIUM SERPL-SCNC: 137 MMOL/L — SIGNIFICANT CHANGE UP (ref 135–145)
TROPONIN T, HIGH SENSITIVITY RESULT: <6 NG/L — SIGNIFICANT CHANGE UP
WBC # BLD: 5.65 K/UL — SIGNIFICANT CHANGE UP (ref 3.8–10.5)
WBC # BLD: 5.78 K/UL — SIGNIFICANT CHANGE UP (ref 3.8–10.5)
WBC # FLD AUTO: 5.65 K/UL — SIGNIFICANT CHANGE UP (ref 3.8–10.5)
WBC # FLD AUTO: 5.78 K/UL — SIGNIFICANT CHANGE UP (ref 3.8–10.5)

## 2022-03-14 PROCEDURE — 74176 CT ABD & PELVIS W/O CONTRAST: CPT | Mod: 26

## 2022-03-14 PROCEDURE — 71045 X-RAY EXAM CHEST 1 VIEW: CPT | Mod: 26

## 2022-03-14 PROCEDURE — 93010 ELECTROCARDIOGRAM REPORT: CPT

## 2022-03-14 RX ORDER — NALOXONE HYDROCHLORIDE 4 MG/.1ML
0.4 SPRAY NASAL ONCE
Refills: 0 | Status: COMPLETED | OUTPATIENT
Start: 2022-03-14 | End: 2022-03-14

## 2022-03-14 RX ORDER — ONDANSETRON 8 MG/1
4 TABLET, FILM COATED ORAL EVERY 4 HOURS
Refills: 0 | Status: DISCONTINUED | OUTPATIENT
Start: 2022-03-14 | End: 2022-03-19

## 2022-03-14 RX ADMIN — NALOXONE HYDROCHLORIDE 0.4 MILLIGRAM(S): 4 SPRAY NASAL at 00:08

## 2022-03-14 RX ADMIN — ENOXAPARIN SODIUM 40 MILLIGRAM(S): 100 INJECTION SUBCUTANEOUS at 16:13

## 2022-03-14 RX ADMIN — PIPERACILLIN AND TAZOBACTAM 25 GRAM(S): 4; .5 INJECTION, POWDER, LYOPHILIZED, FOR SOLUTION INTRAVENOUS at 05:21

## 2022-03-14 RX ADMIN — ONDANSETRON 4 MILLIGRAM(S): 8 TABLET, FILM COATED ORAL at 21:38

## 2022-03-14 RX ADMIN — PIPERACILLIN AND TAZOBACTAM 25 GRAM(S): 4; .5 INJECTION, POWDER, LYOPHILIZED, FOR SOLUTION INTRAVENOUS at 16:04

## 2022-03-14 RX ADMIN — PIPERACILLIN AND TAZOBACTAM 25 GRAM(S): 4; .5 INJECTION, POWDER, LYOPHILIZED, FOR SOLUTION INTRAVENOUS at 23:18

## 2022-03-14 NOTE — PROGRESS NOTE ADULT - ASSESSMENT
What Type Of Note Output Would You Prefer (Optional)?: Standard Output How Severe Are Your Spot(S)?: mild 46F s/p LAP LAR 2/7 for diverticulitis. Recovering well.     - Pain control:  Toradol/tylenol, breakthrough dilaudid  - Diet: LRD  - DVT ppx  - Encourage OOB and ambulating as tolerated  - IS    A Team  [id number] Have Your Spot(S) Been Treated In The Past?: has not been treated Hpi Title: Evaluation of Skin Lesions 46F s/p LAP LAR 2/7 for diverticulitis. Recovering well.     PLAN:  - Pain control:  Toradol/tylenol, no narcotics   - Serial abdominal exams  - Clear liquids only today  - Continue IVF until tolerating enough PO  - Monitor bowel function  - OOB/Ambulate   - DVT ppx SQH    A Team Surgery  [id number] Family Member: Mother 46F s/p LAP LAR 2/7 for diverticulitis. Recovering well.     PLAN:  - Pain control:  Toradol/tylenol, no narcotics   - CXR showing new pneumoperitoneum; suspect residual gas from insufflation from recent lap procedure given patient's abdominal exam stable; will continue serial abdominal exams  - Clear liquids only today  - Continue IVF until tolerating enough PO  - Monitor bowel function  - OOB/Ambulate   - DVT ppx SQH    A Team Surgery  [id number]

## 2022-03-14 NOTE — PHYSICAL THERAPY INITIAL EVALUATION ADULT - PERTINENT HX OF CURRENT PROBLEM, REHAB EVAL
patient is a 47 year old female admitted for abdominal pain concerning for new intraabdominal collection following LAR on 2/7/22

## 2022-03-14 NOTE — PHYSICAL THERAPY INITIAL EVALUATION ADULT - STANDING BALANCE: DYNAMIC, REHAB EVAL
[facility] GENERAL SURGERY DAILY PROGRESS NOTE    SUBJECTIVE: Awake, alert    OBJECTIVE: CURRENT VITALS:  /81   Pulse 73   Temp 97.6 °F (36.4 °C) (Oral)   Resp 16   Ht 5' (1.524 m)   Wt (!) 337 lb 4.9 oz (153 kg)   SpO2 97%   BMI 65.88 kg/m²          ABD: Soft. Open wound clean. Packing removed and replaced. LABS:    CBC:   Recent Labs     07/29/21  1612 07/31/21  0801 08/01/21  0441   WBC 7.7 8.1 7.9   RBC 4.52 4.15 4.01   HGB 11.9* 10.8* 10.5*   HCT 36.4 33.4* 32.3*   MCV 80.5 80.6 80.5   RDW 15.1 15.3 15.5*    346 353     BMP:   Recent Labs     07/29/21 1612 07/31/21  0800 08/01/21  0441   * 138 134*   K 3.3* 3.7 3.5   CL 99 106 103   CO2 22 21 19*   PHOS  --  3.2 3.2   BUN 4* 6* 5*   CREATININE 0.8 0.7 0.6     Recent Labs     07/29/21  1612 07/31/21  0800 08/01/21  0441   MG 2.00 2.10 2.00             ASSESSMENT:   POD 1 I&D abdominal wall abscess      PLAN:   Await culture results  Antibiotics  Wound VAC early this week.           Natalia Mora MD good balance

## 2022-03-14 NOTE — DISCHARGE NOTE PROVIDER - HOSPITAL COURSE
Patient is a 47 year old female with a PMHx of diverticulitis (S/P LAR with Dr. Kam 2/7/22) who presented to the ED abdominal pain located in suprapubic region.  She had 3 dark red bowel movements then felt the pain afterwards.    On 3/13 CT Abdomen / Pelvis performed showing interval sigmoid resection with distal colonic anastomosis.  Pericolonic air-containing collection adjacent to the anastomotic suture measuring 2.6 x 1.5 cm with surrounding fat stranding raising concern for anastomotic leak.  IR consulted, however no window for drainage.  Patient made NPO with IV fluids.  She was started on IV Zosyn.    On 3/14 CT Abdomen / Pelvis with rectal contrast performed showing ___________.      ***COMPLETE UP TO 3/14*** Patient is a 47 year old female with a PMHx of diverticulitis (S/P LAR with Dr. Kam 2/7/22) who presented to the ED abdominal pain located in suprapubic region.  She had 3 dark red bowel movements then felt the pain afterwards.    On 3/13 CT Abdomen / Pelvis performed showing interval sigmoid resection with distal colonic anastomosis.  Pericolonic air-containing collection adjacent to the anastomotic suture measuring 2.6 x 1.5 cm with surrounding fat stranding raising concern for anastomotic leak.  IR consulted, however no window for drainage.  Patient made NPO with IV fluids.  She was started on IV Zosyn.    On 3/14 CT Abdomen / Pelvis with rectal contrast performed showing leak at the anastomosis.  Patient was started on a clear liquid diet, which she tolerated well.  Physical therapy evaluated patient and recommended home with no needs.       ***COMPLETE UP TO 3/15*** Patient is a 47 year old female with a PMHx of diverticulitis (S/P LAR with Dr. Kam 2/7/22) who presented to the ED abdominal pain located in suprapubic region.  She had 3 dark red bowel movements then felt the pain afterwards.    On 3/13 CT Abdomen / Pelvis performed showing interval sigmoid resection with distal colonic anastomosis.  Pericolonic air-containing collection adjacent to the anastomotic suture measuring 2.6 x 1.5 cm with surrounding fat stranding raising concern for anastomotic leak.  IR consulted, however no window for drainage.  Patient made NPO with IV fluids.  She was started on IV Zosyn.    On 3/14 CT Abdomen / Pelvis with rectal contrast performed showing leak at the anastomosis.  Patient was started on a clear liquid diet, which she tolerated well.  Physical therapy evaluated patient and recommended home with no needs.   Patient clinical status improved over hospital course and patient pain significantly resolved with return of bowel function and tolerance of low residual diet. Patient deemed clinically stable for discharge home on 3/19/2022 with a two weeks course of PO augmentin. Plan discussed with and approved by attending, Dr. Kam.

## 2022-03-14 NOTE — DISCHARGE NOTE PROVIDER - NSDCCPCAREPLAN_GEN_ALL_CORE_FT
PRINCIPAL DISCHARGE DIAGNOSIS  Diagnosis: Post-operative complication  Assessment and Plan of Treatment: s/p LAR      SECONDARY DISCHARGE DIAGNOSES  Diagnosis: LLQ pain  Assessment and Plan of Treatment:

## 2022-03-14 NOTE — PROGRESS NOTE ADULT - SUBJECTIVE AND OBJECTIVE BOX
HITESH [id number]  47yFemale  T(C): 36.8 (03-14-22 @ 17:44), Max: 36.8 (03-13-22 @ 21:53)  HR: 76 (03-14-22 @ 17:44) (63 - 81)  BP: 120/69 (03-14-22 @ 17:44) (111/73 - 124/75)  RR: 16 (03-14-22 @ 17:44) (16 - 18)  SpO2: 100% (03-14-22 @ 17:44) (100% - 100%)  Wt(kg): --  03-13 @ 07:01  -  03-14 @ 07:00  --------------------------------------------------------  IN: 1350 mL / OUT: 0 mL / NET: 1350 mL    03-14 @ 07:01  -  03-14 @ 18:17  --------------------------------------------------------  IN: 0 mL / OUT: 0 mL / NET: 0 mL

## 2022-03-14 NOTE — PROGRESS NOTE ADULT - SUBJECTIVE AND OBJECTIVE BOX
GENERAL SURGERY PROGRESS NOTE    SUBJECTIVE  Patient seen and examined. No acute events overnight. Reports tolerating diet without nausea, vomiting, passing flatus, having bowel movements, voiding without issues, have been ambulating and out of bed. Denies fever, chills, SOB, chest pain.         OBJECTIVE    PHYSICAL EXAM  General: Appears well, NAD  CHEST: breathing comfortably  CV: appears well perfused  Abdomen: soft, nontender, nondistended, no rebound or guarding  Extremities: Grossly symmetric    T(C): 36.8 (22 @ 21:53), Max: 36.9 (22 @ 16:09)  HR: 63 (22 @ 21:53) (63 - 86)  BP: 111/73 (22 @ 21:53) (102/70 - 127/87)  RR: 17 (22 @ 21:53) (16 - 18)  SpO2: 100% (22 @ 21:53) (99% - 100%)    22 @ 07:01  -  22 @ 01:41  --------------------------------------------------------  IN: 350 mL / OUT: 0 mL / NET: 350 mL        MEDICATIONS  dicyclomine 20 milliGRAM(s) Oral three times a day before meals PRN  enoxaparin Injectable 40 milliGRAM(s) SubCutaneous every 24 hours  HYDROmorphone  Injectable 0.5 milliGRAM(s) IV Push every 4 hours PRN  influenza   Vaccine 0.5 milliLiter(s) IntraMuscular once  lactated ringers. 1000 milliLiter(s) IV Continuous <Continuous>  naloxone Injectable 0.4 milliGRAM(s) IV Push once  oxyCODONE    IR 2.5 milliGRAM(s) Oral every 6 hours PRN  piperacillin/tazobactam IVPB.. 3.375 Gram(s) IV Intermittent every 8 hours      LABS                        11.6   7.76  )-----------( 278      ( 13 Mar 2022 03:53 )             36.9         137  |  101  |  14  ----------------------------<  118<H>  4.1   |  23  |  0.77    Ca    9.8      13 Mar 2022 03:56    TPro  7.9  /  Alb  4.7  /  TBili  0.3  /  DBili  x   /  AST  13  /  ALT  6   /  AlkPhos  115  03-13    PT/INR - ( 13 Mar 2022 03:53 )   PT: 12.4 sec;   INR: 1.07 ratio         PTT - ( 13 Mar 2022 03:53 )  PTT:32.0 sec  Urinalysis Basic - ( 13 Mar 2022 03:53 )    Color: Light Yellow / Appearance: Slightly Turbid / S.028 / pH: x  Gluc: x / Ketone: Negative  / Bili: Negative / Urobili: <2 mg/dL   Blood: x / Protein: 30 mg/dL / Nitrite: Negative   Leuk Esterase: Negative / RBC: 5 /HPF / WBC 2 /HPF   Sq Epi: x / Non Sq Epi: 8 /HPF / Bacteria: Many        RADIOLOGY & ADDITIONAL STUDIES GENERAL SURGERY PROGRESS NOTE    OVERNIGHT EVENTS:  - Patient had an abdominal pain and nausea and was given Dilaudid 0.5mg and Zofran 4mg. Soon after the above medications were given, patient was found to be unresponsive. She was seen and examined immediately at bedside. Her vitals were at baseline and stable, Sp02 95-97%. STAT labs were obtained as well as Chest X-ray. Patient was given 0.4mg of Narcan and started regaining her conscious and responded to sternum rubs. After the second dose of Narcan, patient was fully awake and cooperative. EKG is NSR. Chest X-ray is unremarkable. Labs are pending..      SUBJECTIVE  Patient seen and examined.        OBJECTIVE    PHYSICAL EXAM  General: Appears well, NAD  CHEST: breathing comfortably  CV: appears well perfused  Abdomen: soft, nontender, nondistended, no rebound or guarding  Extremities: Grossly symmetric    T(C): 36.8 (22 @ 21:53), Max: 36.9 (22 @ 16:09)  HR: 63 (22 @ 21:53) (63 - 86)  BP: 111/73 (22 @ 21:53) (102/70 - 127/87)  RR: 17 (22 @ 21:53) (16 - 18)  SpO2: 100% (22 @ 21:53) (99% - 100%)    22 @ 07:01  -  22 @ 01:41  --------------------------------------------------------  IN: 350 mL / OUT: 0 mL / NET: 350 mL        MEDICATIONS  dicyclomine 20 milliGRAM(s) Oral three times a day before meals PRN  enoxaparin Injectable 40 milliGRAM(s) SubCutaneous every 24 hours  HYDROmorphone  Injectable 0.5 milliGRAM(s) IV Push every 4 hours PRN  influenza   Vaccine 0.5 milliLiter(s) IntraMuscular once  lactated ringers. 1000 milliLiter(s) IV Continuous <Continuous>  naloxone Injectable 0.4 milliGRAM(s) IV Push once  oxyCODONE    IR 2.5 milliGRAM(s) Oral every 6 hours PRN  piperacillin/tazobactam IVPB.. 3.375 Gram(s) IV Intermittent every 8 hours      LABS                        11.6   7.76  )-----------( 278      ( 13 Mar 2022 03:53 )             36.9         137  |  101  |  14  ----------------------------<  118<H>  4.1   |  23  |  0.77    Ca    9.8      13 Mar 2022 03:56    TPro  7.9  /  Alb  4.7  /  TBili  0.3  /  DBili  x   /  AST  13  /  ALT  6   /  AlkPhos  115  03-13    PT/INR - ( 13 Mar 2022 03:53 )   PT: 12.4 sec;   INR: 1.07 ratio         PTT - ( 13 Mar 2022 03:53 )  PTT:32.0 sec  Urinalysis Basic - ( 13 Mar 2022 03:53 )    Color: Light Yellow / Appearance: Slightly Turbid / S.028 / pH: x  Gluc: x / Ketone: Negative  / Bili: Negative / Urobili: <2 mg/dL   Blood: x / Protein: 30 mg/dL / Nitrite: Negative   Leuk Esterase: Negative / RBC: 5 /HPF / WBC 2 /HPF   Sq Epi: x / Non Sq Epi: 8 /HPF / Bacteria: Many        RADIOLOGY & ADDITIONAL STUDIES Surgery Daily Progress Note  =====================================================  Interval / Overnight Events: Patient had an abdominal pain and nausea overnight - given Dilaudid 0.5mg and Zofran 4mg.  Soon after patient was found to be unresponsive.  She was seen and examined immediately at bedside.  Her vitals were at baseline and stable, Sp02 95-97%.  STAT labs were obtained as well as chest x-ray.  Patient was given 0.4mg of Narcan and started regaining her conscious and responded to sternum rubs.  After the second dose of Narcan, patient was fully awake and cooperative.      HPI:  Patient is a 47 year old female with a PMHx of diverticulitis (S/P LAR with Dr. Kam 2/7/22) who presented to the ED abdominal pain located in suprapubic region.  She had 3 dark red bowel movements then felt the pain afterwards. (13 Mar 2022 11:02)      PAST MEDICAL & SURGICAL HISTORY:  Endometrial cancer  Diverticulitis  ~ recurrent  Ovarian cyst  Irritable bowel syndrome (IBS)  ~ Constipation type  H/O: hysterectomy  ~ without oophorectomy  H/O abdominoplasty      ALLERGIES:  codeine (Short breath)    --------------------------------------------------------------------------------------    MEDICATIONS:    Neurologic Medications  HYDROmorphone  Injectable 0.5 milliGRAM(s) IV Push every 4 hours PRN Severe Pain (7 - 10)  oxyCODONE    IR 2.5 milliGRAM(s) Oral every 6 hours PRN Moderate Pain (4 - 6)    Gastrointestinal Medications  dicyclomine 20 milliGRAM(s) Oral three times a day before meals PRN Pain  lactated ringers. 1000 milliLiter(s) IV Continuous <Continuous>    Hematologic/Oncologic Medications  enoxaparin Injectable 40 milliGRAM(s) SubCutaneous every 24 hours  influenza   Vaccine 0.5 milliLiter(s) IntraMuscular once    Antimicrobial/Immunologic Medications  piperacillin/tazobactam IVPB.. 3.375 Gram(s) IV Intermittent every 8 hours    --------------------------------------------------------------------------------------    VITAL SIGNS:  T(C): 36.4 (14 Mar 2022 05:21), Max: 36.9 (13 Mar 2022 16:09)  T(F): 97.6 (14 Mar 2022 05:21), Max: 98.4 (13 Mar 2022 16:09)  HR: 81 (14 Mar 2022 05:21) (63 - 83)  BP: 120/77 (14 Mar 2022 05:21) (102/70 - 124/75)  RR: 18 (14 Mar 2022 05:21) (17 - 18)  SpO2: 100% (14 Mar 2022 05:21) (99% - 100%)    --------------------------------------------------------------------------------------    INS AND OUTS:    13 Mar 2022 07:01  -  14 Mar 2022 07:00  --------------------------------------------------------  IN:    IV PiggyBack: 100 mL    Lactated Ringers: 1250 mL  Total IN: 1350 mL    OUT:    Oral Fluid: 0 mL  Total OUT: 0 mL    Total NET: 1350 mL    --------------------------------------------------------------------------------------    EXAM    NEUROLOGY  Exam: Normal, in no acute distress.    HEENT  Exam: Normocephalic, atraumatic.    RESPIRATORY  Exam: Normal expansion / effort.    CARDIOVASCULAR  Exam: S1, S2.  Regular rate and rhythm.    GI/NUTRITION  Exam: Abdomen soft, Non-distended.  Tender to palpation of bilateral lower quadrants.  Current Diet: NPO    MUSCULOSKELETAL  Exam: All extremities moving spontaneously without limitations.      METABOLIC / FLUIDS / ELECTROLYTES  lactated ringers. 1000 milliLiter(s) IV Continuous <Continuous>      HEMATOLOGIC  [x] VTE Prophylaxis: enoxaparin Injectable 40 milliGRAM(s) SubCutaneous every 24 hours      INFECTIOUS DISEASE  Antimicrobials/Immunologic Medications:  influenza   Vaccine 0.5 milliLiter(s) IntraMuscular once  piperacillin/tazobactam IVPB.. 3.375 Gram(s) IV Intermittent every 8 hours    --------------------------------------------------------------------------------------

## 2022-03-14 NOTE — DISCHARGE NOTE PROVIDER - CARE PROVIDER_API CALL
Wilber aKm)  ColonRectal Surgery; Surgery  [facility], [address] [place] [address]  Phone: [phone]  Fax: [phone]  Established Patient  Follow Up Time: 1 week

## 2022-03-14 NOTE — DISCHARGE NOTE PROVIDER - NSDCMRMEDTOKEN_GEN_ALL_CORE_FT
dicyclomine 20 mg oral tablet: 1 tab(s) orally every 6 hours, As Needed for pain    dicyclomine 20 mg oral tablet: 1 tab(s) orally every 6 hours MDD:80mg  ibuprofen 400 mg oral tablet: 1 tab(s) orally every 6 hours MDD:1600mg  ibuprofen 400 mg oral tablet: 1 tab(s) orally every 6 hours  oxyCODONE 5 mg oral tablet: 1 tab(s) orally once a day (at bedtime), As Needed MDD:1 tab    amoxicillin-clavulanate 875 mg-125 mg oral tablet: 1 tab(s) orally 2 times a day   dicyclomine 20 mg oral tablet: 1 tab(s) orally every 6 hours, As Needed for pain    dicyclomine 20 mg oral tablet: 1 tab(s) orally every 6 hours MDD:80mg  ibuprofen 400 mg oral tablet: 1 tab(s) orally every 6 hours MDD:1600mg  ibuprofen 400 mg oral tablet: 1 tab(s) orally every 6 hours  oxyCODONE 5 mg oral tablet: 1 tab(s) orally once a day (at bedtime), As Needed MDD:1 tab

## 2022-03-14 NOTE — CHART NOTE - NSCHARTNOTEFT_GEN_A_CORE
Patient had an abdominal pain and nausea and was given Dilaudid 0.5mg and Zofran 4mg. Soon after the above medications were given, patient was found to be unresponsive. She was seen and examined immediately at bedside. Her vitals were at baseline and stable, Sp02 95-97%. STAT labs were obtained as well as Chest X-ray. Patient was given 0.4mg of Narcan and started regaining her conscious and responded to sternum rubs. After the second dose of Narcan, patient was fully awake and cooperative. EKG is NSR. Chest X-ray is unremarkable. Labs are pending..    IMELDA Tejeda; PGY-1  [phone]

## 2022-03-14 NOTE — PROGRESS NOTE ADULT - ASSESSMENT
Patient is a 47 year old female with a PMHx of diverticulitis (S/P LAR with Dr. Kam 2/7/22) who presented to the ED abdominal pain located in suprapubic region.  She had 3 dark red bowel movements then felt the pain afterwards.  CT Abdomen / Pelvis performed showing interval sigmoid resection with distal colonic anastomosis.  Pericolonic air-containing collection adjacent to the anastomotic suture measuring 2.6 x 1.5 cm with surrounding fat stranding raising concern for anastomotic leak.      PLAN:  - NPO  - LR @100cc/hr  - Continue with IV Zosyn  - Out of bed  - Pain control  - VTE prophylaxis with Lovenox subcutaneous      #[id number]  A Team Surgery

## 2022-03-15 LAB
-  AMIKACIN: SIGNIFICANT CHANGE UP
-  AMOXICILLIN/CLAVULANIC ACID: SIGNIFICANT CHANGE UP
-  AMPICILLIN/SULBACTAM: SIGNIFICANT CHANGE UP
-  AMPICILLIN: SIGNIFICANT CHANGE UP
-  AZTREONAM: SIGNIFICANT CHANGE UP
-  CEFAZOLIN: SIGNIFICANT CHANGE UP
-  CEFEPIME: SIGNIFICANT CHANGE UP
-  CEFOXITIN: SIGNIFICANT CHANGE UP
-  CEFTRIAXONE: SIGNIFICANT CHANGE UP
-  CIPROFLOXACIN: SIGNIFICANT CHANGE UP
-  ERTAPENEM: SIGNIFICANT CHANGE UP
-  GENTAMICIN: SIGNIFICANT CHANGE UP
-  IMIPENEM: SIGNIFICANT CHANGE UP
-  LEVOFLOXACIN: SIGNIFICANT CHANGE UP
-  MEROPENEM: SIGNIFICANT CHANGE UP
-  NITROFURANTOIN: SIGNIFICANT CHANGE UP
-  PIPERACILLIN/TAZOBACTAM: SIGNIFICANT CHANGE UP
-  TIGECYCLINE: SIGNIFICANT CHANGE UP
-  TOBRAMYCIN: SIGNIFICANT CHANGE UP
-  TRIMETHOPRIM/SULFAMETHOXAZOLE: SIGNIFICANT CHANGE UP
ALBUMIN SERPL ELPH-MCNC: 4 G/DL — SIGNIFICANT CHANGE UP (ref 3.3–5)
ALP SERPL-CCNC: 87 U/L — SIGNIFICANT CHANGE UP (ref 40–120)
ALT FLD-CCNC: <5 U/L — SIGNIFICANT CHANGE UP (ref 4–33)
ANION GAP SERPL CALC-SCNC: 10 MMOL/L — SIGNIFICANT CHANGE UP (ref 7–14)
AST SERPL-CCNC: 13 U/L — SIGNIFICANT CHANGE UP (ref 4–32)
BILIRUB SERPL-MCNC: <0.2 MG/DL — SIGNIFICANT CHANGE UP (ref 0.2–1.2)
BUN SERPL-MCNC: 6 MG/DL — LOW (ref 7–23)
CALCIUM SERPL-MCNC: 9.7 MG/DL — SIGNIFICANT CHANGE UP (ref 8.4–10.5)
CHLORIDE SERPL-SCNC: 106 MMOL/L — SIGNIFICANT CHANGE UP (ref 98–107)
CO2 SERPL-SCNC: 22 MMOL/L — SIGNIFICANT CHANGE UP (ref 22–31)
CREAT SERPL-MCNC: 0.77 MG/DL — SIGNIFICANT CHANGE UP (ref 0.5–1.3)
EGFR: 96 ML/MIN/1.73M2 — SIGNIFICANT CHANGE UP
GLUCOSE SERPL-MCNC: 129 MG/DL — HIGH (ref 70–99)
HCT VFR BLD CALC: 30.9 % — LOW (ref 34.5–45)
HGB BLD-MCNC: 10.1 G/DL — LOW (ref 11.5–15.5)
MAGNESIUM SERPL-MCNC: 2.1 MG/DL — SIGNIFICANT CHANGE UP (ref 1.6–2.6)
MCHC RBC-ENTMCNC: 28.5 PG — SIGNIFICANT CHANGE UP (ref 27–34)
MCHC RBC-ENTMCNC: 32.7 GM/DL — SIGNIFICANT CHANGE UP (ref 32–36)
MCV RBC AUTO: 87.3 FL — SIGNIFICANT CHANGE UP (ref 80–100)
METHOD TYPE: SIGNIFICANT CHANGE UP
NRBC # BLD: 0 /100 WBCS — SIGNIFICANT CHANGE UP
NRBC # FLD: 0 K/UL — SIGNIFICANT CHANGE UP
PHOSPHATE SERPL-MCNC: 3.5 MG/DL — SIGNIFICANT CHANGE UP (ref 2.5–4.5)
PLATELET # BLD AUTO: 244 K/UL — SIGNIFICANT CHANGE UP (ref 150–400)
POTASSIUM SERPL-MCNC: 4.1 MMOL/L — SIGNIFICANT CHANGE UP (ref 3.5–5.3)
POTASSIUM SERPL-SCNC: 4.1 MMOL/L — SIGNIFICANT CHANGE UP (ref 3.5–5.3)
PROT SERPL-MCNC: 6.6 G/DL — SIGNIFICANT CHANGE UP (ref 6–8.3)
RBC # BLD: 3.54 M/UL — LOW (ref 3.8–5.2)
RBC # FLD: 12.8 % — SIGNIFICANT CHANGE UP (ref 10.3–14.5)
SODIUM SERPL-SCNC: 138 MMOL/L — SIGNIFICANT CHANGE UP (ref 135–145)
WBC # BLD: 6.66 K/UL — SIGNIFICANT CHANGE UP (ref 3.8–10.5)
WBC # FLD AUTO: 6.66 K/UL — SIGNIFICANT CHANGE UP (ref 3.8–10.5)

## 2022-03-15 RX ORDER — KETOROLAC TROMETHAMINE 30 MG/ML
15 SYRINGE (ML) INJECTION EVERY 6 HOURS
Refills: 0 | Status: DISCONTINUED | OUTPATIENT
Start: 2022-03-15 | End: 2022-03-15

## 2022-03-15 RX ORDER — SODIUM CHLORIDE 9 MG/ML
1000 INJECTION, SOLUTION INTRAVENOUS
Refills: 0 | Status: DISCONTINUED | OUTPATIENT
Start: 2022-03-15 | End: 2022-03-18

## 2022-03-15 RX ORDER — LANOLIN ALCOHOL/MO/W.PET/CERES
3 CREAM (GRAM) TOPICAL AT BEDTIME
Refills: 0 | Status: DISCONTINUED | OUTPATIENT
Start: 2022-03-15 | End: 2022-03-19

## 2022-03-15 RX ORDER — KETOROLAC TROMETHAMINE 30 MG/ML
15 SYRINGE (ML) INJECTION ONCE
Refills: 0 | Status: DISCONTINUED | OUTPATIENT
Start: 2022-03-15 | End: 2022-03-15

## 2022-03-15 RX ORDER — ACETAMINOPHEN 500 MG
650 TABLET ORAL ONCE
Refills: 0 | Status: COMPLETED | OUTPATIENT
Start: 2022-03-15 | End: 2022-03-15

## 2022-03-15 RX ORDER — ACETAMINOPHEN 500 MG
1000 TABLET ORAL EVERY 6 HOURS
Refills: 0 | Status: COMPLETED | OUTPATIENT
Start: 2022-03-15 | End: 2022-03-16

## 2022-03-15 RX ADMIN — Medication 3 MILLIGRAM(S): at 22:14

## 2022-03-15 RX ADMIN — PIPERACILLIN AND TAZOBACTAM 25 GRAM(S): 4; .5 INJECTION, POWDER, LYOPHILIZED, FOR SOLUTION INTRAVENOUS at 23:35

## 2022-03-15 RX ADMIN — PIPERACILLIN AND TAZOBACTAM 25 GRAM(S): 4; .5 INJECTION, POWDER, LYOPHILIZED, FOR SOLUTION INTRAVENOUS at 15:41

## 2022-03-15 RX ADMIN — Medication 650 MILLIGRAM(S): at 00:09

## 2022-03-15 RX ADMIN — Medication 1000 MILLIGRAM(S): at 18:05

## 2022-03-15 RX ADMIN — Medication 400 MILLIGRAM(S): at 23:34

## 2022-03-15 RX ADMIN — Medication 15 MILLIGRAM(S): at 02:15

## 2022-03-15 RX ADMIN — ENOXAPARIN SODIUM 40 MILLIGRAM(S): 100 INJECTION SUBCUTANEOUS at 15:41

## 2022-03-15 RX ADMIN — SODIUM CHLORIDE 100 MILLILITER(S): 9 INJECTION, SOLUTION INTRAVENOUS at 11:35

## 2022-03-15 RX ADMIN — Medication 400 MILLIGRAM(S): at 17:44

## 2022-03-15 RX ADMIN — Medication 1000 MILLIGRAM(S): at 12:17

## 2022-03-15 RX ADMIN — PIPERACILLIN AND TAZOBACTAM 25 GRAM(S): 4; .5 INJECTION, POWDER, LYOPHILIZED, FOR SOLUTION INTRAVENOUS at 07:12

## 2022-03-15 RX ADMIN — Medication 400 MILLIGRAM(S): at 11:35

## 2022-03-15 RX ADMIN — Medication 15 MILLIGRAM(S): at 03:00

## 2022-03-15 RX ADMIN — ONDANSETRON 4 MILLIGRAM(S): 8 TABLET, FILM COATED ORAL at 02:15

## 2022-03-15 RX ADMIN — Medication 650 MILLIGRAM(S): at 01:00

## 2022-03-15 RX ADMIN — SODIUM CHLORIDE 75 MILLILITER(S): 9 INJECTION, SOLUTION INTRAVENOUS at 13:56

## 2022-03-15 NOTE — PROGRESS NOTE ADULT - SUBJECTIVE AND OBJECTIVE BOX
GENERAL SURGERY PROGRESS NOTE    SUBJECTIVE  Patient seen and examined. No acute events overnight.          OBJECTIVE    PHYSICAL EXAM  General: Appears well, NAD  CHEST: breathing comfortably  CV: appears well perfused  Abdomen: soft, nontender, nondistended, no rebound or guarding  Extremities: Grossly symmetric    T(C): 36.7 (22 @ 21:54), Max: 36.8 (22 @ 02:04)  HR: 66 (22 @ 21:54) (64 - 89)  BP: 134/70 (22 @ 21:54) (116/76 - 134/70)  RR: 18 (22 @ 21:54) (16 - 18)  SpO2: 100% (22 @ 21:54) (99% - 100%)    22 @ 07:01  -  22 @ 07:00  --------------------------------------------------------  IN: 1350 mL / OUT: 0 mL / NET: 1350 mL    22 @ 07:01  -  03-15-22 @ 00:51  --------------------------------------------------------  IN: 240 mL / OUT: 0 mL / NET: 240 mL        MEDICATIONS  dicyclomine 20 milliGRAM(s) Oral three times a day before meals PRN  enoxaparin Injectable 40 milliGRAM(s) SubCutaneous every 24 hours  influenza   Vaccine 0.5 milliLiter(s) IntraMuscular once  lactated ringers. 1000 milliLiter(s) IV Continuous <Continuous>  ondansetron Injectable 4 milliGRAM(s) IV Push every 4 hours PRN  piperacillin/tazobactam IVPB.. 3.375 Gram(s) IV Intermittent every 8 hours      LABS                        11.0   5.78  )-----------( 252      ( 14 Mar 2022 04:22 )             34.7         137  |  104  |  9   ----------------------------<  117<H>  4.1   |  22  |  0.83    Ca    9.3      14 Mar 2022 04:22  Phos  3.3     03-14  Mg     2.10     03-14    TPro  6.7  /  Alb  4.0  /  TBili  0.3  /  DBili  x   /  AST  15  /  ALT  <5  /  AlkPhos  97  03-14    PT/INR - ( 13 Mar 2022 03:53 )   PT: 12.4 sec;   INR: 1.07 ratio         PTT - ( 13 Mar 2022 03:53 )  PTT:32.0 sec  Urinalysis Basic - ( 13 Mar 2022 03:53 )    Color: Light Yellow / Appearance: Slightly Turbid / S.028 / pH: x  Gluc: x / Ketone: Negative  / Bili: Negative / Urobili: <2 mg/dL   Blood: x / Protein: 30 mg/dL / Nitrite: Negative   Leuk Esterase: Negative / RBC: 5 /HPF / WBC 2 /HPF   Sq Epi: x / Non Sq Epi: 8 /HPF / Bacteria: Many        RADIOLOGY & ADDITIONAL STUDIES Surgery Daily Progress Note  =====================================================  Interval / Overnight Events: CT Abdomen / Pelvis with rectal contrast performed yesterday showing anastomotic leak.      HPI:  Patient is a 47 year old female with a PMHx of diverticulitis (S/P LAR with Dr. Kam 2/7/22) who presented to the ED abdominal pain located in suprapubic region.  She had 3 dark red bowel movements then felt the pain afterwards. (13 Mar 2022 11:02)      PAST MEDICAL & SURGICAL HISTORY:  Endometrial cancer  Diverticulitis  ~ recurrent  Ovarian cyst  Irritable bowel syndrome (IBS)  ~ Constipation type  H/O: hysterectomy  ~ without oophorectomy  H/O abdominoplasty      ALLERGIES:  codeine (Short breath)    --------------------------------------------------------------------------------------    MEDICATIONS:    Neurologic Medications  ondansetron Injectable 4 milliGRAM(s) IV Push every 4 hours PRN Nausea and/or Vomiting    Gastrointestinal Medications  dicyclomine 20 milliGRAM(s) Oral three times a day before meals PRN Pain  lactated ringers. 1000 milliLiter(s) IV Continuous <Continuous>    Hematologic/Oncologic Medications  enoxaparin Injectable 40 milliGRAM(s) SubCutaneous every 24 hours  influenza   Vaccine 0.5 milliLiter(s) IntraMuscular once    Antimicrobial/Immunologic Medications  piperacillin/tazobactam IVPB.. 3.375 Gram(s) IV Intermittent every 8 hours    --------------------------------------------------------------------------------------    VITAL SIGNS:  T(C): 36.7 (15 Mar 2022 05:45), Max: 37 (15 Mar 2022 02:26)  T(F): 98 (15 Mar 2022 05:45), Max: 98.6 (15 Mar 2022 02:26)  HR: 71 (15 Mar 2022 05:45) (64 - 89)  BP: 121/78 (15 Mar 2022 05:45) (116/76 - 134/70)  RR: 14 (15 Mar 2022 05:45) (14 - 18)  SpO2: 100% (15 Mar 2022 05:45) (99% - 100%)    --------------------------------------------------------------------------------------    INS AND OUTS:    14 Mar 2022 07:01  -  15 Mar 2022 07:00  --------------------------------------------------------  IN:    Oral Fluid: 240 mL  Total IN: 240 mL    OUT:    Voided (mL): 0 mL  Total OUT: 0 mL    Total NET: 240 mL    --------------------------------------------------------------------------------------    EXAM    NEUROLOGY  Exam: Normal, in no acute distress.    HEENT  Exam: Normocephalic, atraumatic.    RESPIRATORY  Exam: Normal expansion / effort.    CARDIOVASCULAR  Exam: S1, S2.  Regular rate and rhythm.    GI/NUTRITION  Exam: Abdomen soft, Non-distended.  Tender to palpation of bilateral lower quadrants.  Current Diet: Clear liquid diet    MUSCULOSKELETAL  Exam: All extremities moving spontaneously without limitations.      METABOLIC / FLUIDS / ELECTROLYTES  lactated ringers. 1000 milliLiter(s) IV Continuous <Continuous>      HEMATOLOGIC  [x] VTE Prophylaxis: enoxaparin Injectable 40 milliGRAM(s) SubCutaneous every 24 hours      INFECTIOUS DISEASE  Antimicrobials/Immunologic Medications:  influenza   Vaccine 0.5 milliLiter(s) IntraMuscular once  piperacillin/tazobactam IVPB.. 3.375 Gram(s) IV Intermittent every 8 hours    --------------------------------------------------------------------------------------

## 2022-03-15 NOTE — PROGRESS NOTE ADULT - SUBJECTIVE AND OBJECTIVE BOX
Patient is a 47y Female     Patient is a 47y old  Female who presents with a chief complaint of Abdominal Pain, Concern for New Intraabdominal collection after LAR 2/7 (15 Mar 2022 00:51)      HPI:  47F with history of Diverticulitis (s/p LAR with Dr. Kam 2/7 ) presents to the ED abdominal pain located in suprapubic region for the past day, worse today, sharp in quality. She had 3 dark/red bowel movements then felt the pain afterwards and pain is 9/10 in severity. She tried Tylenol without much relief, last taken 3 hrs ago. She has fever and nausea. She had surg site staples removed 2 weeks ago and wound without issue since. She denies vomiting/diarrhea, urinary sxs, cough/sob/chest pain. Not on ACs. (13 Mar 2022 11:02)      PAST MEDICAL & SURGICAL HISTORY:  Endometrial cancer    Diverticulitis  ~ recurrent    Ovarian cyst    Irritable bowel syndrome (IBS)  ~ Constipation type    H/O: hysterectomy  ~ without oophorectomy    H/O abdominoplasty        MEDICATIONS  (STANDING):  enoxaparin Injectable 40 milliGRAM(s) SubCutaneous every 24 hours  influenza   Vaccine 0.5 milliLiter(s) IntraMuscular once  lactated ringers. 1000 milliLiter(s) (100 mL/Hr) IV Continuous <Continuous>  piperacillin/tazobactam IVPB.. 3.375 Gram(s) IV Intermittent every 8 hours      Allergies    codeine (Short breath)    Intolerances        SOCIAL HISTORY:  Denies ETOh,Smoking,     FAMILY HISTORY:  Family history of blood disease  ~ uncle    Family history of bone cancer  ~ grandmother    Family history of smoking  ~ father    FH: heart failure    Family hx of hypertension (Mother)        REVIEW OF SYSTEMS:    CONSTITUTIONAL: No weakness, fevers or chills  EYES/ENT: No visual changes;  No vertigo or throat pain   NECK: No pain or stiffness  RESPIRATORY: No cough, wheezing, hemoptysis; No shortness of breath  CARDIOVASCULAR: No chest pain or palpitations  GASTROINTESTINAL: No abdominal or epigastric pain. No nausea, vomiting, or hematemesis; No diarrhea or constipation. No melena or hematochezia.  GENITOURINARY: No dysuria, frequency or hematuria  NEUROLOGICAL: No numbness or weakness  SKIN: No itching, burning, rashes, or lesions   All other review of systems is negative unless indicated above.    VITAL:  T(C): , Max: 37 (03-15-22 @ 02:26)  T(F): , Max: 98.6 (03-15-22 @ 02:26)  HR: 71 (03-15-22 @ 05:45)  BP: 121/78 (03-15-22 @ 05:45)  BP(mean): --  RR: 14 (03-15-22 @ 05:45)  SpO2: 100% (03-15-22 @ 05:45)  Wt(kg): --    I and O's:    03-13 @ 07:01  -  03-14 @ 07:00  --------------------------------------------------------  IN: 1350 mL / OUT: 0 mL / NET: 1350 mL    03-14 @ 07:01  -  03-15 @ 07:00  --------------------------------------------------------  IN: 240 mL / OUT: 0 mL / NET: 240 mL          PHYSICAL EXAM:    Constitutional: NAD  HEENT: PERRLA,   Neck: No JVD  Respiratory: CTA B/L  Cardiovascular: S1 and S2  Gastrointestinal: BS+, soft, NT/ND  Extremities: No peripheral edema  Neurological: A/O x 3, no focal deficits  Psychiatric: Normal mood, normal affect  : No Roberts  Skin: No rashes  Access: Not applicable  Back: No CVA tenderness    LABS:                        11.0   5.78  )-----------( 252      ( 14 Mar 2022 04:22 )             34.7     03-14    137  |  104  |  9   ----------------------------<  117<H>  4.1   |  22  |  0.83    Ca    9.3      14 Mar 2022 04:22  Phos  3.3     03-14  Mg     2.10     03-14    TPro  6.7  /  Alb  4.0  /  TBili  0.3  /  DBili  x   /  AST  15  /  ALT  <5  /  AlkPhos  97  03-14          RADIOLOGY & ADDITIONAL STUDIES:

## 2022-03-15 NOTE — PROGRESS NOTE ADULT - ASSESSMENT
Patient is a 47 year old female with a PMHx of diverticulitis (S/P LAR with Dr. Kam 2/7/22) who presented to the ED abdominal pain located in suprapubic region.  She had 3 dark red bowel movements then felt the pain afterwards.  CT Abdomen / Pelvis performed showing interval sigmoid resection with distal colonic anastomosis.  Pericolonic air-containing collection adjacent to the anastomotic suture measuring 2.6 x 1.5 cm with surrounding fat stranding raising concern for anastomotic leak.      PLAN:  - NPO  - LR @100cc/hr  - Continue with IV Zosyn  - Out of bed  - Pain control  - VTE prophylaxis with Lovenox subcutaneous      #[id number]  A Team Surgery    Patient is a 47 year old female with a PMHx of diverticulitis (S/P LAR with Dr. Kam 2/7/22) who presented to the ED abdominal pain located in suprapubic region.  She had 3 dark red bowel movements then felt the pain afterwards.  CT Abdomen / Pelvis performed showing interval sigmoid resection with distal colonic anastomosis.  Pericolonic air-containing collection adjacent to the anastomotic suture measuring 2.6 x 1.5 cm with surrounding fat stranding raising concern for anastomotic leak.  CT Abdomen / Pelvis with rectal contrast performed showing anastomotic leak.      PLAN:  - Clear liquid diet  - LR @100cc/hr  - Continue with IV Zosyn  - Out of bed  - Pain control (no narcotics)  - Standing Toradol 15mg q6 hours  - VTE prophylaxis with Lovenox subcutaneous      #[id number]  A Team Surgery Patient is a 47 year old female with a PMHx of diverticulitis (S/P LAR with Dr. Kam 2/7/22) who presented to the ED abdominal pain located in suprapubic region.  She had 3 dark red bowel movements then felt the pain afterwards.  CT Abdomen / Pelvis performed showing interval sigmoid resection with distal colonic anastomosis.  Pericolonic air-containing collection adjacent to the anastomotic suture measuring 2.6 x 1.5 cm with surrounding fat stranding raising concern for anastomotic leak.  CT Abdomen / Pelvis with rectal contrast performed showing anastomotic leak.      PLAN:  - Clear liquid diet  - D5 + 1/2 NS @75cc/hr  - Continue with IV Zosyn  - Out of bed  - Pain control (no narcotics)  - Standing Toradol 15mg q6 hours  - VTE prophylaxis with Lovenox subcutaneous      #[id number]  A Team Surgery

## 2022-03-15 NOTE — PROGRESS NOTE ADULT - ASSESSMENT
conservative gi magnemtn  appreicate surgery,  minimiaze narcotics secondary to some drug seeking behavoir

## 2022-03-16 LAB
-  AMPICILLIN: SIGNIFICANT CHANGE UP
-  CIPROFLOXACIN: SIGNIFICANT CHANGE UP
-  DAPTOMYCIN: SIGNIFICANT CHANGE UP
-  LEVOFLOXACIN: SIGNIFICANT CHANGE UP
-  LINEZOLID: SIGNIFICANT CHANGE UP
-  NITROFURANTOIN: SIGNIFICANT CHANGE UP
-  PENICILLIN: SIGNIFICANT CHANGE UP
-  RIFAMPIN: SIGNIFICANT CHANGE UP
-  TETRACYCLINE: SIGNIFICANT CHANGE UP
-  VANCOMYCIN: SIGNIFICANT CHANGE UP
ANION GAP SERPL CALC-SCNC: 10 MMOL/L — SIGNIFICANT CHANGE UP (ref 7–14)
BUN SERPL-MCNC: 4 MG/DL — LOW (ref 7–23)
CALCIUM SERPL-MCNC: 9.4 MG/DL — SIGNIFICANT CHANGE UP (ref 8.4–10.5)
CHLORIDE SERPL-SCNC: 106 MMOL/L — SIGNIFICANT CHANGE UP (ref 98–107)
CO2 SERPL-SCNC: 21 MMOL/L — LOW (ref 22–31)
CREAT SERPL-MCNC: 0.73 MG/DL — SIGNIFICANT CHANGE UP (ref 0.5–1.3)
CULTURE RESULTS: SIGNIFICANT CHANGE UP
EGFR: 102 ML/MIN/1.73M2 — SIGNIFICANT CHANGE UP
GLUCOSE SERPL-MCNC: 127 MG/DL — HIGH (ref 70–99)
HCT VFR BLD CALC: 31.8 % — LOW (ref 34.5–45)
HGB BLD-MCNC: 10.2 G/DL — LOW (ref 11.5–15.5)
MAGNESIUM SERPL-MCNC: 2.1 MG/DL — SIGNIFICANT CHANGE UP (ref 1.6–2.6)
MCHC RBC-ENTMCNC: 28.3 PG — SIGNIFICANT CHANGE UP (ref 27–34)
MCHC RBC-ENTMCNC: 32.1 GM/DL — SIGNIFICANT CHANGE UP (ref 32–36)
MCV RBC AUTO: 88.1 FL — SIGNIFICANT CHANGE UP (ref 80–100)
METHOD TYPE: SIGNIFICANT CHANGE UP
NRBC # BLD: 0 /100 WBCS — SIGNIFICANT CHANGE UP
NRBC # FLD: 0 K/UL — SIGNIFICANT CHANGE UP
ORGANISM # SPEC MICROSCOPIC CNT: SIGNIFICANT CHANGE UP
PHOSPHATE SERPL-MCNC: 3.4 MG/DL — SIGNIFICANT CHANGE UP (ref 2.5–4.5)
PLATELET # BLD AUTO: 226 K/UL — SIGNIFICANT CHANGE UP (ref 150–400)
POTASSIUM SERPL-MCNC: 3.9 MMOL/L — SIGNIFICANT CHANGE UP (ref 3.5–5.3)
POTASSIUM SERPL-SCNC: 3.9 MMOL/L — SIGNIFICANT CHANGE UP (ref 3.5–5.3)
RBC # BLD: 3.61 M/UL — LOW (ref 3.8–5.2)
RBC # FLD: 13 % — SIGNIFICANT CHANGE UP (ref 10.3–14.5)
SODIUM SERPL-SCNC: 137 MMOL/L — SIGNIFICANT CHANGE UP (ref 135–145)
SPECIMEN SOURCE: SIGNIFICANT CHANGE UP
WBC # BLD: 4.37 K/UL — SIGNIFICANT CHANGE UP (ref 3.8–10.5)
WBC # FLD AUTO: 4.37 K/UL — SIGNIFICANT CHANGE UP (ref 3.8–10.5)

## 2022-03-16 RX ORDER — ACETAMINOPHEN 500 MG
1000 TABLET ORAL EVERY 6 HOURS
Refills: 0 | Status: COMPLETED | OUTPATIENT
Start: 2022-03-16 | End: 2022-03-16

## 2022-03-16 RX ADMIN — Medication 1000 MILLIGRAM(S): at 23:50

## 2022-03-16 RX ADMIN — PIPERACILLIN AND TAZOBACTAM 25 GRAM(S): 4; .5 INJECTION, POWDER, LYOPHILIZED, FOR SOLUTION INTRAVENOUS at 23:20

## 2022-03-16 RX ADMIN — Medication 400 MILLIGRAM(S): at 05:20

## 2022-03-16 RX ADMIN — Medication 3 MILLIGRAM(S): at 21:21

## 2022-03-16 RX ADMIN — Medication 1000 MILLIGRAM(S): at 19:05

## 2022-03-16 RX ADMIN — ENOXAPARIN SODIUM 40 MILLIGRAM(S): 100 INJECTION SUBCUTANEOUS at 16:10

## 2022-03-16 RX ADMIN — PIPERACILLIN AND TAZOBACTAM 25 GRAM(S): 4; .5 INJECTION, POWDER, LYOPHILIZED, FOR SOLUTION INTRAVENOUS at 07:47

## 2022-03-16 RX ADMIN — Medication 1000 MILLIGRAM(S): at 01:00

## 2022-03-16 RX ADMIN — Medication 400 MILLIGRAM(S): at 23:20

## 2022-03-16 RX ADMIN — PIPERACILLIN AND TAZOBACTAM 25 GRAM(S): 4; .5 INJECTION, POWDER, LYOPHILIZED, FOR SOLUTION INTRAVENOUS at 16:09

## 2022-03-16 RX ADMIN — Medication 1000 MILLIGRAM(S): at 12:05

## 2022-03-16 RX ADMIN — Medication 400 MILLIGRAM(S): at 11:50

## 2022-03-16 RX ADMIN — Medication 400 MILLIGRAM(S): at 18:50

## 2022-03-16 RX ADMIN — SODIUM CHLORIDE 75 MILLILITER(S): 9 INJECTION, SOLUTION INTRAVENOUS at 05:20

## 2022-03-16 RX ADMIN — Medication 1000 MILLIGRAM(S): at 06:06

## 2022-03-16 NOTE — PROGRESS NOTE ADULT - SUBJECTIVE AND OBJECTIVE BOX
GENERAL SURGERY PROGRESS NOTE    SUBJECTIVE  Patient seen and examined. No acute events overnight.         OBJECTIVE    PHYSICAL EXAM  General: Appears well, NAD  CHEST: breathing comfortably  CV: appears well perfused  Abdomen: soft, nontender, nondistended, no rebound or guarding  Extremities: Grossly symmetric    T(C): 36.6 (03-15-22 @ 22:07), Max: 37.7 (03-15-22 @ 18:16)  HR: 66 (03-15-22 @ 22:07) (66 - 84)  BP: 115/72 (03-15-22 @ 22:07) (114/66 - 126/79)  RR: 18 (03-15-22 @ 22:07) (14 - 18)  SpO2: 100% (03-15-22 @ 22:07) (100% - 100%)    03-14-22 @ 07:01  -  03-15-22 @ 07:00  --------------------------------------------------------  IN: 240 mL / OUT: 0 mL / NET: 240 mL    03-15-22 @ 07:01  -  03-16-22 @ 00:36  --------------------------------------------------------  IN: 1650 mL / OUT: 0 mL / NET: 1650 mL        MEDICATIONS  acetaminophen   IVPB .. 1000 milliGRAM(s) IV Intermittent every 6 hours  dextrose 5% + sodium chloride 0.45%. 1000 milliLiter(s) IV Continuous <Continuous>  dicyclomine 20 milliGRAM(s) Oral three times a day before meals PRN  enoxaparin Injectable 40 milliGRAM(s) SubCutaneous every 24 hours  influenza   Vaccine 0.5 milliLiter(s) IntraMuscular once  melatonin 3 milliGRAM(s) Oral at bedtime  ondansetron Injectable 4 milliGRAM(s) IV Push every 4 hours PRN  piperacillin/tazobactam IVPB.. 3.375 Gram(s) IV Intermittent every 8 hours      LABS                        10.1   6.66  )-----------( 244      ( 15 Mar 2022 07:07 )             30.9     03-15    138  |  106  |  6<L>  ----------------------------<  129<H>  4.1   |  22  |  0.77    Ca    9.7      15 Mar 2022 07:07  Phos  3.5     03-15  Mg     2.10     03-15    TPro  6.6  /  Alb  4.0  /  TBili  <0.2  /  DBili  x   /  AST  13  /  ALT  <5  /  AlkPhos  87  03-15          RADIOLOGY & ADDITIONAL STUDIES Surgery Daily Progress Note  =====================================================  Interval / Overnight Events: No acute events overnight.      HPI:  Patient is a 47 year old female with a PMHx of diverticulitis (S/P LAR with Dr. Kam 2/7/22) who presented to the ED abdominal pain located in suprapubic region.  She had 3 dark red bowel movements then felt the pain afterwards. (13 Mar 2022 11:02)      PAST MEDICAL & SURGICAL HISTORY:  Endometrial cancer  Diverticulitis  ~ recurrent  Ovarian cyst  Irritable bowel syndrome (IBS)  ~ Constipation type  H/O: hysterectomy  ~ without oophorectomy  H/O abdominoplasty      ALLERGIES:  codeine (Short breath)    --------------------------------------------------------------------------------------    MEDICATIONS:    Neurologic Medications  acetaminophen   IVPB .. 1000 milliGRAM(s) IV Intermittent every 6 hours  melatonin 3 milliGRAM(s) Oral at bedtime  ondansetron Injectable 4 milliGRAM(s) IV Push every 4 hours PRN Nausea and/or Vomiting    Gastrointestinal Medications  dextrose 5% + sodium chloride 0.45%. 1000 milliLiter(s) IV Continuous <Continuous>  dicyclomine 20 milliGRAM(s) Oral three times a day before meals PRN Pain    Hematologic/Oncologic Medications  enoxaparin Injectable 40 milliGRAM(s) SubCutaneous every 24 hours  influenza   Vaccine 0.5 milliLiter(s) IntraMuscular once    Antimicrobial/Immunologic Medications  piperacillin/tazobactam IVPB.. 3.375 Gram(s) IV Intermittent every 8 hours    --------------------------------------------------------------------------------------    VITAL SIGNS:  T(C): 36.7 (16 Mar 2022 06:16), Max: 37.7 (15 Mar 2022 18:16)  T(F): 98.1 (16 Mar 2022 06:16), Max: 99.8 (15 Mar 2022 18:16)  HR: 75 (16 Mar 2022 06:16) (60 - 75)  BP: 122/74 (16 Mar 2022 06:16) (114/66 - 126/79)  RR: 18 (16 Mar 2022 06:16) (16 - 18)  SpO2: 97% (16 Mar 2022 06:16) (97% - 100%)    --------------------------------------------------------------------------------------    INS AND OUTS:    15 Mar 2022 07:01  -  16 Mar 2022 07:00  --------------------------------------------------------  IN:    dextrose 5% + sodium chloride 0.45%: 1425 mL    IV PiggyBack: 300 mL    Lactated Ringers: 375 mL    Oral Fluid: 750 mL  Total IN: 2850 mL    OUT:  Total OUT: 0 mL    Total NET: 2850 mL    --------------------------------------------------------------------------------------    EXAM    NEUROLOGY  Exam: Normal, in no acute distress.    HEENT  Exam: Normocephalic, atraumatic.    RESPIRATORY  Exam: Normal expansion / effort.    CARDIOVASCULAR  Exam: S1, S2.  Regular rate and rhythm.    GI/NUTRITION  Exam: Abdomen softly distended.  Tender to palpation of bilateral lower quadrants.  Current Diet: Clear liquid diet    MUSCULOSKELETAL  Exam: All extremities moving spontaneously without limitations.      METABOLIC / FLUIDS / ELECTROLYTES  dextrose 5% + sodium chloride 0.45%. 1000 milliLiter(s) IV Continuous <Continuous>      HEMATOLOGIC  [x] VTE Prophylaxis: enoxaparin Injectable 40 milliGRAM(s) SubCutaneous every 24 hours      INFECTIOUS DISEASE  Antimicrobials/Immunologic Medications:  influenza   Vaccine 0.5 milliLiter(s) IntraMuscular once  piperacillin/tazobactam IVPB.. 3.375 Gram(s) IV Intermittent every 8 hours    --------------------------------------------------------------------------------------

## 2022-03-16 NOTE — PROGRESS NOTE ADULT - SUBJECTIVE AND OBJECTIVE BOX
Patient is a 47y Female     Patient is a 47y old  Female who presents with a chief complaint of Abdominal Pain, Concern for New Intraabdominal collection after LAR 2/7 (16 Mar 2022 00:36)      HPI:  47F with history of Diverticulitis (s/p LAR with Dr. Kam 2/7 ) presents to the ED abdominal pain located in suprapubic region for the past day, worse today, sharp in quality. She had 3 dark/red bowel movements then felt the pain afterwards and pain is 9/10 in severity. She tried Tylenol without much relief, last taken 3 hrs ago. She has fever and nausea. She had surg site staples removed 2 weeks ago and wound without issue since. She denies vomiting/diarrhea, urinary sxs, cough/sob/chest pain. Not on ACs. (13 Mar 2022 11:02)      PAST MEDICAL & SURGICAL HISTORY:  Endometrial cancer    Diverticulitis  ~ recurrent    Ovarian cyst    Irritable bowel syndrome (IBS)  ~ Constipation type    H/O: hysterectomy  ~ without oophorectomy    H/O abdominoplasty        MEDICATIONS  (STANDING):  acetaminophen   IVPB .. 1000 milliGRAM(s) IV Intermittent every 6 hours  dextrose 5% + sodium chloride 0.45%. 1000 milliLiter(s) (75 mL/Hr) IV Continuous <Continuous>  enoxaparin Injectable 40 milliGRAM(s) SubCutaneous every 24 hours  influenza   Vaccine 0.5 milliLiter(s) IntraMuscular once  melatonin 3 milliGRAM(s) Oral at bedtime  piperacillin/tazobactam IVPB.. 3.375 Gram(s) IV Intermittent every 8 hours      Allergies    codeine (Short breath)    Intolerances        SOCIAL HISTORY:  Denies ETOh,Smoking,     FAMILY HISTORY:  Family history of blood disease  ~ uncle    Family history of bone cancer  ~ grandmother    Family history of smoking  ~ father    FH: heart failure    Family hx of hypertension (Mother)        REVIEW OF SYSTEMS:    CONSTITUTIONAL: No weakness, fevers or chills  EYES/ENT: No visual changes;  No vertigo or throat pain   NECK: No pain or stiffness  RESPIRATORY: No cough, wheezing, hemoptysis; No shortness of breath  CARDIOVASCULAR: No chest pain or palpitations  GASTROINTESTINAL: No abdominal or epigastric pain. No nausea, vomiting, or hematemesis; No diarrhea or constipation. No melena or hematochezia.  GENITOURINARY: No dysuria, frequency or hematuria  NEUROLOGICAL: No numbness or weakness  SKIN: No itching, burning, rashes, or lesions   All other review of systems is negative unless indicated above.    VITAL:  T(C): , Max: 37.7 (03-15-22 @ 18:16)  T(F): , Max: 99.8 (03-15-22 @ 18:16)  HR: 81 (03-16-22 @ 09:27)  BP: 153/84 (03-16-22 @ 09:27)  BP(mean): --  RR: 18 (03-16-22 @ 09:27)  SpO2: 98% (03-16-22 @ 09:27)  Wt(kg): --    I and O's:    03-14 @ 07:01  -  03-15 @ 07:00  --------------------------------------------------------  IN: 240 mL / OUT: 0 mL / NET: 240 mL    03-15 @ 07:01  -  03-16 @ 07:00  --------------------------------------------------------  IN: 2850 mL / OUT: 0 mL / NET: 2850 mL          PHYSICAL EXAM:    Constitutional: NAD  HEENT: PERRLA,   Neck: No JVD  Respiratory: CTA B/L  Cardiovascular: S1 and S2  Gastrointestinal: BS+, soft, NT/ND  Extremities: No peripheral edema  Neurological: A/O x 3, no focal deficits  Psychiatric: Normal mood, normal affect  : No Roberts  Skin: No rashes  Access: Not applicable  Back: No CVA tenderness    LABS:                        10.2   4.37  )-----------( 226      ( 16 Mar 2022 08:22 )             31.8     03-16    137  |  106  |  4<L>  ----------------------------<  127<H>  3.9   |  21<L>  |  0.73    Ca    9.4      16 Mar 2022 08:22  Phos  3.4     03-16  Mg     2.10     03-16    TPro  6.6  /  Alb  4.0  /  TBili  <0.2  /  DBili  x   /  AST  13  /  ALT  <5  /  AlkPhos  87  03-15          RADIOLOGY & ADDITIONAL STUDIES:

## 2022-03-17 LAB
ANION GAP SERPL CALC-SCNC: 12 MMOL/L — SIGNIFICANT CHANGE UP (ref 7–14)
BUN SERPL-MCNC: 3 MG/DL — LOW (ref 7–23)
CALCIUM SERPL-MCNC: 9.4 MG/DL — SIGNIFICANT CHANGE UP (ref 8.4–10.5)
CHLORIDE SERPL-SCNC: 106 MMOL/L — SIGNIFICANT CHANGE UP (ref 98–107)
CO2 SERPL-SCNC: 19 MMOL/L — LOW (ref 22–31)
CREAT SERPL-MCNC: 0.74 MG/DL — SIGNIFICANT CHANGE UP (ref 0.5–1.3)
EGFR: 100 ML/MIN/1.73M2 — SIGNIFICANT CHANGE UP
GLUCOSE SERPL-MCNC: 111 MG/DL — HIGH (ref 70–99)
HCT VFR BLD CALC: 33.9 % — LOW (ref 34.5–45)
HGB BLD-MCNC: 10.7 G/DL — LOW (ref 11.5–15.5)
MAGNESIUM SERPL-MCNC: 2 MG/DL — SIGNIFICANT CHANGE UP (ref 1.6–2.6)
MCHC RBC-ENTMCNC: 27.9 PG — SIGNIFICANT CHANGE UP (ref 27–34)
MCHC RBC-ENTMCNC: 31.6 GM/DL — LOW (ref 32–36)
MCV RBC AUTO: 88.5 FL — SIGNIFICANT CHANGE UP (ref 80–100)
NRBC # BLD: 0 /100 WBCS — SIGNIFICANT CHANGE UP
NRBC # FLD: 0 K/UL — SIGNIFICANT CHANGE UP
PHOSPHATE SERPL-MCNC: 3.3 MG/DL — SIGNIFICANT CHANGE UP (ref 2.5–4.5)
PLATELET # BLD AUTO: 245 K/UL — SIGNIFICANT CHANGE UP (ref 150–400)
POTASSIUM SERPL-MCNC: 4 MMOL/L — SIGNIFICANT CHANGE UP (ref 3.5–5.3)
POTASSIUM SERPL-SCNC: 4 MMOL/L — SIGNIFICANT CHANGE UP (ref 3.5–5.3)
RBC # BLD: 3.83 M/UL — SIGNIFICANT CHANGE UP (ref 3.8–5.2)
RBC # FLD: 13 % — SIGNIFICANT CHANGE UP (ref 10.3–14.5)
SODIUM SERPL-SCNC: 137 MMOL/L — SIGNIFICANT CHANGE UP (ref 135–145)
WBC # BLD: 4.91 K/UL — SIGNIFICANT CHANGE UP (ref 3.8–10.5)
WBC # FLD AUTO: 4.91 K/UL — SIGNIFICANT CHANGE UP (ref 3.8–10.5)

## 2022-03-17 RX ORDER — TRAMADOL HYDROCHLORIDE 50 MG/1
25 TABLET ORAL ONCE
Refills: 0 | Status: DISCONTINUED | OUTPATIENT
Start: 2022-03-17 | End: 2022-03-17

## 2022-03-17 RX ORDER — ACETAMINOPHEN 500 MG
1000 TABLET ORAL EVERY 6 HOURS
Refills: 0 | Status: COMPLETED | OUTPATIENT
Start: 2022-03-17 | End: 2022-03-18

## 2022-03-17 RX ORDER — TRAMADOL HYDROCHLORIDE 50 MG/1
25 TABLET ORAL ONCE
Refills: 0 | Status: DISCONTINUED | OUTPATIENT
Start: 2022-03-17 | End: 2022-03-18

## 2022-03-17 RX ADMIN — ONDANSETRON 4 MILLIGRAM(S): 8 TABLET, FILM COATED ORAL at 01:48

## 2022-03-17 RX ADMIN — TRAMADOL HYDROCHLORIDE 25 MILLIGRAM(S): 50 TABLET ORAL at 17:26

## 2022-03-17 RX ADMIN — Medication 3 MILLIGRAM(S): at 21:14

## 2022-03-17 RX ADMIN — Medication 20 MILLIGRAM(S): at 05:10

## 2022-03-17 RX ADMIN — ENOXAPARIN SODIUM 40 MILLIGRAM(S): 100 INJECTION SUBCUTANEOUS at 16:05

## 2022-03-17 RX ADMIN — PIPERACILLIN AND TAZOBACTAM 25 GRAM(S): 4; .5 INJECTION, POWDER, LYOPHILIZED, FOR SOLUTION INTRAVENOUS at 16:05

## 2022-03-17 RX ADMIN — Medication 400 MILLIGRAM(S): at 06:58

## 2022-03-17 RX ADMIN — Medication 1000 MILLIGRAM(S): at 18:02

## 2022-03-17 RX ADMIN — PIPERACILLIN AND TAZOBACTAM 25 GRAM(S): 4; .5 INJECTION, POWDER, LYOPHILIZED, FOR SOLUTION INTRAVENOUS at 07:16

## 2022-03-17 RX ADMIN — Medication 400 MILLIGRAM(S): at 11:37

## 2022-03-17 RX ADMIN — Medication 400 MILLIGRAM(S): at 17:47

## 2022-03-17 RX ADMIN — TRAMADOL HYDROCHLORIDE 25 MILLIGRAM(S): 50 TABLET ORAL at 16:46

## 2022-03-17 RX ADMIN — Medication 1000 MILLIGRAM(S): at 11:52

## 2022-03-17 NOTE — PROGRESS NOTE ADULT - ASSESSMENT
Patient is a 47 year old female with a PMHx of diverticulitis (S/P LAR with Dr. Kam 2/7/22) who presented to the ED abdominal pain located in suprapubic region.  She had 3 dark red bowel movements then felt the pain afterwards.  CT Abdomen / Pelvis performed showing interval sigmoid resection with distal colonic anastomosis.  Pericolonic air-containing collection adjacent to the anastomotic suture measuring 2.6 x 1.5 cm with surrounding fat stranding raising concern for anastomotic leak.  CT Abdomen / Pelvis with rectal contrast performed showing anastomotic leak.      PLAN:  - Clear liquid diet  - D5 + 1/2 NS @75cc/hr  - Continue with IV Zosyn  - Out of bed  - Pain control (no narcotics)  - Standing Tylenol  - VTE prophylaxis with Lovenox subcutaneous      #[id number]  A Team Surgery Patient is a 47 year old female with a PMHx of diverticulitis (S/P LAR with Dr. Kam 2/7/22) who presented to the ED abdominal pain located in suprapubic region.  She had 3 dark red bowel movements then felt the pain afterwards.  CT Abdomen / Pelvis performed showing interval sigmoid resection with distal colonic anastomosis.  Pericolonic air-containing collection adjacent to the anastomotic suture measuring 2.6 x 1.5 cm with surrounding fat stranding raising concern for anastomotic leak.  CT Abdomen / Pelvis with rectal contrast performed showing anastomotic leak.      PLAN:  - Clear liquid diet  - D5 + 1/2 NS @75cc/hr  - Continue with IV Zosyn  - Out of bed  - Pain control (no narcotics)  - Standing Tylenol  - VTE prophylaxis with Lovenox subcutaneous  - discussed with attending Dr. Kam     #[id number]  A Team Surgery

## 2022-03-17 NOTE — PROGRESS NOTE ADULT - SUBJECTIVE AND OBJECTIVE BOX
Patient is a 47y Female     Patient is a 47y old  Female who presents with a chief complaint of Abdominal Pain, Concern for New Intraabdominal collection after LAR 2/7 (17 Mar 2022 00:33)      HPI:  47F with history of Diverticulitis (s/p LAR with Dr. Kam 2/7 ) presents to the ED abdominal pain located in suprapubic region for the past day, worse today, sharp in quality. She had 3 dark/red bowel movements then felt the pain afterwards and pain is 9/10 in severity. She tried Tylenol without much relief, last taken 3 hrs ago. She has fever and nausea. She had surg site staples removed 2 weeks ago and wound without issue since. She denies vomiting/diarrhea, urinary sxs, cough/sob/chest pain. Not on ACs. (13 Mar 2022 11:02)      PAST MEDICAL & SURGICAL HISTORY:  Endometrial cancer    Diverticulitis  ~ recurrent    Ovarian cyst    Irritable bowel syndrome (IBS)  ~ Constipation type    H/O: hysterectomy  ~ without oophorectomy    H/O abdominoplasty        MEDICATIONS  (STANDING):  acetaminophen   IVPB .. 1000 milliGRAM(s) IV Intermittent every 6 hours  dextrose 5% + sodium chloride 0.45%. 1000 milliLiter(s) (75 mL/Hr) IV Continuous <Continuous>  enoxaparin Injectable 40 milliGRAM(s) SubCutaneous every 24 hours  influenza   Vaccine 0.5 milliLiter(s) IntraMuscular once  melatonin 3 milliGRAM(s) Oral at bedtime  piperacillin/tazobactam IVPB.. 3.375 Gram(s) IV Intermittent every 8 hours      Allergies    codeine (Short breath)    Intolerances        SOCIAL HISTORY:  Denies ETOh,Smoking,     FAMILY HISTORY:  Family history of blood disease  ~ uncle    Family history of bone cancer  ~ grandmother    Family history of smoking  ~ father    FH: heart failure    Family hx of hypertension (Mother)        REVIEW OF SYSTEMS:    CONSTITUTIONAL: No weakness, fevers or chills  EYES/ENT: No visual changes;  No vertigo or throat pain   NECK: No pain or stiffness  RESPIRATORY: No cough, wheezing, hemoptysis; No shortness of breath  CARDIOVASCULAR: No chest pain or palpitations  GASTROINTESTINAL: No abdominal or epigastric pain. No nausea, vomiting, or hematemesis; No diarrhea or constipation. No melena or hematochezia.  GENITOURINARY: No dysuria, frequency or hematuria  NEUROLOGICAL: No numbness or weakness  SKIN: No itching, burning, rashes, or lesions   All other review of systems is negative unless indicated above.    VITAL:  T(C): , Max: 37.1 (03-17-22 @ 05:00)  T(F): , Max: 98.7 (03-17-22 @ 05:00)  HR: 63 (03-17-22 @ 05:00)  BP: 124/76 (03-17-22 @ 05:00)  BP(mean): --  RR: 16 (03-17-22 @ 05:00)  SpO2: 100% (03-17-22 @ 05:00)  Wt(kg): --    I and O's:    03-14 @ 07:01  -  03-15 @ 07:00  --------------------------------------------------------  IN: 240 mL / OUT: 0 mL / NET: 240 mL    03-15 @ 07:01  -  03-16 @ 07:00  --------------------------------------------------------  IN: 2850 mL / OUT: 0 mL / NET: 2850 mL    03-16 @ 07:01  -  03-17 @ 06:23  --------------------------------------------------------  IN: 2195 mL / OUT: 425 mL / NET: 1770 mL          PHYSICAL EXAM:    Constitutional: NAD  HEENT: PERRLA,   Neck: No JVD  Respiratory: CTA B/L  Cardiovascular: S1 and S2  Gastrointestinal: BS+, soft, NT/ND  Extremities: No peripheral edema  Neurological: A/O x 3, no focal deficits  Psychiatric: Normal mood, normal affect  : No Roberts  Skin: No rashes  Access: Not applicable  Back: No CVA tenderness    LABS:                        10.2   4.37  )-----------( 226      ( 16 Mar 2022 08:22 )             31.8     03-16    137  |  106  |  4<L>  ----------------------------<  127<H>  3.9   |  21<L>  |  0.73    Ca    9.4      16 Mar 2022 08:22  Phos  3.4     03-16  Mg     2.10     03-16    TPro  6.6  /  Alb  4.0  /  TBili  <0.2  /  DBili  x   /  AST  13  /  ALT  <5  /  AlkPhos  87  03-15          RADIOLOGY & ADDITIONAL STUDIES:

## 2022-03-17 NOTE — PROGRESS NOTE ADULT - SUBJECTIVE AND OBJECTIVE BOX
GENERAL SURGERY PROGRESS NOTE    SUBJECTIVE  Patient seen and examined. No acute events overnight.           OBJECTIVE    PHYSICAL EXAM  General: Appears well, NAD  CHEST: breathing comfortably  CV: appears well perfused  Abdomen: soft, nontender, nondistended, no rebound or guarding  Extremities: Grossly symmetric    T(C): 36.8 (03-16-22 @ 22:07), Max: 36.8 (03-16-22 @ 22:07)  HR: 65 (03-16-22 @ 22:07) (60 - 81)  BP: 140/69 (03-16-22 @ 22:07) (115/64 - 153/84)  RR: 18 (03-16-22 @ 22:07) (16 - 18)  SpO2: 100% (03-16-22 @ 22:07) (97% - 100%)    03-15-22 @ 07:01  -  03-16-22 @ 07:00  --------------------------------------------------------  IN: 2850 mL / OUT: 0 mL / NET: 2850 mL    03-16-22 @ 07:01  -  03-17-22 @ 00:33  --------------------------------------------------------  IN: 1415 mL / OUT: 150 mL / NET: 1265 mL        MEDICATIONS  dextrose 5% + sodium chloride 0.45%. 1000 milliLiter(s) IV Continuous <Continuous>  dicyclomine 20 milliGRAM(s) Oral three times a day before meals PRN  enoxaparin Injectable 40 milliGRAM(s) SubCutaneous every 24 hours  influenza   Vaccine 0.5 milliLiter(s) IntraMuscular once  melatonin 3 milliGRAM(s) Oral at bedtime  ondansetron Injectable 4 milliGRAM(s) IV Push every 4 hours PRN  piperacillin/tazobactam IVPB.. 3.375 Gram(s) IV Intermittent every 8 hours      LABS                        10.2   4.37  )-----------( 226      ( 16 Mar 2022 08:22 )             31.8     03-16    137  |  106  |  4<L>  ----------------------------<  127<H>  3.9   |  21<L>  |  0.73    Ca    9.4      16 Mar 2022 08:22  Phos  3.4     03-16  Mg     2.10     03-16    TPro  6.6  /  Alb  4.0  /  TBili  <0.2  /  DBili  x   /  AST  13  /  ALT  <5  /  AlkPhos  87  03-15          RADIOLOGY & ADDITIONAL STUDIES GENERAL SURGERY PROGRESS NOTE    SUBJECTIVE  Patient seen and examined. No acute events overnight.  Patient denies acute onset abdominal pain, fevers, chills, NVD, lightheadedness, SOB, CP. tolerating CLD. -flatus/-BM          OBJECTIVE    PHYSICAL EXAM  General: Appears well, NAD  CHEST: breathing comfortably  CV: appears well perfused  Abdomen: soft, nontender, nondistended, no rebound or guarding  Extremities: Grossly symmetric    T(C): 36.8 (03-16-22 @ 22:07), Max: 36.8 (03-16-22 @ 22:07)  HR: 65 (03-16-22 @ 22:07) (60 - 81)  BP: 140/69 (03-16-22 @ 22:07) (115/64 - 153/84)  RR: 18 (03-16-22 @ 22:07) (16 - 18)  SpO2: 100% (03-16-22 @ 22:07) (97% - 100%)    03-15-22 @ 07:01  -  03-16-22 @ 07:00  --------------------------------------------------------  IN: 2850 mL / OUT: 0 mL / NET: 2850 mL    03-16-22 @ 07:01  -  03-17-22 @ 00:33  --------------------------------------------------------  IN: 1415 mL / OUT: 150 mL / NET: 1265 mL        MEDICATIONS  dextrose 5% + sodium chloride 0.45%. 1000 milliLiter(s) IV Continuous <Continuous>  dicyclomine 20 milliGRAM(s) Oral three times a day before meals PRN  enoxaparin Injectable 40 milliGRAM(s) SubCutaneous every 24 hours  influenza   Vaccine 0.5 milliLiter(s) IntraMuscular once  melatonin 3 milliGRAM(s) Oral at bedtime  ondansetron Injectable 4 milliGRAM(s) IV Push every 4 hours PRN  piperacillin/tazobactam IVPB.. 3.375 Gram(s) IV Intermittent every 8 hours      LABS                        10.2   4.37  )-----------( 226      ( 16 Mar 2022 08:22 )             31.8     03-16    137  |  106  |  4<L>  ----------------------------<  127<H>  3.9   |  21<L>  |  0.73    Ca    9.4      16 Mar 2022 08:22  Phos  3.4     03-16  Mg     2.10     03-16    TPro  6.6  /  Alb  4.0  /  TBili  <0.2  /  DBili  x   /  AST  13  /  ALT  <5  /  AlkPhos  87  03-15          RADIOLOGY & ADDITIONAL STUDIES

## 2022-03-18 LAB
ANION GAP SERPL CALC-SCNC: 11 MMOL/L — SIGNIFICANT CHANGE UP (ref 7–14)
BUN SERPL-MCNC: 4 MG/DL — LOW (ref 7–23)
CALCIUM SERPL-MCNC: 9.3 MG/DL — SIGNIFICANT CHANGE UP (ref 8.4–10.5)
CHLORIDE SERPL-SCNC: 106 MMOL/L — SIGNIFICANT CHANGE UP (ref 98–107)
CO2 SERPL-SCNC: 19 MMOL/L — LOW (ref 22–31)
CREAT SERPL-MCNC: 0.75 MG/DL — SIGNIFICANT CHANGE UP (ref 0.5–1.3)
CULTURE RESULTS: SIGNIFICANT CHANGE UP
CULTURE RESULTS: SIGNIFICANT CHANGE UP
EGFR: 99 ML/MIN/1.73M2 — SIGNIFICANT CHANGE UP
GLUCOSE SERPL-MCNC: 123 MG/DL — HIGH (ref 70–99)
HCT VFR BLD CALC: 32.6 % — LOW (ref 34.5–45)
HGB BLD-MCNC: 10.7 G/DL — LOW (ref 11.5–15.5)
MAGNESIUM SERPL-MCNC: 2 MG/DL — SIGNIFICANT CHANGE UP (ref 1.6–2.6)
MCHC RBC-ENTMCNC: 28.8 PG — SIGNIFICANT CHANGE UP (ref 27–34)
MCHC RBC-ENTMCNC: 32.8 GM/DL — SIGNIFICANT CHANGE UP (ref 32–36)
MCV RBC AUTO: 87.6 FL — SIGNIFICANT CHANGE UP (ref 80–100)
NRBC # BLD: 0 /100 WBCS — SIGNIFICANT CHANGE UP
NRBC # FLD: 0 K/UL — SIGNIFICANT CHANGE UP
PHOSPHATE SERPL-MCNC: 3.2 MG/DL — SIGNIFICANT CHANGE UP (ref 2.5–4.5)
PLATELET # BLD AUTO: 252 K/UL — SIGNIFICANT CHANGE UP (ref 150–400)
POTASSIUM SERPL-MCNC: 4 MMOL/L — SIGNIFICANT CHANGE UP (ref 3.5–5.3)
POTASSIUM SERPL-SCNC: 4 MMOL/L — SIGNIFICANT CHANGE UP (ref 3.5–5.3)
RBC # BLD: 3.72 M/UL — LOW (ref 3.8–5.2)
RBC # FLD: 13.2 % — SIGNIFICANT CHANGE UP (ref 10.3–14.5)
SODIUM SERPL-SCNC: 136 MMOL/L — SIGNIFICANT CHANGE UP (ref 135–145)
SPECIMEN SOURCE: SIGNIFICANT CHANGE UP
SPECIMEN SOURCE: SIGNIFICANT CHANGE UP
WBC # BLD: 5.01 K/UL — SIGNIFICANT CHANGE UP (ref 3.8–10.5)
WBC # FLD AUTO: 5.01 K/UL — SIGNIFICANT CHANGE UP (ref 3.8–10.5)

## 2022-03-18 RX ORDER — ACETAMINOPHEN 500 MG
650 TABLET ORAL ONCE
Refills: 0 | Status: COMPLETED | OUTPATIENT
Start: 2022-03-18 | End: 2022-03-18

## 2022-03-18 RX ADMIN — PIPERACILLIN AND TAZOBACTAM 25 GRAM(S): 4; .5 INJECTION, POWDER, LYOPHILIZED, FOR SOLUTION INTRAVENOUS at 07:07

## 2022-03-18 RX ADMIN — PIPERACILLIN AND TAZOBACTAM 25 GRAM(S): 4; .5 INJECTION, POWDER, LYOPHILIZED, FOR SOLUTION INTRAVENOUS at 00:10

## 2022-03-18 RX ADMIN — ENOXAPARIN SODIUM 40 MILLIGRAM(S): 100 INJECTION SUBCUTANEOUS at 14:59

## 2022-03-18 RX ADMIN — Medication 400 MILLIGRAM(S): at 00:10

## 2022-03-18 RX ADMIN — Medication 3 MILLIGRAM(S): at 21:08

## 2022-03-18 RX ADMIN — SODIUM CHLORIDE 75 MILLILITER(S): 9 INJECTION, SOLUTION INTRAVENOUS at 12:53

## 2022-03-18 RX ADMIN — Medication 650 MILLIGRAM(S): at 19:21

## 2022-03-18 RX ADMIN — TRAMADOL HYDROCHLORIDE 25 MILLIGRAM(S): 50 TABLET ORAL at 21:08

## 2022-03-18 RX ADMIN — Medication 650 MILLIGRAM(S): at 19:50

## 2022-03-18 RX ADMIN — TRAMADOL HYDROCHLORIDE 25 MILLIGRAM(S): 50 TABLET ORAL at 21:38

## 2022-03-18 RX ADMIN — PIPERACILLIN AND TAZOBACTAM 25 GRAM(S): 4; .5 INJECTION, POWDER, LYOPHILIZED, FOR SOLUTION INTRAVENOUS at 15:56

## 2022-03-18 RX ADMIN — PIPERACILLIN AND TAZOBACTAM 25 GRAM(S): 4; .5 INJECTION, POWDER, LYOPHILIZED, FOR SOLUTION INTRAVENOUS at 23:39

## 2022-03-18 NOTE — PROGRESS NOTE ADULT - ASSESSMENT
Patient is a 47 year old female with a PMHx of diverticulitis (S/P LAR with Dr. Kam 2/7/22) who presented to the ED abdominal pain located in suprapubic region.  She had 3 dark red bowel movements then felt the pain afterwards.  CT Abdomen / Pelvis performed showing interval sigmoid resection with distal colonic anastomosis.  Pericolonic air-containing collection adjacent to the anastomotic suture measuring 2.6 x 1.5 cm with surrounding fat stranding raising concern for anastomotic leak.  CT Abdomen / Pelvis with rectal contrast performed showing anastomotic leak.      PLAN:  - Clear liquid diet  - D5 + 1/2 NS @75cc/hr  - Continue with IV Zosyn  - Out of bed  - Pain control (no narcotics)  - Standing Tylenol  - VTE prophylaxis with Lovenox subcutaneous  - discussed with attending Dr. Kam     #[id number]  A Team Surgery Patient is a 47 year old female with a PMHx of diverticulitis (S/P LAR with Dr. Kam 2/7/22) who presented to the ED abdominal pain located in suprapubic region.  She had 3 dark red bowel movements then felt the pain afterwards.  CT Abdomen / Pelvis performed showing interval sigmoid resection with distal colonic anastomosis.  Pericolonic air-containing collection adjacent to the anastomotic suture measuring 2.6 x 1.5 cm with surrounding fat stranding raising concern for anastomotic leak.  CT Abdomen / Pelvis with rectal contrast performed showing anastomotic leak.      PLAN:  - Clear liquid diet, if tolerating well LRD for dinner today  - D5 + 1/2 NS @75cc/hr  - Continue with IV Zosyn  - Out of bed  - Pain control PRN  - Standing Tylenol  - VTE prophylaxis with Lovenox subcutaneous      #[id number]  A Team Surgery

## 2022-03-18 NOTE — PROGRESS NOTE ADULT - SUBJECTIVE AND OBJECTIVE BOX
GENERAL SURGERY PROGRESS NOTE    SUBJECTIVE  Patient seen and examined. No acute events overnight.        OBJECTIVE    PHYSICAL EXAM  General: Appears well, NAD  CHEST: breathing comfortably  CV: appears well perfused  Abdomen: soft, nontender, nondistended, no rebound or guarding  Extremities: Grossly symmetric    T(C): 37.2 (03-17-22 @ 22:50), Max: 37.2 (03-17-22 @ 22:50)  HR: 66 (03-17-22 @ 22:50) (63 - 82)  BP: 127/69 (03-17-22 @ 22:50) (110/71 - 133/77)  RR: 17 (03-17-22 @ 22:50) (16 - 17)  SpO2: 100% (03-17-22 @ 22:50) (99% - 100%)    03-16-22 @ 07:01  -  03-17-22 @ 07:00  --------------------------------------------------------  IN: 2195 mL / OUT: 425 mL / NET: 1770 mL    03-17-22 @ 07:01  -  03-18-22 @ 00:43  --------------------------------------------------------  IN: 720 mL / OUT: 0 mL / NET: 720 mL        MEDICATIONS  dextrose 5% + sodium chloride 0.45%. 1000 milliLiter(s) IV Continuous <Continuous>  dicyclomine 20 milliGRAM(s) Oral three times a day before meals PRN  enoxaparin Injectable 40 milliGRAM(s) SubCutaneous every 24 hours  influenza   Vaccine 0.5 milliLiter(s) IntraMuscular once  melatonin 3 milliGRAM(s) Oral at bedtime  ondansetron Injectable 4 milliGRAM(s) IV Push every 4 hours PRN  piperacillin/tazobactam IVPB.. 3.375 Gram(s) IV Intermittent every 8 hours  traMADol 25 milliGRAM(s) Oral once      LABS                        10.7   4.91  )-----------( 245      ( 17 Mar 2022 07:19 )             33.9     03-17    137  |  106  |  3<L>  ----------------------------<  111<H>  4.0   |  19<L>  |  0.74    Ca    9.4      17 Mar 2022 07:19  Phos  3.3     03-17  Mg     2.00     03-17            RADIOLOGY & ADDITIONAL STUDIES A Team GENERAL SURGERY Daily PROGRESS NOTE    SUBJECTIVE  Patient seen and examined. No acute events overnight. Patient lying comfortably in bed this morning, requesting to eat this morning. Denies chest pain, SOB, dizziness, palpitations, N/V/D, fever and chills       OBJECTIVE    PHYSICAL EXAM  General: Appears well, NAD  CHEST: breathing comfortably  CV: appears well perfused  Abdomen: soft, nontender, nondistended, no rebound or guarding  Extremities: Grossly symmetric    T(C): 37.2 (03-17-22 @ 22:50), Max: 37.2 (03-17-22 @ 22:50)  HR: 66 (03-17-22 @ 22:50) (63 - 82)  BP: 127/69 (03-17-22 @ 22:50) (110/71 - 133/77)  RR: 17 (03-17-22 @ 22:50) (16 - 17)  SpO2: 100% (03-17-22 @ 22:50) (99% - 100%)    03-16-22 @ 07:01  -  03-17-22 @ 07:00  --------------------------------------------------------  IN: 2195 mL / OUT: 425 mL / NET: 1770 mL    03-17-22 @ 07:01  -  03-18-22 @ 00:43  --------------------------------------------------------  IN: 720 mL / OUT: 0 mL / NET: 720 mL        MEDICATIONS  dextrose 5% + sodium chloride 0.45%. 1000 milliLiter(s) IV Continuous <Continuous>  dicyclomine 20 milliGRAM(s) Oral three times a day before meals PRN  enoxaparin Injectable 40 milliGRAM(s) SubCutaneous every 24 hours  influenza   Vaccine 0.5 milliLiter(s) IntraMuscular once  melatonin 3 milliGRAM(s) Oral at bedtime  ondansetron Injectable 4 milliGRAM(s) IV Push every 4 hours PRN  piperacillin/tazobactam IVPB.. 3.375 Gram(s) IV Intermittent every 8 hours  traMADol 25 milliGRAM(s) Oral once      LABS                        10.7   4.91  )-----------( 245      ( 17 Mar 2022 07:19 )             33.9     03-17    137  |  106  |  3<L>  ----------------------------<  111<H>  4.0   |  19<L>  |  0.74    Ca    9.4      17 Mar 2022 07:19  Phos  3.3     03-17  Mg     2.00     03-17            RADIOLOGY & ADDITIONAL STUDIES

## 2022-03-18 NOTE — PROGRESS NOTE ADULT - SUBJECTIVE AND OBJECTIVE BOX
Patient is a 47y Female     Patient is a 47y old  Female who presents with a chief complaint of Abdominal Pain, Concern for New Intraabdominal collection after LAR 2/7 (18 Mar 2022 00:42)      HPI:  47F with history of Diverticulitis (s/p LAR with Dr. Kam 2/7 ) presents to the ED abdominal pain located in suprapubic region for the past day, worse today, sharp in quality. She had 3 dark/red bowel movements then felt the pain afterwards and pain is 9/10 in severity. She tried Tylenol without much relief, last taken 3 hrs ago. She has fever and nausea. She had surg site staples removed 2 weeks ago and wound without issue since. She denies vomiting/diarrhea, urinary sxs, cough/sob/chest pain. Not on ACs. (13 Mar 2022 11:02)      PAST MEDICAL & SURGICAL HISTORY:  Endometrial cancer    Diverticulitis  ~ recurrent    Ovarian cyst    Irritable bowel syndrome (IBS)  ~ Constipation type    H/O: hysterectomy  ~ without oophorectomy    H/O abdominoplasty        MEDICATIONS  (STANDING):  dextrose 5% + sodium chloride 0.45%. 1000 milliLiter(s) (75 mL/Hr) IV Continuous <Continuous>  enoxaparin Injectable 40 milliGRAM(s) SubCutaneous every 24 hours  influenza   Vaccine 0.5 milliLiter(s) IntraMuscular once  melatonin 3 milliGRAM(s) Oral at bedtime  piperacillin/tazobactam IVPB.. 3.375 Gram(s) IV Intermittent every 8 hours  traMADol 25 milliGRAM(s) Oral once      Allergies    codeine (Short breath)    Intolerances        SOCIAL HISTORY:  Denies ETOh,Smoking,     FAMILY HISTORY:  Family history of blood disease  ~ uncle    Family history of bone cancer  ~ grandmother    Family history of smoking  ~ father    FH: heart failure    Family hx of hypertension (Mother)        REVIEW OF SYSTEMS:    CONSTITUTIONAL: No weakness, fevers or chills  EYES/ENT: No visual changes;  No vertigo or throat pain   NECK: No pain or stiffness  RESPIRATORY: No cough, wheezing, hemoptysis; No shortness of breath  CARDIOVASCULAR: No chest pain or palpitations  GASTROINTESTINAL: No abdominal or epigastric pain. No nausea, vomiting, or hematemesis; No diarrhea or constipation. No melena or hematochezia.  GENITOURINARY: No dysuria, frequency or hematuria  NEUROLOGICAL: No numbness or weakness  SKIN: No itching, burning, rashes, or lesions   All other review of systems is negative unless indicated above.    VITAL:  T(C): , Max: 37.2 (03-17-22 @ 22:50)  T(F): , Max: 99 (03-17-22 @ 22:50)  HR: 66 (03-18-22 @ 06:12)  BP: 138/84 (03-18-22 @ 06:12)  BP(mean): --  RR: 17 (03-18-22 @ 06:12)  SpO2: 100% (03-18-22 @ 06:12)  Wt(kg): --    I and O's:    03-15 @ 07:01  -  03-16 @ 07:00  --------------------------------------------------------  IN: 2850 mL / OUT: 0 mL / NET: 2850 mL    03-16 @ 07:01  -  03-17 @ 07:00  --------------------------------------------------------  IN: 2195 mL / OUT: 425 mL / NET: 1770 mL    03-17 @ 07:01  -  03-18 @ 06:57  --------------------------------------------------------  IN: 720 mL / OUT: 0 mL / NET: 720 mL          PHYSICAL EXAM:    Constitutional: NAD  HEENT: PERRLA,   Neck: No JVD  Respiratory: CTA B/L  Cardiovascular: S1 and S2  Gastrointestinal: BS+, soft, NT/ND  Extremities: No peripheral edema  Neurological: A/O x 3, no focal deficits  Psychiatric: Normal mood, normal affect  : No Roberts  Skin: No rashes  Access: Not applicable  Back: No CVA tenderness    LABS:                        10.7   4.91  )-----------( 245      ( 17 Mar 2022 07:19 )             33.9     03-17    137  |  106  |  3<L>  ----------------------------<  111<H>  4.0   |  19<L>  |  0.74    Ca    9.4      17 Mar 2022 07:19  Phos  3.3     03-17  Mg     2.00     03-17            RADIOLOGY & ADDITIONAL STUDIES:

## 2022-03-19 ENCOUNTER — TRANSCRIPTION ENCOUNTER (OUTPATIENT)
Age: 47
End: 2022-03-19

## 2022-03-19 VITALS
HEART RATE: 67 BPM | TEMPERATURE: 98 F | RESPIRATION RATE: 17 BRPM | SYSTOLIC BLOOD PRESSURE: 120 MMHG | DIASTOLIC BLOOD PRESSURE: 69 MMHG | OXYGEN SATURATION: 100 %

## 2022-03-19 RX ADMIN — PIPERACILLIN AND TAZOBACTAM 25 GRAM(S): 4; .5 INJECTION, POWDER, LYOPHILIZED, FOR SOLUTION INTRAVENOUS at 07:28

## 2022-03-19 NOTE — PROGRESS NOTE ADULT - REASON FOR ADMISSION
Abdominal Pain, Concern for New Intraabdominal collection after LAR 2/7

## 2022-03-19 NOTE — DISCHARGE NOTE NURSING/CASE MANAGEMENT/SOCIAL WORK - NSDCPEFALRISK_GEN_ALL_CORE
For information on Fall & Injury Prevention, visit: [email or web address] OR  [email or web address] OR  https://www.cdc.gov/steadi/patient.html

## 2022-03-19 NOTE — PROGRESS NOTE ADULT - PROVIDER SPECIALTY LIST ADULT
Gastroenterology
Gastroenterology
Surgery
Gastroenterology
Gastroenterology
Surgery
Gastroenterology
Gastroenterology
Surgery

## 2022-03-19 NOTE — DISCHARGE NOTE NURSING/CASE MANAGEMENT/SOCIAL WORK - NSDCVIVACCINE_GEN_ALL_CORE_FT
influenza, injectable, quadrivalent, preservative free; 18-Dec-2020 11:57; Ashley Jefferson (RN); [employer]; 33bn3 (Exp. Date: 30-Jun-2021); IntraMuscular; Deltoid Left.; 0.5 milliLiter(s); VIS (VIS Published: 15-Aug-2019, VIS Presented: 18-Dec-2020);

## 2022-03-19 NOTE — DISCHARGE NOTE NURSING/CASE MANAGEMENT/SOCIAL WORK - PATIENT PORTAL LINK FT
You can access the FollowMyHealth Patient Portal offered by [facility] by registering at the following website: [email or web address]. By joining [facility]’s FollowMyHealth portal, you will also be able to view your health information using other applications (apps) compatible with our system.

## 2022-03-19 NOTE — PROGRESS NOTE ADULT - SUBJECTIVE AND OBJECTIVE BOX
Patient is a 47y Female     Patient is a 47y old  Female who presents with a chief complaint of Abdominal Pain, Concern for New Intraabdominal collection after LAR 2/7 (19 Mar 2022 01:37)      HPI:  47F with history of Diverticulitis (s/p LAR with Dr. Kam 2/7 ) presents to the ED abdominal pain located in suprapubic region for the past day, worse today, sharp in quality. She had 3 dark/red bowel movements then felt the pain afterwards and pain is 9/10 in severity. She tried Tylenol without much relief, last taken 3 hrs ago. She has fever and nausea. She had surg site staples removed 2 weeks ago and wound without issue since. She denies vomiting/diarrhea, urinary sxs, cough/sob/chest pain. Not on ACs. (13 Mar 2022 11:02)      PAST MEDICAL & SURGICAL HISTORY:  Endometrial cancer    Diverticulitis  ~ recurrent    Ovarian cyst    Irritable bowel syndrome (IBS)  ~ Constipation type    H/O: hysterectomy  ~ without oophorectomy    H/O abdominoplasty        MEDICATIONS  (STANDING):  enoxaparin Injectable 40 milliGRAM(s) SubCutaneous every 24 hours  melatonin 3 milliGRAM(s) Oral at bedtime  piperacillin/tazobactam IVPB.. 3.375 Gram(s) IV Intermittent every 8 hours      Allergies    codeine (Short breath)    Intolerances        SOCIAL HISTORY:  Denies ETOh,Smoking,     FAMILY HISTORY:  Family history of blood disease  ~ uncle    Family history of bone cancer  ~ grandmother    Family history of smoking  ~ father    FH: heart failure    Family hx of hypertension (Mother)        REVIEW OF SYSTEMS:    CONSTITUTIONAL: No weakness, fevers or chills  EYES/ENT: No visual changes;  No vertigo or throat pain   NECK: No pain or stiffness  RESPIRATORY: No cough, wheezing, hemoptysis; No shortness of breath  CARDIOVASCULAR: No chest pain or palpitations  GASTROINTESTINAL: No abdominal or epigastric pain. No nausea, vomiting, or hematemesis; No diarrhea or constipation. No melena or hematochezia.  GENITOURINARY: No dysuria, frequency or hematuria  NEUROLOGICAL: No numbness or weakness  SKIN: No itching, burning, rashes, or lesions   All other review of systems is negative unless indicated above.    VITAL:  T(C): , Max: 36.8 (03-18-22 @ 14:50)  T(F): , Max: 98.2 (03-18-22 @ 14:50)  HR: 67 (03-19-22 @ 10:12)  BP: 120/69 (03-19-22 @ 10:12)  BP(mean): --  RR: 17 (03-19-22 @ 10:12)  SpO2: 100% (03-19-22 @ 10:12)  Wt(kg): --    I and O's:    03-17 @ 07:01  -  03-18 @ 07:00  --------------------------------------------------------  IN: 720 mL / OUT: 0 mL / NET: 720 mL    03-18 @ 07:01  -  03-19 @ 07:00  --------------------------------------------------------  IN: 1040 mL / OUT: 1850 mL / NET: -810 mL          PHYSICAL EXAM:    Constitutional: NAD  HEENT: PERRLA,   Neck: No JVD  Respiratory: CTA B/L  Cardiovascular: S1 and S2  Gastrointestinal: BS+, soft, NT/ND  Extremities: No peripheral edema  Neurological: A/O x 3, no focal deficits  Psychiatric: Normal mood, normal affect  : No Roberts  Skin: No rashes  Access: Not applicable  Back: No CVA tenderness    LABS:                        10.7   5.01  )-----------( 252      ( 18 Mar 2022 06:45 )             32.6     03-18    136  |  106  |  4<L>  ----------------------------<  123<H>  4.0   |  19<L>  |  0.75    Ca    9.3      18 Mar 2022 06:45  Phos  3.2     03-18  Mg     2.00     03-18            RADIOLOGY & ADDITIONAL STUDIES:

## 2022-03-19 NOTE — PROGRESS NOTE ADULT - ASSESSMENT
Patient is a 47 year old female with a PMHx of diverticulitis (S/P LAR with Dr. Kam 2/7/22) who presented to the ED abdominal pain located in suprapubic region.  She had 3 dark red bowel movements then felt the pain afterwards.  CT Abdomen / Pelvis performed showing interval sigmoid resection with distal colonic anastomosis.  Pericolonic air-containing collection adjacent to the anastomotic suture measuring 2.6 x 1.5 cm with surrounding fat stranding raising concern for anastomotic leak.  CT Abdomen / Pelvis with rectal contrast performed showing anastomotic leak.      PLAN:  - Clear liquid diet, if tolerating well LRD for dinner today  - D5 + 1/2 NS @75cc/hr  - Continue with IV Zosyn  - Out of bed  - Pain control PRN  - Standing Tylenol  - VTE prophylaxis with Lovenox subcutaneous      #[id number]  A Team Surgery Patient is a 47 year old female with a PMHx of diverticulitis (S/P LAR with Dr. Kam 2/7/22) who presented to the ED abdominal pain located in suprapubic region.  She had 3 dark red bowel movements then felt the pain afterwards.  CT Abdomen / Pelvis performed showing interval sigmoid resection with distal colonic anastomosis.  Pericolonic air-containing collection adjacent to the anastomotic suture measuring 2.6 x 1.5 cm with surrounding fat stranding raising concern for anastomotic leak.  CT Abdomen / Pelvis with rectal contrast performed showing anastomotic leak.      PLAN:  - LRD  - D5 + 1/2 NS @75cc/hr  - Out of bed  - Pain control PRN  - Standing Tylenol  - VTE prophylaxis with Lovenox subcutaneous  -d/c home with 2 weeks of PO augmentin   -Plan discussed with attending on call, Dr. Biswas       #[id number]  A Team Surgery

## 2022-03-19 NOTE — PROGRESS NOTE ADULT - SUBJECTIVE AND OBJECTIVE BOX
GENERAL SURGERY PROGRESS NOTE    SUBJECTIVE  Patient seen and examined. No acute events overnight.          OBJECTIVE    PHYSICAL EXAM  General: Appears well, NAD  CHEST: breathing comfortably  CV: appears well perfused  Abdomen: soft, nontender, nondistended, no rebound or guarding  Extremities: Grossly symmetric    T(C): 36.7 (03-18-22 @ 21:20), Max: 36.8 (03-18-22 @ 14:50)  HR: 79 (03-18-22 @ 21:20) (61 - 82)  BP: 116/73 (03-18-22 @ 21:20) (116/73 - 147/83)  RR: 18 (03-18-22 @ 21:20) (16 - 18)  SpO2: 100% (03-18-22 @ 21:20) (100% - 100%)    03-17-22 @ 07:01  -  03-18-22 @ 07:00  --------------------------------------------------------  IN: 720 mL / OUT: 0 mL / NET: 720 mL    03-18-22 @ 07:01  -  03-19-22 @ 01:37  --------------------------------------------------------  IN: 820 mL / OUT: 1550 mL / NET: -730 mL        MEDICATIONS  dicyclomine 20 milliGRAM(s) Oral three times a day before meals PRN  enoxaparin Injectable 40 milliGRAM(s) SubCutaneous every 24 hours  melatonin 3 milliGRAM(s) Oral at bedtime  ondansetron Injectable 4 milliGRAM(s) IV Push every 4 hours PRN  piperacillin/tazobactam IVPB.. 3.375 Gram(s) IV Intermittent every 8 hours      LABS                        10.7   5.01  )-----------( 252      ( 18 Mar 2022 06:45 )             32.6     03-18    136  |  106  |  4<L>  ----------------------------<  123<H>  4.0   |  19<L>  |  0.75    Ca    9.3      18 Mar 2022 06:45  Phos  3.2     03-18  Mg     2.00     03-18            RADIOLOGY & ADDITIONAL STUDIES GENERAL SURGERY PROGRESS NOTE    SUBJECTIVE  Patient seen and examined. No acute events overnight.  Patient denies acute onset abdominal pain, NVD, fevers, chills, lightheadedness, SOB, CP. Tolerating LRD. +flatus/+BM        OBJECTIVE    PHYSICAL EXAM  General: Appears well, NAD  CHEST: breathing comfortably  CV: appears well perfused  Abdomen: soft, nontender, nondistended, no rebound or guarding  Extremities: Grossly symmetric    T(C): 36.7 (03-18-22 @ 21:20), Max: 36.8 (03-18-22 @ 14:50)  HR: 79 (03-18-22 @ 21:20) (61 - 82)  BP: 116/73 (03-18-22 @ 21:20) (116/73 - 147/83)  RR: 18 (03-18-22 @ 21:20) (16 - 18)  SpO2: 100% (03-18-22 @ 21:20) (100% - 100%)    03-17-22 @ 07:01  -  03-18-22 @ 07:00  --------------------------------------------------------  IN: 720 mL / OUT: 0 mL / NET: 720 mL    03-18-22 @ 07:01  -  03-19-22 @ 01:37  --------------------------------------------------------  IN: 820 mL / OUT: 1550 mL / NET: -730 mL        MEDICATIONS  dicyclomine 20 milliGRAM(s) Oral three times a day before meals PRN  enoxaparin Injectable 40 milliGRAM(s) SubCutaneous every 24 hours  melatonin 3 milliGRAM(s) Oral at bedtime  ondansetron Injectable 4 milliGRAM(s) IV Push every 4 hours PRN  piperacillin/tazobactam IVPB.. 3.375 Gram(s) IV Intermittent every 8 hours      LABS                        10.7   5.01  )-----------( 252      ( 18 Mar 2022 06:45 )             32.6     03-18    136  |  106  |  4<L>  ----------------------------<  123<H>  4.0   |  19<L>  |  0.75    Ca    9.3      18 Mar 2022 06:45  Phos  3.2     03-18  Mg     2.00     03-18            RADIOLOGY & ADDITIONAL STUDIES

## 2022-03-24 ENCOUNTER — APPOINTMENT (OUTPATIENT)
Dept: COLORECTAL SURGERY | Facility: CLINIC | Age: 47
End: 2022-03-24
Payer: COMMERCIAL

## 2022-03-24 PROCEDURE — 99024 POSTOP FOLLOW-UP VISIT: CPT

## 2022-03-24 NOTE — ASSESSMENT
[FreeTextEntry1] : Chronic diverticulitis with small -containing collection/anastomotic leak\par -Continue antibiotics\par -Low-residue diet\par -Repeat imaging next week to evaluate progress of air containing pocket

## 2022-03-24 NOTE — HISTORY OF PRESENT ILLNESS
[FreeTextEntry1] : 47-year-old female 6 weeks status post anterior resection for chronic diverticulitis. Patient had been progressing well but reported acute pain and bleeding per rectum last week presented emergency room and small air collection noted near anastomosis. Currently she reports intermittent mild pain tolerating diet with normal bowel movements no fevers or chills

## 2022-03-29 ENCOUNTER — NON-APPOINTMENT (OUTPATIENT)
Age: 47
End: 2022-03-29

## 2022-03-30 ENCOUNTER — APPOINTMENT (OUTPATIENT)
Dept: CT IMAGING | Facility: IMAGING CENTER | Age: 47
End: 2022-03-30
Payer: COMMERCIAL

## 2022-03-30 ENCOUNTER — OUTPATIENT (OUTPATIENT)
Dept: OUTPATIENT SERVICES | Facility: HOSPITAL | Age: 47
LOS: 1 days | End: 2022-03-30
Payer: COMMERCIAL

## 2022-03-30 ENCOUNTER — RESULT REVIEW (OUTPATIENT)
Age: 47
End: 2022-03-30

## 2022-03-30 DIAGNOSIS — Z98.890 OTHER SPECIFIED POSTPROCEDURAL STATES: Chronic | ICD-10-CM

## 2022-03-30 DIAGNOSIS — Z00.8 ENCOUNTER FOR OTHER GENERAL EXAMINATION: ICD-10-CM

## 2022-03-30 DIAGNOSIS — Z90.710 ACQUIRED ABSENCE OF BOTH CERVIX AND UTERUS: Chronic | ICD-10-CM

## 2022-03-30 PROCEDURE — 74177 CT ABD & PELVIS W/CONTRAST: CPT | Mod: 26

## 2022-03-30 PROCEDURE — 74177 CT ABD & PELVIS W/CONTRAST: CPT

## 2022-04-12 ENCOUNTER — APPOINTMENT (OUTPATIENT)
Dept: CT IMAGING | Facility: IMAGING CENTER | Age: 47
End: 2022-04-12
Payer: COMMERCIAL

## 2022-04-12 ENCOUNTER — RESULT REVIEW (OUTPATIENT)
Age: 47
End: 2022-04-12

## 2022-04-12 ENCOUNTER — OUTPATIENT (OUTPATIENT)
Dept: OUTPATIENT SERVICES | Facility: HOSPITAL | Age: 47
LOS: 1 days | End: 2022-04-12
Payer: COMMERCIAL

## 2022-04-12 DIAGNOSIS — Z90.710 ACQUIRED ABSENCE OF BOTH CERVIX AND UTERUS: Chronic | ICD-10-CM

## 2022-04-12 DIAGNOSIS — Z00.8 ENCOUNTER FOR OTHER GENERAL EXAMINATION: ICD-10-CM

## 2022-04-12 DIAGNOSIS — Z98.890 OTHER SPECIFIED POSTPROCEDURAL STATES: Chronic | ICD-10-CM

## 2022-04-12 PROCEDURE — 74177 CT ABD & PELVIS W/CONTRAST: CPT

## 2022-04-12 PROCEDURE — 74177 CT ABD & PELVIS W/CONTRAST: CPT | Mod: 26

## 2022-06-09 ENCOUNTER — APPOINTMENT (OUTPATIENT)
Dept: MRI IMAGING | Facility: IMAGING CENTER | Age: 47
End: 2022-06-09

## 2022-06-14 ENCOUNTER — APPOINTMENT (OUTPATIENT)
Dept: COLORECTAL SURGERY | Facility: CLINIC | Age: 47
End: 2022-06-14

## 2022-06-15 ENCOUNTER — APPOINTMENT (OUTPATIENT)
Dept: MRI IMAGING | Facility: IMAGING CENTER | Age: 47
End: 2022-06-15
Payer: COMMERCIAL

## 2022-06-15 ENCOUNTER — OUTPATIENT (OUTPATIENT)
Dept: OUTPATIENT SERVICES | Facility: HOSPITAL | Age: 47
LOS: 1 days | End: 2022-06-15
Payer: COMMERCIAL

## 2022-06-15 ENCOUNTER — RESULT REVIEW (OUTPATIENT)
Age: 47
End: 2022-06-15

## 2022-06-15 DIAGNOSIS — Z90.710 ACQUIRED ABSENCE OF BOTH CERVIX AND UTERUS: Chronic | ICD-10-CM

## 2022-06-15 DIAGNOSIS — K86.2 CYST OF PANCREAS: ICD-10-CM

## 2022-06-15 DIAGNOSIS — Z98.890 OTHER SPECIFIED POSTPROCEDURAL STATES: Chronic | ICD-10-CM

## 2022-06-15 PROCEDURE — A9585: CPT

## 2022-06-15 PROCEDURE — 74183 MRI ABD W/O CNTR FLWD CNTR: CPT

## 2022-06-15 PROCEDURE — 74183 MRI ABD W/O CNTR FLWD CNTR: CPT | Mod: 26

## 2022-06-21 ENCOUNTER — APPOINTMENT (OUTPATIENT)
Dept: COLORECTAL SURGERY | Facility: CLINIC | Age: 47
End: 2022-06-21
Payer: COMMERCIAL

## 2022-06-21 DIAGNOSIS — K57.92 DIVERTICULITIS OF INTESTINE, PART UNSPECIFIED, W/OUT PERFORATION OR ABSCESS W/OUT BLEEDING: ICD-10-CM

## 2022-06-21 PROCEDURE — 99213 OFFICE O/P EST LOW 20 MIN: CPT

## 2022-06-21 NOTE — ASSESSMENT
[FreeTextEntry1] : History of acute diverticulitis status post sigmoid colon repair\par -Patient progressing well\par -High-fiber diet\par -Will schedule sigmoidoscopy to evaluate anastomosis\par -MRI results reviewed consistent with mild fusiform pancreatic duct dilation and recommended interval followup. Will schedule repeat MRI in 3 months

## 2022-06-21 NOTE — HISTORY OF PRESENT ILLNESS
[FreeTextEntry1] : 47-year-old female 6 weeks status post anterior resection for chronic diverticulitis. Patient had been progressing well but reported acute pain and bleeding per rectum last week presented emergency room and small air collection noted near anastomosis. Currently she reports intermittent mild pain tolerating diet with normal bowel movements no fevers or chills\par \par June 21, 2022-patient is status post laparoscopic sigmoid colon resection. She had small anastomotic leak noted approximately 5 weeks postoperatively which was treated with antibiotics and resolved on imaging. She currently denies abdominal pain no fevers or chills no nausea or vomiting. Normal bowel movements otherwise without complaint from an abdominal standpoint. She recently underwent MRI/MRCP to evaluate incidental finding of mild pancreatic duct dilation

## 2022-07-27 RX ORDER — AMOXICILLIN AND CLAVULANATE POTASSIUM 875; 125 MG/1; MG/1
875-125 TABLET, COATED ORAL
Qty: 28 | Refills: 2 | Status: DISCONTINUED | COMMUNITY
Start: 2022-03-29 | End: 2022-07-27

## 2022-07-27 RX ORDER — AMOXICILLIN AND CLAVULANATE POTASSIUM 875; 125 MG/1; MG/1
875-125 TABLET, COATED ORAL
Qty: 28 | Refills: 2 | Status: DISCONTINUED | COMMUNITY
Start: 2022-03-30 | End: 2022-07-27

## 2022-07-27 RX ORDER — LORAZEPAM 1 MG/1
1 TABLET ORAL
Qty: 1 | Refills: 0 | Status: DISCONTINUED | COMMUNITY
Start: 2022-06-14 | End: 2022-07-27

## 2022-07-30 ENCOUNTER — NON-APPOINTMENT (OUTPATIENT)
Age: 47
End: 2022-07-30

## 2022-08-04 ENCOUNTER — APPOINTMENT (OUTPATIENT)
Dept: COLORECTAL SURGERY | Facility: CLINIC | Age: 47
End: 2022-08-04

## 2022-08-04 PROCEDURE — 45330 DIAGNOSTIC SIGMOIDOSCOPY: CPT

## 2022-08-04 PROCEDURE — 99213 OFFICE O/P EST LOW 20 MIN: CPT | Mod: 25

## 2022-08-04 NOTE — ASSESSMENT
[FreeTextEntry1] : Colon diverticulosis\par -Patient progressing well\par -Continue fiber supplementation daily, solid stool was noted throughout the colon\par -MiraLax intermittently for Constipation\par -All questions answered\par -Follow up as needed

## 2022-08-04 NOTE — HISTORY OF PRESENT ILLNESS
[FreeTextEntry1] : 47-year-old female status post Laparoscopic-assisted sigmoid colon resection for recurrent perforated diverticulitis. Patient's postoperative course was complicated by small contained leak over 5 weeks post procedure. This leak resolved with antibiotics With confirm resolution on imaging. Currently she is progressing well Reports intermittent abdominal discomfort with constipation. No fevers or chills no nausea or vomiting. No aggravating factors

## 2022-09-15 NOTE — ED PROVIDER NOTE - OBJECTIVE STATEMENT
[facility]  FEMALE PELVIC MEDICINE AND RECONSTRUCTIVE SURGERY  MD Thien Horta MD Abraham Shashoua, MD Alissa Schiller, PA-C    CHIEF COMPLAINT  Chief Complaint   Patient presents with   • Injections     Lupron #8       REFERRING PROVIDER/PRIMARY CARE PROVIDER  PCP - Itzel Schumacher DO    HISTORY OF PRESENT ILLNESS    I had the pleasure of seeing Ms. Durán for follow up.  She is a 26 year old Para 0 who presents for Lupron #8 for the management of pelvic pain and endometriosis.    She initially presented in January 2022 for evaluation of pelvic pain and endometriosis.     Previously tried multiple OCPs including Mono-Linyah and Tri-Lo-Daysi but discontinued due to intolerable nausea, mood changes, hair loss or decreased libido.      She underwent laparoscopy on 8/25/2021, findings c/w severe endometriosis with Dr. Pedersen.     Post-op she was placed on continuous OCPS with Microgestin 1/20. She reports having a few menstrual cycles without pelvic pain but experienced side effect of nausea.     H/o severe dysmenorrhea since menarche.  Pain has worsened for a year.    She is virginal.  She has dyschezia worse with menses.     She has had continuous right sided pelvic pain.     She was started on Paxil and Lupron starting in 2/22 and has received 3 doses of Lupron.      On 5/20/2022 she underwent DaVinci Resection of Endometriosis, removal of endometrioma, Cystoscopy with placement of lighted bilateral ureteral catheters, Placement of Kyleena IUD, left ovarian cystectomy, bilateral ureteral lysis.    FINDINGS: There was deep infiltrating endometriosis involving the pelvic peritoneum pararectal peritoneum and rectovaginal peritoneum.  There was a 4 cm left endometrioma with the left ovary densely adherent to the pelvic sidewall overlying the left ureter.  The ureters were bilaterally deviated by endometriosis.  There was endometriosis of the pararectal peritoneum and rectovaginal peritoneum then  encroached on the rectal sigmoid serosa.  There were 1 to 2 mm serosal implants of the rectosigmoid serosa.  There was deep infiltrating endometriosis of the vesicouterine peritoneum.  The appendix was normal liver capsule smooth.  At the conclusion the procedure all visible endometriosis was resected      Pathologic Diagnosis   A.   Left endometrioma:  -Ovarian tissue with endometrioma.     B.   Endometriosis:  -Fibrofatty tissue with foci of endometriosis.     She returned 8/15/2022 for her 7th Lupron 3.75 mg monthly injection. She reports that her pelvic pain has completely resolved on Lupron therapy with Kyleena IUD in place. She is amenorrheic. She is experiencing multiple hot flashes throughout the day/night, but describes them as tolerable. No other noticeable side effects. She continues taking Paxil that was started prior to Lupron therapy. Plan is to complete 9 months of Lupron therapy.    She returned on 9/15/2022 for Lupron #8. Kyleena IUD remains in place. She is also taking add-back therapy. She is amenorrheic and denies pelvic pain. Hot flashes are mild and tolerable. Denies mood changes.    I have personally reviewed and updated the following EMR sections: Allergies, Problem List, Past Medical History, Past Surgical History, Social History and Family History      Past Medical History:   Diagnosis Date   • Acute swimmer's ear of right side 10/28/2021   • Anxiety    • Endometriosis determined by laparoscopy    • Gall bladder polyp    • IBS (irritable bowel syndrome)    • Internal hemorrhoid    • Motion sickness    • Non-recurrent acute suppurative otitis media of right ear without spontaneous rupture of tympanic membrane 11/29/2021   • PONV (postoperative nausea and vomiting)    • RAD (reactive airway disease)        Past Surgical History:   Procedure Laterality Date   • Colonoscopy     • Cyst removal     • Egd     • Ovarian cyst removal Left 08/25/2021    left endometrioma. Diffuse endometriosis  implants seen.       Social History     Tobacco Use   • Smoking status: Never Smoker   • Smokeless tobacco: Never Used   Vaping Use   • Vaping Use: never used   Substance Use Topics   • Alcohol use: No   • Drug use: No       Family History   Problem Relation Age of Onset   • Depression Mother    • Hyperlipidemia Father    • Patient is unaware of any medical problems Sister    • Patient is unaware of any medical problems Brother    • Cancer Maternal Grandmother         bone, lung   • Diabetes Maternal Grandfather    • Asthma Maternal Grandfather    • Cancer, Breast Paternal Grandmother    • Motor Vehicle Accident Paternal Grandfather    • Heart disease Neg Hx    • Stroke Neg Hx        MEDICATIONS  Outpatient Medications Marked as Taking for the 9/15/22 encounter (Office Visit) with Chrissy Patel PA-C   Medication Sig Dispense Refill   • spironolactone (ALDACTONE) 50 MG tablet Take 50 mg by mouth daily.        Medications were reviewed and updated today.    ALLERGIES  ALLERGIES:   Allergen Reactions   • Banana   (Food And Med) THROAT SWELLING   • Eggs Or Egg-Derived Products   (Food Or Med) THROAT SWELLING   • Cinnamon HIVES       REVIEW OF SYSTEMS  Constitutional: Negative.    HENT: Negative.    Eyes: Negative.    Respiratory: Negative.    Cardiovascular: Negative.    Gastrointestinal: as per HPI    Genitourinary: as per HPI    Musculoskeletal: Negative.    Skin: Negative.    Neurological: Negative.    Endo/Heme/Allergies: Negative.    Psychiatric/Behavioral: Negative.      All other systems are reviewed and are negative.     PHYSICAL EXAM  Visit Vitals  /70   Pulse 98   Temp 97.4 °F (36.3 °C) (Tympanic)   Resp 16   Ht 5' 5\" (1.651 m)   Wt 56 kg (123 lb 6.4 oz)   LMP 02/27/2022   BMI 20.53 kg/m²         Constitutional:   General Appearance: in no acute distress and current vital signs reviewed.   Neurological: oriented to person, oriented to place and oriented to time.       RESULTS/PROCEDURES  URINE  PREGNANCY,QUAL   Date Value Ref Range Status   09/15/2022 Negative Negative Final       Administrations This Visit     leuprolide (1 Month) (LUPRON DEPOT) injection 3.75 mg     Admin Date  09/15/2022 Action  Given Dose  3.75 mg Route  Intramuscular Administered By  Ade Giron CMA                 ASSESSMENT/PLAN  1. Endometriosis determined by laparoscopy    2. Negative pregnancy test        Ms. Durán received her 8th dose of monthly Lupron 3.75 mg injections with plan for a total of 9 doses. She continues to report resolution of pelvic pain, amenorrhea and tolerable side effects with Lupron. she is taking add-back therapy. She will return in 1 month with Dr. Enriquez to complete the course and discuss next steps in her endometriosis management.      Orders Placed This Encounter   • POCT Urine Pregnancy   • leuprolide (1 Month) (LUPRON DEPOT) injection 3.75 mg       Total visit time spent with the patient was 10 minutes with greater than 50% of the total time spent on counseling and/or coordinating care, visit preparation and reviewing records.    Note faxed to referring provider listed above.   44 y/o female with no PMHx presents to ED c/o urinary frequency and burning x 4 days. No back pain, fever, hematuria or vaginal discharge. Pt noticed that today her urine smelled unusual. No other acute complaints at time of eval.

## 2022-11-17 ENCOUNTER — APPOINTMENT (OUTPATIENT)
Dept: COLORECTAL SURGERY | Facility: CLINIC | Age: 47
End: 2022-11-17

## 2022-11-17 PROCEDURE — 99213 OFFICE O/P EST LOW 20 MIN: CPT

## 2022-11-17 NOTE — HISTORY OF PRESENT ILLNESS
[FreeTextEntry1] : 47-year-old female status post sigmoid colon resection for diverticulitis. Patient had a Late anastomotic dehiscence with contained collection that resolved spontaneously with antibiotics. She underwent subsequent sigmoidoscopy which showed a completely normal anastomosis and she has been progressing well. She presents today however with 2 weeks of intermittent abdominal pain on the left side. Previously her pain was on the right. She denies fever chills no nausea or vomiting but does report constipation. Otherwise without complaint no aggravating factors

## 2022-11-17 NOTE — ASSESSMENT
[FreeTextEntry1] : Diverticulosis status post sigmoid colon resection\par -Patient with intermittent abdominal pain\par -Recommend the patient initiated fiber supplementation and she reports straining for bowel movements\par -Tylenol as needed\par -Follow up in office in 2 weeks for reevaluation

## 2022-12-08 ENCOUNTER — APPOINTMENT (OUTPATIENT)
Dept: COLORECTAL SURGERY | Facility: CLINIC | Age: 47
End: 2022-12-08

## 2022-12-08 DIAGNOSIS — K57.30 DIVERTICULOSIS OF LARGE INTESTINE W/OUT PERFORATION OR ABSCESS W/OUT BLEEDING: ICD-10-CM

## 2022-12-08 PROCEDURE — 99213 OFFICE O/P EST LOW 20 MIN: CPT

## 2022-12-08 NOTE — ASSESSMENT
[FreeTextEntry1] : Status post sigmoid colon resection with late contained leak that resolved with antibiotics. Patient had been progressing well but reports intermittent left lower quadrant pain.. This pain was improved with bowel regimen but is still somewhat present\par -Recommended patient continue fiber supplementation daily\par -MiraLax as needed for 2 days without bowel movements\par -Will repeat MRI/MRCP to evaluate stability of pancreatic lesion small\par -Patient followup with gastroenterology\par -All questions answered

## 2022-12-08 NOTE — HISTORY OF PRESENT ILLNESS
[FreeTextEntry1] : 47-year-old female status post sigmoid colon resection for diverticulitis. Patient had a Late anastomotic dehiscence with contained collection that resolved spontaneously with antibiotics. She underwent subsequent sigmoidoscopy which showed a completely normal anastomosis and she has been progressing well. She presents today however with 2 weeks of intermittent abdominal pain on the left side. Previously her pain was on the right. She denies fever chills no nausea or vomiting but does report constipation. Otherwise without complaint no aggravating factors\par \par December 8, 2022-patient still reports intermittent abdominal bloating and pain. No fevers or chills. The pain has improved significantly with bowel regimen. It is also improved with bowel movements. No fevers or chills no nausea or vomiting otherwise without complaint no aggravating factor

## 2022-12-13 NOTE — PROGRESS NOTE ADULT - NEGATIVE GENERAL SYMPTOMS
SUBJECTIVE:    Patient ID: Jose Singh is a 64 y.o. male.    Chief Complaint: Follow-up (Went over meds verbally, dry cough x2 weeks, has not taken anything OTC, Flu declined// SW)    Pt here for regular f/u- lipids. Pt reports overall doing okay.     Reports started with cough about 2 weeks ago- now mainly dry cough. No fever/chills, SOB or wheezing.     Follows with Dr. Duffy, urology for hx of bladder CA and BPH. Reports recently had f/u cysto which was clear      Office Visit on 10/11/2022   Component Date Value Ref Range Status    EKG 12-Lead 10/11/2022 NSR   Final-Edited    Ventricular Rate 10/11/2022 64   Final-Edited    DC Interval 10/11/2022 178   Final-Edited    QRS Duration 10/11/2022 88   Final-Edited    QT/QTc 10/11/2022 388/400   Final-Edited    PRT Axes 10/11/2022 36  30  47   Final-Edited       Past Medical History:   Diagnosis Date    Cancer     bladder CA s/p TURBT    GERD (gastroesophageal reflux disease)     Hematuria 2021    Hyperlipidemia      Past Surgical History:   Procedure Laterality Date    APPENDECTOMY      COLONOSCOPY      CYSTOGRAM  2021    CYSTOSCOPY N/A 05/06/2021    Procedure: CYSTOSCOPY;  Surgeon: Yao Duffy MD;  Location: [facility];  Service: Urology;  Laterality: N/A;    ESOPHAGOGASTRODUODENOSCOPY N/A 03/21/2022    Procedure: EGD (ESOPHAGOGASTRODUODENOSCOPY);  Surgeon: Saurav Hartley MD;  Location: [facility];  Service: Endoscopy;  Laterality: N/A;    LUMBAR FUSION  2015    ROTATOR CUFF REPAIR Left 11/2022    TONSILLECTOMY      UPPER GASTROINTESTINAL ENDOSCOPY       Family History   Problem Relation Age of Onset    Heart disease Mother     Cancer Mother 70        colon, breast    Diabetes Mother     Colon cancer Mother     COPD Father     Cancer Father 65        prostate    Cancer Brother 58        prostate CA    Heart disease Brother         CHF    Stomach cancer Neg Hx     Rectal cancer Neg Hx     Colon polyps Neg Hx     Crohn's disease Neg Hx      Ulcerative colitis Neg Hx        The 10-year CVD risk score (LENA'Anjelica, et al., 2008) is: 14.5%    Values used to calculate the score:      Age: 64 years      Sex: Male      Diabetic: No      Tobacco smoker: No      Systolic Blood Pressure: 136 mmHg      Is BP treated: No      HDL Cholesterol: 49 mg/dL      Total Cholesterol: 156 mg/dL     Marital Status:   Alcohol History:  reports current alcohol use of about 6.0 standard drinks per week.  Tobacco History:  reports that he quit smoking about 29 years ago. His smoking use included cigarettes. He has a 40.00 pack-year smoking history. He has never used smokeless tobacco.  Drug History:  reports no history of drug use.    Health Maintenance Topics with due status: Not Due       Topic Last Completion Date    Colorectal Cancer Screening 06/22/2020    TETANUS VACCINE 06/07/2021    Lipid Panel 05/26/2022     Immunization History   Administered Date(s) Administered    Influenza - Quadrivalent - PF *Preferred* (6 months and older) 01/11/2011, 12/10/2013    Influenza Split 12/10/2013    Tdap 06/07/2021    Zoster 05/06/2013    Zoster Recombinant 12/31/2019, 03/02/2020       Review of patient's allergies indicates:  No Known Allergies    Current Outpatient Medications:     cetirizine (ZYRTEC) 10 MG tablet, Take 1 tablet (10 mg total) by mouth once daily., Disp: 90 tablet, Rfl: 3    glucosamine-chondroitin 500-400 mg tablet, Take 1 tablet by mouth once daily., Disp: , Rfl:     melatonin 10 mg Cap, Take by mouth., Disp: , Rfl:     pantoprazole (PROTONIX) 20 MG tablet, TK 2 TS PO QD, Disp: , Rfl: 3    TURMERIC ORAL, Take 1 tablet by mouth., Disp: , Rfl:     atorvastatin (LIPITOR) 40 MG tablet, Take 1 tablet (40 mg total) by mouth every evening., Disp: 90 tablet, Rfl: 2    fluticasone propionate (FLONASE) 50 mcg/actuation nasal spray, 1 spray (50 mcg total) by Each Nostril route once daily., Disp: 16 g, Rfl: 2    meloxicam (MOBIC) 15 MG tablet, Take 1 tablet (15 mg total)  "by mouth daily as needed for Pain., Disp: 90 tablet, Rfl: 2    Current Facility-Administered Medications:     acetaminophen tablet 650 mg, 650 mg, Oral, Once PRN, Alirio Hunter MD    ondansetron disintegrating tablet 4 mg, 4 mg, Oral, Once PRN, Alirio Hunter MD    Review of Systems   Constitutional:  Negative for appetite change, chills and fever.   HENT:  Negative for sore throat and trouble swallowing.    Eyes:  Negative for visual disturbance.   Respiratory:  Negative for cough, shortness of breath and wheezing.    Cardiovascular:  Negative for chest pain, palpitations and leg swelling.   Gastrointestinal:  Negative for abdominal pain, constipation and diarrhea.   Genitourinary:  Negative for dysuria, frequency and hematuria.   Musculoskeletal:  Positive for arthralgias (bilat knees, wrists). Negative for back pain (no pain since lumbar surgery) and gait problem.   Skin:  Negative for rash.   Neurological:  Negative for dizziness, syncope, speech difficulty and numbness.   Psychiatric/Behavioral:  Negative for dysphoric mood. The patient is not nervous/anxious.         Objective:      Vitals:    12/13/22 1604   BP: 136/86   Pulse: 68   Weight: 106.6 kg (235 lb)   Height: 6' 2" (1.88 m)     Physical Exam  Vitals and nursing note reviewed.   Constitutional:       Appearance: He is well-developed.   HENT:      Head: Normocephalic and atraumatic.      Right Ear: Tympanic membrane and ear canal normal.      Left Ear: Tympanic membrane and ear canal normal.      Mouth/Throat:      Pharynx: No posterior oropharyngeal erythema.   Neck:      Vascular: No carotid bruit.   Cardiovascular:      Rate and Rhythm: Normal rate and regular rhythm.      Heart sounds: No murmur heard.    No friction rub. No gallop.   Pulmonary:      Effort: Pulmonary effort is normal. No respiratory distress.      Breath sounds: Normal breath sounds. No wheezing or rales.   Abdominal:      General: There is no distension.      Palpations: " Abdomen is soft.      Tenderness: There is no abdominal tenderness.   Musculoskeletal:      Cervical back: Neck supple.   Lymphadenopathy:      Cervical: No cervical adenopathy.   Skin:     General: Skin is warm and dry.      Findings: No rash.   Neurological:      Mental Status: He is alert and oriented to person, place, and time.      Gait: Gait normal.         Assessment:       1. Dyslipidemia    2. DDD (degenerative disc disease), lumbar    3. History of bladder cancer           Plan:       Dyslipidemia  Comments:  due for repeat labs  Orders:  -     CBC Auto Differential; Future; Expected date: 12/13/2022  -     Comprehensive Metabolic Panel; Future; Expected date: 12/13/2022  -     Lipid Panel; Future; Expected date: 12/13/2022  -     Urinalysis, Reflex to Urine Culture Urine, Clean Catch; Future; Expected date: 12/13/2022  -     TSH w/reflex to FT4; Future; Expected date: 12/13/2022  -     atorvastatin (LIPITOR) 40 MG tablet; Take 1 tablet (40 mg total) by mouth every evening.  Dispense: 90 tablet; Refill: 2    DDD (degenerative disc disease), lumbar  Comments:  reports some recent LBP, plans to f/u with Dr. Deras soon  Orders:  -     meloxicam (MOBIC) 15 MG tablet; Take 1 tablet (15 mg total) by mouth daily as needed for Pain.  Dispense: 90 tablet; Refill: 2    History of bladder cancer  Comments:  followed by Dr. Llamas. PSA ordered by urology    Other orders  -     fluticasone propionate (FLONASE) 50 mcg/actuation nasal spray; 1 spray (50 mcg total) by Each Nostril route once daily.  Dispense: 16 g; Refill: 2      Follow up in about 9 months (around 9/13/2023) for lipids, labs within next 2 weeks.          Counseled on age and gender appropriate medical preventative services, including cancer screenings, immunizations, overall nutritional health, need for a consistent exercise regimen and an overall push towards maintaining a vigorous and active lifestyle.      12/13/2022 Maliha Monson NP       no fever/no chills

## 2023-01-05 ENCOUNTER — APPOINTMENT (OUTPATIENT)
Dept: COLORECTAL SURGERY | Facility: CLINIC | Age: 48
End: 2023-01-05

## 2023-01-11 NOTE — DISCHARGE NOTE PROVIDER - NSDCCONDITION_GEN_ALL_CORE
-- DO NOT REPLY / DO NOT REPLY ALL --  -- Message is from Engagement Center Operations (ECO) --    Request Result  Is the patient currently having Emergent symptoms?: No    Which result are you requesting?: Missed call for results     What is the full name of the provider that ordered the lab or test?: Paulie Paige        Alternative phone number: n/a    Clinic site name / Account # for ordering provider: [id number]    Can a detailed message be left?: Yes    Message Turnaround: [place]:    Refer to site's KB page for routing instructions    Please give this turnaround time to the caller:   \"You can expect to receive a response 2-3 business days after your provider's clinical team reviews the message\"    Inform patients: \"Please be aware the return phone call may come from an unidentified or out of state phone number.\"  
duplicate encounter  
Stable

## 2023-02-18 NOTE — PATIENT PROFILE ADULT - HEALTH LITERACY
"Reason for hospital stay:  UTI, Dementia    Most recent vitals: /54 (BP Location: Left arm, Patient Position: Chair, Cuff Size: Adult Regular)   Pulse 104   Temp 100  F (37.8  C) (Tympanic)   Resp 16   Ht 1.6 m (5' 3\")   Wt 45.4 kg (100 lb 1.4 oz)   SpO2 97%   BMI 17.73 kg/m      Patient lethargic most of shift. VS and assessment as charted in flow sheets. Slept in - medications and VS / assessment postponed until patient more awake. Slept through breakfast. Was mostly cooperative this shift. Confused and unable to follow most directions. Did allow vitals but did not tolerate automatic Spacelabs BP cuff - did allow a manual cuff. Took medications whole with applesauce and apple juice. Family in visiting and helped patient with lunch - ate half of lunch and only 1/4 of supper. Denied any pain. Incontinent of urine. IV saine locked - continues on IV Rocephin. Video monitoring in place and alarms on.     Face to face report given with opportunity to observe patient.    Report given to Leigh Antonio RN   2/17/2023  7:15 PM    " no

## 2023-03-25 ENCOUNTER — RESULT REVIEW (OUTPATIENT)
Age: 48
End: 2023-03-25

## 2023-03-25 ENCOUNTER — OUTPATIENT (OUTPATIENT)
Dept: OUTPATIENT SERVICES | Facility: HOSPITAL | Age: 48
LOS: 1 days | End: 2023-03-25
Payer: MEDICAID

## 2023-03-25 ENCOUNTER — APPOINTMENT (OUTPATIENT)
Dept: MRI IMAGING | Facility: IMAGING CENTER | Age: 48
End: 2023-03-25
Payer: MEDICAID

## 2023-03-25 DIAGNOSIS — Z98.890 OTHER SPECIFIED POSTPROCEDURAL STATES: Chronic | ICD-10-CM

## 2023-03-25 DIAGNOSIS — K86.2 CYST OF PANCREAS: ICD-10-CM

## 2023-03-25 DIAGNOSIS — Z90.710 ACQUIRED ABSENCE OF BOTH CERVIX AND UTERUS: Chronic | ICD-10-CM

## 2023-03-25 PROCEDURE — 74183 MRI ABD W/O CNTR FLWD CNTR: CPT

## 2023-03-25 PROCEDURE — 74183 MRI ABD W/O CNTR FLWD CNTR: CPT | Mod: 26

## 2023-04-18 ENCOUNTER — APPOINTMENT (OUTPATIENT)
Dept: COLORECTAL SURGERY | Facility: CLINIC | Age: 48
End: 2023-04-18

## 2023-05-31 NOTE — ED ADULT TRIAGE NOTE - ADDITIONAL SAFETY/BANDS...
Referral placed, if persistent symptoms, should avoid contact use until resolved. If change in vision, should be seen emergently.   Additional Safety/Bands:
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
## 2023-09-08 NOTE — CONSULT NOTE ADULT - ASSESSMENT
Provider Recommendation Follow Up:   Reached patient/caregiver. Informed of provider's recommendations. Patient verbalized understanding and agrees with the plan.     Scheduled with PCP for 9/20/23.        KARI DiazN, RN  [facility]     45yF w/pmhx endometrial cancer s/p hysterectomy p/w lower abd pain. On CT pt found to have acute sigmoid diverticulitis

## 2023-10-14 NOTE — ED PROVIDER NOTE - NS ED ATTENDING STATEMENT MOD
used I have personally performed a face to face diagnostic evaluation on this patient. I have reviewed the ACP note and agree with the history, exam and plan of care, except as noted.

## 2023-11-13 NOTE — H&P ADULT - NSHPLANGLIMITEDENGLISH_GEN_A_CORE
Called and spoke with patient's daughter Rachel regarding recommendations to have a repeat imaging of CXR.     Provided information to the imaging center to patient's daughter.     Patient verbalized understanding and had no further questions at this time. Advised patient to reach out with questions or concerns.    No

## 2024-02-08 ENCOUNTER — APPOINTMENT (OUTPATIENT)
Dept: INTERNAL MEDICINE | Facility: CLINIC | Age: 49
End: 2024-02-08

## 2024-03-05 ENCOUNTER — APPOINTMENT (OUTPATIENT)
Dept: INTERNAL MEDICINE | Facility: CLINIC | Age: 49
End: 2024-03-05
Payer: MEDICAID

## 2024-03-05 VITALS
HEART RATE: 87 BPM | TEMPERATURE: 99.3 F | BODY MASS INDEX: 30.58 KG/M2 | WEIGHT: 162 LBS | RESPIRATION RATE: 15 BRPM | SYSTOLIC BLOOD PRESSURE: 136 MMHG | DIASTOLIC BLOOD PRESSURE: 81 MMHG | HEIGHT: 61 IN | OXYGEN SATURATION: 98 %

## 2024-03-05 DIAGNOSIS — Z00.00 ENCOUNTER FOR GENERAL ADULT MEDICAL EXAMINATION W/OUT ABNORMAL FINDINGS: ICD-10-CM

## 2024-03-05 DIAGNOSIS — Z63.0 PROBLEMS IN RELATIONSHIP WITH SPOUSE OR PARTNER: ICD-10-CM

## 2024-03-05 DIAGNOSIS — Z80.3 FAMILY HISTORY OF MALIGNANT NEOPLASM OF BREAST: ICD-10-CM

## 2024-03-05 DIAGNOSIS — Z12.39 ENCOUNTER FOR OTHER SCREENING FOR MALIGNANT NEOPLASM OF BREAST: ICD-10-CM

## 2024-03-05 DIAGNOSIS — Z81.1 FAMILY HISTORY OF ALCOHOL ABUSE AND DEPENDENCE: ICD-10-CM

## 2024-03-05 DIAGNOSIS — Z12.4 ENCOUNTER FOR SCREENING FOR MALIGNANT NEOPLASM OF CERVIX: ICD-10-CM

## 2024-03-05 DIAGNOSIS — Z82.49 FAMILY HISTORY OF ISCHEMIC HEART DISEASE AND OTHER DISEASES OF THE CIRCULATORY SYSTEM: ICD-10-CM

## 2024-03-05 DIAGNOSIS — Z80.8 FAMILY HISTORY OF MALIGNANT NEOPLASM OF OTHER ORGANS OR SYSTEMS: ICD-10-CM

## 2024-03-05 DIAGNOSIS — C55 MALIGNANT NEOPLASM OF UTERUS, PART UNSPECIFIED: ICD-10-CM

## 2024-03-05 PROCEDURE — 99386 PREV VISIT NEW AGE 40-64: CPT | Mod: 25

## 2024-03-05 PROCEDURE — 36415 COLL VENOUS BLD VENIPUNCTURE: CPT

## 2024-03-05 SDOH — SOCIAL STABILITY - SOCIAL INSECURITY: PROBLEMS IN RELATIONSHIP WITH SPOUSE OR PARTNER: Z63.0

## 2024-03-06 LAB
ALBUMIN SERPL ELPH-MCNC: 4.7 G/DL
ALP BLD-CCNC: 101 U/L
ALT SERPL-CCNC: 16 U/L
ANION GAP SERPL CALC-SCNC: 14 MMOL/L
AST SERPL-CCNC: 23 U/L
BILIRUB SERPL-MCNC: 0.3 MG/DL
BUN SERPL-MCNC: 12 MG/DL
CALCIUM SERPL-MCNC: 9.9 MG/DL
CHLORIDE SERPL-SCNC: 103 MMOL/L
CHOLEST SERPL-MCNC: 249 MG/DL
CO2 SERPL-SCNC: 21 MMOL/L
CREAT SERPL-MCNC: 0.71 MG/DL
EGFR: 104 ML/MIN/1.73M2
ESTIMATED AVERAGE GLUCOSE: 140 MG/DL
GLUCOSE SERPL-MCNC: 119 MG/DL
HBA1C MFR BLD HPLC: 6.5 %
HCT VFR BLD CALC: 40.1 %
HDLC SERPL-MCNC: 46 MG/DL
HGB BLD-MCNC: 12.6 G/DL
LDLC SERPL CALC-MCNC: 176 MG/DL
MCHC RBC-ENTMCNC: 28.4 PG
MCHC RBC-ENTMCNC: 31.4 GM/DL
MCV RBC AUTO: 90.3 FL
NONHDLC SERPL-MCNC: 203 MG/DL
PLATELET # BLD AUTO: 316 K/UL
POTASSIUM SERPL-SCNC: 4.5 MMOL/L
PROT SERPL-MCNC: 7.5 G/DL
RBC # BLD: 4.44 M/UL
RBC # FLD: 12.4 %
SODIUM SERPL-SCNC: 138 MMOL/L
TRIGL SERPL-MCNC: 147 MG/DL
TSH SERPL-ACNC: 4.08 UIU/ML
WBC # FLD AUTO: 7.19 K/UL

## 2024-03-08 PROBLEM — Z63.0 MARITAL PROBLEMS: Status: ACTIVE | Noted: 2024-03-08
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
## 2024-03-08 NOTE — REVIEW OF SYSTEMS
[Fever] : no fever [Chills] : no chills [Fatigue] : no fatigue [Vision Problems] : no vision problems [Pain] : no pain [Nasal Discharge] : no nasal discharge [Hearing Loss] : no hearing loss [Chest Pain] : no chest pain [Lower Ext Edema] : no lower extremity edema [Palpitations] : no palpitations [Shortness Of Breath] : no shortness of breath [Wheezing] : no wheezing [Cough] : no cough [Abdominal Pain] : no abdominal pain [Dyspnea on Exertion] : no dyspnea on exertion [Nausea] : no nausea [Constipation] : no constipation [Diarrhea] : diarrhea [Vomiting] : no vomiting [Heartburn] : no heartburn [Dysuria] : no dysuria [Melena] : no melena [Incontinence] : no incontinence [Hematuria] : no hematuria [Muscle Weakness] : no muscle weakness [Muscle Pain] : no muscle pain [Dizziness] : no dizziness [Skin Rash] : no skin rash [Headache] : no headache [Fainting] : no fainting [Suicidal] : not suicidal [Depression] : no depression

## 2024-03-08 NOTE — HEALTH RISK ASSESSMENT
[No] : In the past 12 months have you used drugs other than those required for medical reasons? No [0] : 2) Feeling down, depressed, or hopeless: Not at all (0) [PHQ-2 Negative - No further assessment needed] : PHQ-2 Negative - No further assessment needed [None] : None [With Family] : lives with family [Employed] : employed [Feels Safe at Home] : Feels safe at home [Fully functional (bathing, dressing, toileting, transferring, walking, feeding)] : Fully functional (bathing, dressing, toileting, transferring, walking, feeding) [Fully functional (using the telephone, shopping, preparing meals, housekeeping, doing laundry, using] : Fully functional and needs no help or supervision to perform IADLs (using the telephone, shopping, preparing meals, housekeeping, doing laundry, using transportation, managing medications and managing finances) [Smoke Detector] : smoke detector [Carbon Monoxide Detector] : carbon monoxide detector [Never] : Never [] :  [# Of Children ___] : has [unfilled] children [[id number]] : 0 [Change in mental status noted] : No change in mental status noted [Language] : denies difficulty with language [Reports changes in hearing] : Reports no changes in hearing [Handling Complex Tasks] : denies difficulty handling complex tasks [Reports changes in vision] : Reports no changes in vision [Reports changes in dental health] : Reports no changes in dental health [PapSmearDate] : due [MammogramDate] : due [de-identified] : with   [ColonoscopyDate] : 01/23 [FreeTextEntry2] : [employer]  [FreeTextEntry3] : 26 yo daughter, 24 yo son

## 2024-04-18 ENCOUNTER — APPOINTMENT (OUTPATIENT)
Dept: CT IMAGING | Facility: IMAGING CENTER | Age: 49
End: 2024-04-18
Payer: COMMERCIAL

## 2024-04-18 ENCOUNTER — OUTPATIENT (OUTPATIENT)
Dept: OUTPATIENT SERVICES | Facility: HOSPITAL | Age: 49
LOS: 1 days | End: 2024-04-18
Payer: COMMERCIAL

## 2024-04-18 ENCOUNTER — RESULT REVIEW (OUTPATIENT)
Age: 49
End: 2024-04-18

## 2024-04-18 ENCOUNTER — APPOINTMENT (OUTPATIENT)
Dept: MAMMOGRAPHY | Facility: IMAGING CENTER | Age: 49
End: 2024-04-18
Payer: COMMERCIAL

## 2024-04-18 DIAGNOSIS — Z00.8 ENCOUNTER FOR OTHER GENERAL EXAMINATION: ICD-10-CM

## 2024-04-18 DIAGNOSIS — Z12.39 ENCOUNTER FOR OTHER SCREENING FOR MALIGNANT NEOPLASM OF BREAST: ICD-10-CM

## 2024-04-18 DIAGNOSIS — Z90.710 ACQUIRED ABSENCE OF BOTH CERVIX AND UTERUS: Chronic | ICD-10-CM

## 2024-04-18 DIAGNOSIS — Z98.890 OTHER SPECIFIED POSTPROCEDURAL STATES: Chronic | ICD-10-CM

## 2024-04-18 PROCEDURE — 74177 CT ABD & PELVIS W/CONTRAST: CPT | Mod: 26

## 2024-04-18 PROCEDURE — 77063 BREAST TOMOSYNTHESIS BI: CPT | Mod: 26

## 2024-04-18 PROCEDURE — 77067 SCR MAMMO BI INCL CAD: CPT | Mod: 26

## 2024-04-18 PROCEDURE — 77063 BREAST TOMOSYNTHESIS BI: CPT

## 2024-04-18 PROCEDURE — 74177 CT ABD & PELVIS W/CONTRAST: CPT

## 2024-04-18 PROCEDURE — 77067 SCR MAMMO BI INCL CAD: CPT

## 2024-05-07 NOTE — ED ADULT TRIAGE NOTE - NS ED TRIAGE AVPU SCALE
[facility] Division of Kidney Diseases & Hypertension  FOLLOW UP NOTE  [phone]--------------------------------------------------------------------------------  Chief Complaint: LUBA on CRRT    24 hour events/subjective: Remains critically ill, intubated/sedated, on pressors, anuric on CRRT. EGD planned        PAST HISTORY  --------------------------------------------------------------------------------  No significant changes to PMH, PSH, FHx, SHx, unless otherwise noted    ALLERGIES & MEDICATIONS  --------------------------------------------------------------------------------  Allergies    Allergy Status Unknown      Standing Inpatient Medications  albuterol/ipratropium for Nebulization 3 milliLiter(s) Nebulizer every 6 hours  cefepime   IVPB 2000 milliGRAM(s) IV Intermittent every 8 hours  chlorhexidine 0.12% Liquid 15 milliLiter(s) Oral Mucosa every 12 hours  chlorhexidine 2% Cloths 1 Application(s) Topical daily  CRRT Treatment    <Continuous>  dexMEDEtomidine Infusion 0.3 MICROgram(s)/kG/Hr IV Continuous <Continuous>  dextrose 10%. 1000 milliLiter(s) IV Continuous <Continuous>  folic acid 1 milliGRAM(s) Oral daily  hydrocortisone sodium succinate Injectable 50 milliGRAM(s) IV Push every 6 hours  ketamine Infusion. 1.5 mG/kG/Hr IV Continuous <Continuous>  lactulose Syrup 30 Gram(s) Oral three times a day  metoclopramide Injectable 5 milliGRAM(s) IV Push every 8 hours  metroNIDAZOLE  IVPB 500 milliGRAM(s) IV Intermittent every 12 hours  multivitamin/minerals/iron Oral Solution (CENTRUM) 15 milliLiter(s) Oral daily  norepinephrine Infusion 0.05 MICROgram(s)/kG/Min IV Continuous <Continuous>  pantoprazole Infusion 8 mG/Hr IV Continuous <Continuous>  petrolatum Ophthalmic Ointment 1 Application(s) Both EYES two times a day  polyethylene glycol 3350 17 Gram(s) Oral two times a day  potassium phosphate IVPB 30 milliMole(s) IV Intermittent once  PrismaSATE Dialysate BGK 4 / 2.5 5000 milliLiter(s) CRRT <Continuous>  PrismaSOL Filtration BGK 4 / 2.5 5000 milliLiter(s) CRRT <Continuous>  PrismaSOL Filtration BGK 4 / 2.5 5000 milliLiter(s) CRRT <Continuous>  propofol Infusion 49.946 MICROgram(s)/kG/Min IV Continuous <Continuous>  senna 2 Tablet(s) Oral at bedtime  thiamine 100 milliGRAM(s) Oral daily  vasopressin Infusion 0.04 Unit(s)/Min IV Continuous <Continuous>    PRN Inpatient Medications      REVIEW OF SYSTEMS  --------------------------------------------------------------------------------  Unable to obtain, pt intubated/sedated    VITALS/PHYSICAL EXAM  --------------------------------------------------------------------------------  T(C): 37.7 (05-07-24 @ 08:00), Max: 38.3 (05-06-24 @ 20:00)  HR: 79 (05-07-24 @ 10:00) (77 - 89)  BP: --  RR: 28 (05-07-24 @ 10:00) (28 - 28)  SpO2: 97% (05-07-24 @ 10:00) (94% - 98%)  Wt(kg): --        05-06-24 @ 07:01  -  05-07-24 @ 07:00  --------------------------------------------------------  IN: 6425.5 mL / OUT: 7975 mL / NET: -1549.5 mL    05-07-24 @ 07:01  -  05-07-24 @ 10:39  --------------------------------------------------------  IN: 354.3 mL / OUT: 196 mL / NET: 158.3 mL      Physical Exam:  	Gen: sedated and intubated  	HEENT: NC/AT, +ETT, +OGT  	Pulm: Coarse breath sounds bilaterally  	CV: +S1S2  	Abd: +BS, soft  	: geronimo              Extremities: +bilateral LE edema noted               Neuro: sedated  	Skin: Warm and dry, +jaundice  	Dialysis access: RIJ non-tunneled cath    LABS/STUDIES  --------------------------------------------------------------------------------              7.3    13.24 >-----------<  44       [05-07-24 @ 02:00]              20.8     133  |  98  |  19  ----------------------------<  189      [05-07-24 @ 08:15]  4.3   |  20  |  0.74        Ca     8.1     [05-07-24 @ 08:15]      iCa    1.08     [05-07 @ 02:00]      Mg     2.40     [05-07-24 @ 08:15]      Phos  2.6     [05-07-24 @ 08:15]    TPro  6.8  /  Alb  3.5  /  TBili  33.8  /  DBili  x   /  AST  203  /  ALT  101  /  AlkPhos  67  [05-07-24 @ 08:15]    PT/INR: PT 36.9 , INR 3.41       [05-07-24 @ 08:15]  PTT: 41.1       [05-07-24 @ 08:15]      Creatinine Trend:  SCr 0.74 [05-07 @ 08:15]  SCr 0.78 [05-07 @ 02:00]  SCr 0.81 [05-06 @ 20:00]  SCr 0.78 [05-06 @ 13:40]  SCr 0.84 [05-06 @ 08:45]    Urinalysis - [05-07-24 @ 08:15]      Color  / Appearance  / SG  / pH       Gluc 189 / Ketone   / Bili  / Urobili        Blood  / Protein  / Leuk Est  / Nitrite       RBC  / WBC  / Hyaline  / Gran  / Sq Epi  / Non Sq Epi  / Bacteria       Lipid: chol --, , HDL --, LDL --      [05-06-24 @ 12:14]      C4 Complement 9      [05-03-24 @ 22:03] Alert-The patient is alert, awake and responds to voice. The patient is oriented to time, place, and person. The triage nurse is able to obtain subjective information.

## 2024-05-08 ENCOUNTER — APPOINTMENT (OUTPATIENT)
Dept: OBGYN | Facility: CLINIC | Age: 49
End: 2024-05-08
Payer: COMMERCIAL

## 2024-05-08 VITALS
SYSTOLIC BLOOD PRESSURE: 128 MMHG | WEIGHT: 155 LBS | DIASTOLIC BLOOD PRESSURE: 90 MMHG | BODY MASS INDEX: 28.52 KG/M2 | HEIGHT: 62 IN

## 2024-05-08 DIAGNOSIS — Z11.3 ENCOUNTER FOR SCREENING FOR INFECTIONS WITH A PREDOMINANTLY SEXUAL MODE OF TRANSMISSION: ICD-10-CM

## 2024-05-08 DIAGNOSIS — N94.89 OTHER SPECIFIED CONDITIONS ASSOCIATED WITH FEMALE GENITAL ORGANS AND MENSTRUAL CYCLE: ICD-10-CM

## 2024-05-08 PROCEDURE — 99386 PREV VISIT NEW AGE 40-64: CPT

## 2024-05-08 PROCEDURE — 99212 OFFICE O/P EST SF 10 MIN: CPT | Mod: 25

## 2024-05-09 LAB
C TRACH RRNA SPEC QL NAA+PROBE: NOT DETECTED
N GONORRHOEA RRNA SPEC QL NAA+PROBE: NOT DETECTED
SOURCE AMPLIFICATION: NORMAL

## 2024-05-15 ENCOUNTER — ASOB RESULT (OUTPATIENT)
Age: 49
End: 2024-05-15

## 2024-05-15 ENCOUNTER — APPOINTMENT (OUTPATIENT)
Dept: OBGYN | Facility: CLINIC | Age: 49
End: 2024-05-15
Payer: COMMERCIAL

## 2024-05-15 PROCEDURE — 76830 TRANSVAGINAL US NON-OB: CPT

## 2024-05-16 DIAGNOSIS — N83.299 OTHER OVARIAN CYST, UNSPECIFIED SIDE: ICD-10-CM

## 2024-05-16 DIAGNOSIS — Z85.42 PERSONAL HISTORY OF MALIGNANT NEOPLASM OF OTHER PARTS OF UTERUS: ICD-10-CM

## 2024-05-21 NOTE — HISTORY OF PRESENT ILLNESS
[FreeTextEntry1] : 2024. HITESH DEAN 49 year old female  LMP presents for an initial exam establishing gyn care and an annual exam.  Pt denies vaginal bleeding. No vaginal discharge or vaginitis symptoms. She denies abdominal or pelvic pain. No urinary complaints. BM is normal per patient.   Pt reports that she ha a h/o "uterine ca" s/p hysterectomy, chemotherapy, and radiation, but did not have her ovaries or fallopian tubes removed. She states that a CT scan done in 2024 had found a mass on her fallopian tubes that her radiologist believes could be cancer and advised her to schedule a visit with MD. I do not have those records today.  OBHx: :  @ 44 wks G2:  @ 42 wks GynHx: Uterine Ca?, h/o ovarian cysts, denies abnormal paps or fibroids PMH: Hx of diverticultiis SHx: sigmoid colon and proximal rectum removal , abdominoplasty, breast reduction, Hysterectomy Meds: denies All: NKDA  Soc: No alcohol, drug, or tobacco use, non-smoker.  Psych: negative FHx: Mother: HTN, Father: CAD, Stroke Denies FHx of breast, ovarian, uterine or colon cancer.     [TextBox_4] : Immunizations (flu, covid, Gardasil): UTD  [Mammogramdate] : 04/2024  [TextBox_19] : BiRADS-2 [TextBox_31] : unknown [No] : Patient does not have concerns regarding sex [ColonoscopyDate] : 03/2023  [Currently Active] : currently active [Men] : men

## 2024-05-21 NOTE — PLAN
[FreeTextEntry1] : #HCM -Breast self exam reviewed and taught -STI Screening declined but GC/CT culture obtained -Pap/HPV not conducted since pt no longer has a cervix -Advised pt to schedule colonoscopy  -Immunizations: UTD  #adnexal mass?  -per pt, pt has h/o uterine ca s/p hysterectomy but still have her fallopian tubes and ovaries Pt to schedule TVUS to assess pt to obtain records from her hysterectomy, indication, and outside imaging   RTO in 2 weeks for follow up visit KARLY Wells MD

## 2024-05-21 NOTE — PHYSICAL EXAM
[Chaperone Present] : A chaperone was present in the examining room during all aspects of the physical examination [[id number]] : A chaperone was present during the pelvic exam. [Appropriately responsive] : appropriately responsive [Alert] : alert [No Acute Distress] : no acute distress [No Lymphadenopathy] : no lymphadenopathy [Regular Rate Rhythm] : regular rate rhythm [No Murmurs] : no murmurs [Clear to Auscultation B/L] : clear to auscultation bilaterally [Soft] : soft [Non-tender] : non-tender [Non-distended] : non-distended [No HSM] : No HSM [No Lesions] : no lesions [No Mass] : no mass [Oriented x3] : oriented x3 [Examination Of The Breasts] : a normal appearance [No Masses] : no breast masses were palpable [Labia Majora] : normal [Labia Minora] : normal [Normal] : normal [Uterine Adnexae] : normal [Declined] : Patient declined rectal exam [FreeTextEntry2] : Lara Esteban [Absent] : absent

## 2024-06-10 ENCOUNTER — OUTPATIENT (OUTPATIENT)
Dept: OUTPATIENT SERVICES | Facility: HOSPITAL | Age: 49
LOS: 1 days | End: 2024-06-10

## 2024-06-10 ENCOUNTER — APPOINTMENT (OUTPATIENT)
Dept: MRI IMAGING | Facility: CLINIC | Age: 49
End: 2024-06-10

## 2024-06-10 DIAGNOSIS — N83.299 OTHER OVARIAN CYST, UNSPECIFIED SIDE: ICD-10-CM

## 2024-06-10 DIAGNOSIS — Z98.890 OTHER SPECIFIED POSTPROCEDURAL STATES: Chronic | ICD-10-CM

## 2024-06-10 DIAGNOSIS — Z90.710 ACQUIRED ABSENCE OF BOTH CERVIX AND UTERUS: Chronic | ICD-10-CM

## 2024-06-10 DIAGNOSIS — Z85.42 PERSONAL HISTORY OF MALIGNANT NEOPLASM OF OTHER PARTS OF UTERUS: ICD-10-CM

## 2024-06-20 ENCOUNTER — APPOINTMENT (OUTPATIENT)
Dept: MRI IMAGING | Facility: CLINIC | Age: 49
End: 2024-06-20

## 2024-07-10 NOTE — HISTORY OF PRESENT ILLNESS
[FreeTextEntry1] : 46-year-old female status post laparoscopic-assisted sigmoid colon resection for chronic diverticular disease. Patient progressing well. Tolerating diet positive bowel movements. No fevers or chills Adult

## 2024-08-23 ENCOUNTER — APPOINTMENT (OUTPATIENT)
Dept: INTERNAL MEDICINE | Facility: CLINIC | Age: 49
End: 2024-08-23

## 2024-08-27 NOTE — PATIENT PROFILE ADULT - FALL HARM RISK - CONCLUSION
normal no additional sounds heard breath sounds clear to auscultation no cervical masses or bruits appreciated  No tenderness in the head or neck area to palpation  Good peripheral pulses no edema is noted  No rashes unusual bruising or bleeding lacerations are atypical discoloration appreciated on general inspection      NEUROLOGICAL EXAM:    Appearance:  The patient is well developed, well nourished, provides a coherent history and is in no acute distress.   Mental Status: Oriented to time, place and person. Mood and affect appropriate.   Cranial Nerves:    EOM's full, no nystagmus, gaze is conjugate, no ptosis.  Facial motor intact bilaterally. Hearing is normal bilaterally.  Voice is with good projection without evidence of secretion pooling   Motor:  5/5 x 4  Tone: Normal   Bulk: Normal   No fasciculations appreciated    Reflexes:   +2 and symmetrical  Toes downgoing bilaterally   Sensory:   Normal to light touch    Gait:  Ambulates independently with normal gait and station   Tremor:   No tremor noted.   Cerebellar:  No cerebellar signs present.                  On this date 8/27/2024 I have spent 40 minutes reviewing previous notes, test results and face to face with the patient discussing the diagnosis and importance of compliance with the treatment plan as well as documenting on the day of the visit.      An electronic signature was used to authenticate this note.    --Tonia Lakhani, ANP    Fall with Harm Risk

## 2025-02-10 NOTE — ASU PREOP CHECKLIST - SURGICAL CONSENT
bilateral upper extremity Active ROM was WFL (within functional limits)/bilateral  lower extremity Active ROM was WFL (within functional limits) done

## 2025-02-19 ENCOUNTER — EMERGENCY (EMERGENCY)
Facility: HOSPITAL | Age: 50
LOS: 1 days | Discharge: HOME | End: 2025-02-19
Attending: EMERGENCY MEDICINE | Admitting: EMERGENCY MEDICINE
Payer: COMMERCIAL

## 2025-02-19 VITALS
OXYGEN SATURATION: 100 % | RESPIRATION RATE: 17 BRPM | HEART RATE: 94 BPM | TEMPERATURE: 99 F | SYSTOLIC BLOOD PRESSURE: 142 MMHG | DIASTOLIC BLOOD PRESSURE: 78 MMHG

## 2025-02-19 VITALS
TEMPERATURE: 98 F | WEIGHT: 128.09 LBS | RESPIRATION RATE: 18 BRPM | SYSTOLIC BLOOD PRESSURE: 148 MMHG | DIASTOLIC BLOOD PRESSURE: 89 MMHG | OXYGEN SATURATION: 98 % | HEART RATE: 98 BPM

## 2025-02-19 DIAGNOSIS — Z98.890 OTHER SPECIFIED POSTPROCEDURAL STATES: Chronic | ICD-10-CM

## 2025-02-19 DIAGNOSIS — Z90.710 ACQUIRED ABSENCE OF BOTH CERVIX AND UTERUS: Chronic | ICD-10-CM

## 2025-02-19 LAB
ADD ON TEST-SPECIMEN IN LAB: SIGNIFICANT CHANGE UP
ALBUMIN SERPL ELPH-MCNC: 4.2 G/DL — SIGNIFICANT CHANGE UP (ref 3.3–5)
ALP SERPL-CCNC: 77 U/L — SIGNIFICANT CHANGE UP (ref 40–120)
ALT FLD-CCNC: 10 U/L — SIGNIFICANT CHANGE UP (ref 4–33)
ANION GAP SERPL CALC-SCNC: 12 MMOL/L — SIGNIFICANT CHANGE UP (ref 7–14)
APPEARANCE UR: ABNORMAL
AST SERPL-CCNC: 21 U/L — SIGNIFICANT CHANGE UP (ref 4–32)
BACTERIA # UR AUTO: NEGATIVE /HPF — SIGNIFICANT CHANGE UP
BASOPHILS # BLD AUTO: 0.04 K/UL — SIGNIFICANT CHANGE UP (ref 0–0.2)
BASOPHILS NFR BLD AUTO: 0.6 % — SIGNIFICANT CHANGE UP (ref 0–2)
BILIRUB SERPL-MCNC: <0.2 MG/DL — SIGNIFICANT CHANGE UP (ref 0.2–1.2)
BILIRUB UR-MCNC: NEGATIVE — SIGNIFICANT CHANGE UP
BLD GP AB SCN SERPL QL: NEGATIVE — SIGNIFICANT CHANGE UP
BLOOD GAS VENOUS COMPREHENSIVE RESULT: SIGNIFICANT CHANGE UP
BUN SERPL-MCNC: 10 MG/DL — SIGNIFICANT CHANGE UP (ref 7–23)
CALCIUM SERPL-MCNC: 9.6 MG/DL — SIGNIFICANT CHANGE UP (ref 8.4–10.5)
CAST: 7 /LPF — HIGH (ref 0–4)
CHLORIDE SERPL-SCNC: 107 MMOL/L — SIGNIFICANT CHANGE UP (ref 98–107)
CO2 SERPL-SCNC: 20 MMOL/L — LOW (ref 22–31)
COLOR SPEC: YELLOW — SIGNIFICANT CHANGE UP
CREAT SERPL-MCNC: 0.83 MG/DL — SIGNIFICANT CHANGE UP (ref 0.5–1.3)
DIFF PNL FLD: NEGATIVE — SIGNIFICANT CHANGE UP
EGFR: 86 ML/MIN/1.73M2 — SIGNIFICANT CHANGE UP
EGFR: 86 ML/MIN/1.73M2 — SIGNIFICANT CHANGE UP
EOSINOPHIL # BLD AUTO: 0.12 K/UL — SIGNIFICANT CHANGE UP (ref 0–0.5)
EOSINOPHIL NFR BLD AUTO: 1.9 % — SIGNIFICANT CHANGE UP (ref 0–6)
GLUCOSE SERPL-MCNC: 95 MG/DL — SIGNIFICANT CHANGE UP (ref 70–99)
GLUCOSE UR QL: NEGATIVE MG/DL — SIGNIFICANT CHANGE UP
HCG SERPL-ACNC: 1 MIU/ML — SIGNIFICANT CHANGE UP
HCT VFR BLD CALC: 34.8 % — SIGNIFICANT CHANGE UP (ref 34.5–45)
HGB BLD-MCNC: 11.2 G/DL — LOW (ref 11.5–15.5)
IANC: 3.74 K/UL — SIGNIFICANT CHANGE UP (ref 1.8–7.4)
IMM GRANULOCYTES NFR BLD AUTO: 0.3 % — SIGNIFICANT CHANGE UP (ref 0–0.9)
INR BLD: 0.9 RATIO — SIGNIFICANT CHANGE UP (ref 0.85–1.16)
KETONES UR-MCNC: NEGATIVE MG/DL — SIGNIFICANT CHANGE UP
LEUKOCYTE ESTERASE UR-ACNC: NEGATIVE — SIGNIFICANT CHANGE UP
LIDOCAIN IGE QN: 57 U/L — SIGNIFICANT CHANGE UP (ref 7–60)
LYMPHOCYTES # BLD AUTO: 2.18 K/UL — SIGNIFICANT CHANGE UP (ref 1–3.3)
LYMPHOCYTES # BLD AUTO: 34.2 % — SIGNIFICANT CHANGE UP (ref 13–44)
MAGNESIUM SERPL-MCNC: 2 MG/DL — SIGNIFICANT CHANGE UP (ref 1.6–2.6)
MCHC RBC-ENTMCNC: 28.5 PG — SIGNIFICANT CHANGE UP (ref 27–34)
MCHC RBC-ENTMCNC: 32.2 G/DL — SIGNIFICANT CHANGE UP (ref 32–36)
MCV RBC AUTO: 88.5 FL — SIGNIFICANT CHANGE UP (ref 80–100)
MONOCYTES # BLD AUTO: 0.28 K/UL — SIGNIFICANT CHANGE UP (ref 0–0.9)
MONOCYTES NFR BLD AUTO: 4.4 % — SIGNIFICANT CHANGE UP (ref 2–14)
NEUTROPHILS # BLD AUTO: 3.74 K/UL — SIGNIFICANT CHANGE UP (ref 1.8–7.4)
NEUTROPHILS NFR BLD AUTO: 58.6 % — SIGNIFICANT CHANGE UP (ref 43–77)
NITRITE UR-MCNC: NEGATIVE — SIGNIFICANT CHANGE UP
NRBC # BLD AUTO: 0 K/UL — SIGNIFICANT CHANGE UP (ref 0–0)
NRBC # FLD: 0 K/UL — SIGNIFICANT CHANGE UP (ref 0–0)
NRBC BLD AUTO-RTO: 0 /100 WBCS — SIGNIFICANT CHANGE UP (ref 0–0)
PH UR: 6 — SIGNIFICANT CHANGE UP (ref 5–8)
PHOSPHATE SERPL-MCNC: 3.2 MG/DL — SIGNIFICANT CHANGE UP (ref 2.5–4.5)
PLATELET # BLD AUTO: 234 K/UL — SIGNIFICANT CHANGE UP (ref 150–400)
POTASSIUM SERPL-MCNC: 4.1 MMOL/L — SIGNIFICANT CHANGE UP (ref 3.5–5.3)
POTASSIUM SERPL-SCNC: 4.1 MMOL/L — SIGNIFICANT CHANGE UP (ref 3.5–5.3)
PROT SERPL-MCNC: 6.8 G/DL — SIGNIFICANT CHANGE UP (ref 6–8.3)
PROT UR-MCNC: NEGATIVE MG/DL — SIGNIFICANT CHANGE UP
PROTHROM AB SERPL-ACNC: 10.7 SEC — SIGNIFICANT CHANGE UP (ref 9.9–13.4)
RBC # BLD: 3.93 M/UL — SIGNIFICANT CHANGE UP (ref 3.8–5.2)
RBC # FLD: 12.1 % — SIGNIFICANT CHANGE UP (ref 10.3–14.5)
RBC CASTS # UR COMP ASSIST: 38 /HPF — HIGH (ref 0–4)
REVIEW: SIGNIFICANT CHANGE UP
RH IG SCN BLD-IMP: POSITIVE — SIGNIFICANT CHANGE UP
SODIUM SERPL-SCNC: 139 MMOL/L — SIGNIFICANT CHANGE UP (ref 135–145)
SP GR SPEC: 1.01 — SIGNIFICANT CHANGE UP (ref 1–1.03)
SQUAMOUS # UR AUTO: 2 /HPF — SIGNIFICANT CHANGE UP (ref 0–5)
UROBILINOGEN FLD QL: 0.2 MG/DL — SIGNIFICANT CHANGE UP (ref 0.2–1)
WBC # BLD: 6.38 K/UL — SIGNIFICANT CHANGE UP (ref 3.8–10.5)
WBC # FLD AUTO: 6.38 K/UL — SIGNIFICANT CHANGE UP (ref 3.8–10.5)
WBC UR QL: 1 /HPF — SIGNIFICANT CHANGE UP (ref 0–5)

## 2025-02-19 PROCEDURE — 99285 EMERGENCY DEPT VISIT HI MDM: CPT

## 2025-02-19 PROCEDURE — 74177 CT ABD & PELVIS W/CONTRAST: CPT | Mod: 26

## 2025-02-19 RX ORDER — ONDANSETRON HCL/PF 4 MG/2 ML
4 VIAL (ML) INJECTION ONCE
Refills: 0 | Status: COMPLETED | OUTPATIENT
Start: 2025-02-19 | End: 2025-02-19

## 2025-02-19 RX ORDER — SODIUM CHLORIDE 9 G/1000ML
1000 INJECTION, SOLUTION INTRAVENOUS ONCE
Refills: 0 | Status: COMPLETED | OUTPATIENT
Start: 2025-02-19 | End: 2025-02-19

## 2025-02-19 RX ORDER — SALINE 7; 19 G/118ML; G/118ML
1 ENEMA RECTAL ONCE
Refills: 0 | Status: COMPLETED | OUTPATIENT
Start: 2025-02-19 | End: 2025-02-19

## 2025-02-19 RX ORDER — MAGNESIUM CITRATE
296 SOLUTION, ORAL ORAL ONCE
Refills: 0 | Status: COMPLETED | OUTPATIENT
Start: 2025-02-19 | End: 2025-02-19

## 2025-02-19 RX ORDER — IOHEXOL 350 MG/ML
30 INJECTION, SOLUTION INTRAVENOUS ONCE
Refills: 0 | Status: COMPLETED | OUTPATIENT
Start: 2025-02-19 | End: 2025-02-19

## 2025-02-19 RX ORDER — ACETAMINOPHEN 500 MG/5ML
1000 LIQUID (ML) ORAL ONCE
Refills: 0 | Status: DISCONTINUED | OUTPATIENT
Start: 2025-02-19 | End: 2025-02-19

## 2025-02-19 RX ORDER — KETOROLAC TROMETHAMINE 30 MG/ML
15 INJECTION, SOLUTION INTRAMUSCULAR; INTRAVENOUS ONCE
Refills: 0 | Status: DISCONTINUED | OUTPATIENT
Start: 2025-02-19 | End: 2025-02-19

## 2025-02-19 RX ORDER — HYDROMORPHONE/SOD CHLOR,ISO/PF 2 MG/10 ML
1 SYRINGE (ML) INJECTION ONCE
Refills: 0 | Status: DISCONTINUED | OUTPATIENT
Start: 2025-02-19 | End: 2025-02-19

## 2025-02-19 RX ADMIN — Medication 4 MILLIGRAM(S): at 19:55

## 2025-02-19 RX ADMIN — SODIUM CHLORIDE 1000 MILLILITER(S): 9 INJECTION, SOLUTION INTRAVENOUS at 19:06

## 2025-02-19 RX ADMIN — Medication 1 MILLIGRAM(S): at 23:04

## 2025-02-19 RX ADMIN — Medication 4 MILLIGRAM(S): at 21:24

## 2025-02-19 RX ADMIN — IOHEXOL 30 MILLILITER(S): 350 INJECTION, SOLUTION INTRAVENOUS at 19:30

## 2025-02-19 RX ADMIN — Medication 4 MILLIGRAM(S): at 19:05

## 2025-02-19 RX ADMIN — KETOROLAC TROMETHAMINE 15 MILLIGRAM(S): 30 INJECTION, SOLUTION INTRAMUSCULAR; INTRAVENOUS at 19:06

## 2025-02-19 RX ADMIN — Medication 4 MILLIGRAM(S): at 19:06

## 2025-02-20 LAB — TROPONIN T, HIGH SENSITIVITY RESULT: <6 NG/L — SIGNIFICANT CHANGE UP

## 2025-02-20 PROCEDURE — 76830 TRANSVAGINAL US NON-OB: CPT | Mod: 26

## 2025-03-03 ENCOUNTER — NON-APPOINTMENT (OUTPATIENT)
Age: 50
End: 2025-03-03

## 2025-03-03 ENCOUNTER — APPOINTMENT (OUTPATIENT)
Dept: GASTROENTEROLOGY | Facility: CLINIC | Age: 50
End: 2025-03-03
Payer: COMMERCIAL

## 2025-03-03 VITALS
BODY MASS INDEX: 24.11 KG/M2 | WEIGHT: 131 LBS | TEMPERATURE: 98 F | SYSTOLIC BLOOD PRESSURE: 126 MMHG | HEART RATE: 81 BPM | DIASTOLIC BLOOD PRESSURE: 81 MMHG | OXYGEN SATURATION: 98 % | HEIGHT: 62 IN

## 2025-03-03 PROCEDURE — 99204 OFFICE O/P NEW MOD 45 MIN: CPT

## 2025-03-03 PROCEDURE — G2211 COMPLEX E/M VISIT ADD ON: CPT | Mod: NC

## 2025-03-04 ENCOUNTER — APPOINTMENT (OUTPATIENT)
Dept: SURGERY | Facility: CLINIC | Age: 50
End: 2025-03-04

## 2025-03-06 ENCOUNTER — LABORATORY RESULT (OUTPATIENT)
Age: 50
End: 2025-03-06

## 2025-03-06 ENCOUNTER — NON-APPOINTMENT (OUTPATIENT)
Age: 50
End: 2025-03-06

## 2025-03-06 ENCOUNTER — APPOINTMENT (OUTPATIENT)
Dept: INTERNAL MEDICINE | Facility: CLINIC | Age: 50
End: 2025-03-06

## 2025-03-06 VITALS
DIASTOLIC BLOOD PRESSURE: 84 MMHG | SYSTOLIC BLOOD PRESSURE: 143 MMHG | WEIGHT: 129 LBS | HEART RATE: 95 BPM | BODY MASS INDEX: 23.74 KG/M2 | HEIGHT: 62 IN | OXYGEN SATURATION: 100 % | TEMPERATURE: 98.1 F

## 2025-03-06 VITALS — SYSTOLIC BLOOD PRESSURE: 120 MMHG | DIASTOLIC BLOOD PRESSURE: 84 MMHG

## 2025-03-06 DIAGNOSIS — Z00.00 ENCOUNTER FOR GENERAL ADULT MEDICAL EXAMINATION W/OUT ABNORMAL FINDINGS: ICD-10-CM

## 2025-03-06 DIAGNOSIS — Z01.84 ENCOUNTER FOR ANTIBODY RESPONSE EXAMINATION: ICD-10-CM

## 2025-03-06 DIAGNOSIS — Q45.3 OTHER CONGENITAL MALFORMATIONS OF PANCREAS AND PANCREATIC DUCT: ICD-10-CM

## 2025-03-06 DIAGNOSIS — Z11.1 ENCOUNTER FOR SCREENING FOR RESPIRATORY TUBERCULOSIS: ICD-10-CM

## 2025-03-06 DIAGNOSIS — L29.9 PRURITUS, UNSPECIFIED: ICD-10-CM

## 2025-03-06 DIAGNOSIS — Z23 ENCOUNTER FOR IMMUNIZATION: ICD-10-CM

## 2025-03-06 DIAGNOSIS — Z02.89 ENCOUNTER FOR OTHER ADMINISTRATIVE EXAMINATIONS: ICD-10-CM

## 2025-03-06 DIAGNOSIS — Z12.39 ENCOUNTER FOR OTHER SCREENING FOR MALIGNANT NEOPLASM OF BREAST: ICD-10-CM

## 2025-03-06 LAB
ALBUMIN SERPL ELPH-MCNC: 4.8 G/DL
ALP BLD-CCNC: 106 U/L
ALT SERPL-CCNC: 33 U/L
AMYLASE/CREAT SERPL: 89 U/L
ANION GAP SERPL CALC-SCNC: 14 MMOL/L
AST SERPL-CCNC: 47 U/L
BILIRUB SERPL-MCNC: 0.2 MG/DL
BUN SERPL-MCNC: 10 MG/DL
CALCIUM SERPL-MCNC: 10.2 MG/DL
CHLORIDE SERPL-SCNC: 104 MMOL/L
CHOLEST SERPL-MCNC: 225 MG/DL
CO2 SERPL-SCNC: 23 MMOL/L
CREAT SERPL-MCNC: 0.68 MG/DL
EGFRCR SERPLBLD CKD-EPI 2021: 106 ML/MIN/1.73M2
ESTIMATED AVERAGE GLUCOSE: 114 MG/DL
GLUCOSE SERPL-MCNC: 79 MG/DL
HBA1C MFR BLD HPLC: 5.6 %
HCT VFR BLD CALC: 38.4 %
HDLC SERPL-MCNC: 58 MG/DL
HGB BLD-MCNC: 12.2 G/DL
LDLC SERPL CALC-MCNC: 149 MG/DL
LPL SERPL-CCNC: 72 U/L
MCHC RBC-ENTMCNC: 28.9 PG
MCHC RBC-ENTMCNC: 31.8 G/DL
MCV RBC AUTO: 91 FL
NONHDLC SERPL-MCNC: 167 MG/DL
PLATELET # BLD AUTO: 254 K/UL
POTASSIUM SERPL-SCNC: 4.3 MMOL/L
PROT SERPL-MCNC: 7.2 G/DL
RBC # BLD: 4.22 M/UL
RBC # FLD: 12.5 %
SODIUM SERPL-SCNC: 141 MMOL/L
TRIGL SERPL-MCNC: 102 MG/DL
TSH SERPL-ACNC: 4.54 UIU/ML
WBC # FLD AUTO: 4.61 K/UL

## 2025-03-06 PROCEDURE — 99396 PREV VISIT EST AGE 40-64: CPT | Mod: 25

## 2025-03-06 PROCEDURE — 99214 OFFICE O/P EST MOD 30 MIN: CPT | Mod: 25

## 2025-03-06 PROCEDURE — 90715 TDAP VACCINE 7 YRS/> IM: CPT

## 2025-03-06 PROCEDURE — 90471 IMMUNIZATION ADMIN: CPT

## 2025-03-06 PROCEDURE — 93000 ELECTROCARDIOGRAM COMPLETE: CPT

## 2025-03-06 PROCEDURE — 36415 COLL VENOUS BLD VENIPUNCTURE: CPT

## 2025-03-06 RX ORDER — HYDROCORTISONE 25 MG/G
2.5 CREAM TOPICAL 3 TIMES DAILY
Qty: 1 | Refills: 0 | Status: ACTIVE | COMMUNITY
Start: 2025-03-06 | End: 1900-01-01

## 2025-03-07 ENCOUNTER — APPOINTMENT (OUTPATIENT)
Dept: OBGYN | Facility: CLINIC | Age: 50
End: 2025-03-07
Payer: COMMERCIAL

## 2025-03-07 VITALS
BODY MASS INDEX: 27.83 KG/M2 | WEIGHT: 129 LBS | DIASTOLIC BLOOD PRESSURE: 86 MMHG | SYSTOLIC BLOOD PRESSURE: 143 MMHG | HEIGHT: 57 IN

## 2025-03-07 DIAGNOSIS — N83.299 OTHER OVARIAN CYST, UNSPECIFIED SIDE: ICD-10-CM

## 2025-03-07 DIAGNOSIS — N94.89 OTHER SPECIFIED CONDITIONS ASSOCIATED WITH FEMALE GENITAL ORGANS AND MENSTRUAL CYCLE: ICD-10-CM

## 2025-03-07 LAB — HBV SURFACE AB SER QL: REACTIVE

## 2025-03-07 PROCEDURE — 99214 OFFICE O/P EST MOD 30 MIN: CPT

## 2025-03-09 LAB
DRUG ABUSE PANEL-9, SERUM: NORMAL
MEV IGG FLD QL IA: >300 AU/ML
MEV IGG+IGM SER-IMP: POSITIVE
MUV AB SER-ACNC: NEGATIVE
MUV IGG SER QL IA: <5 AU/ML
RUBV IGG FLD-ACNC: 3.59 INDEX
RUBV IGG SER-IMP: POSITIVE
VZV AB TITR SER: POSITIVE
VZV IGG SER IF-ACNC: 8.4 S/CO

## 2025-03-12 LAB
M TB IFN-G BLD-IMP: NEGATIVE
QUANTIFERON TB PLUS MITOGEN MINUS NIL: >10 IU/ML
QUANTIFERON TB PLUS NIL: 0.02 IU/ML
QUANTIFERON TB PLUS TB1 MINUS NIL: 0.01 IU/ML
QUANTIFERON TB PLUS TB2 MINUS NIL: 0 IU/ML

## 2025-03-14 ENCOUNTER — APPOINTMENT (OUTPATIENT)
Dept: MRI IMAGING | Facility: IMAGING CENTER | Age: 50
End: 2025-03-14

## 2025-03-18 ENCOUNTER — APPOINTMENT (OUTPATIENT)
Dept: INTERNAL MEDICINE | Facility: CLINIC | Age: 50
End: 2025-03-18
Payer: COMMERCIAL

## 2025-03-18 DIAGNOSIS — Z23 ENCOUNTER FOR IMMUNIZATION: ICD-10-CM

## 2025-03-18 PROCEDURE — 90707 MMR VACCINE SC: CPT

## 2025-03-18 PROCEDURE — 90471 IMMUNIZATION ADMIN: CPT

## 2025-03-20 ENCOUNTER — APPOINTMENT (OUTPATIENT)
Dept: COLORECTAL SURGERY | Facility: CLINIC | Age: 50
End: 2025-03-20
Payer: COMMERCIAL

## 2025-03-20 DIAGNOSIS — N94.89 OTHER SPECIFIED CONDITIONS ASSOCIATED WITH FEMALE GENITAL ORGANS AND MENSTRUAL CYCLE: ICD-10-CM

## 2025-03-20 PROCEDURE — 99213 OFFICE O/P EST LOW 20 MIN: CPT

## 2025-04-15 ENCOUNTER — NON-APPOINTMENT (OUTPATIENT)
Age: 50
End: 2025-04-15

## 2025-04-16 ENCOUNTER — APPOINTMENT (OUTPATIENT)
Dept: GYNECOLOGIC ONCOLOGY | Facility: CLINIC | Age: 50
End: 2025-04-16
Payer: MEDICAID

## 2025-04-16 VITALS
HEIGHT: 57 IN | WEIGHT: 129 LBS | SYSTOLIC BLOOD PRESSURE: 106 MMHG | BODY MASS INDEX: 27.83 KG/M2 | OXYGEN SATURATION: 98 % | TEMPERATURE: 98 F | HEART RATE: 99 BPM | DIASTOLIC BLOOD PRESSURE: 73 MMHG

## 2025-04-16 DIAGNOSIS — N83.299 OTHER OVARIAN CYST, UNSPECIFIED SIDE: ICD-10-CM

## 2025-04-16 DIAGNOSIS — N87.9 DYSPLASIA OF CERVIX UTERI, UNSPECIFIED: ICD-10-CM

## 2025-04-16 DIAGNOSIS — N94.89 OTHER SPECIFIED CONDITIONS ASSOCIATED WITH FEMALE GENITAL ORGANS AND MENSTRUAL CYCLE: ICD-10-CM

## 2025-04-16 DIAGNOSIS — K57.30 DIVERTICULOSIS OF LARGE INTESTINE W/OUT PERFORATION OR ABSCESS W/OUT BLEEDING: ICD-10-CM

## 2025-04-16 PROCEDURE — 99205 OFFICE O/P NEW HI 60 MIN: CPT

## 2025-04-16 PROCEDURE — 99459 PELVIC EXAMINATION: CPT

## 2025-04-16 RX ORDER — LORAZEPAM 1 MG/1
1 TABLET ORAL
Qty: 2 | Refills: 0 | Status: ACTIVE | COMMUNITY
Start: 2025-04-16 | End: 1900-01-01

## 2025-04-17 LAB
CANCER AG125 SERPL-ACNC: 9 U/ML
CANCER AG19-9 SERPL-ACNC: 21 U/ML
CEA SERPL-MCNC: 1.1 NG/ML

## 2025-05-01 ENCOUNTER — APPOINTMENT (OUTPATIENT)
Dept: MRI IMAGING | Facility: IMAGING CENTER | Age: 50
End: 2025-05-01

## 2025-05-05 ENCOUNTER — APPOINTMENT (OUTPATIENT)
Dept: OBGYN | Facility: CLINIC | Age: 50
End: 2025-05-05

## 2025-05-07 ENCOUNTER — TRANSCRIPTION ENCOUNTER (OUTPATIENT)
Age: 50
End: 2025-05-07

## 2025-05-07 ENCOUNTER — OUTPATIENT (OUTPATIENT)
Dept: OUTPATIENT SERVICES | Facility: HOSPITAL | Age: 50
LOS: 1 days | End: 2025-05-07
Payer: MEDICAID

## 2025-05-07 ENCOUNTER — RESULT REVIEW (OUTPATIENT)
Age: 50
End: 2025-05-07

## 2025-05-07 ENCOUNTER — APPOINTMENT (OUTPATIENT)
Dept: GASTROENTEROLOGY | Facility: HOSPITAL | Age: 50
End: 2025-05-07

## 2025-05-07 VITALS
WEIGHT: 125 LBS | HEART RATE: 82 BPM | HEIGHT: 59 IN | DIASTOLIC BLOOD PRESSURE: 66 MMHG | RESPIRATION RATE: 15 BRPM | SYSTOLIC BLOOD PRESSURE: 132 MMHG | OXYGEN SATURATION: 100 % | TEMPERATURE: 97 F

## 2025-05-07 VITALS
HEART RATE: 66 BPM | DIASTOLIC BLOOD PRESSURE: 81 MMHG | OXYGEN SATURATION: 100 % | RESPIRATION RATE: 16 BRPM | SYSTOLIC BLOOD PRESSURE: 145 MMHG

## 2025-05-07 DIAGNOSIS — Z90.710 ACQUIRED ABSENCE OF BOTH CERVIX AND UTERUS: Chronic | ICD-10-CM

## 2025-05-07 DIAGNOSIS — Z98.890 OTHER SPECIFIED POSTPROCEDURAL STATES: Chronic | ICD-10-CM

## 2025-05-07 DIAGNOSIS — K21.9 GASTRO-ESOPHAGEAL REFLUX DISEASE WITHOUT ESOPHAGITIS: ICD-10-CM

## 2025-05-07 PROCEDURE — 88305 TISSUE EXAM BY PATHOLOGIST: CPT | Mod: 26

## 2025-05-07 PROCEDURE — 43259 EGD US EXAM DUODENUM/JEJUNUM: CPT

## 2025-05-07 PROCEDURE — 43239 EGD BIOPSY SINGLE/MULTIPLE: CPT

## 2025-05-07 PROCEDURE — 88305 TISSUE EXAM BY PATHOLOGIST: CPT

## 2025-05-07 NOTE — PRE PROCEDURE NOTE - PRE PROCEDURE EVALUATION
Attending Physician:  Dr. Rodriguez                Procedure:EGDUS    Indication for Procedure: dilated PD and pain  ________________________________________________________  PAST MEDICAL & SURGICAL HISTORY:  Endometrial cancer      Diverticulitis  ~ recurrent      Ovarian cyst      Irritable bowel syndrome (IBS)  ~ Constipation type      H/O: hysterectomy  ~ without oophorectomy      H/O abdominoplasty        ALLERGIES:  codeine (Short breath)    HOME MEDICATIONS:  ibuprofen 400 mg oral tablet: 1 tab(s) orally every 6 hours    AICD/PPM: [ ] yes   [x ] no    PERTINENT LAB DATA:                      PHYSICAL EXAMINATION:    Height (cm): 149.9  Weight (kg): 56.7  BMI (kg/m2): 25.2  BSA (m2): 1.51T(C): 36.2  HR: 82  BP: 132/66  RR: 15  SpO2: 100%    Constitutional: NAD  HEENT: PERRLA, EOMI,    Neck:  No JVD  Respiratory: CTAB/L  Cardiovascular: S1 and S2  Gastrointestinal: BS+, soft, NT/ND  Extremities: No peripheral edema  Neurological: A/O x 3, no focal deficits  Psychiatric: Normal mood, normal affect  Skin: No rashes    ASA Class: I [ ]  II [x ]  III [ ]  IV [ ]    COMMENTS:    The patient is a suitable candidate for the planned procedure unless box checked [ ]  No, explain:

## 2025-05-07 NOTE — ASU PATIENT PROFILE, ADULT - FALL HARM RISK - UNIVERSAL INTERVENTIONS
Bed in lowest position, wheels locked, appropriate side rails in place/Call bell, personal items and telephone in reach/Instruct patient to call for assistance before getting out of bed or chair/Non-slip footwear when patient is out of bed/[place] to call system/Physically safe environment - no spills, clutter or unnecessary equipment/Purposeful Proactive Rounding/Room/bathroom lighting operational, light cord in reach

## 2025-05-07 NOTE — ASU DISCHARGE PLAN (ADULT/PEDIATRIC) - FINANCIAL ASSISTANCE
[facility] provides services at a reduced cost to those who are determined to be eligible through [facility]’s financial assistance program. Information regarding [facility]’s financial assistance program can be found by going to [email or web address] or by calling [phone].

## 2025-05-08 LAB — SURGICAL PATHOLOGY STUDY: SIGNIFICANT CHANGE UP

## 2025-06-15 ENCOUNTER — EMERGENCY (EMERGENCY)
Facility: HOSPITAL | Age: 50
LOS: 1 days | End: 2025-06-15
Attending: STUDENT IN AN ORGANIZED HEALTH CARE EDUCATION/TRAINING PROGRAM | Admitting: STUDENT IN AN ORGANIZED HEALTH CARE EDUCATION/TRAINING PROGRAM
Payer: MEDICAID

## 2025-06-15 VITALS
HEART RATE: 100 BPM | RESPIRATION RATE: 17 BRPM | DIASTOLIC BLOOD PRESSURE: 85 MMHG | HEIGHT: 58 IN | OXYGEN SATURATION: 100 % | WEIGHT: 125 LBS | TEMPERATURE: 98 F | SYSTOLIC BLOOD PRESSURE: 138 MMHG

## 2025-06-15 DIAGNOSIS — Z90.710 ACQUIRED ABSENCE OF BOTH CERVIX AND UTERUS: Chronic | ICD-10-CM

## 2025-06-15 LAB
ALBUMIN SERPL ELPH-MCNC: 4.5 G/DL — SIGNIFICANT CHANGE UP (ref 3.3–5)
ALP SERPL-CCNC: 132 U/L — HIGH (ref 40–120)
ALT FLD-CCNC: 84 U/L — HIGH (ref 4–33)
ANION GAP SERPL CALC-SCNC: 13 MMOL/L — SIGNIFICANT CHANGE UP (ref 7–14)
APPEARANCE UR: CLEAR — SIGNIFICANT CHANGE UP
AST SERPL-CCNC: 212 U/L — HIGH (ref 4–32)
BACTERIA # UR AUTO: NEGATIVE /HPF — SIGNIFICANT CHANGE UP
BASOPHILS # BLD AUTO: 0.03 K/UL — SIGNIFICANT CHANGE UP (ref 0–0.2)
BASOPHILS NFR BLD AUTO: 0.5 % — SIGNIFICANT CHANGE UP (ref 0–2)
BILIRUB SERPL-MCNC: <0.2 MG/DL — SIGNIFICANT CHANGE UP (ref 0.2–1.2)
BILIRUB UR-MCNC: NEGATIVE — SIGNIFICANT CHANGE UP
BLOOD GAS VENOUS COMPREHENSIVE RESULT: SIGNIFICANT CHANGE UP
BUN SERPL-MCNC: 11 MG/DL — SIGNIFICANT CHANGE UP (ref 7–23)
CALCIUM SERPL-MCNC: 9.5 MG/DL — SIGNIFICANT CHANGE UP (ref 8.4–10.5)
CAST: 0 /LPF — SIGNIFICANT CHANGE UP (ref 0–4)
CHLORIDE SERPL-SCNC: 104 MMOL/L — SIGNIFICANT CHANGE UP (ref 98–107)
CO2 SERPL-SCNC: 21 MMOL/L — LOW (ref 22–31)
COLOR SPEC: YELLOW — SIGNIFICANT CHANGE UP
CREAT SERPL-MCNC: 0.56 MG/DL — SIGNIFICANT CHANGE UP (ref 0.5–1.3)
DIFF PNL FLD: ABNORMAL
EGFR: 111 ML/MIN/1.73M2 — SIGNIFICANT CHANGE UP
EGFR: 111 ML/MIN/1.73M2 — SIGNIFICANT CHANGE UP
EOSINOPHIL # BLD AUTO: 0.06 K/UL — SIGNIFICANT CHANGE UP (ref 0–0.5)
EOSINOPHIL NFR BLD AUTO: 1.1 % — SIGNIFICANT CHANGE UP (ref 0–6)
GLUCOSE SERPL-MCNC: 88 MG/DL — SIGNIFICANT CHANGE UP (ref 70–99)
GLUCOSE UR QL: NEGATIVE MG/DL — SIGNIFICANT CHANGE UP
HCT VFR BLD CALC: 36.7 % — SIGNIFICANT CHANGE UP (ref 34.5–45)
HGB BLD-MCNC: 11.9 G/DL — SIGNIFICANT CHANGE UP (ref 11.5–15.5)
IANC: 3.93 K/UL — SIGNIFICANT CHANGE UP (ref 1.8–7.4)
IMM GRANULOCYTES NFR BLD AUTO: 0.2 % — SIGNIFICANT CHANGE UP (ref 0–0.9)
KETONES UR QL: ABNORMAL MG/DL
LEUKOCYTE ESTERASE UR-ACNC: NEGATIVE — SIGNIFICANT CHANGE UP
LIDOCAIN IGE QN: 47 U/L — SIGNIFICANT CHANGE UP (ref 7–60)
LYMPHOCYTES # BLD AUTO: 1.36 K/UL — SIGNIFICANT CHANGE UP (ref 1–3.3)
LYMPHOCYTES # BLD AUTO: 23.8 % — SIGNIFICANT CHANGE UP (ref 13–44)
MCHC RBC-ENTMCNC: 29.3 PG — SIGNIFICANT CHANGE UP (ref 27–34)
MCHC RBC-ENTMCNC: 32.4 G/DL — SIGNIFICANT CHANGE UP (ref 32–36)
MCV RBC AUTO: 90.4 FL — SIGNIFICANT CHANGE UP (ref 80–100)
MONOCYTES # BLD AUTO: 0.32 K/UL — SIGNIFICANT CHANGE UP (ref 0–0.9)
MONOCYTES NFR BLD AUTO: 5.6 % — SIGNIFICANT CHANGE UP (ref 2–14)
NEUTROPHILS # BLD AUTO: 3.93 K/UL — SIGNIFICANT CHANGE UP (ref 1.8–7.4)
NEUTROPHILS NFR BLD AUTO: 68.8 % — SIGNIFICANT CHANGE UP (ref 43–77)
NITRITE UR-MCNC: NEGATIVE — SIGNIFICANT CHANGE UP
NRBC # BLD AUTO: 0 K/UL — SIGNIFICANT CHANGE UP (ref 0–0)
NRBC # FLD: 0 K/UL — SIGNIFICANT CHANGE UP (ref 0–0)
NRBC BLD AUTO-RTO: 0 /100 WBCS — SIGNIFICANT CHANGE UP (ref 0–0)
PH UR: 6 — SIGNIFICANT CHANGE UP (ref 5–8)
PLATELET # BLD AUTO: 185 K/UL — SIGNIFICANT CHANGE UP (ref 150–400)
POTASSIUM SERPL-MCNC: 4.9 MMOL/L — SIGNIFICANT CHANGE UP (ref 3.5–5.3)
POTASSIUM SERPL-SCNC: 4.9 MMOL/L — SIGNIFICANT CHANGE UP (ref 3.5–5.3)
PROT SERPL-MCNC: 7.5 G/DL — SIGNIFICANT CHANGE UP (ref 6–8.3)
PROT UR-MCNC: NEGATIVE MG/DL — SIGNIFICANT CHANGE UP
RBC # BLD: 4.06 M/UL — SIGNIFICANT CHANGE UP (ref 3.8–5.2)
RBC # FLD: 12.2 % — SIGNIFICANT CHANGE UP (ref 10.3–14.5)
RBC CASTS # UR COMP ASSIST: 8 /HPF — HIGH (ref 0–4)
REVIEW: SIGNIFICANT CHANGE UP
SODIUM SERPL-SCNC: 138 MMOL/L — SIGNIFICANT CHANGE UP (ref 135–145)
SP GR SPEC: 1.03 — SIGNIFICANT CHANGE UP (ref 1–1.03)
SQUAMOUS # UR AUTO: 1 /HPF — SIGNIFICANT CHANGE UP (ref 0–5)
UROBILINOGEN FLD QL: 1 MG/DL — SIGNIFICANT CHANGE UP (ref 0.2–1)
WBC # BLD: 5.71 K/UL — SIGNIFICANT CHANGE UP (ref 3.8–10.5)
WBC # FLD AUTO: 5.71 K/UL — SIGNIFICANT CHANGE UP (ref 3.8–10.5)
WBC UR QL: 0 /HPF — SIGNIFICANT CHANGE UP (ref 0–5)

## 2025-06-15 PROCEDURE — 99285 EMERGENCY DEPT VISIT HI MDM: CPT

## 2025-06-15 PROCEDURE — 76830 TRANSVAGINAL US NON-OB: CPT | Mod: 26

## 2025-06-15 RX ORDER — ONDANSETRON HCL/PF 4 MG/2 ML
4 VIAL (ML) INJECTION ONCE
Refills: 0 | Status: COMPLETED | OUTPATIENT
Start: 2025-06-15 | End: 2025-06-15

## 2025-06-15 RX ORDER — KETOROLAC TROMETHAMINE 30 MG/ML
15 INJECTION, SOLUTION INTRAMUSCULAR; INTRAVENOUS ONCE
Refills: 0 | Status: DISCONTINUED | OUTPATIENT
Start: 2025-06-15 | End: 2025-06-15

## 2025-06-15 RX ADMIN — Medication 1000 MILLILITER(S): at 21:35

## 2025-06-15 RX ADMIN — Medication 4 MILLIGRAM(S): at 21:40

## 2025-06-15 RX ADMIN — Medication 4 MILLIGRAM(S): at 21:35

## 2025-06-15 RX ADMIN — KETOROLAC TROMETHAMINE 15 MILLIGRAM(S): 30 INJECTION, SOLUTION INTRAMUSCULAR; INTRAVENOUS at 21:34

## 2025-06-15 NOTE — ED PROVIDER NOTE - PHYSICAL EXAMINATION
GEN: no acute respiratory distress. nontoxic, speaking comfortably in full sentences, ambulating with steady gait.  HEENT: NCAT. face symmetrical. PERRL 4mm, EOMI, MMM, oropharynx wnl.  Neck: no JVD, trachea midline, no lymphadenopathy, FROM  CV: RRR. +S1S2, no murmur. 2+ pulses in 4 extremities, cap refill <2 sec  Chest: CTA B/l. no wheezing, rales, rhonchi. no retractions. good air movement.   ABD: +BS, soft, non distended, +LLQ tenderness, +slight rlq tenderness. No guarding/rebound. No lesions, ecchymosis, well healed surgical scar  : no cva or suprapubic tenderness  MSK: No clubbing, cyanosis, edema. FROM of all extremities. no tenderness to palpation. No midline or paraspinal tenderness.   Neuro: AAOX3. Sensation intact, motor 5/5 throughout.   SKIN: No  rash

## 2025-06-15 NOTE — ED ADULT NURSE NOTE - OBJECTIVE STATEMENT
50y female with past medical history of ovarian cyt c/o LLQ abdominal pain x2 weeks, worsening over the last 2 days. Endorsing increasing nausea,  denies vaginal bleeding, urinary symptoms. 22g IV placed L AC, labs obtained and sent to the lab

## 2025-06-15 NOTE — ED PROVIDER NOTE - CLINICAL SUMMARY MEDICAL DECISION MAKING FREE TEXT BOX
Patient with 2 weeks of left lower quadrant pain, nausea.  Reports normal flatus however no bowel movement in 4 days.  Patient has history of left ovarian cysts, diverticulitis.  Differential diagnosis includes, but not limited to, diverticulitis, symptomatic ovarian cyst versus torsion, nephrolithiasis, UTI.  Plan: Labs, symptom relief, CT, ultrasound, reassess.

## 2025-06-15 NOTE — ED ADULT NURSE NOTE - NSFALLUNIVINTERV_ED_ALL_ED
Bed/Stretcher in lowest position, wheels locked, appropriate side rails in place/Call bell, personal items and telephone in reach/Instruct patient to call for assistance before getting out of bed/chair/stretcher/Non-slip footwear applied when patient is off stretcher/[place] to call system/Physically safe environment - no spills, clutter or unnecessary equipment/Purposeful proactive rounding/Room/bathroom lighting operational, light cord in reach

## 2025-06-15 NOTE — ED ADULT TRIAGE NOTE - CHIEF COMPLAINT QUOTE
Pt c/o LLQ abdominal pain x2 weeks, worsening over the last 2 days. Endorsing increasing vaginal discharge, denies vaginal bleeding, n/v/d, fevers, chills, urinary symptoms. Hx of cyst on ovary

## 2025-06-15 NOTE — ED PROVIDER NOTE - PATIENT PORTAL LINK FT
You can access the FollowMyHealth Patient Portal offered by [facility] by registering at the following website: [email or web address]. By joining [facility]’s FollowMyHealth portal, you will also be able to view your health information using other applications (apps) compatible with our system.

## 2025-06-15 NOTE — ED PROVIDER NOTE - DISPOSITION TYPE
DISCHARGE Normal suck-swallow patterns for age/Cry with normal variation of amplitude and frequency/Tongue motility size and shape normal/[place] and grasp reflexes acceptable/Global muscle tone and symmetry normal/Tongue - no atrophy or fasciculations

## 2025-06-15 NOTE — ED PROVIDER NOTE - OBJECTIVE STATEMENT
50-year-old female with past medical history uterine cancer status post hysterectomy, diverticulitis status post resection presenting for 2 weeks of left lower quadrant pain.  Patient reports pain severe at times, patient attempted to tolerate at home. Patient reports was told she had left ovarian cyst in the past and was told may need surgery however surgeon reported not wanting to do it due to prior diverticulitis surgery.  Patient reports chills but no fevers.  Patient denies chest pain, shortness of breath, palpitations.  Patient reports nausea but no vomiting.  Patient reports no bowel movement 4 days however has been minimal flatus.  Patient reports no dysuria, hematuria, urinary frequency or urgency.  Triage note mentions vaginal discharge however patient denies vaginal discharge or bleeding.  Patient denies all medications, drugs, alcohol, tobacco.

## 2025-06-15 NOTE — ED PROVIDER NOTE - PROGRESS NOTE DETAILS
MD Cielo: Pt signed out to me by Dr Amezcua. Pt re-assessed, requesting dc home. US and CT both show e/o L ovarian cyst which pt has had in the past. Requesting referral to ObGyn for further evaluation and possible surgery- referral list printed and provided to pt. Will dc

## 2025-06-15 NOTE — ED ADULT TRIAGE NOTE - ESI TRIAGE ACUITY LEVEL, MLM
3 [email or web address]

## 2025-06-16 VITALS
SYSTOLIC BLOOD PRESSURE: 108 MMHG | HEART RATE: 88 BPM | RESPIRATION RATE: 16 BRPM | DIASTOLIC BLOOD PRESSURE: 74 MMHG | TEMPERATURE: 98 F | OXYGEN SATURATION: 100 %

## 2025-06-16 PROCEDURE — 74177 CT ABD & PELVIS W/CONTRAST: CPT | Mod: 26

## 2025-06-16 NOTE — ED ADULT NURSE REASSESSMENT NOTE - NS ED NURSE REASSESS COMMENT FT1
Report received from JOHANN chacon. Pt at baseline mental status, breathing even and unlabored in bed. Pt denies chest pain, SOB, headache, dizziness, blurry vision, N/V and chills at this time. Pt endorses no complaints currently. Bed in lowest position, call bell within reach. All safety precautions in place. patient expressing wishes to go home, ct resulted pending reassessment from MD.

## 2025-06-30 ENCOUNTER — APPOINTMENT (OUTPATIENT)
Dept: GYNECOLOGIC ONCOLOGY | Facility: CLINIC | Age: 50
End: 2025-06-30
Payer: MEDICAID

## 2025-06-30 PROBLEM — R52 ACUTE PAIN: Status: ACTIVE | Noted: 2025-06-30

## 2025-06-30 PROCEDURE — 99213 OFFICE O/P EST LOW 20 MIN: CPT

## 2025-06-30 RX ORDER — TRAMADOL HYDROCHLORIDE 50 MG/1
50 TABLET, COATED ORAL 3 TIMES DAILY
Qty: 15 | Refills: 0 | Status: ACTIVE | COMMUNITY
Start: 2025-06-30 | End: 1900-01-01

## 2025-07-01 ENCOUNTER — APPOINTMENT (OUTPATIENT)
Dept: MRI IMAGING | Facility: IMAGING CENTER | Age: 50
End: 2025-07-01

## 2025-07-01 ENCOUNTER — OUTPATIENT (OUTPATIENT)
Dept: OUTPATIENT SERVICES | Facility: HOSPITAL | Age: 50
LOS: 1 days | End: 2025-07-01
Payer: MEDICAID

## 2025-07-01 DIAGNOSIS — N83.299 OTHER OVARIAN CYST, UNSPECIFIED SIDE: ICD-10-CM

## 2025-07-01 DIAGNOSIS — Z98.890 OTHER SPECIFIED POSTPROCEDURAL STATES: Chronic | ICD-10-CM

## 2025-07-01 DIAGNOSIS — Z90.710 ACQUIRED ABSENCE OF BOTH CERVIX AND UTERUS: Chronic | ICD-10-CM

## 2025-07-01 PROCEDURE — 72197 MRI PELVIS W/O & W/DYE: CPT | Mod: 26

## 2025-07-01 PROCEDURE — 72197 MRI PELVIS W/O & W/DYE: CPT

## 2025-07-01 PROCEDURE — A9585: CPT

## 2025-07-01 NOTE — PATIENT PROFILE ADULT - NSPROGENBLOODRESTRICT_GEN_A_NUR
Refill request for Metoprolol     Prescription approved per [facility] Refill Protocol.        
none

## 2025-07-21 ENCOUNTER — APPOINTMENT (OUTPATIENT)
Dept: GYNECOLOGIC ONCOLOGY | Facility: CLINIC | Age: 50
End: 2025-07-21
Payer: MEDICAID

## 2025-07-21 VITALS
BODY MASS INDEX: 27.18 KG/M2 | HEIGHT: 57 IN | OXYGEN SATURATION: 98 % | WEIGHT: 126 LBS | TEMPERATURE: 98.3 F | HEART RATE: 93 BPM | SYSTOLIC BLOOD PRESSURE: 101 MMHG | DIASTOLIC BLOOD PRESSURE: 72 MMHG

## 2025-07-21 DIAGNOSIS — N94.89 OTHER SPECIFIED CONDITIONS ASSOCIATED WITH FEMALE GENITAL ORGANS AND MENSTRUAL CYCLE: ICD-10-CM

## 2025-07-21 DIAGNOSIS — N83.299 OTHER OVARIAN CYST, UNSPECIFIED SIDE: ICD-10-CM

## 2025-07-21 PROCEDURE — 99215 OFFICE O/P EST HI 40 MIN: CPT

## 2025-08-04 ENCOUNTER — NON-APPOINTMENT (OUTPATIENT)
Age: 50
End: 2025-08-04

## 2025-08-04 ENCOUNTER — EMERGENCY (EMERGENCY)
Facility: HOSPITAL | Age: 50
LOS: 1 days | End: 2025-08-04
Admitting: EMERGENCY MEDICINE
Payer: MEDICAID

## 2025-08-04 VITALS
HEART RATE: 88 BPM | OXYGEN SATURATION: 98 % | HEIGHT: 58 IN | DIASTOLIC BLOOD PRESSURE: 83 MMHG | RESPIRATION RATE: 18 BRPM | TEMPERATURE: 98 F | WEIGHT: 125 LBS | SYSTOLIC BLOOD PRESSURE: 138 MMHG

## 2025-08-04 DIAGNOSIS — Z98.890 OTHER SPECIFIED POSTPROCEDURAL STATES: Chronic | ICD-10-CM

## 2025-08-04 DIAGNOSIS — Z90.710 ACQUIRED ABSENCE OF BOTH CERVIX AND UTERUS: Chronic | ICD-10-CM

## 2025-08-04 PROCEDURE — L9991: CPT

## 2025-08-29 ENCOUNTER — APPOINTMENT (OUTPATIENT)
Dept: GYNECOLOGIC ONCOLOGY | Facility: HOSPITAL | Age: 50
End: 2025-08-29

## 2025-09-12 ENCOUNTER — APPOINTMENT (OUTPATIENT)
Dept: GYNECOLOGIC ONCOLOGY | Facility: CLINIC | Age: 50
End: 2025-09-12

## (undated) DEVICE — DRSG STERISTRIPS 0.5 X 4"

## (undated) DEVICE — VENODYNE/SCD SLEEVE CALF MEDIUM

## (undated) DEVICE — CLN COAG SCRUBBIE TIP

## (undated) DEVICE — BITE BLOCK ADULT 20 X 27MM (GREEN)

## (undated) DEVICE — ELCTR BOVIE BLADE 3/4" EXTENDED LENGTH 6"

## (undated) DEVICE — SOL INJ NS 0.9% 500ML 2 PORT

## (undated) DEVICE — SUT VICRYL 2-0 27" SH UNDYED

## (undated) DEVICE — BASIN SET SINGLE

## (undated) DEVICE — ELCTR BOVIE TIP BLADE INSULATED 6.5" EDGE

## (undated) DEVICE — SUT VICRYL 3-0 18" SH UNDYED (POP-OFF)

## (undated) DEVICE — ANESTHESIA CIRCUIT ADULT HMEF

## (undated) DEVICE — SUCTION YANKAUER OPEN TIP NO VENT CURVE

## (undated) DEVICE — TUBING IV SET GRAVITY 3Y 100" MACRO

## (undated) DEVICE — WARMING BLANKET FULL ADULT

## (undated) DEVICE — SUT MAXON 1 36" GS-24

## (undated) DEVICE — TUBING SUCTION 20FT

## (undated) DEVICE — CANISTER DISPOSABLE THIN WALL 3000CC

## (undated) DEVICE — LIGASURE BLUNT TIP 37CM

## (undated) DEVICE — BRUSH COLONOSCOPY CYTOLOGY

## (undated) DEVICE — TROCAR COVIDIEN BLUNT TIP HASSAN 12MM

## (undated) DEVICE — STAPLER SKIN MULTI DIRECTION W35

## (undated) DEVICE — LABELS BLANK W PEN

## (undated) DEVICE — PACK IV START WITH CHG

## (undated) DEVICE — SENSOR O2 FINGER ADULT

## (undated) DEVICE — SUT VICRYL 3-0 27" SH UNDYED

## (undated) DEVICE — TUBING SUCTION CONN 6FT STERILE

## (undated) DEVICE — CATH IV SAFE BC 22G X 1" (BLUE)

## (undated) DEVICE — SUCTION YANKAUER NO CONTROL VENT

## (undated) DEVICE — ELCTR GROUNDING PAD ADULT COVIDIEN

## (undated) DEVICE — BALLOON US ENDO

## (undated) DEVICE — CATH IV SAFE BC 20G X 1.16" (PINK)

## (undated) DEVICE — POSITIONER PINK PAD PIGAZZI SYSTEM

## (undated) DEVICE — SUT SILK 2-0 30" SH

## (undated) DEVICE — DRSG OPSITE 2.5 X 2"

## (undated) DEVICE — POSITIONER FOAM EGG CRATE ULNAR 2PCS (PINK)

## (undated) DEVICE — TROCAR COVIDIEN VISIPORT PLUS 5MM-12MM WITH FIXATION CANNULA

## (undated) DEVICE — BIOPSY FORCEP RADIAL JAW 4 STANDARD WITH NEEDLE

## (undated) DEVICE — TUBING OLYMPUS INSUFFLATION

## (undated) DEVICE — SYR ALLIANCE II INFLATION 60ML

## (undated) DEVICE — BLADE SCALPEL SAFETYLOCK #15

## (undated) DEVICE — PACK MAJOR ABDOMINAL W ENDO DRAPE

## (undated) DEVICE — FORCEP RADIAL JAW 4 JUMBO 2.8MM 3.2MM 240CM ORANGE DISP

## (undated) DEVICE — PACK ABDOMINAL CLOSURE

## (undated) DEVICE — PACK GENERAL LAPAROSCOPY

## (undated) DEVICE — CATH BLLN ULTRASONIC ENSOSCOPE

## (undated) DEVICE — IRRIGATOR BIO SHIELD

## (undated) DEVICE — Device

## (undated) DEVICE — POOLE SUCTION TIP

## (undated) DEVICE — GELPORT LAPAROSCOPIC SYSTEM

## (undated) DEVICE — GLV 8 PROTEXIS (CREAM) NEU-THERA

## (undated) DEVICE — TUBING SUCTION NONCONDUCTIVE 6MM X 12FT

## (undated) DEVICE — FOLEY HOLDER STATLOCK 2 WAY ADULT

## (undated) DEVICE — TUBING INSUFFLATION LAP FILTER 10FT

## (undated) DEVICE — SUT VICRYL 2-0 18" TIES UNDYED

## (undated) DEVICE — BLADE SCALPEL SAFETYLOCK #10